# Patient Record
Sex: FEMALE | Race: WHITE | NOT HISPANIC OR LATINO | Employment: FULL TIME | URBAN - METROPOLITAN AREA
[De-identification: names, ages, dates, MRNs, and addresses within clinical notes are randomized per-mention and may not be internally consistent; named-entity substitution may affect disease eponyms.]

---

## 2017-01-03 DIAGNOSIS — F51.01 PRIMARY INSOMNIA: ICD-10-CM

## 2017-01-03 RX ORDER — ZOLPIDEM TARTRATE 10 MG/1
TABLET ORAL
Qty: 60 TABLET | Refills: 3 | Status: ON HOLD | OUTPATIENT
Start: 2017-01-03 | End: 2018-08-01 | Stop reason: CLARIF

## 2017-01-04 ENCOUNTER — TELEPHONE (OUTPATIENT)
Dept: FAMILY MEDICINE | Facility: CLINIC | Age: 72
End: 2017-01-04

## 2017-01-04 NOTE — TELEPHONE ENCOUNTER
----- Message from Mera Yuen sent at 1/4/2017 12:00 PM CST -----  Contact: self  Patient [phone] (cell) is asking if Dr Gomez is trying to reschedule her appt on 02 02 17?/please advise

## 2017-01-19 ENCOUNTER — PATIENT OUTREACH (OUTPATIENT)
Dept: ADMINISTRATIVE | Facility: HOSPITAL | Age: 72
End: 2017-01-19

## 2017-01-19 NOTE — LETTER
January 27, 2017    Madison Long  [address] [address] [place] [address]             [facility]  [address]  [facility] [address]  Phone: [phone] Dear Mrs. Long:    We have tried to reach you by mychart unsuccessfully.        [facility] is committed to your overall health.  To help you get the most out of each of your visits, we will review your information to make sure you are up to date on all of your recommended tests and/or procedures.       Dr. Gomez has found that you may be due for     One-time Hepatitis C Screening lab test(a viral condition that can harm the liver)   Tetanus vaccine   Shingles vaccine   Pneumonia vaccine   Flu vaccine     If you have had any of the above done at another facility, please bring the records or information with you so that your record at [facility] will be complete.     If you are currently taking medication, please bring it with you to your appointment for review.     Also, if you have any type of Advanced Directives, please bring them with you to your office visit so we may scan them into your chart.     Jose Maria Doe LPN Clinical Care Coordinator   [facility]   [address] [facility] [address]   [place] [address]   [phone] (p)   [phone] (f)

## 2017-02-01 ENCOUNTER — TELEPHONE (OUTPATIENT)
Dept: FAMILY MEDICINE | Facility: CLINIC | Age: 72
End: 2017-02-01

## 2017-02-02 ENCOUNTER — OFFICE VISIT (OUTPATIENT)
Dept: FAMILY MEDICINE | Facility: CLINIC | Age: 72
End: 2017-02-02
Payer: MEDICARE

## 2017-02-02 VITALS
DIASTOLIC BLOOD PRESSURE: 80 MMHG | BODY MASS INDEX: 26.74 KG/M2 | OXYGEN SATURATION: 98 % | HEART RATE: 70 BPM | HEIGHT: 65 IN | SYSTOLIC BLOOD PRESSURE: 138 MMHG | WEIGHT: 160.5 LBS

## 2017-02-02 DIAGNOSIS — E78.00 HYPERCHOLESTEROLEMIA: ICD-10-CM

## 2017-02-02 DIAGNOSIS — J44.9 CHRONIC OBSTRUCTIVE PULMONARY DISEASE, UNSPECIFIED COPD TYPE: Primary | ICD-10-CM

## 2017-02-02 DIAGNOSIS — I10 ESSENTIAL HYPERTENSION: Chronic | ICD-10-CM

## 2017-02-02 DIAGNOSIS — K21.9 GASTROESOPHAGEAL REFLUX DISEASE WITHOUT ESOPHAGITIS: ICD-10-CM

## 2017-02-02 PROCEDURE — 3075F SYST BP GE 130 - 139MM HG: CPT | Mod: S$GLB,,, | Performed by: FAMILY MEDICINE

## 2017-02-02 PROCEDURE — 99214 OFFICE O/P EST MOD 30 MIN: CPT | Mod: S$GLB,,, | Performed by: FAMILY MEDICINE

## 2017-02-02 PROCEDURE — 90670 PCV13 VACCINE IM: CPT | Mod: S$GLB,,, | Performed by: FAMILY MEDICINE

## 2017-02-02 PROCEDURE — 99999 PR PBB SHADOW E&M-EST. PATIENT-LVL IV: CPT | Mod: PBBFAC,,, | Performed by: FAMILY MEDICINE

## 2017-02-02 PROCEDURE — 1160F RVW MEDS BY RX/DR IN RCRD: CPT | Mod: S$GLB,,, | Performed by: FAMILY MEDICINE

## 2017-02-02 PROCEDURE — 99499 UNLISTED E&M SERVICE: CPT | Mod: S$GLB,,, | Performed by: FAMILY MEDICINE

## 2017-02-02 PROCEDURE — 1157F ADVNC CARE PLAN IN RCRD: CPT | Mod: S$GLB,,, | Performed by: FAMILY MEDICINE

## 2017-02-02 PROCEDURE — 3079F DIAST BP 80-89 MM HG: CPT | Mod: S$GLB,,, | Performed by: FAMILY MEDICINE

## 2017-02-02 PROCEDURE — 1159F MED LIST DOCD IN RCRD: CPT | Mod: S$GLB,,, | Performed by: FAMILY MEDICINE

## 2017-02-02 PROCEDURE — G0009 ADMIN PNEUMOCOCCAL VACCINE: HCPCS | Mod: S$GLB,,, | Performed by: FAMILY MEDICINE

## 2017-02-02 PROCEDURE — 1126F AMNT PAIN NOTED NONE PRSNT: CPT | Mod: S$GLB,,, | Performed by: FAMILY MEDICINE

## 2017-02-02 RX ORDER — ATORVASTATIN CALCIUM 10 MG/1
10 TABLET, FILM COATED ORAL DAILY
Qty: 90 TABLET | Refills: 3 | Status: SHIPPED | OUTPATIENT
Start: 2017-02-02 | End: 2017-06-02 | Stop reason: SDUPTHER

## 2017-02-02 RX ORDER — ATORVASTATIN CALCIUM 10 MG/1
10 TABLET, FILM COATED ORAL DAILY
Qty: 90 TABLET | Refills: 3 | Status: SHIPPED | OUTPATIENT
Start: 2017-02-02 | End: 2018-02-28 | Stop reason: SDUPTHER

## 2017-02-02 NOTE — MR AVS SNAPSHOT
[facility]  [address] [facility] [address] [place] [address]  Phone: [phone]  Fax: [phone]                  Madison Long   2017 9:20 AM   Office Visit    Description:  Female : 1945   Provider:  Jose J Gomez MD   Department:  [facility]           Reason for Visit     Annual Exam           Diagnoses this Visit        Comments    Chronic obstructive pulmonary disease, unspecified COPD type    -  Primary     Hypercholesterolemia         Essential hypertension         Gastroesophageal reflux disease without esophagitis                To Do List           Future Appointments        Provider Department Dept Phone    2017 10:30 AM MD [facility]n - Gastroenterology [phone]    2017 12:30 PM VASCULAR, LAB [facility] - Vascular Laboratory [phone]    2017 1:00 PM MD [facility] - Vascular Surgery [phone]    2017 1:00 PM Kamran Hughes MD [place] - Urology [phone]    2017 9:20 AM Jose J Gomez MD [facility] [phone]      Goals (5 Years of Data)     None      Follow-Up and Disposition     Return in about 4 months (around 2017).       These Medications        Disp Refills Start End    atorvastatin (LIPITOR) 10 MG tablet 90 tablet 3 2017    Take 1 tablet (10 mg total) by mouth once daily. - Oral    Pharmacy: [id number] [place] - [address] [facility] Ph #: [phone]       atorvastatin (LIPITOR) 10 MG tablet 90 tablet 3 2017    Take 1 tablet (10 mg total) by mouth once daily. - Oral    Pharmacy: [id number] - [place] - [address] Ph #: [phone]         [facility] On Call     [facility] On Call Nurse Care Line -  Assistance  Registered nurses in the [facility] On Call Center provide clinical advisement, health education, appointment booking, and other advisory services.  Call for this free  service at [phone].             Medications           Message regarding Medications     Verify the changes and/or additions to your medication regime listed below are the same as discussed with your clinician today.  If any of these changes or additions are incorrect, please notify your healthcare provider.        STOP taking these medications     azelastine (ASTELIN) 137 mcg (0.1 %) nasal spray 1 spray (137 mcg total) by Nasal route 2 (two) times daily.    oxybutynin (DITROPAN) 5 MG Tab Take 1 tablet (5 mg total) by mouth 3 (three) times daily.           Verify that the below list of medications is an accurate representation of the medications you are currently taking.  If none reported, the list may be blank. If incorrect, please contact your healthcare provider. Carry this list with you in case of emergency.           Current Medications     albuterol (ACCUNEB) 1.25 mg/3 mL Nebu Take 3 mLs (1.25 mg total) by nebulization every 6 (six) hours as needed.    aspirin 81 MG Chew Take 81 mg by mouth every other day.     budesonide-formoterol 160-4.5 mcg (SYMBICORT) 160-4.5 mcg/actuation HFAA Inhale 2 puffs into the lungs 2 (two) times daily.    desoximetasone (TOPICORT LP) 0.05 % cream Twice a day  PRN    DUREZOL 0.05 % Drop ophthalmic solution     econazole nitrate 1 % cream Twice a day  PRN    fish oil-omega-3 fatty acids 300-1,000 mg capsule Take 2 g by mouth once daily.    fluorouracil (EFUDEX) 5 % cream AAA bid x 2 weeks    fluticasone (FLONASE) 50 mcg/actuation nasal spray APPLY TWO SPRAYS NASALLY ONCE DAILY    guaifenesin (MUCINEX) 600 mg 12 hr tablet Take 1,200 mg by mouth 2 (two) times daily.    ketorolac 0.4% (ACULAR) 0.4 % Drop     levocetirizine (XYZAL) 5 MG tablet Take 1 tablet (5 mg total) by mouth every evening.    losartan (COZAAR) 50 MG tablet Take 1 tablet (50 mg total) by mouth once daily.    multivit,stress formula-zinc (STRESS FORMULA WITH ZINC) Tab Take by mouth.    omeprazole (PRILOSEC) 40  "MG capsule Take 1 capsule (40 mg total) by mouth every morning.    tretinoin (RETIN-A) 0.1 % cream Use hs on face    VENTOLIN HFA 90 mcg/actuation inhaler INHALE ONE OR TWO PUFFS BY MOUTH EVERY SIX HOURS AS NEEDED FORWHEEZING.    zolpidem (AMBIEN) 10 mg Tab 1 or 2 tablets every HS prn insomnia    atorvastatin (LIPITOR) 10 MG tablet Take 1 tablet (10 mg total) by mouth once daily.    atorvastatin (LIPITOR) 10 MG tablet Take 1 tablet (10 mg total) by mouth once daily.           Clinical Reference Information           Vital Signs - Last Recorded  Most recent update: 2/2/2017  9:24 AM by Bettye Elliott LPN    BP Pulse Ht Wt SpO2 BMI    138/80 70 5' 5" (1.651 m) 72.8 kg (160 lb 7.9 oz) 98% 26.71 kg/m2      Blood Pressure          Most Recent Value    BP  138/80      Allergies as of 2/2/2017     Penicillins      Immunizations Administered on Date of Encounter - 2/2/2017     Name Date Dose VIS Date Route    Pneumococcal Conjugate - 13 Valent  Incomplete 0.5 mL 11/5/2015 Intramuscular      Orders Placed During Today's Visit      Normal Orders This Visit    Pneumococcal Conjugate Vaccine (13 Valent) (IM)       "

## 2017-02-08 ENCOUNTER — TELEPHONE (OUTPATIENT)
Dept: GASTROENTEROLOGY | Facility: CLINIC | Age: 72
End: 2017-02-08

## 2017-02-08 NOTE — TELEPHONE ENCOUNTER
Provider Canceled (dr sierra will not be in the office , appt cxl, pt can speak with anyone to rescheduled appt

## 2017-02-12 NOTE — PROGRESS NOTES
HISTORY OF PRESENT ILLNESS:  A pleasant female, 71 years of age.  She is a   patient of Dr. Gómez Doherty at the main clinic for COPD.  She also has   hypertension, GERD and hyperlipidemia.  She is a nonsmoker.  She has had several   surgeries.  Past medical, surgical and social history was reviewed.  No recent   chest pain or shortness of breath.  No nausea, vomiting, dysphagia or melena.    No recent hospitalizations.    PHYSICAL EXAMINATION:  Dusty female who looks younger than her age.  Pulse ox   normal.  Chest clear.  No wheezes, rhonchi, clubbing, cyanosis or peripheral   edema.  Regular rhythm.  No murmur or gallop.  No scleral icterus, jaundice or   hepatosplenomegaly.  BMI 26.    ASSESSMENT:  COPD, hypertension, GERD and hypercholesterolemia.    PLAN:  We discussed medications, reviewed labs.  We discussed COPD.  We will   update Prevnar 13 and see her back in followup.      NIKKI  dd: 02/12/2017 13:51:21 (CST)  td: 02/12/2017 14:02:14 (CST)  Doc ID   #[id number]  Job ID #[id number]    CC:

## 2017-02-14 ENCOUNTER — OFFICE VISIT (OUTPATIENT)
Dept: VASCULAR SURGERY | Facility: CLINIC | Age: 72
End: 2017-02-14
Payer: MEDICARE

## 2017-02-14 ENCOUNTER — HOSPITAL ENCOUNTER (OUTPATIENT)
Dept: VASCULAR SURGERY | Facility: CLINIC | Age: 72
Discharge: HOME | End: 2017-02-14
Attending: SURGERY
Payer: MEDICARE

## 2017-02-14 VITALS
SYSTOLIC BLOOD PRESSURE: 139 MMHG | HEART RATE: 78 BPM | HEIGHT: 66 IN | DIASTOLIC BLOOD PRESSURE: 76 MMHG | BODY MASS INDEX: 25.39 KG/M2 | TEMPERATURE: 97 F | WEIGHT: 158 LBS

## 2017-02-14 DIAGNOSIS — I65.29 STENOSIS OF CAROTID ARTERY, UNSPECIFIED LATERALITY: Primary | ICD-10-CM

## 2017-02-14 DIAGNOSIS — I65.23 BILATERAL CAROTID ARTERY OCCLUSION: ICD-10-CM

## 2017-02-14 DIAGNOSIS — I65.23 BILATERAL CAROTID ARTERY STENOSIS: ICD-10-CM

## 2017-02-14 PROCEDURE — 3078F DIAST BP <80 MM HG: CPT | Mod: S$GLB,,, | Performed by: SURGERY

## 2017-02-14 PROCEDURE — 99999 PR PBB SHADOW E&M-EST. PATIENT-LVL III: CPT | Mod: PBBFAC,,, | Performed by: SURGERY

## 2017-02-14 PROCEDURE — 1159F MED LIST DOCD IN RCRD: CPT | Mod: S$GLB,,, | Performed by: SURGERY

## 2017-02-14 PROCEDURE — 1157F ADVNC CARE PLAN IN RCRD: CPT | Mod: S$GLB,,, | Performed by: SURGERY

## 2017-02-14 PROCEDURE — 1126F AMNT PAIN NOTED NONE PRSNT: CPT | Mod: S$GLB,,, | Performed by: SURGERY

## 2017-02-14 PROCEDURE — 3075F SYST BP GE 130 - 139MM HG: CPT | Mod: S$GLB,,, | Performed by: SURGERY

## 2017-02-14 PROCEDURE — 93880 EXTRACRANIAL BILAT STUDY: CPT | Mod: S$GLB,,, | Performed by: SURGERY

## 2017-02-14 PROCEDURE — 99213 OFFICE O/P EST LOW 20 MIN: CPT | Mod: S$GLB,,, | Performed by: SURGERY

## 2017-02-14 PROCEDURE — 1160F RVW MEDS BY RX/DR IN RCRD: CPT | Mod: S$GLB,,, | Performed by: SURGERY

## 2017-02-14 NOTE — PROGRESS NOTES
See my prior note; review of systems, family history and social history are   unchanged.    HISTORY OF PRESENT ILLNESS:  A 71-year-old female initially seen four years ago   for moderate carotid stenosis by an outside, duplex suggesting a 50% stenosis.    Her exam showed no stenosis whatsoever.    She now returns wishing to have this rechecked.  She denies stroke, TIA or   amaurosis.  She has no history of coronary disease or peripheral arterial   occlusive disease.    PHYSICAL EXAMINATION:  VITAL SIGNS:  See nursing note.  NEUROLOGIC:  Cranial nerves VII through XII are intact, 5/5 motor strength in   all extremities.    IMAGING:  Carotid duplex today reveals no carotid stenosis bilaterally.    ASSESSMENT:  No carotid stenosis.  I reassured her that I do not believe she   needs any longitudinal surveillance.          /ole [id number] brooke(s)        LINA/LISETTE  dd: 02/14/2017 13:36:40 (CST)  td: 02/15/2017 01:51:47 (CST)  Doc ID   #[id number]  Job ID #[id number]    CC:

## 2017-02-14 NOTE — MR AVS SNAPSHOT
[facility] - Vascular Surgery  [address]  [facility] [address]  Phone: [phone]  Fax: [phone]                  Madison Long   2017 1:00 PM   Office Visit    Description:  Female : 1945   Provider:  VERONICA Coon III, MD   Department:  [facility] - Vascular Surgery           Reason for Visit     Follow-up           Diagnoses this Visit        Comments    Stenosis of carotid artery, unspecified laterality    -  Primary            To Do List           Future Appointments        Provider Department Dept Phone    2017 1:00 PM MD [facility] [place] - Urology [phone]    2017 9:20 AM Jose J Gomez MD [facility] [phone]      Goals (5 Years of Data)     None      Follow-Up and Disposition     Return if symptoms worsen or fail to improve.      [facility] On Call     [facility] On Call Nurse Care Line -  Assistance  Registered nurses in the [facility] On Call Center provide clinical advisement, health education, appointment booking, and other advisory services.  Call for this free service at [phone].             Medications           Message regarding Medications     Verify the changes and/or additions to your medication regime listed below are the same as discussed with your clinician today.  If any of these changes or additions are incorrect, please notify your healthcare provider.             Verify that the below list of medications is an accurate representation of the medications you are currently taking.  If none reported, the list may be blank. If incorrect, please contact your healthcare provider. Carry this list with you in case of emergency.           Current Medications     albuterol (ACCUNEB) 1.25 mg/3 mL Nebu Take 3 mLs (1.25 mg total) by nebulization every 6 (six) hours as needed.    aspirin 81 MG Chew Take 81 mg by mouth every other day.     atorvastatin (LIPITOR) 10 MG tablet Take 1 tablet (10 mg total) by mouth once  "daily.    atorvastatin (LIPITOR) 10 MG tablet Take 1 tablet (10 mg total) by mouth once daily.    budesonide-formoterol 160-4.5 mcg (SYMBICORT) 160-4.5 mcg/actuation HFAA Inhale 2 puffs into the lungs 2 (two) times daily.    desoximetasone (TOPICORT LP) 0.05 % cream Twice a day  PRN    DUREZOL 0.05 % Drop ophthalmic solution     econazole nitrate 1 % cream Twice a day  PRN    fish oil-omega-3 fatty acids 300-1,000 mg capsule Take 2 g by mouth once daily.    fluorouracil (EFUDEX) 5 % cream AAA bid x 2 weeks    fluticasone (FLONASE) 50 mcg/actuation nasal spray APPLY TWO SPRAYS NASALLY ONCE DAILY    guaifenesin (MUCINEX) 600 mg 12 hr tablet Take 1,200 mg by mouth 2 (two) times daily.    ketorolac 0.4% (ACULAR) 0.4 % Drop     levocetirizine (XYZAL) 5 MG tablet Take 1 tablet (5 mg total) by mouth every evening.    losartan (COZAAR) 50 MG tablet Take 1 tablet (50 mg total) by mouth once daily.    multivit,stress formula-zinc (STRESS FORMULA WITH ZINC) Tab Take by mouth.    omeprazole (PRILOSEC) 40 MG capsule Take 1 capsule (40 mg total) by mouth every morning.    tretinoin (RETIN-A) 0.1 % cream Use hs on face    VENTOLIN HFA 90 mcg/actuation inhaler INHALE ONE OR TWO PUFFS BY MOUTH EVERY SIX HOURS AS NEEDED FORWHEEZING.    zolpidem (AMBIEN) 10 mg Tab 1 or 2 tablets every HS prn insomnia           Clinical Reference Information           Your Vitals Were     BP Pulse Temp Height Weight BMI    139/76 (BP Location: Right arm, Patient Position: Sitting, BP Method: Automatic) 78 96.8 °F (36 °C) (Oral) 5' 6" (1.676 m) 71.7 kg (158 lb) 25.5 kg/m2      Blood Pressure          Most Recent Value    Right Arm BP - Sitting  139/76    Left Arm BP - Sitting  138/72    BP  139/76      Allergies as of 2/14/2017     Penicillins      Immunizations Administered on Date of Encounter - 2/14/2017     None      Language Assistance Services     ATTENTION: Language assistance services are available, free of charge. Please call [phone].  "     ATENCIÓN: Si habla español, tiene a sales disposición servicios gratuitos de asistencia lingüística. [place] al [phone].     CHÚ Ý: N?u b?n nói Ti?ng Vi?t, có các d?ch v? h? tr? ngôn ng? mi?n phí dành cho b?n. G?i s? [phone].         [facility] complies with applicable Federal civil rights laws and does not discriminate on the basis of race, color, national origin, age, disability, or sex.

## 2017-03-21 DIAGNOSIS — J44.9 CHRONIC OBSTRUCTIVE PULMONARY DISEASE, UNSPECIFIED COPD TYPE: ICD-10-CM

## 2017-03-21 RX ORDER — BUDESONIDE AND FORMOTEROL FUMARATE DIHYDRATE 160; 4.5 UG/1; UG/1
AEROSOL RESPIRATORY (INHALATION)
Qty: 11 G | Refills: 7 | Status: SHIPPED | OUTPATIENT
Start: 2017-03-21 | End: 2018-05-21 | Stop reason: SDUPTHER

## 2017-05-23 DIAGNOSIS — J30.1 SEASONAL ALLERGIC RHINITIS DUE TO POLLEN: Primary | ICD-10-CM

## 2017-05-23 RX ORDER — FLUTICASONE PROPIONATE 50 MCG
SPRAY, SUSPENSION (ML) NASAL
Qty: 16 G | Refills: 11 | Status: SHIPPED | OUTPATIENT
Start: 2017-05-23 | End: 2018-05-08 | Stop reason: SDUPTHER

## 2017-06-02 ENCOUNTER — OFFICE VISIT (OUTPATIENT)
Dept: FAMILY MEDICINE | Facility: CLINIC | Age: 72
End: 2017-06-02
Payer: MEDICARE

## 2017-06-02 VITALS
WEIGHT: 158 LBS | BODY MASS INDEX: 25.39 KG/M2 | SYSTOLIC BLOOD PRESSURE: 120 MMHG | HEIGHT: 66 IN | DIASTOLIC BLOOD PRESSURE: 80 MMHG | HEART RATE: 86 BPM

## 2017-06-02 DIAGNOSIS — I10 ESSENTIAL HYPERTENSION: Primary | ICD-10-CM

## 2017-06-02 DIAGNOSIS — J44.1 COPD EXACERBATION: ICD-10-CM

## 2017-06-02 DIAGNOSIS — E78.5 HYPERLIPIDEMIA, UNSPECIFIED HYPERLIPIDEMIA TYPE: ICD-10-CM

## 2017-06-02 DIAGNOSIS — K21.9 GASTROESOPHAGEAL REFLUX DISEASE WITHOUT ESOPHAGITIS: ICD-10-CM

## 2017-06-02 PROCEDURE — 99499 UNLISTED E&M SERVICE: CPT | Mod: S$GLB,,, | Performed by: FAMILY MEDICINE

## 2017-06-02 PROCEDURE — 99214 OFFICE O/P EST MOD 30 MIN: CPT | Mod: S$GLB,,, | Performed by: FAMILY MEDICINE

## 2017-06-02 PROCEDURE — 1159F MED LIST DOCD IN RCRD: CPT | Mod: S$GLB,,, | Performed by: FAMILY MEDICINE

## 2017-06-02 PROCEDURE — 1157F ADVNC CARE PLAN IN RCRD: CPT | Mod: S$GLB,,, | Performed by: FAMILY MEDICINE

## 2017-06-02 PROCEDURE — 1126F AMNT PAIN NOTED NONE PRSNT: CPT | Mod: S$GLB,,, | Performed by: FAMILY MEDICINE

## 2017-06-02 PROCEDURE — 99999 PR PBB SHADOW E&M-EST. PATIENT-LVL III: CPT | Mod: PBBFAC,,, | Performed by: FAMILY MEDICINE

## 2017-06-05 ENCOUNTER — OFFICE VISIT (OUTPATIENT)
Dept: DERMATOLOGY | Facility: CLINIC | Age: 72
End: 2017-06-05
Payer: MEDICARE

## 2017-06-05 ENCOUNTER — OFFICE VISIT (OUTPATIENT)
Dept: GASTROENTEROLOGY | Facility: CLINIC | Age: 72
End: 2017-06-05
Payer: MEDICARE

## 2017-06-05 ENCOUNTER — LAB VISIT (OUTPATIENT)
Dept: LAB | Facility: HOSPITAL | Age: 72
End: 2017-06-05
Attending: FAMILY MEDICINE
Payer: MEDICARE

## 2017-06-05 VITALS — WEIGHT: 158 LBS | BODY MASS INDEX: 25.5 KG/M2

## 2017-06-05 VITALS
HEIGHT: 66 IN | SYSTOLIC BLOOD PRESSURE: 123 MMHG | HEART RATE: 103 BPM | BODY MASS INDEX: 26.08 KG/M2 | DIASTOLIC BLOOD PRESSURE: 78 MMHG | WEIGHT: 162.25 LBS

## 2017-06-05 DIAGNOSIS — L82.1 SEBORRHEIC KERATOSES: ICD-10-CM

## 2017-06-05 DIAGNOSIS — Z79.899 ENCOUNTER FOR MONITORING LONG-TERM PROTON PUMP INHIBITOR THERAPY: ICD-10-CM

## 2017-06-05 DIAGNOSIS — Z51.81 ENCOUNTER FOR MONITORING LONG-TERM PROTON PUMP INHIBITOR THERAPY: ICD-10-CM

## 2017-06-05 DIAGNOSIS — L57.0 MULTIPLE ACTINIC KERATOSES: ICD-10-CM

## 2017-06-05 DIAGNOSIS — I10 ESSENTIAL HYPERTENSION: ICD-10-CM

## 2017-06-05 DIAGNOSIS — Z85.828 HISTORY OF SKIN CANCER: Primary | ICD-10-CM

## 2017-06-05 DIAGNOSIS — K21.00 GASTROESOPHAGEAL REFLUX DISEASE WITH ESOPHAGITIS: Primary | ICD-10-CM

## 2017-06-05 LAB
ALBUMIN SERPL BCP-MCNC: 4.1 G/DL
ALP SERPL-CCNC: 65 U/L
ALT SERPL W/O P-5'-P-CCNC: 19 U/L
ANION GAP SERPL CALC-SCNC: 11 MMOL/L
AST SERPL-CCNC: 24 U/L
BILIRUB SERPL-MCNC: 0.7 MG/DL
BUN SERPL-MCNC: 9 MG/DL
CALCIUM SERPL-MCNC: 9.5 MG/DL
CHLORIDE SERPL-SCNC: 100 MMOL/L
CHOLEST/HDLC SERPL: 3 {RATIO}
CO2 SERPL-SCNC: 25 MMOL/L
CREAT SERPL-MCNC: 0.8 MG/DL
EST. GFR  (AFRICAN AMERICAN): >60 ML/MIN/1.73 M^2
EST. GFR  (NON AFRICAN AMERICAN): >60 ML/MIN/1.73 M^2
GLUCOSE SERPL-MCNC: 103 MG/DL
HDL/CHOLESTEROL RATIO: 33.6 %
HDLC SERPL-MCNC: 223 MG/DL
HDLC SERPL-MCNC: 75 MG/DL
LDLC SERPL CALC-MCNC: 137 MG/DL
NONHDLC SERPL-MCNC: 148 MG/DL
POTASSIUM SERPL-SCNC: 3.8 MMOL/L
PROT SERPL-MCNC: 7.2 G/DL
SODIUM SERPL-SCNC: 136 MMOL/L
TRIGL SERPL-MCNC: 55 MG/DL

## 2017-06-05 PROCEDURE — 17003 DESTRUCT PREMALG LES 2-14: CPT | Mod: S$GLB,,, | Performed by: DERMATOLOGY

## 2017-06-05 PROCEDURE — 99214 OFFICE O/P EST MOD 30 MIN: CPT | Mod: 25,S$GLB,, | Performed by: DERMATOLOGY

## 2017-06-05 PROCEDURE — 99999 PR PBB SHADOW E&M-EST. PATIENT-LVL III: CPT | Mod: PBBFAC,,, | Performed by: NURSE PRACTITIONER

## 2017-06-05 PROCEDURE — 1157F ADVNC CARE PLAN IN RCRD: CPT | Mod: S$GLB,,, | Performed by: NURSE PRACTITIONER

## 2017-06-05 PROCEDURE — 17000 DESTRUCT PREMALG LESION: CPT | Mod: S$GLB,,, | Performed by: DERMATOLOGY

## 2017-06-05 PROCEDURE — 1157F ADVNC CARE PLAN IN RCRD: CPT | Mod: S$GLB,,, | Performed by: DERMATOLOGY

## 2017-06-05 PROCEDURE — 99999 PR PBB SHADOW E&M-EST. PATIENT-LVL II: CPT | Mod: PBBFAC,,, | Performed by: DERMATOLOGY

## 2017-06-05 PROCEDURE — 99499 UNLISTED E&M SERVICE: CPT | Mod: S$GLB,,, | Performed by: NURSE PRACTITIONER

## 2017-06-05 PROCEDURE — 99214 OFFICE O/P EST MOD 30 MIN: CPT | Mod: S$GLB,,, | Performed by: NURSE PRACTITIONER

## 2017-06-05 PROCEDURE — 1126F AMNT PAIN NOTED NONE PRSNT: CPT | Mod: S$GLB,,, | Performed by: NURSE PRACTITIONER

## 2017-06-05 PROCEDURE — 1159F MED LIST DOCD IN RCRD: CPT | Mod: S$GLB,,, | Performed by: DERMATOLOGY

## 2017-06-05 PROCEDURE — 1159F MED LIST DOCD IN RCRD: CPT | Mod: S$GLB,,, | Performed by: NURSE PRACTITIONER

## 2017-06-05 RX ORDER — FLUOROURACIL 50 MG/G
CREAM TOPICAL
Qty: 40 G | Refills: 1 | Status: SHIPPED | OUTPATIENT
Start: 2017-06-05 | End: 2018-05-07

## 2017-06-05 RX ORDER — OMEPRAZOLE 20 MG/1
20 TABLET, DELAYED RELEASE ORAL EVERY MORNING
Qty: 30 TABLET | Refills: 11 | COMMUNITY
Start: 2017-06-05 | End: 2022-06-30

## 2017-06-05 NOTE — PROGRESS NOTES
[facility] Note    Reason for Visit:  The primary encounter diagnosis was Gastroesophageal reflux disease with esophagitis. A diagnosis of Encounter for monitoring long-term proton pump inhibitor therapy was also pertinent to this visit.    PCP:   Primary Doctor No       Referring MD:  No referring provider defined for this encounter.    HPI:  This is a 72 y.o. female here for evaluation of Gerd. Ms. Long was last seen in clinic with Dr. Anderson in 2008. I reviewed patient's medical record dating back 10 years. EGD done last 2011 and chronic inflammation present to esophagus and gastritis neg for H. Pylori.     Currently, has been taking Prilosec for a couple weeks then Tums for a couple weeks. Pt reports concern of side effects with PPIs. Having daily reflux symptoms of pyrosis and burning in throat when does not take Prilosec, with Prilosec daily no breakthrough symptoms. Food triggers are spicy food, greasy food, wine, and liquor. Denies dysphagia/odynophagia. NSAID usage- taking Aspirin 81 mg.     Having a normal formed stool daily. Drinks a glass of prune juice daily. Denies hematochezia, hematemesis, melena, BRBPR, black/tarry stools, and coffee ground emesis.     ROS:  Constitutional: No fevers, no chills, No unintentional weight loss, no fatigue   ENT: No allergies  CV: No chest pain, no palpitations, no perif. edema  Pulm: No cough, No shortness of breath, no wheezes, no sputum  Ophtho: No vision changes  GI: see HPI; also no nausea, no vomiting, no change in appetite  Derm: No rash  Heme: No lymphadenopathy, No bruising  MSK: No arthritis, no muscle pain, no muscle weakness  : No dysuria, No hematuria  Endo: No hot or cold intolerance  Neuro: No syncope, No seizure     Medical History:  has a past medical history of Allergy; Asthma; Basal cell carcinoma; GERD (gastroesophageal reflux disease); Hyperlipidemia; and Hypertension.    Surgical History:  has a past surgical  history that includes Hysterectomy; Excision basal cell carcinoma; Colonoscopy (2013); and Upper gastrointestinal endoscopy.    Family History: family history includes Asthma in her mother; Stroke (age of onset: 77) in her father..     Social History:  reports that she has never smoked. She has never used smokeless tobacco. She reports that she drinks about 1.2 oz of alcohol per week . She reports that she does not use drugs.    Review of patient's allergies indicates:   Allergen Reactions    Penicillins Hives     Current Outpatient Prescriptions   Medication Sig    aspirin 81 MG Chew Take 81 mg by mouth daily as needed.     atorvastatin (LIPITOR) 10 MG tablet Take 1 tablet (10 mg total) by mouth once daily.    desoximetasone (TOPICORT LP) 0.05 % cream Twice a day  PRN    DUREZOL 0.05 % Drop ophthalmic solution     fish oil-omega-3 fatty acids 300-1,000 mg capsule Take 2 g by mouth once daily.    fluorouracil (EFUDEX) 5 % cream AAA bid x 2 weeks    fluticasone (FLONASE) 50 mcg/actuation nasal spray APPLY TWO SPRAYS NASALLY ONCE DAILY    guaifenesin (MUCINEX) 600 mg 12 hr tablet Take 1,200 mg by mouth 2 (two) times daily.    ketorolac 0.4% (ACULAR) 0.4 % Drop     losartan (COZAAR) 50 MG tablet Take 1 tablet (50 mg total) by mouth once daily.    multivit,stress formula-zinc (STRESS FORMULA WITH ZINC) Tab Take by mouth.    SYMBICORT 160-4.5 mcg/actuation HFAA INHALE 2 PUFFS BY MOUTH TWICE DAILY    VENTOLIN HFA 90 mcg/actuation inhaler INHALE ONE OR TWO PUFFS BY MOUTH EVERY SIX HOURS AS NEEDED FORWHEEZING.    zolpidem (AMBIEN) 10 mg Tab 1 or 2 tablets every HS prn insomnia    econazole nitrate 1 % cream Twice a day  PRN    omeprazole (PRILOSEC OTC) 20 MG tablet Take 1 tablet (20 mg total) by mouth every morning. Take 30 minutes before your first protein containing meal.    tretinoin (RETIN-A) 0.1 % cream Use hs on face     No current facility-administered medications for this visit.      Objective  "Findings:    Vital Signs:  /78   Pulse 103   Ht 5' 5.5" (1.664 m)   Wt 73.6 kg (162 lb 4.1 oz)   BMI 26.59 kg/m²   Body mass index is 26.59 kg/m².    Physical Exam:  General Appearance: Well appearing in no acute distress  Head: Normocephalic, without obvious abnormality  Eyes: No scleral icterus, EOMI  ENT: Neck supple, Lips, mucosa, and tongue normal; teeth and gums normal  Lungs: CTA bilaterally in anterior and posterior fields, no wheezes, no crackles.  Heart: Regular rate and rhythm, no murmurs heard  Abdomen: Soft, non tender, non distended with positive bowel sounds in all four quadrants.  Extremities: No clubbing, cyanosis or edema  Skin: No rash to exposed areas  Neurologic: AAOx4    Labs:  Lab Results   Component Value Date    WBC 8.50 12/06/2016    HGB 14.8 12/06/2016    HCT 43.7 12/06/2016     12/06/2016    CHOL 223 (H) 06/05/2017    TRIG 55 06/05/2017    HDL 75 06/05/2017    ALT 19 06/05/2017    AST 24 06/05/2017     06/05/2017    K 3.8 06/05/2017     06/05/2017    CREATININE 0.8 06/05/2017    BUN 9 06/05/2017    CO2 25 06/05/2017    TSH 1.650 10/26/2016    INR 1.0 12/06/2016     Endoscopy:    EGD- 6/15/11 Esophageal mucosal changes suspicious for Rosen's bx and negative for rosen's- chronic inflammation. Hiatal hernia and gastritis bx negative for H. Pylori.     Colonoscopy- 3/1/13 Tortuous colon.There is restrictive mobility of the colon in the sigmoid and transverse colon region probable secondary to adhesions. Repeat in 5 years, 2018.     Assessment:  1. Gastroesophageal reflux disease with esophagitis    2. Encounter for monitoring long-term proton pump inhibitor therapy      Recommendations:  1. Begin taking Prilosec 20 mg 30-45 minutes before first protein containing meal, rx sen to pharmacy. If reflux symptoms are not controlled begin taking Prilosec 40 mg daily. Reviewed lifestyle modifications and handout given. Can take OTC Zantac as needed at night.   2. Labs " tomorrow- Vitamin D, Vitamin B12, and Magnesium. Patient made aware of the increased risk of deficiencies in Vitamin D, B12, and Magnesium being on a PPI for long term. Explained to patient increased risk of C. Diff associated diarrhea and routine bone density scans are recommended per PCP while on long term PPI.     Follow up in 1 year unless symptoms worsen.     Order summary:  Orders Placed This Encounter    Vitamin D    Vitamin B12    Magnesium    omeprazole (PRILOSEC OTC) 20 MG tablet     Thank you so much for allowing me to participate in the care of Madison Fan, DELVIN, FNP-C

## 2017-06-05 NOTE — PATIENT INSTRUCTIONS
For GERD/Reflux:    Take your Prilosec 20 mg 30-45 minutes before your first protein containing meal (breakfast) every day. If symptoms are not controlled increase back to Prilosec 40 mg.     Remain upright for at least 3 hours after eating.    Elevate the head of the bead about 6 inches.  Some patients place cinder blocks under the head of the bed for elevation.    Avoid foods that you have noticed make your symptoms worse (possible triggers include:peppermint, chocolate, caffeine, spicy foods, greasy/fried foods, acidic foods).    You may take over the counter Zantac/ranitadine as directed, as needed for breakthrough symptoms.     Make a clinic appointment If symptoms occur more than twice per week, despite taking medications appropriately.

## 2017-06-06 NOTE — PROGRESS NOTES
Subjective:       Patient ID:  Madison Long is a 72 y.o. female who presents for   Chief Complaint   Patient presents with    Skin Check    Spot     New lesions on arms not painful no tx      Spot         Review of Systems   Constitutional: Negative for fever.   Skin: Negative for itching and rash.   Hematologic/Lymphatic: Does not bruise/bleed easily.        Objective:    Physical Exam   Constitutional: She appears well-developed and well-nourished. No distress.   Neurological: She is alert and oriented to person, place, and time. She is not disoriented.   Psychiatric: She has a normal mood and affect.   Skin:   Areas Examined (abnormalities noted in diagram):   Head / Face Inspection Performed  Neck Inspection Performed  Chest / Axilla Inspection Performed  Back Inspection Performed  RUE Inspected  LUE Inspection Performed  RLE Inspected  LLE Inspection Performed                   Diagram Legend     Erythematous scaling macule/papule c/w actinic keratosis        Surgical scar with no sign of skin cancer recurrence       Assessment / Plan:        History of skin cancer  Comments:  bcc right cheek removed mohs surgery    Multiple actinic keratoses   Cryosurgery Procedure Note    Verbal consent from the patient is obtained and the patient is aware of the precancerous quality and need for treatment of these lesions. Liquid nitrogen cryosurgery is applied to the 11 actinic keratoses, as detailed in the physical exam, to produce a freeze injury.    -     fluorouracil (EFUDEX) 5 % cream; AAA bid x 2 weeks  Dispense: 40 g; Refill: 1    Seborrheic keratoses  reassurance      No recurrence of lesion treated on right nose last visit             Return in about 6 months (around 12/5/2017).

## 2017-06-11 NOTE — PROGRESS NOTES
HISTORY OF PRESENT ILLNESS:  A pleasant female here today, 72 years of age.  She   has hypertension, hyperlipidemia and GERD.  She also has COPD.  Past medical,   surgical and social history is reviewed.  She is a nonsmoker at the present   time.  She has not really been on inhaler that she should be on recently.    PHYSICAL EXAMINATION:  Vital signs normal.  Chest is clear.  No wheezes or   rhonchi.  No clubbing, cyanosis or peripheral edema today.  Respiratory rate was   normal.  No acute skin rash.  No abdominal tenderness.    ASSESSMENT:  Hypertension, COPD, hyperlipidemia and GERD.    PLAN:  We discussed the use of each of her medications and inhalers.  Reviewed   GERD.  Ordered CMP and lipids.  She is going to followup with specialists, I   believe.  I will get back to her, pending her lab test.      NIKKI  dd: 06/11/2017 15:38:13 (CDT)  td: 06/12/2017 03:51:30 (CDT)  Doc ID   #[id number]  Job ID #[id number]    CC:

## 2017-06-14 ENCOUNTER — LAB VISIT (OUTPATIENT)
Dept: LAB | Facility: HOSPITAL | Age: 72
End: 2017-06-14
Attending: INTERNAL MEDICINE
Payer: MEDICARE

## 2017-06-14 DIAGNOSIS — Z79.899 ENCOUNTER FOR MONITORING LONG-TERM PROTON PUMP INHIBITOR THERAPY: ICD-10-CM

## 2017-06-14 DIAGNOSIS — Z51.81 ENCOUNTER FOR MONITORING LONG-TERM PROTON PUMP INHIBITOR THERAPY: ICD-10-CM

## 2017-06-14 LAB
25(OH)D3+25(OH)D2 SERPL-MCNC: 40 NG/ML
MAGNESIUM SERPL-MCNC: 2.2 MG/DL
VIT B12 SERPL-MCNC: 1127 PG/ML

## 2017-06-14 PROCEDURE — 36415 COLL VENOUS BLD VENIPUNCTURE: CPT | Mod: PO

## 2017-06-14 PROCEDURE — 82306 VITAMIN D 25 HYDROXY: CPT

## 2017-06-14 PROCEDURE — 83735 ASSAY OF MAGNESIUM: CPT

## 2017-06-14 PROCEDURE — 82607 VITAMIN B-12: CPT

## 2017-06-15 ENCOUNTER — TELEPHONE (OUTPATIENT)
Dept: GASTROENTEROLOGY | Facility: CLINIC | Age: 72
End: 2017-06-15

## 2017-06-15 DIAGNOSIS — I10 ESSENTIAL HYPERTENSION: ICD-10-CM

## 2017-06-15 NOTE — TELEPHONE ENCOUNTER
----- Message from Rosy Alvarado sent at 6/15/2017 12:23 PM CDT -----  Contact: Patient  States that she needs a refill for the losartan (COZAAR) 50 MG tablets which is her BP medication and that was the only medication that wasn't sent in.  Can you please look in to this matter and please refill.  Any questions, please call [phone].  Thank you      [id number] [place] - [address] [facility]  [address]  [place] [address]  Phone: [phone] Fax: [phone]

## 2017-06-15 NOTE — TELEPHONE ENCOUNTER
----- Message from Araceli Fan NP sent at 6/15/2017 10:45 AM CDT -----  Please notify Vitamin B12, D, and Mag normal. Thanks.     SALLIE Charles

## 2017-06-18 RX ORDER — LOSARTAN POTASSIUM 50 MG/1
50 TABLET ORAL DAILY
Qty: 30 TABLET | Refills: 11 | Status: SHIPPED | OUTPATIENT
Start: 2017-06-18 | End: 2018-07-18 | Stop reason: SDUPTHER

## 2017-08-14 ENCOUNTER — OFFICE VISIT (OUTPATIENT)
Dept: UROLOGY | Facility: CLINIC | Age: 72
End: 2017-08-14
Payer: MEDICARE

## 2017-08-14 VITALS
SYSTOLIC BLOOD PRESSURE: 136 MMHG | HEIGHT: 65 IN | HEART RATE: 82 BPM | WEIGHT: 161.81 LBS | BODY MASS INDEX: 26.96 KG/M2 | DIASTOLIC BLOOD PRESSURE: 80 MMHG

## 2017-08-14 DIAGNOSIS — N95.2 VAGINITIS, ATROPHIC: ICD-10-CM

## 2017-08-14 PROCEDURE — 3079F DIAST BP 80-89 MM HG: CPT | Mod: S$GLB,,, | Performed by: UROLOGY

## 2017-08-14 PROCEDURE — 3075F SYST BP GE 130 - 139MM HG: CPT | Mod: S$GLB,,, | Performed by: UROLOGY

## 2017-08-14 PROCEDURE — 1126F AMNT PAIN NOTED NONE PRSNT: CPT | Mod: S$GLB,,, | Performed by: UROLOGY

## 2017-08-14 PROCEDURE — 1159F MED LIST DOCD IN RCRD: CPT | Mod: S$GLB,,, | Performed by: UROLOGY

## 2017-08-14 PROCEDURE — 3008F BODY MASS INDEX DOCD: CPT | Mod: S$GLB,,, | Performed by: UROLOGY

## 2017-08-14 PROCEDURE — 99999 PR PBB SHADOW E&M-EST. PATIENT-LVL III: CPT | Mod: PBBFAC,,, | Performed by: UROLOGY

## 2017-08-14 PROCEDURE — 1157F ADVNC CARE PLAN IN RCRD: CPT | Mod: S$GLB,,, | Performed by: UROLOGY

## 2017-08-14 PROCEDURE — 99204 OFFICE O/P NEW MOD 45 MIN: CPT | Mod: S$GLB,,, | Performed by: UROLOGY

## 2017-08-14 RX ORDER — ESTRADIOL 0.1 MG/G
1 CREAM VAGINAL EVERY OTHER DAY
Qty: 1 TUBE | Refills: 3 | Status: SHIPPED | OUTPATIENT
Start: 2017-08-14 | End: 2018-08-20 | Stop reason: SDUPTHER

## 2017-08-14 NOTE — PROGRESS NOTES
CC: vaginal dryness, terminal dribbling and dysuria    Madison Long is a 72 y.o. woman who is here for the evaluation of Urinary Frequency and Other (dryness to the vagina area)    S/p total hysterectomy at age 47 for ovarian cyst.  Her  passed 13 years ago and has not been sexually active.  C/o terminal dribbling and slight burning at the end of voiding.  Thus she has used Azo for these symptoms and felt better.  Hx of recurrent UTI.  She is not sexually active at present time.    Denies flank pain, dysuira, hematuria     Past Medical History:   Diagnosis Date    Allergy     Asthma     Basal cell carcinoma     GERD (gastroesophageal reflux disease)     Hyperlipidemia     Hypertension      Past Surgical History:   Procedure Laterality Date    BASAL CELL CARCINOMA EXCISION      COLONOSCOPY  2013    HYSTERECTOMY      UPPER GASTROINTESTINAL ENDOSCOPY       Social History   Substance Use Topics    Smoking status: Never Smoker    Smokeless tobacco: Never Used    Alcohol use 1.2 oz/week     2 Glasses of wine per week      Comment: socially     Family History   Problem Relation Age of Onset    Asthma Mother     Stroke Father 77     cva    Colon cancer Neg Hx     Colon polyps Neg Hx     Celiac disease Neg Hx     Esophageal cancer Neg Hx     Stomach cancer Neg Hx     Irritable bowel syndrome Neg Hx     Inflammatory bowel disease Neg Hx      Allergy:  Review of patient's allergies indicates:   Allergen Reactions    Penicillins Hives     Outpatient Encounter Prescriptions as of 8/14/2017   Medication Sig Dispense Refill    aspirin 81 MG Chew Take 81 mg by mouth daily as needed.       atorvastatin (LIPITOR) 10 MG tablet Take 1 tablet (10 mg total) by mouth once daily. 90 tablet 3    DUREZOL 0.05 % Drop ophthalmic solution       fish oil-omega-3 fatty acids 300-1,000 mg capsule Take 2 g by mouth once daily.      fluticasone (FLONASE) 50 mcg/actuation nasal spray APPLY TWO SPRAYS NASALLY  ONCE DAILY 16 g 11    guaifenesin (MUCINEX) 600 mg 12 hr tablet Take 1,200 mg by mouth 2 (two) times daily.      ketorolac 0.4% (ACULAR) 0.4 % Drop       losartan (COZAAR) 50 MG tablet Take 1 tablet (50 mg total) by mouth once daily. 30 tablet 11    multivit,stress formula-zinc (STRESS FORMULA WITH ZINC) Tab Take by mouth.      omeprazole (PRILOSEC OTC) 20 MG tablet Take 1 tablet (20 mg total) by mouth every morning. Take 30 minutes before your first protein containing meal. 30 tablet 11    SYMBICORT 160-4.5 mcg/actuation HFAA INHALE 2 PUFFS BY MOUTH TWICE DAILY 11 g 7    tretinoin (RETIN-A) 0.1 % cream Use hs on face 45 g 5    VENTOLIN HFA 90 mcg/actuation inhaler INHALE ONE OR TWO PUFFS BY MOUTH EVERY SIX HOURS AS NEEDED FORWHEEZING. 18 each 12    zolpidem (AMBIEN) 10 mg Tab 1 or 2 tablets every HS prn insomnia 60 tablet 3    desoximetasone (TOPICORT LP) 0.05 % cream Twice a day  PRN      econazole nitrate 1 % cream Twice a day  PRN      estradiol (ESTRACE) 0.01 % (0.1 mg/gram) vaginal cream Place 1 g vaginally every other day. 1 Tube 3    fluorouracil (EFUDEX) 5 % cream AAA bid x 2 weeks 40 g 1     No facility-administered encounter medications on file as of 8/14/2017.      Review of Systems   General ROS: GENERAL:  No weight gain or loss  SKIN:  No rashes or lacerations  HEAD:  No headaches  EYES:  No exophthalmos, jaundice or blindness  EARS:  No dizziness, tinnitus or hearing loss  NOSE:  No changes in smell  MOUTH & THROAT:  No dyskinetic movements or obvious goiter  CHEST:  No shortness of breath, hyperventilation or cough  CARDIOVASCULAR:  No tachycardia or chest pain  ABDOMEN:  No nausea, vomiting, pain, constipation or diarrhea  URINARY:  No frequency, dysuria or sexual dysfunction  ENDOCRINE:  No polydipsia, polyuria  MUSCULOSKELETAL:  No pain or stiffness of the joints  NEUROLOGIC:  No weakness, sensory changes, seizures, confusion, memory loss, tremor or other abnormal movements  Physical  Exam     Vitals:    08/14/17 1305   BP: 136/80   Pulse: 82     Physical Exam   Constitutional: She is oriented to person, place, and time. She appears well-developed and well-nourished. No distress.   HENT:   Head: Normocephalic and atraumatic.   Right Ear: External ear normal.   Left Ear: External ear normal.   Nose: Nose normal.   Eyes: Conjunctivae and EOM are normal. Pupils are equal, round, and reactive to light. No scleral icterus.   Neck: Normal range of motion. Neck supple. No JVD present. No tracheal deviation present. No thyromegaly present.   Cardiovascular: Normal rate, regular rhythm, normal heart sounds and intact distal pulses.  Exam reveals no gallop and no friction rub.    No murmur heard.  Pulmonary/Chest: Effort normal and breath sounds normal. No respiratory distress. She has no wheezes.   Abdominal: Soft. Bowel sounds are normal. She exhibits no distension and no mass. There is no tenderness. There is no rebound and no guarding.   Genitourinary: Vagina normal and uterus normal. No vaginal discharge found.   Genitourinary Comments: Atrophic vagina   Musculoskeletal: Normal range of motion. She exhibits no edema, tenderness or deformity.   Lymphadenopathy:     She has no cervical adenopathy.   Neurological: She is alert and oriented to person, place, and time.   Skin: Skin is warm and dry. She is not diaphoretic.     Psychiatric: She has a normal mood and affect. Her behavior is normal. Thought content normal.         LABS:  Lab Results   Component Value Date    CREATININE 0.8 06/05/2017    CREATININE 0.8 12/06/2016    CREATININE 0.9 10/26/2016     Urine Culture, Routine   Date Value Ref Range Status   11/28/2016 No growth  Final     Assessment and Plan:  Madison was seen today for urinary frequency and other.    Diagnoses and all orders for this visit:    Vaginitis, atrophic  -     estradiol (ESTRACE) 0.01 % (0.1 mg/gram) vaginal cream; Place 1 g vaginally every other day.    No positive urine  culture so far.  If she has a symptoms of UTI, make sure to get urine culture.  Otherwise, for her symptoms, recommend vaginal cream applied to urethra.    Follow-up:  Return in about 6 months (around 2/14/2018).

## 2017-08-29 ENCOUNTER — PATIENT OUTREACH (OUTPATIENT)
Dept: ADMINISTRATIVE | Facility: HOSPITAL | Age: 72
End: 2017-08-29

## 2017-09-04 ENCOUNTER — HOSPITAL ENCOUNTER (EMERGENCY)
Facility: HOSPITAL | Age: 72
Discharge: HOME | End: 2017-09-04
Attending: EMERGENCY MEDICINE | Admitting: EMERGENCY MEDICINE
Payer: MEDICARE

## 2017-09-04 ENCOUNTER — NURSE TRIAGE (OUTPATIENT)
Dept: ADMINISTRATIVE | Facility: CLINIC | Age: 72
End: 2017-09-04

## 2017-09-04 ENCOUNTER — HOSPITAL ENCOUNTER (EMERGENCY)
Facility: HOSPITAL | Age: 72
Discharge: ANOTHER HOSPITAL | End: 2017-09-04
Attending: EMERGENCY MEDICINE
Payer: MEDICARE

## 2017-09-04 VITALS
HEART RATE: 89 BPM | DIASTOLIC BLOOD PRESSURE: 87 MMHG | OXYGEN SATURATION: 96 % | SYSTOLIC BLOOD PRESSURE: 163 MMHG | RESPIRATION RATE: 14 BRPM | WEIGHT: 161.81 LBS | HEIGHT: 65 IN | TEMPERATURE: 99 F | BODY MASS INDEX: 26.96 KG/M2

## 2017-09-04 VITALS
WEIGHT: 162 LBS | HEIGHT: 66 IN | DIASTOLIC BLOOD PRESSURE: 88 MMHG | RESPIRATION RATE: 18 BRPM | TEMPERATURE: 99 F | BODY MASS INDEX: 26.03 KG/M2 | HEART RATE: 72 BPM | SYSTOLIC BLOOD PRESSURE: 143 MMHG | OXYGEN SATURATION: 98 %

## 2017-09-04 DIAGNOSIS — H33.21 RETINAL DETACHMENT, RIGHT: Primary | ICD-10-CM

## 2017-09-04 DIAGNOSIS — H53.9 VISUAL CHANGES: Primary | ICD-10-CM

## 2017-09-04 PROCEDURE — 99284 EMERGENCY DEPT VISIT MOD MDM: CPT | Mod: 27

## 2017-09-04 PROCEDURE — 99283 EMERGENCY DEPT VISIT LOW MDM: CPT | Mod: ,,, | Performed by: EMERGENCY MEDICINE

## 2017-09-04 PROCEDURE — 25000003 PHARM REV CODE 250: Performed by: STUDENT IN AN ORGANIZED HEALTH CARE EDUCATION/TRAINING PROGRAM

## 2017-09-04 PROCEDURE — 25000003 PHARM REV CODE 250: Performed by: EMERGENCY MEDICINE

## 2017-09-04 PROCEDURE — 99284 EMERGENCY DEPT VISIT MOD MDM: CPT

## 2017-09-04 RX ORDER — PROPARACAINE HYDROCHLORIDE 5 MG/ML
1 SOLUTION/ DROPS OPHTHALMIC
Status: COMPLETED | OUTPATIENT
Start: 2017-09-04 | End: 2017-09-04

## 2017-09-04 RX ADMIN — PROPARACAINE HYDROCHLORIDE 1 DROP: 5 SOLUTION/ DROPS OPHTHALMIC at 04:09

## 2017-09-04 RX ADMIN — FLUORESCEIN SODIUM 1 STRIP: 1 STRIP OPHTHALMIC at 08:09

## 2017-09-04 RX ADMIN — FLUORESCEIN SODIUM 1 STRIP: 1 STRIP OPHTHALMIC at 04:09

## 2017-09-04 NOTE — ED PROVIDER NOTES
"Encounter Date: 9/4/2017    SCRIBE #1 NOTE: IStephanie, am scribing for, and in the presence of, Dr. Reyes.       History     Chief Complaint   Patient presents with    Eye Problem     vision problems since Thursday, worsening.       09/04/2017 3:53 PM     Chief Complaint: Eye Problem      Madison Long is a 72 y.o. female with a history of Asthma, HTN, HLD, GERD who presents to the ED with complaints of worsened intermittent blurry vision in the right eye since 4 days ago. She states her vision in the right eye was initially blurry but resolved and now today she has central blurry vision and inferior loss of vision. She reports peripheral vision is intact in the right eye. She states she sees "reddish purplish" blur color in the right eye when looking straight forward. If she look down she reports she sees a blurry black color. She reports cataract surgery in both eyes. Pt states cataract surgery in the right eyes 11/2/2016. She wears lenses daily. Pt denies facial numbness or eye pain.        The history is provided by the patient.     Review of patient's allergies indicates:   Allergen Reactions    Penicillins Hives     Past Medical History:   Diagnosis Date    Allergy     Asthma     Basal cell carcinoma     GERD (gastroesophageal reflux disease)     Hyperlipidemia     Hypertension      Past Surgical History:   Procedure Laterality Date    BASAL CELL CARCINOMA EXCISION      COLONOSCOPY  2013    HYSTERECTOMY      UPPER GASTROINTESTINAL ENDOSCOPY       Family History   Problem Relation Age of Onset    Asthma Mother     Stroke Father 77     cva    Colon cancer Neg Hx     Colon polyps Neg Hx     Celiac disease Neg Hx     Esophageal cancer Neg Hx     Stomach cancer Neg Hx     Irritable bowel syndrome Neg Hx     Inflammatory bowel disease Neg Hx      Social History   Substance Use Topics    Smoking status: Never Smoker    Smokeless tobacco: Never Used    Alcohol use 1.2 oz/week     2 " Glasses of wine per week      Comment: socially     Review of Systems   Constitutional: Negative for fever.   HENT: Negative for sore throat.    Eyes: Positive for visual disturbance. Negative for pain.   Respiratory: Negative for shortness of breath.    Cardiovascular: Negative for chest pain.   Gastrointestinal: Negative for nausea.   Skin: Negative for rash.   Neurological: Negative for weakness, numbness and headaches.       Physical Exam     Initial Vitals [09/04/17 1517]   BP Pulse Resp Temp SpO2   (!) 163/87 89 14 98.9 °F (37.2 °C) 96 %      MAP       112.33         Physical Exam    Nursing note and vitals reviewed.  Constitutional: She appears well-developed and well-nourished.   HENT:   Head: Normocephalic and atraumatic.   Eyes: EOM and lids are normal. Pupils are equal, round, and reactive to light. Right eye exhibits no chemosis, no discharge, no exudate and no hordeolum. No foreign body present in the right eye. Left eye exhibits no chemosis, no discharge, no exudate and no hordeolum. No foreign body present in the left eye. Right conjunctiva is not injected. Right conjunctiva has no hemorrhage. Left conjunctiva is not injected. Left conjunctiva has no hemorrhage. No scleral icterus. Right eye exhibits normal extraocular motion and no nystagmus. Left eye exhibits normal extraocular motion and no nystagmus. Right pupil is round and reactive. Left pupil is round and reactive. Pupils are equal.   Slit lamp exam:       The right eye shows no corneal abrasion, no corneal flare, no corneal ulcer, no foreign body, no hyphema, no hypopyon, no fluorescein uptake and no anterior chamber bulge.   Visual fields intact.  Unable to visualize eyegrounds   Neck: Normal range of motion. Neck supple.   Cardiovascular: Normal rate and regular rhythm. Exam reveals no gallop and no friction rub.    No murmur heard.  Pulmonary/Chest: No respiratory distress.   Musculoskeletal: Normal range of motion.   Neurological: She is  alert and oriented to person, place, and time.   Skin: Skin is warm and dry.   Psychiatric: She has a normal mood and affect. Her behavior is normal. Judgment and thought content normal.         ED Course   Procedures  Labs Reviewed - No data to display                     Scribe Attestation:   Scribe #1: I performed the above scribed service and the documentation accurately describes the services I performed. I attest to the accuracy of the note.    Attending Attestation:           Physician Attestation for Scribe:  Physician Attestation Statement for Scribe #1: I, Dr. Reyes, reviewed documentation, as scribed by Stephanie Rodriguez in my presence, and it is both accurate and complete.                 ED Course as of Sep 04 1731   Mon Sep 04, 2017   1617 Case d/w dr hurst [facility] who accepts patient, ER to ER  [EF]   1635 R iop 4,6  [EF]      ED Course User Index  [EF] Elijah Reyes MD     Clinical Impression:   No diagnosis found.         72-year-old presents with central vision loss and visual changes.  These began today.  She has no ocular pain.  She does have some inferior loss of vision on the nasal aspect when looking straight ahead.  Directly above this region she has blurry vision.  Peripheral vision is intact according to the patient.  This does seem concerning for possible retinal detachment.  Patient needs to be transferred for immediate ophthalmology consultation. sister will drive to [facility] ER.  This does not seem to be a stroke.  She has not had complete loss of vision in the right eye.  I see no reason for head CT or labs at our facility.             Elijah Reyes MD  09/04/17 0792

## 2017-09-04 NOTE — ED NOTES
"Presents to the ER with c/o blurry vision to right eye that started 3 days ago. Patient reports a "Circular rainbow vision to my right eye." Patient reports that blurry vision has resolved but now has a black field when she looks down. Mucous membranes are pink and moist. Skin is warm, dry and intact. Lungs are clear bilaterally, respirations are regular and unlabored. Denies cough, congestion, rhinorrhea or SOB. BS active x4, no tenderness with palpation, abd is soft and not distended. Denies any appetite or activity change. S1S2, capillary refill is < 2 seconds. Denies dysuria, difficulty urinating, frequency, numbness, tingling or weakness. ELIJAH MAZAS    "

## 2017-09-04 NOTE — TELEPHONE ENCOUNTER
Answer Assessment - Initial Assessment Questions  Hx of bilateral cataract surgery. Right one done Nov 2016. Onset last Thursday of blurred vision in right eye which has been continuous. Last night noted what appeared to be a halo and this am part of the visual field is black and has some rainbow colors. On call back [facility] and is on her way to ER.    Protocols used: ST VISION LOSS OR CHANGE-A-AH

## 2017-09-04 NOTE — ED TRIAGE NOTES
Reports blurry vision to right eye that's gotten progressively worse since Thursday. By last night around 2000, she's been seeing colors and black shade in center of vision.

## 2017-09-05 ENCOUNTER — TELEPHONE (OUTPATIENT)
Dept: OPHTHALMOLOGY | Facility: CLINIC | Age: 72
End: 2017-09-05

## 2017-09-05 ENCOUNTER — OFFICE VISIT (OUTPATIENT)
Dept: OPHTHALMOLOGY | Facility: CLINIC | Age: 72
End: 2017-09-05
Payer: MEDICARE

## 2017-09-05 DIAGNOSIS — H33.011 RETINAL DETACHMENT OF RIGHT EYE WITH SINGLE BREAK: ICD-10-CM

## 2017-09-05 DIAGNOSIS — H33.001 RHEGMATOGENOUS RETINAL DETACHMENT OF RIGHT EYE: Primary | ICD-10-CM

## 2017-09-05 DIAGNOSIS — H33.21 RETINAL DETACHMENT, RIGHT: Primary | ICD-10-CM

## 2017-09-05 DIAGNOSIS — H43.811 POSTERIOR VITREOUS DETACHMENT, RIGHT EYE: ICD-10-CM

## 2017-09-05 PROCEDURE — 92226 PR SPECIAL EYE EXAM, SUBSEQUENT: CPT | Mod: RT,S$GLB,, | Performed by: OPHTHALMOLOGY

## 2017-09-05 PROCEDURE — 99999 PR PBB SHADOW E&M-EST. PATIENT-LVL III: CPT | Mod: PBBFAC,,, | Performed by: OPHTHALMOLOGY

## 2017-09-05 PROCEDURE — 99499 UNLISTED E&M SERVICE: CPT | Mod: S$GLB,,, | Performed by: OPHTHALMOLOGY

## 2017-09-05 PROCEDURE — 92004 COMPRE OPH EXAM NEW PT 1/>: CPT | Mod: S$GLB,,, | Performed by: OPHTHALMOLOGY

## 2017-09-05 PROCEDURE — 92134 CPTRZ OPH DX IMG PST SGM RTA: CPT | Mod: S$GLB,,, | Performed by: OPHTHALMOLOGY

## 2017-09-05 RX ORDER — CARBOXYMETHYLCELLULOSE SODIUM 5 MG/ML
2 SOLUTION/ DROPS OPHTHALMIC 2 TIMES DAILY PRN
COMMUNITY
End: 2018-05-07

## 2017-09-05 NOTE — ED PROVIDER NOTES
"Encounter Date: 9/4/2017    SCRIBE #1 NOTE: I, Clementina Fernández, am scribing for, and in the presence of,  Dr. Mast. I have scribed the following portions of the note - Other sections scribed: HPI ROS.       History     Chief Complaint   Patient presents with    Eye Problem     sent here from [place] to see ophthal, blurry on and off since thurs, seeing rainbow color since last night     Time seen by provider: 7:23 PM    This is a 72 y.o. female with PM Hx of HTN, HLD, and cataract surgery (both eyes) who presents with complaint of intermittent blurry vision in the right eye that started four days ago. She describes seeing spectrum of colors, "like the rainbow" since yesterday. Pt notes a black [place] in the middle of her field of vision in the right eye, and is only able to see peripherally.  Pt was sent to this facility from [place] for ophthalmology consult. No hx of similar symptoms. Pt feels fine otherwise and denies HA, numbness, weakness, or any other symptoms at this time. She reports taking Bg ASA 1-2 times a week.           The history is provided by the patient and medical records.     Review of patient's allergies indicates:   Allergen Reactions    Penicillins Hives     Past Medical History:   Diagnosis Date    Allergy     Asthma     Basal cell carcinoma     GERD (gastroesophageal reflux disease)     Hyperlipidemia     Hypertension      Past Surgical History:   Procedure Laterality Date    BASAL CELL CARCINOMA EXCISION      COLONOSCOPY  2013    EYE SURGERY      cataracts    HYSTERECTOMY      UPPER GASTROINTESTINAL ENDOSCOPY       Family History   Problem Relation Age of Onset    Asthma Mother     Stroke Father 77     cva    Colon cancer Neg Hx     Colon polyps Neg Hx     Celiac disease Neg Hx     Esophageal cancer Neg Hx     Stomach cancer Neg Hx     Irritable bowel syndrome Neg Hx     Inflammatory bowel disease Neg Hx      Social History   Substance Use Topics    Smoking status: " Never Smoker    Smokeless tobacco: Never Used    Alcohol use 1.2 oz/week     2 Glasses of wine per week      Comment: socially     Review of Systems   Constitutional: Negative for chills and fever.   HENT: Negative for congestion and sore throat.    Eyes: Positive for visual disturbance (right eye x 4 days).   Respiratory: Negative for shortness of breath.    Cardiovascular: Negative for chest pain.   Gastrointestinal: Negative for abdominal pain, nausea and vomiting.   Musculoskeletal: Negative for back pain and neck pain.   Skin: Negative for rash.   Neurological: Negative for weakness, numbness and headaches.   Psychiatric/Behavioral: Negative for confusion.       Physical Exam     Initial Vitals [09/04/17 1752]   BP Pulse Resp Temp SpO2   (!) 196/96 100 18 98.8 °F (37.1 °C) 98 %      MAP       129.33         Physical Exam    Constitutional: She appears well-developed and well-nourished. She is not diaphoretic. No distress.   HENT:   Head: Normocephalic and atraumatic.   Right Ear: External ear normal.   Left Ear: External ear normal.   Mouth/Throat: Oropharynx is clear and moist. No oropharyngeal exudate.   Eyes: Conjunctivae and EOM are normal. Pupils are equal, round, and reactive to light. Right eye exhibits no discharge. Left eye exhibits no discharge. No scleral icterus.   Neck: Normal range of motion. Neck supple. No JVD present.   Cardiovascular: Normal rate, regular rhythm and intact distal pulses.   No murmur heard.  Pulmonary/Chest: Breath sounds normal. No stridor. No respiratory distress. She has no wheezes. She has no rhonchi. She has no rales.   Musculoskeletal: Normal range of motion. She exhibits no edema.   Neurological: She is alert and oriented to person, place, and time.   Skin: Skin is warm and dry. No rash noted. No erythema.   Psychiatric: She has a normal mood and affect.         ED Course   Procedures  Labs Reviewed - No data to display          Medical Decision Making:   History:    Old Medical Records: I decided to obtain old medical records.  Initial Assessment:   Transfer for eval of retinal detachment.  Low suspicion for stroke based on stated sxs.  Case d/w ophthalmology - will eval.      10:00 PM seen by ophtho - does have RD.  Will be seen in clinic in am for further mgt.  Pt indicates understanding of f/u plan.    Other:   I have discussed this case with another health care provider.            Scribe Attestation:   Scribe #1: I performed the above scribed service and the documentation accurately describes the services I performed. I attest to the accuracy of the note.    Attending Attestation:           Physician Attestation for Scribe:  Physician Attestation Statement for Scribe #1: I, Dr. Mast, reviewed documentation, as scribed by Clementina Fernández in my presence, and it is both accurate and complete.                 ED Course      Clinical Impression:   The encounter diagnosis was Retinal detachment, right.                           Harish Mast MD  09/04/17 2644

## 2017-09-05 NOTE — PRE-PROCEDURE INSTRUCTIONS
Spoke with Patient.  NPO, medication, and pre-op instructions reviewed.  Arrival time 1230.  Instructed that she can eat and drink until 0430, have clear liquids between 0430 - 0900, and then to remain NPO after 0900.  Denies previous problems with Anesthesia.   Verbalized understanding of instructions.

## 2017-09-05 NOTE — CONSULTS
"History and Physical Exam  Ophthalmology Consult Service    CC: Left eye blurriness and loss of vision    HPI: Madison Long is a 72 y.o. female with PMH of HTN, HLD, allergic rhinitis, and COPD who presents to the ED with a 5 day history of eye "blurriness" and worsening loss of vision OS. She says that her symptoms first started on 5 days prior when she woke up her L eye was slightly more blurry than her R but soon went away over and hr or two. The two days following, she experienced the same thing upon awakening however the blurriness lasted longer before going away. Last night, when she was leaving a party and got in her car to drive home, she saw that the tail light of the car in front of her looked "reddish and had a red halo around it"; at this time she could see but her vision was distorted. Upon awakening this morning however, she noticed that part of her vision was black and it appeared as if there was a "curtain going down the center of [her] vision". She denies any eye pain, foreign body sensation, or recent trauma. Of note, she had cataract surgery OU last year without complications (sept and again November).       POH: None.  POSHx: CE / IOL placement OU 10 months prior.  FOHx: None.    Gtts: AT in the morning and at night OU      Past Medical History:   Diagnosis Date    Allergy     Asthma     Basal cell carcinoma     GERD (gastroesophageal reflux disease)     Hyperlipidemia     Hypertension          Family History   Problem Relation Age of Onset    Asthma Mother     Stroke Father 77     cva    Colon cancer Neg Hx     Colon polyps Neg Hx     Celiac disease Neg Hx     Esophageal cancer Neg Hx     Stomach cancer Neg Hx     Irritable bowel syndrome Neg Hx     Inflammatory bowel disease Neg Hx          No current facility-administered medications for this encounter.     Current Outpatient Prescriptions:     aspirin 81 MG Chew, Take 81 mg by mouth daily as needed. , Disp: , Rfl:     " atorvastatin (LIPITOR) 10 MG tablet, Take 1 tablet (10 mg total) by mouth once daily., Disp: 90 tablet, Rfl: 3    desoximetasone (TOPICORT LP) 0.05 % cream, Twice a day  PRN, Disp: , Rfl:     estradiol (ESTRACE) 0.01 % (0.1 mg/gram) vaginal cream, Place 1 g vaginally every other day., Disp: 1 Tube, Rfl: 3    fish oil-omega-3 fatty acids 300-1,000 mg capsule, Take 2 g by mouth once daily., Disp: , Rfl:     fluorouracil (EFUDEX) 5 % cream, AAA bid x 2 weeks, Disp: 40 g, Rfl: 1    fluticasone (FLONASE) 50 mcg/actuation nasal spray, APPLY TWO SPRAYS NASALLY ONCE DAILY, Disp: 16 g, Rfl: 11    guaifenesin (MUCINEX) 600 mg 12 hr tablet, Take 1,200 mg by mouth 2 (two) times daily., Disp: , Rfl:     losartan (COZAAR) 50 MG tablet, Take 1 tablet (50 mg total) by mouth once daily., Disp: 30 tablet, Rfl: 11    omeprazole (PRILOSEC OTC) 20 MG tablet, Take 1 tablet (20 mg total) by mouth every morning. Take 30 minutes before your first protein containing meal., Disp: 30 tablet, Rfl: 11    SYMBICORT 160-4.5 mcg/actuation HFAA, INHALE 2 PUFFS BY MOUTH TWICE DAILY, Disp: 11 g, Rfl: 7    tretinoin (RETIN-A) 0.1 % cream, Use hs on face, Disp: 45 g, Rfl: 5    VENTOLIN HFA 90 mcg/actuation inhaler, INHALE ONE OR TWO PUFFS BY MOUTH EVERY SIX HOURS AS NEEDED FORWHEEZING., Disp: 18 each, Rfl: 12    zolpidem (AMBIEN) 10 mg Tab, 1 or 2 tablets every HS prn insomnia, Disp: 60 tablet, Rfl: 3    DUREZOL 0.05 % Drop ophthalmic solution, , Disp: , Rfl:     econazole nitrate 1 % cream, Twice a day  PRN, Disp: , Rfl:     ketorolac 0.4% (ACULAR) 0.4 % Drop, , Disp: , Rfl:     multivit,stress formula-zinc (STRESS FORMULA WITH ZINC) Tab, Take by mouth., Disp: , Rfl:       Review of patient's allergies indicates:   Allergen Reactions    Penicillins Hives         Social History   Substance Use Topics    Smoking status: Never Smoker    Smokeless tobacco: Never Used    Alcohol use 1.2 oz/week     2 Glasses of wine per week       Comment: socially         Base Eye Exam     Visual Acuity (Snellen - Linear)       Right Left    Dist sc NLP center with CF peripherally @ 3 ft      Dist cc  20/40    Near cc  20/30          Tonometry (Tonopen, 8:00 PM)       Right Left    Pressure 10 12          Pupils       Dark Light Shape React APD    Right 3 2 Round Brisk yes    Left 3 2 Round Brisk None          Visual Fields       Right Left      Full    Restrictions Total inferior nasal deficiency; Partial inner superior temporal, inferior temporal, superior nasal deficiencies           Neuro/Psych     Oriented x3:  Yes    Mood/Affect:  Normal          Dilation     Both eyes:  1.0% Mydriacyl, 0.5% Mydriacyl @ 9:30 PM            Slit Lamp and Fundus Exam     Slit Lamp Exam       Right Left    Lids/Lashes Normal Normal    Conjunctiva/Sclera 1+ injection  1+ injection, mild papillary lower tarsal conjunctivitis     Cornea Arcus. Some central and inferior PEEs Arcus. Occasional PEEs with tear film debris    Anterior Chamber Deep and quiet Deep and quiet    Iris Round and reactive Round and reactive    Lens PCIOL PCIOL    Vitreous Mancia sign with syneresis Syneresis          Fundus Exam       Right Left    Disc Normal Normal    C/D Ratio 0.3 0.3    Macula RD extending to the periphery of the macula Normal    Vessels Normal Normal    Periphery Inferotemporal Mac-off RD Normal                  Plan   A/P: Madison Long is a 72 y.o. female with HTN and CE/IOL extraction 10 months prior who presents with diminished vision OD who presents with a mac off RD OD.    - Contacted retina fellow. Will refer pt to retina clinic tomorrow morning.  - Since the macula is detached, she does not need emergent surgery.   - Instructed not to eat or drink at 12 am tonight.   - No rigorous activity. Sleep with the head of the bed elevated.   - Educated pt about causes and pathophysiology of her RD.          ----------------------------------------------------------------------------------------------------------  Thank you for the consult. Feel free to contact me if you have any additional questions.    HARJEET Arango M.D.  PGY-2 - U Ophthalmology  Pager: [phone]

## 2017-09-05 NOTE — H&P
Pre-Operative History & Physical  Ophthalmology      SUBJECTIVE:     History of Present Illness:  Patient is a 72 y.o. 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    MEDICATIONS:   No prescriptions prior to admission.       ALLERGIES:   Review of patient's allergies indicates:   Allergen Reactions    Penicillins Hives       PAST MEDICAL HISTORY:   Past Medical History:   Diagnosis Date    Allergy     Asthma     Basal cell carcinoma     GERD (gastroesophageal reflux disease)     Hyperlipidemia     Hypertension     Retinal detachment      PAST SURGICAL HISTORY:   Past Surgical History:   Procedure Laterality Date    BASAL CELL CARCINOMA EXCISION      CATARACT EXTRACTION      COLONOSCOPY  2013    EYE SURGERY      cataracts    HYSTERECTOMY      UPPER GASTROINTESTINAL ENDOSCOPY       PAST FAMILY HISTORY:   Family History   Problem Relation Age of Onset    Asthma Mother     Stroke Father 77     cva    Colon cancer Neg Hx     Colon polyps Neg Hx     Celiac disease Neg Hx     Esophageal cancer Neg Hx     Stomach cancer Neg Hx     Irritable bowel syndrome Neg Hx     Inflammatory bowel disease Neg Hx      SOCIAL HISTORY:   Social History   Substance Use Topics    Smoking status: Never Smoker    Smokeless tobacco: Never Used    Alcohol use 1.2 oz/week     2 Glasses of wine per week      Comment: socially        MENTAL STATUS: Alert    REVIEW OF SYSTEMS: Negative    OBJECTIVE:     Vital Signs (Most Recent)       Physical Exam:  General: NAD  HEENT: Atraumatic  Lungs: Adequate respirations, LCTAB  Heart: RRR, No murmur  Abdomen: Soft NT    ASSESSMENT/PLAN:     Patient is a 72 y.o. 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     - Plan for surgical correction:  25g PPV/EL/SF6 OD for RRD  Local MAC  45 min   - Risks/benefits/alternatives of the procedure including, but not limited to scarring, bleeding, infection, loss or decreased vision,  and/or need for possible repeat surgery discussed with the patient and family.   - Informed consent obtained prior to surgery and the patient/family voiced good understanding.    Warner Medrano  9/5/2017  2:41 PM

## 2017-09-05 NOTE — PROGRESS NOTES
"HPI     DLS 09/04/17 in the E.R.        71 Y/O F presents today per consult from the E.R.  Pt was seen by Dr. Andres Leung MD and Dr. Harihs Vetnura MD for Retinal detachment of the right eye. Pt states" the onset occurred on Thursday, starting with blurred vision and by Sunday vision was worse and seeing dark spots, flashes, and floaters.[facility]       Eye Meds: Refresh Tears OU prn    OCT - RD OD  OS - No ME      A/P    1. RRD OD   Macula involving x 4  Days    PLan 25g PPV/EL/SF6 OD for RRD  Local MAC  45 min    Risks, benefits, and alternatives to treatment discussed in detail with the patient.  The patient voiced understanding and wished to proceed with the procedure    2. PCIOL OU    3. HTN   Good BP control    TO OR      "

## 2017-09-06 ENCOUNTER — PATIENT OUTREACH (OUTPATIENT)
Dept: ADMINISTRATIVE | Facility: HOSPITAL | Age: 72
End: 2017-09-06

## 2017-09-06 ENCOUNTER — ANESTHESIA EVENT (OUTPATIENT)
Dept: SURGERY | Facility: HOSPITAL | Age: 72
End: 2017-09-06
Payer: MEDICARE

## 2017-09-06 ENCOUNTER — HOSPITAL ENCOUNTER (OUTPATIENT)
Facility: HOSPITAL | Age: 72
Discharge: HOME | End: 2017-09-06
Attending: OPHTHALMOLOGY | Admitting: OPHTHALMOLOGY
Payer: MEDICARE

## 2017-09-06 ENCOUNTER — ANESTHESIA (OUTPATIENT)
Dept: SURGERY | Facility: HOSPITAL | Age: 72
End: 2017-09-06
Payer: MEDICARE

## 2017-09-06 VITALS
OXYGEN SATURATION: 98 % | WEIGHT: 161 LBS | RESPIRATION RATE: 11 BRPM | HEIGHT: 66 IN | HEART RATE: 78 BPM | DIASTOLIC BLOOD PRESSURE: 80 MMHG | SYSTOLIC BLOOD PRESSURE: 154 MMHG | BODY MASS INDEX: 25.88 KG/M2 | TEMPERATURE: 99 F

## 2017-09-06 DIAGNOSIS — H33.001 RETINAL DETACHMENT, RHEGMATOGENOUS, RIGHT EYE: ICD-10-CM

## 2017-09-06 DIAGNOSIS — H33.011 RETINAL DETACHMENT OF RIGHT EYE WITH SINGLE BREAK: Primary | ICD-10-CM

## 2017-09-06 PROCEDURE — 37000008 HC ANESTHESIA 1ST 15 MINUTES: Performed by: OPHTHALMOLOGY

## 2017-09-06 PROCEDURE — 36000707: Performed by: OPHTHALMOLOGY

## 2017-09-06 PROCEDURE — 37000009 HC ANESTHESIA EA ADD 15 MINS: Performed by: OPHTHALMOLOGY

## 2017-09-06 PROCEDURE — 25000003 PHARM REV CODE 250: Performed by: OPHTHALMOLOGY

## 2017-09-06 PROCEDURE — 71000044 HC DOSC ROUTINE RECOVERY FIRST HOUR: Performed by: OPHTHALMOLOGY

## 2017-09-06 PROCEDURE — 67108 REPAIR DETACHED RETINA: CPT | Mod: RT,,, | Performed by: OPHTHALMOLOGY

## 2017-09-06 PROCEDURE — 71000015 HC POSTOP RECOV 1ST HR: Performed by: OPHTHALMOLOGY

## 2017-09-06 PROCEDURE — 25000003 PHARM REV CODE 250: Performed by: ANESTHESIOLOGY

## 2017-09-06 PROCEDURE — 36000706: Performed by: OPHTHALMOLOGY

## 2017-09-06 PROCEDURE — S0020 INJECTION, BUPIVICAINE HYDRO: HCPCS | Performed by: OPHTHALMOLOGY

## 2017-09-06 PROCEDURE — 63600175 PHARM REV CODE 636 W HCPCS: Performed by: OPHTHALMOLOGY

## 2017-09-06 PROCEDURE — 63600175 PHARM REV CODE 636 W HCPCS: Performed by: NURSE ANESTHETIST, CERTIFIED REGISTERED

## 2017-09-06 PROCEDURE — 25000003 PHARM REV CODE 250

## 2017-09-06 RX ORDER — MOXIFLOXACIN 5 MG/ML
SOLUTION/ DROPS OPHTHALMIC
Status: COMPLETED
Start: 2017-09-06 | End: 2017-09-06

## 2017-09-06 RX ORDER — SODIUM CHLORIDE 0.9 % (FLUSH) 0.9 %
3 SYRINGE (ML) INJECTION
Status: DISCONTINUED | OUTPATIENT
Start: 2017-09-06 | End: 2017-09-06 | Stop reason: HOSPADM

## 2017-09-06 RX ORDER — FENTANYL CITRATE 50 UG/ML
INJECTION, SOLUTION INTRAMUSCULAR; INTRAVENOUS
Status: DISCONTINUED | OUTPATIENT
Start: 2017-09-06 | End: 2017-09-06

## 2017-09-06 RX ORDER — PREDNISOLONE ACETATE 10 MG/ML
1 SUSPENSION/ DROPS OPHTHALMIC
Status: DISCONTINUED | OUTPATIENT
Start: 2017-09-06 | End: 2017-09-06 | Stop reason: HOSPADM

## 2017-09-06 RX ORDER — HYDROCODONE BITARTRATE AND ACETAMINOPHEN 5; 325 MG/1; MG/1
1 TABLET ORAL EVERY 4 HOURS PRN
Status: DISCONTINUED | OUTPATIENT
Start: 2017-09-06 | End: 2017-09-06 | Stop reason: HOSPADM

## 2017-09-06 RX ORDER — TROPICAMIDE 10 MG/ML
1 SOLUTION/ DROPS OPHTHALMIC
Status: DISCONTINUED | OUTPATIENT
Start: 2017-09-06 | End: 2017-09-06 | Stop reason: HOSPADM

## 2017-09-06 RX ORDER — NEOMYCIN SULFATE, POLYMYXIN B SULFATE, AND DEXAMETHASONE 3.5; 10000; 1 MG/G; [USP'U]/G; MG/G
OINTMENT OPHTHALMIC
Status: DISCONTINUED
Start: 2017-09-06 | End: 2017-09-06 | Stop reason: HOSPADM

## 2017-09-06 RX ORDER — ACETAMINOPHEN 325 MG/1
650 TABLET ORAL EVERY 4 HOURS PRN
Status: DISCONTINUED | OUTPATIENT
Start: 2017-09-06 | End: 2017-09-06 | Stop reason: HOSPADM

## 2017-09-06 RX ORDER — TROPICAMIDE 10 MG/ML
SOLUTION/ DROPS OPHTHALMIC
Status: COMPLETED
Start: 2017-09-06 | End: 2017-09-06

## 2017-09-06 RX ORDER — PREDNISOLONE ACETATE 10 MG/ML
SUSPENSION/ DROPS OPHTHALMIC
Status: COMPLETED
Start: 2017-09-06 | End: 2017-09-06

## 2017-09-06 RX ORDER — LIDOCAINE HYDROCHLORIDE 20 MG/ML
INJECTION, SOLUTION EPIDURAL; INFILTRATION; INTRACAUDAL; PERINEURAL
Status: DISCONTINUED | OUTPATIENT
Start: 2017-09-06 | End: 2017-09-06 | Stop reason: HOSPADM

## 2017-09-06 RX ORDER — MIDAZOLAM HYDROCHLORIDE 1 MG/ML
INJECTION, SOLUTION INTRAMUSCULAR; INTRAVENOUS
Status: DISCONTINUED | OUTPATIENT
Start: 2017-09-06 | End: 2017-09-06

## 2017-09-06 RX ORDER — VANCOMYCIN HYDROCHLORIDE 500 MG/10ML
INJECTION, POWDER, LYOPHILIZED, FOR SOLUTION INTRAVENOUS
Status: DISCONTINUED | OUTPATIENT
Start: 2017-09-06 | End: 2017-09-06 | Stop reason: HOSPADM

## 2017-09-06 RX ORDER — DEXAMETHASONE SODIUM PHOSPHATE 4 MG/ML
INJECTION, SOLUTION INTRA-ARTICULAR; INTRALESIONAL; INTRAMUSCULAR; INTRAVENOUS; SOFT TISSUE
Status: DISCONTINUED
Start: 2017-09-06 | End: 2017-09-06 | Stop reason: HOSPADM

## 2017-09-06 RX ORDER — BUPIVACAINE HYDROCHLORIDE 7.5 MG/ML
INJECTION, SOLUTION EPIDURAL; RETROBULBAR
Status: DISCONTINUED
Start: 2017-09-06 | End: 2017-09-06 | Stop reason: HOSPADM

## 2017-09-06 RX ORDER — LIDOCAINE HCL/PF 100 MG/5ML
SYRINGE (ML) INTRAVENOUS
Status: DISCONTINUED | OUTPATIENT
Start: 2017-09-06 | End: 2017-09-06

## 2017-09-06 RX ORDER — LIDOCAINE HYDROCHLORIDE 10 MG/ML
1 INJECTION, SOLUTION EPIDURAL; INFILTRATION; INTRACAUDAL; PERINEURAL ONCE
Status: COMPLETED | OUTPATIENT
Start: 2017-09-06 | End: 2017-09-06

## 2017-09-06 RX ORDER — VANCOMYCIN HYDROCHLORIDE 500 MG/10ML
INJECTION, POWDER, LYOPHILIZED, FOR SOLUTION INTRAVENOUS
Status: DISCONTINUED
Start: 2017-09-06 | End: 2017-09-06 | Stop reason: HOSPADM

## 2017-09-06 RX ORDER — LIDOCAINE HYDROCHLORIDE 20 MG/ML
INJECTION, SOLUTION EPIDURAL; INFILTRATION; INTRACAUDAL; PERINEURAL
Status: DISCONTINUED
Start: 2017-09-06 | End: 2017-09-06 | Stop reason: HOSPADM

## 2017-09-06 RX ORDER — TETRACAINE HYDROCHLORIDE 5 MG/ML
SOLUTION OPHTHALMIC
Status: COMPLETED
Start: 2017-09-06 | End: 2017-09-06

## 2017-09-06 RX ORDER — TETRACAINE HYDROCHLORIDE 5 MG/ML
1 SOLUTION OPHTHALMIC
Status: DISCONTINUED | OUTPATIENT
Start: 2017-09-06 | End: 2017-09-06 | Stop reason: HOSPADM

## 2017-09-06 RX ORDER — PHENYLEPHRINE HYDROCHLORIDE 25 MG/ML
1 SOLUTION/ DROPS OPHTHALMIC
Status: DISCONTINUED | OUTPATIENT
Start: 2017-09-06 | End: 2017-09-06 | Stop reason: HOSPADM

## 2017-09-06 RX ORDER — EPINEPHRINE 1 MG/ML
INJECTION, SOLUTION INTRACARDIAC; INTRAMUSCULAR; INTRAVENOUS; SUBCUTANEOUS
Status: DISCONTINUED
Start: 2017-09-06 | End: 2017-09-06 | Stop reason: HOSPADM

## 2017-09-06 RX ORDER — SODIUM CHLORIDE 9 MG/ML
INJECTION, SOLUTION INTRAVENOUS CONTINUOUS
Status: DISCONTINUED | OUTPATIENT
Start: 2017-09-06 | End: 2017-09-06 | Stop reason: HOSPADM

## 2017-09-06 RX ORDER — BUPIVACAINE HYDROCHLORIDE 7.5 MG/ML
INJECTION, SOLUTION EPIDURAL; RETROBULBAR
Status: DISCONTINUED | OUTPATIENT
Start: 2017-09-06 | End: 2017-09-06 | Stop reason: HOSPADM

## 2017-09-06 RX ORDER — EPINEPHRINE CONVENIENCE KIT 1 MG/ML(1)
KIT INTRAMUSCULAR; SUBCUTANEOUS
Status: DISCONTINUED | OUTPATIENT
Start: 2017-09-06 | End: 2017-09-06 | Stop reason: HOSPADM

## 2017-09-06 RX ORDER — ONDANSETRON 4 MG/1
4 TABLET, FILM COATED ORAL EVERY 8 HOURS PRN
Qty: 12 TABLET | Refills: 0 | Status: ON HOLD | OUTPATIENT
Start: 2017-09-06 | End: 2018-01-10 | Stop reason: CLARIF

## 2017-09-06 RX ORDER — PROPOFOL 10 MG/ML
VIAL (ML) INTRAVENOUS
Status: DISCONTINUED | OUTPATIENT
Start: 2017-09-06 | End: 2017-09-06

## 2017-09-06 RX ORDER — ONDANSETRON 8 MG/1
8 TABLET, ORALLY DISINTEGRATING ORAL EVERY 8 HOURS PRN
Status: DISCONTINUED | OUTPATIENT
Start: 2017-09-06 | End: 2017-09-06 | Stop reason: HOSPADM

## 2017-09-06 RX ORDER — DEXAMETHASONE SODIUM PHOSPHATE 4 MG/ML
INJECTION, SOLUTION INTRA-ARTICULAR; INTRALESIONAL; INTRAMUSCULAR; INTRAVENOUS; SOFT TISSUE
Status: DISCONTINUED | OUTPATIENT
Start: 2017-09-06 | End: 2017-09-06 | Stop reason: HOSPADM

## 2017-09-06 RX ORDER — OXYCODONE AND ACETAMINOPHEN 5; 325 MG/1; MG/1
1 TABLET ORAL EVERY 6 HOURS PRN
Qty: 12 TABLET | Refills: 0 | Status: ON HOLD | OUTPATIENT
Start: 2017-09-06 | End: 2018-01-10 | Stop reason: CLARIF

## 2017-09-06 RX ORDER — CYCLOPENTOLATE HYDROCHLORIDE 10 MG/ML
1 SOLUTION/ DROPS OPHTHALMIC
Status: DISCONTINUED | OUTPATIENT
Start: 2017-09-06 | End: 2017-09-06 | Stop reason: HOSPADM

## 2017-09-06 RX ORDER — PHENYLEPHRINE HYDROCHLORIDE 25 MG/ML
SOLUTION/ DROPS OPHTHALMIC
Status: COMPLETED
Start: 2017-09-06 | End: 2017-09-06

## 2017-09-06 RX ORDER — MOXIFLOXACIN 5 MG/ML
1 SOLUTION/ DROPS OPHTHALMIC
Status: DISCONTINUED | OUTPATIENT
Start: 2017-09-06 | End: 2017-09-06 | Stop reason: HOSPADM

## 2017-09-06 RX ORDER — CYCLOPENTOLATE HYDROCHLORIDE 10 MG/ML
SOLUTION/ DROPS OPHTHALMIC
Status: COMPLETED
Start: 2017-09-06 | End: 2017-09-06

## 2017-09-06 RX ADMIN — CYCLOPENTOLATE HYDROCHLORIDE 1 DROP: 10 SOLUTION/ DROPS OPHTHALMIC at 01:09

## 2017-09-06 RX ADMIN — TROPICAMIDE 1 DROP: 10 SOLUTION/ DROPS OPHTHALMIC at 01:09

## 2017-09-06 RX ADMIN — MOXIFLOXACIN 1 DROP: 5 SOLUTION/ DROPS OPHTHALMIC at 01:09

## 2017-09-06 RX ADMIN — HYDROCODONE BITARTRATE AND ACETAMINOPHEN 1 TABLET: 5; 325 TABLET ORAL at 08:09

## 2017-09-06 RX ADMIN — PHENYLEPHRINE HYDROCHLORIDE 1 DROP: 25 SOLUTION/ DROPS OPHTHALMIC at 01:09

## 2017-09-06 RX ADMIN — SODIUM CHLORIDE: 0.9 INJECTION, SOLUTION INTRAVENOUS at 01:09

## 2017-09-06 RX ADMIN — LIDOCAINE HYDROCHLORIDE 70 MG: 20 INJECTION, SOLUTION INTRAVENOUS at 06:09

## 2017-09-06 RX ADMIN — TETRACAINE HYDROCHLORIDE 1 DROP: 5 SOLUTION OPHTHALMIC at 01:09

## 2017-09-06 RX ADMIN — MOXIFLOXACIN HYDROCHLORIDE 1 DROP: 5 SOLUTION/ DROPS OPHTHALMIC at 01:09

## 2017-09-06 RX ADMIN — MIDAZOLAM HYDROCHLORIDE 2 MG: 1 INJECTION, SOLUTION INTRAMUSCULAR; INTRAVENOUS at 06:09

## 2017-09-06 RX ADMIN — PREDNISOLONE ACETATE 1 DROP: 10 SUSPENSION/ DROPS OPHTHALMIC at 01:09

## 2017-09-06 RX ADMIN — FENTANYL CITRATE 25 MCG: 50 INJECTION, SOLUTION INTRAMUSCULAR; INTRAVENOUS at 06:09

## 2017-09-06 RX ADMIN — LIDOCAINE HYDROCHLORIDE 2 MG: 10 INJECTION, SOLUTION EPIDURAL; INFILTRATION; INTRACAUDAL; PERINEURAL at 01:09

## 2017-09-06 RX ADMIN — PROPOFOL 80 MG: 10 INJECTION, EMULSION INTRAVENOUS at 06:09

## 2017-09-06 RX ADMIN — HOMATROPINE HYDROBROMIDE 1 DROP: 50 SOLUTION OPHTHALMIC at 01:09

## 2017-09-06 NOTE — PROGRESS NOTES
Portal outreach un-read by patient.  Outreach mailed today    [facility] is committed to your overall health.  To help you get the most out of each of your visits, we will review your information to make sure you are up to date on all of your recommended tests and/or procedures.       Dr. Arango       has found that you may be due for:     One-time Hepatitis C Screening lab test(a viral condition that can harm the liver)   Tetanus immunization   Shingles immunization       If you have had any of the above done at another facility, please bring the records or information with you so that your record at [facility] will be complete.      If you are currently taking medication , please bring it with you to your appointment for review.     We appreciate the opportunity to provide you with excellent medical care.     Please call the number listed below if you have any questions.

## 2017-09-06 NOTE — LETTER
September 6, 2017    Madison Long  [id number] [place] [id number]             [facility]  [address]  [facility] [address]  Phone: [phone] Dear Ms. Long:    We have tried to reach you by mychart unsuccessfully.     [facility] is committed to your overall health.  To help you get the most out of each of your visits, we will review your information to make sure you are up to date on all of your recommended tests and/or procedures.       Dr. Arango       has found that you may be due for:     One-time Hepatitis C Screening lab test(a viral condition that can harm the liver)   Tetanus immunization   Shingles immunization       If you have had any of the above done at another facility, please bring the records or information with you so that your record at [facility] will be complete.      If you are currently taking medication , please bring it with you to your appointment for review.     We appreciate the opportunity to provide you with excellent medical care.     Sincerely,    Tejal Myers  Clinical Care Coordinator  [facility] [address] [facility] [address]  [place] [address]  Phone: [phone]   Fax: [phone]

## 2017-09-06 NOTE — ANESTHESIA PREPROCEDURE EVALUATION
09/06/2017  Madison Long is a 72 y.o., female.    Anesthesia Evaluation    I have reviewed the Patient Summary Reports.    I have reviewed the Nursing Notes.   I have reviewed the Medications.     Review of Systems  Anesthesia Hx:  No problems with previous Anesthesia  History of prior surgery of interest to airway management or planning: Denies Family Hx of Anesthesia complications.   Denies Personal Hx of Anesthesia complications.   EENT/Dental:   Retinal detachment   Cardiovascular:   Hypertension ECG has been reviewed.    Pulmonary:   COPD Asthma    Renal/:  Renal/ Normal     Hepatic/GI:   GERD    Musculoskeletal:  Musculoskeletal Normal    Neurological:  Neurology Normal        Physical Exam  General:  Well nourished    Airway/Jaw/Neck:  Airway Findings: Mouth Opening: Normal Tongue: Normal  General Airway Assessment: Adult  Mallampati: II  Improves to I with phonation.  TM Distance: Normal, at least 6 cm  Jaw/Neck Findings:  Micrognathia: Negative Neck ROM: Normal ROM      Dental:  Dental Findings: In tact   Chest/Lungs:  Chest/Lungs Findings: Clear to auscultation, Normal Respiratory Rate     Heart/Vascular:  Heart Findings: Rate: Normal  Rhythm: Regular Rhythm  Sounds: Normal  Heart murmur: negative    Abdomen:  Abdomen Findings:  Normal, Nontender, Soft       Mental Status:  Mental Status Findings:  Cooperative, Alert and Oriented         Anesthesia Plan  Type of Anesthesia, risks & benefits discussed:  Anesthesia Type:  general, MAC  Patient's Preference:   Intra-op Monitoring Plan:   Intra-op Monitoring Plan Comments:   Post Op Pain Control Plan:   Post Op Pain Control Plan Comments:   Induction:   IV  Beta Blocker:  Patient is not currently on a Beta-Blocker (No further documentation required).       Informed Consent: Patient understands risks and agrees with Anesthesia plan.  Questions  answered. Anesthesia consent signed with patient.  ASA Score: 2     Day of Surgery Review of History & Physical:    H&P update referred to the surgeon.         Ready For Surgery From Anesthesia Perspective.

## 2017-09-06 NOTE — PROGRESS NOTES
Pt updated: looks like she will be going to sx around 1700; update also given to pt nurse: Leticia Gregory RN. No needs voiced; pt sister at .

## 2017-09-07 ENCOUNTER — OFFICE VISIT (OUTPATIENT)
Dept: OPHTHALMOLOGY | Facility: CLINIC | Age: 72
End: 2017-09-07
Payer: MEDICARE

## 2017-09-07 VITALS — DIASTOLIC BLOOD PRESSURE: 93 MMHG | SYSTOLIC BLOOD PRESSURE: 154 MMHG | HEART RATE: 69 BPM

## 2017-09-07 DIAGNOSIS — H33.021 RETINAL DETACHMENT OF RIGHT EYE WITH MULTIPLE BREAKS: Primary | ICD-10-CM

## 2017-09-07 PROCEDURE — 99499 UNLISTED E&M SERVICE: CPT | Mod: S$GLB,,, | Performed by: OPHTHALMOLOGY

## 2017-09-07 PROCEDURE — 99999 PR PBB SHADOW E&M-EST. PATIENT-LVL II: CPT | Mod: PBBFAC,,, | Performed by: OPHTHALMOLOGY

## 2017-09-07 PROCEDURE — 99024 POSTOP FOLLOW-UP VISIT: CPT | Mod: S$GLB,,, | Performed by: OPHTHALMOLOGY

## 2017-09-07 RX ORDER — DORZOLAMIDE HYDROCHLORIDE AND TIMOLOL MALEATE 20; 5 MG/ML; MG/ML
1 SOLUTION/ DROPS OPHTHALMIC 2 TIMES DAILY
Qty: 10 ML | Refills: 3 | Status: SHIPPED | OUTPATIENT
Start: 2017-09-07 | End: 2017-11-20 | Stop reason: SDUPTHER

## 2017-09-07 NOTE — BRIEF OP NOTE
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

## 2017-09-07 NOTE — DISCHARGE INSTRUCTIONS
Post Op Instructions:  Patient should Maintain Eye shield & Dressing until seen tomorrow in eye clinic  Tylenol as needed for general discomfort  Use Prescription for pain medication if pain is severe  Use Prescription for Nausea (Zofran) if nausea or vomiting  No excessive exercise   No Bending, Lifting or Straining  Call MD if significant pain or nausea / vomiting uncontrolled by medications  Call MD if temperature in excess of 101' F  Sleep on left side  Return to eye clinic for Post Op Examination tomorrow Morning.  Bring Medicine bag to tomorrow's appointment.      Having a Vitrectomy    A vitrectomy is a type of eye surgery to treat problems with the retina and vitreous. The vitreous is a gel-like substance that fills the middle portion of your eye. During the surgery, your surgeon removes the vitreous and replaces it with another solution.    What to tell your health care provider  Before your surgery, tell your health care provider:  · What medicines you take. This includes over-the-counter medicines such as ibuprofen. It also includes vitamins, herbs, and other supplements. You may need to stop taking some medicines before the procedure, such as blood thinners and aspirin.  · If you smoke. You may need to stop before your surgery. Smoking can delay healing. Talk with your healthcare provider if you need help to stop smoking.  · If youve had recent changes in your health. This includes an infection or fever.  · If you are sensitive or allergic to anything. This includes medicines, latex, tape, and anesthetic medicines.  · If you are pregnant. Also tell your healthcare provider if you think you may be pregnant.    Getting ready for your surgery  Make sure to:  · Ask a family member or friend to take you home from the hospital  · Make plans for some help at home while you recover  · Follow all other instructions from your health care provider  · Read the consent form and ask questions if something is not  clear  · Not eat or drink after midnight before your surgery    Tests before your surgery  Before your surgery, your doctor may look at your retina. Special tools are used to shine a light in your eye and look at your retina. You may need to have your eyes dilated for this eye exam. You also may need to have an ultrasound of your eye. This helps your doctor view the retina. An ultrasound uses sound waves to create images on a computer screen.    On the day of your surgery  Talk with your doctor about what to expect during your surgery. The details of the surgery may differ somewhat. A doctor specially trained in eye surgery (ophthalmologist) will do your operation. In general, you can expect the following:  · You may have general anesthesia. This will cause you to sleep through the surgery. Or you may be awake during the surgery. You will receive a medicine to help you relax. You also may be given anesthetic eye drops and injections. This is to make sure you dont feel anything.  · Your surgeon will make an incision in the outer layer of your eye.  · He or she will make a small cut in the white part of the eye (sclera).  · Your doctor will remove the vitreous and any scar tissue or other material.  · Your doctor will do other repairs to your eye as needed. For example, he or she might use a laser to fix a tear in your retina. In some cases, your doctor may inject a gas bubble into your eye. This is to help keep the retina in place.  · Your doctor will replace the vitreous with another type of fluid. It may be replaced with silicone oil or saline.  · Your doctor will close your incisions with stitches.  · Your doctor will put an antibiotic ointment on your eye to help prevent infection.  · Your eye will be covered with a patch.    After your surgery  Youll likely be able to go home the same day. Have someone drive you home.    Recovering at home  Follow all of your doctors instructions about eye care. Your eye may  be a little sore after the procedure. You can take over-the-counter pain medicine as approved by your healthcare provider. You may need to use eye drops with antibiotics. This is to help prevent infection. You may need to wear an eye patch for a day or so.  If you had a gas bubble placed in your eye during your vitrectomy, you will need to follow specific instructions about positioning. You will also need to not travel on an airplane for a period of time after the surgery. Ask your doctor when it will be safe for you to fly again.    Follow-up care  You will need close follow-up with your doctor to see how well the surgery worked. You may have an appointment the day after the surgery. You may need follow-up surgery in the future to remove the replacement fluid from your eye.  Your vision may not be completely normal after your vitrectomy, especially if you had permanent damage to your retina. Ask your doctor how much improvement you can expect.     When to call your health care provider  Call your health care provider right away if you have any of the below:  · Vision that gets worse  · Pain or swelling around your eye that gets worse

## 2017-09-07 NOTE — OP NOTE
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    PROCEDURE PERFORMED:  A 25-gauge pars plana vitrectomy with endolaser, air-fluid   exchange, SF6 gas, 20% to the right eye.    ENDOLASER PARAMETERS:  Number of spots 580, power 150 to 200 milliwatts,   duration 0.1 seconds.    ATTENDING SURGEON:  JANEL Chaves M.D.    ASSISTANT SURGEON:  Fellow, Delio Rivero.    ANESTHESIA:  Local monitored anesthesia care with a retrobulbar injection of 4.0   mL mixture of 0.75% Marcaine, 2% Xylocaine.    ESTIMATED BLOOD LOSS:  Minimal.    COMPLICATIONS:  None.    DISPOSITION:  Stable to recovery.    INDICATIONS FOR SURGERY:  This is a 72-year-old female with a history of blurred   vision out of her right eye, worsening over the last four days.  The patient   was found to have a macula involving retinal detachment the day before surgery   as she was set up for prompt repair to prevent further vision loss and hopefully   improve some vision.  Risks, benefits, and alternatives of surgery were   discussed in detail.  Risks including loss of vision, loss of eye, recurrent   retinal detachment, infection, hemorrhage, lens dislocation, glaucoma, hypotony,   ptosis, and diplopia.  The patient voiced understanding and wished to proceed   with the procedure.    DESCRIPTION OF PROCEDURE:  After proper informed consent was obtained, the   patient was brought back to the Operating Suite at [facility] where   MAC anesthesia was induced.  Retrobulbar injection was provided as above without   complication.  The patient was prepped and draped in the normal sterile fashion   for ophthalmic surgery and a lid speculum was placed in the right eye.    Standard three-port 25-gauge pars plana  vitrectomy was set up with the infusion   cannula inserted 4 mm posterior to the limbus.  The infusion cannula was turned   on only after it was observed to be free and clear of all underlying retinal   tissue.  Supranasal and supratemporal trocars were also placed 4 mm posterior to   the limbus.  The vitrector and light pipe were introduced in the vitreous   cavity and a core vitrectomy was performed.  Posterior hyaloid was already   detached, but it was propagated out to the level of the vitreous base.  Scleral   depression was used to help trim the vitreous down to the vitreous base and trim   around the retinal breaks located at the 11 o'clock position.  There were three   retinal breaks located in lattice degeneration.  The retinal breaks were marked   with diathermy and a posterior retinotomy was created.  An air-fluid exchange   was performed.  The retina significantly flattened following this maneuver.    Endolaser was applied in a barrier fashion around the retinal breaks and the   posterior retinotomy.  The supranasal trocar was removed and not leaking after   gentle massage.  A 20% SF6 gas mixture was created, approximately 40 mL were   cycled through the eye, then the supratemporal and infusion trocars were removed   and not leaking after gentle massage and the eye was normal pressure via   palpation.  Subconjunctival injections of vancomycin and Decadron were given to   the patient.  The drapes were removed from the patient.  She was washed free of   Betadine prep solution.  Maxitrol ointment was placed in the right eye.  The eye   was patch shielded.  MAC anesthesia was reversed and she was brought to   Recovery Room in stable condition, tolerating the procedure well.  Dr. Chaves   was present for the entire case.      JACK  dd: 09/06/2017 20:02:00 (CDT)  td: 09/06/2017 20:41:40 (CDT)  Doc ID   #[id number]  Job ID #[id number]    CC:

## 2017-09-07 NOTE — PROGRESS NOTES
"HPI     Post-op Evaluation    Additional comments: 1 day check           Comments   1 day post op- She had some pain last night which was relieved with pain   meds. She is having no pain this morning but eye is itching    HPI     DLS 09/04/17 in the E.R.        71 Y/O F presents today per consult from the E.R.  Pt was seen by Dr. Andres Leung MD and Dr. Harish Ventura MD for Retinal detachment of the right eye. Pt states" the onset occurred on Thursday, starting with blurred vision and by Sunday vision was worse and seeing dark spots, flashes, and floaters.[facility]       Eye Meds: Refresh Tears OU prn    OCT - RD OD  OS - No ME      A/P    1. RRD OD   Macula involving x 4  Days    S/p  25g PPV/EL/SF6 OD for RRD 9/6/17  Doing well, IOP elevated    Start gtts QID, ointment/shield QHS  Add cosopt BID    Ok for face forward, sleep on left side    2. PCIOL OU    3. HTN   Good BP control    NS 10 days      "

## 2017-09-07 NOTE — TRANSFER OF CARE
"Anesthesia Transfer of Care Note    Patient: Madison Long    Procedure(s) Performed: Procedure(s) (LRB):  VITRECTOMY-PARS PLANA (Right)    Patient location: PACU    Anesthesia Type: MAC    Transport from OR: Transported from OR on room air with adequate spontaneous ventilation    Post pain: adequate analgesia    Post assessment: no apparent anesthetic complications    Post vital signs: stable    Nausea/Vomiting: no nausea/vomiting    Complications: none    Transfer of care protocol was followed      Last vitals:   Visit Vitals  BP (!) 147/102 (BP Location: Right arm, Patient Position: Sitting)   Pulse 84   Temp 36.9 °C (98.4 °F) (Temporal)   Resp 14   Ht 5' 6" (1.676 m)   Wt 73 kg (161 lb)   SpO2 100%   Breastfeeding? No   BMI 25.99 kg/m²     "

## 2017-09-07 NOTE — ANESTHESIA POSTPROCEDURE EVALUATION
"Anesthesia Post Evaluation    Patient: Madison Long    Procedure(s) Performed: Procedure(s) (LRB):  VITRECTOMY-PARS PLANA (Right)    Final Anesthesia Type: MAC  Patient location during evaluation: PACU  Patient participation: Yes- Able to Participate  Level of consciousness: awake and alert  Post-procedure vital signs: reviewed and stable  Pain management: adequate  Airway patency: patent  PONV status at discharge: No PONV  Anesthetic complications: no      Cardiovascular status: blood pressure returned to baseline  Respiratory status: unassisted  Hydration status: euvolemic  Follow-up not needed.        Visit Vitals  BP (!) 154/80 (BP Location: Right arm, Patient Position: Lying)   Pulse 78   Temp 37.2 °C (99 °F) (Temporal)   Resp 11   Ht 5' 6" (1.676 m)   Wt 73 kg (161 lb)   SpO2 98%   Breastfeeding? No   BMI 25.99 kg/m²       Pain/Margie Score: Pain Assessment Performed: Yes (9/6/2017  8:23 PM)  Presence of Pain: denies (9/6/2017  8:23 PM)  Pain Rating Prior to Med Admin: 6 (9/6/2017  7:59 PM)  Pain Rating Post Med Admin: 3 (9/6/2017  8:07 PM)  Margie Score: 10 (9/6/2017  8:23 PM)      "

## 2017-09-07 NOTE — PLAN OF CARE
Discharge instructions explained to pt and family. Pt verbalized understanding. All concerns addressed. Pt given paper prescriptions.

## 2017-09-18 ENCOUNTER — OFFICE VISIT (OUTPATIENT)
Dept: OPHTHALMOLOGY | Facility: CLINIC | Age: 72
End: 2017-09-18
Payer: MEDICARE

## 2017-09-18 DIAGNOSIS — H33.021 RETINAL DETACHMENT OF RIGHT EYE WITH MULTIPLE BREAKS: Primary | ICD-10-CM

## 2017-09-18 PROCEDURE — 99999 PR PBB SHADOW E&M-EST. PATIENT-LVL II: CPT | Mod: PBBFAC,,, | Performed by: OPHTHALMOLOGY

## 2017-09-18 PROCEDURE — 99499 UNLISTED E&M SERVICE: CPT | Mod: S$GLB,,, | Performed by: OPHTHALMOLOGY

## 2017-09-18 PROCEDURE — 99024 POSTOP FOLLOW-UP VISIT: CPT | Mod: S$GLB,,, | Performed by: OPHTHALMOLOGY

## 2017-09-18 NOTE — PROGRESS NOTES
"HPI     DLS 9/7/16      2 wks post op-  States no pain at present. Sometimes will have a shooting pain that comes   and goes.  VA is better.  Still has the bubble. Cannot read the TV screen.        Eye meds:  PF  QID                       A 1% BID                     Dorzolamide BID        HPI     Post-op Evaluation    Additional comments: 1 day check           Comments   1 day post op- She had some pain last night which was relieved with pain   meds. She is having no pain this morning but eye is itching    HPI     DLS 09/04/17 in the E.R.        73 Y/O F presents today per consult from the E.R.  Pt was seen by Dr. Andres Leung MD and Dr. Harish Ventura MD for Retinal detachment of the right eye. Pt states" the onset occurred on Thursday, starting with blurred vision and by Sunday vision was worse and seeing dark spots, flashes, and floaters.[facility]       Eye Meds: Refresh Tears OU prn    OCT - RD OD  OS - No ME      A/P    1. RRD OD   Macula involving x 4  Days    S/p  25g PPV/EL/SF6 OD for RRD 9/6/17  Doing well, IOP elevated    Taper PF 2/1/0  continue cosopt BID until out    Ok for face forward, sleep on left side until bubble gone    2. PCIOL OU    3. HTN   Good BP control        6 weeks      "

## 2017-09-25 ENCOUNTER — OFFICE VISIT (OUTPATIENT)
Dept: OPTOMETRY | Facility: CLINIC | Age: 72
End: 2017-09-25
Payer: MEDICARE

## 2017-09-25 DIAGNOSIS — H43.812 POSTERIOR VITREOUS DETACHMENT, LEFT: Primary | ICD-10-CM

## 2017-09-25 PROCEDURE — 99999 PR PBB SHADOW E&M-EST. PATIENT-LVL III: CPT | Mod: PBBFAC,,, | Performed by: OPTOMETRIST

## 2017-09-25 PROCEDURE — 92014 COMPRE OPH EXAM EST PT 1/>: CPT | Mod: S$GLB,,, | Performed by: OPTOMETRIST

## 2017-09-25 NOTE — PROGRESS NOTES
HPI     Spots and/or Floaters    Additional comments: pt seeing floaters & veil over OS since this morning   -- no light flashes or eye pain // pt had RD surgery OD x 3 weeks           Blurred Vision    Additional comments: noticed sudden transient blurred VA OS only over   this weekend            Dry Eye    Additional comments: +Refresh gtts OU bid           Comments   Agree above  New / transient blur vision OS, somewhat relieved with ATs yesterday  Seems better today than yesterday  No pain/ no redness       Last edited by ASHANTI Noriega, OD on 9/25/2017  1:21 PM. (History)        ROS     Positive for: Eyes    Negative for: Constitutional, Gastrointestinal, Neurological, Skin,   Genitourinary, Musculoskeletal, HENT, Endocrine, Cardiovascular,   Respiratory, Psychiatric, Allergic/Imm, Heme/Lymph    Last edited by AHSANTI Noriega, OD on 9/25/2017  1:21 PM. (History)        Assessment /Plan     For exam results, see Encounter Report.    Posterior vitreous detachment, left      1. New onset PVD OS  Gave copy RD precautions and reviewed  Pt knows to call / message if ANY new symptoms or changes    2.   RRD OD   Macula involving x 4  Days     S/p  25g PPV/EL/SF6 OD for RRD 9/6/17  Doing well, IOP elevated     Taper PF 2/1/0  continue cosopt BID until out     Ok for face forward, sleep on left side until bubble gone    Doing well in PO period  Continue meds / advice as per Dr Chaves  RTC his visit 10/30/17

## 2017-10-05 DIAGNOSIS — E78.5 HYPERLIPIDEMIA, UNSPECIFIED HYPERLIPIDEMIA TYPE: ICD-10-CM

## 2017-10-05 DIAGNOSIS — R06.00 DYSPNEA, UNSPECIFIED TYPE: Primary | ICD-10-CM

## 2017-10-05 DIAGNOSIS — E03.9 HYPOTHYROIDISM, UNSPECIFIED TYPE: ICD-10-CM

## 2017-10-05 DIAGNOSIS — Z12.39 BREAST CANCER SCREENING: ICD-10-CM

## 2017-10-30 ENCOUNTER — TELEPHONE (OUTPATIENT)
Dept: OPHTHALMOLOGY | Facility: CLINIC | Age: 72
End: 2017-10-30

## 2017-10-30 ENCOUNTER — OFFICE VISIT (OUTPATIENT)
Dept: OPHTHALMOLOGY | Facility: CLINIC | Age: 72
End: 2017-10-30
Payer: MEDICARE

## 2017-10-30 DIAGNOSIS — H33.021 RETINAL DETACHMENT OF RIGHT EYE WITH MULTIPLE BREAKS: Primary | ICD-10-CM

## 2017-10-30 DIAGNOSIS — H35.21 PROLIFERATIVE VITREORETINOPATHY, RIGHT: ICD-10-CM

## 2017-10-30 DIAGNOSIS — H35.21 NONDIABETIC PROLIFERATIVE RETINOPATHY, RIGHT: ICD-10-CM

## 2017-10-30 PROCEDURE — 99999 PR PBB SHADOW E&M-EST. PATIENT-LVL II: CPT | Mod: PBBFAC,,, | Performed by: OPHTHALMOLOGY

## 2017-10-30 PROCEDURE — 99499 UNLISTED E&M SERVICE: CPT | Mod: S$GLB,,, | Performed by: OPHTHALMOLOGY

## 2017-10-30 PROCEDURE — 99024 POSTOP FOLLOW-UP VISIT: CPT | Mod: S$GLB,,, | Performed by: OPHTHALMOLOGY

## 2017-10-30 NOTE — PROGRESS NOTES
"HPI     Post-op Evaluation    Additional comments: 6 week check           Comments   DLS 9/18/17- Still seeing floater, wavy lines and seeing [place]. She is   also seeing things on a smaller scale with OD. It is unchanged from last   visit    Cosopt BID      HPI     DLS 9/7/16      2 wks post op-  States no pain at present. Sometimes will have a shooting pain that comes   and goes.  VA is better.  Still has the bubble. Cannot read the TV screen.        Eye meds:  PF  QID                       A 1% BID                     Dorzolamide BID        HPI     Post-op Evaluation    Additional comments: 1 day check           Comments   1 day post op- She had some pain last night which was relieved with pain   meds. She is having no pain this morning but eye is itching    HPI     DLS 09/04/17 in the E.R.        73 Y/O F presents today per consult from the E.R.  Pt was seen by Dr. Andres Leung MD and Dr. Harish Ventura MD for Retinal detachment of the right eye. Pt states" the onset occurred on Thursday, starting with blurred vision and by Sunday vision was worse and seeing dark spots, flashes, and floaters.[facility]       Eye Meds: Refresh Tears OU prn    OCT - RD OD  OS - No ME      A/P    1. RRD OD   Macula involving x 4  Days    S/p  25g PPV/EL/SF6 OD for RRD 9/6/17  IOP improved    10/30/17 - Recurrent IT RD with early PVR and small break    Plan 25g PPV/membrane stripping/inferior retinopexy/C3F8 OD for RRD/PVR OD    Local MAC  LOC 60 min  Mazz case    Risks, benefits, and alternatives to treatment discussed in detail with the patient.  The patient voiced understanding and wished to proceed with the procedure      2. PCIOL OU    3. HTN   Good BP control      "

## 2017-10-31 ENCOUNTER — TELEPHONE (OUTPATIENT)
Dept: OPHTHALMOLOGY | Facility: CLINIC | Age: 72
End: 2017-10-31

## 2017-10-31 ENCOUNTER — ANESTHESIA EVENT (OUTPATIENT)
Dept: SURGERY | Facility: HOSPITAL | Age: 72
End: 2017-10-31
Payer: MEDICARE

## 2017-11-01 ENCOUNTER — ANESTHESIA (OUTPATIENT)
Dept: SURGERY | Facility: HOSPITAL | Age: 72
End: 2017-11-01
Payer: MEDICARE

## 2017-11-01 ENCOUNTER — HOSPITAL ENCOUNTER (OUTPATIENT)
Facility: HOSPITAL | Age: 72
Discharge: HOME | End: 2017-11-01
Attending: OPHTHALMOLOGY | Admitting: OPHTHALMOLOGY
Payer: MEDICARE

## 2017-11-01 ENCOUNTER — SURGERY (OUTPATIENT)
Age: 72
End: 2017-11-01

## 2017-11-01 VITALS
HEART RATE: 83 BPM | DIASTOLIC BLOOD PRESSURE: 82 MMHG | WEIGHT: 152 LBS | HEIGHT: 65 IN | OXYGEN SATURATION: 99 % | SYSTOLIC BLOOD PRESSURE: 132 MMHG | RESPIRATION RATE: 16 BRPM | TEMPERATURE: 98 F | BODY MASS INDEX: 25.33 KG/M2

## 2017-11-01 DIAGNOSIS — H33.021 RETINAL DETACHMENT OF RIGHT EYE WITH MULTIPLE BREAKS: Primary | ICD-10-CM

## 2017-11-01 DIAGNOSIS — H33.001 MACULA-OFF RHEGMATOGENOUS RETINAL DETACHMENT, RIGHT: ICD-10-CM

## 2017-11-01 DIAGNOSIS — H35.21 PROLIFERATIVE VITREORETINOPATHY, RIGHT: ICD-10-CM

## 2017-11-01 PROCEDURE — 37000009 HC ANESTHESIA EA ADD 15 MINS: Performed by: OPHTHALMOLOGY

## 2017-11-01 PROCEDURE — C1784 OCULAR DEV, INTRAOP, DET RET: HCPCS | Performed by: OPHTHALMOLOGY

## 2017-11-01 PROCEDURE — 71000015 HC POSTOP RECOV 1ST HR: Performed by: OPHTHALMOLOGY

## 2017-11-01 PROCEDURE — 25000003 PHARM REV CODE 250: Performed by: OPHTHALMOLOGY

## 2017-11-01 PROCEDURE — 37000008 HC ANESTHESIA 1ST 15 MINUTES: Performed by: OPHTHALMOLOGY

## 2017-11-01 PROCEDURE — 67113 REPAIR RETINAL DETACH CPLX: CPT | Mod: 78,RT,, | Performed by: OPHTHALMOLOGY

## 2017-11-01 PROCEDURE — S0020 INJECTION, BUPIVICAINE HYDRO: HCPCS | Performed by: OPHTHALMOLOGY

## 2017-11-01 PROCEDURE — D9220A PRA ANESTHESIA: Mod: ,,, | Performed by: ANESTHESIOLOGY

## 2017-11-01 PROCEDURE — 63600175 PHARM REV CODE 636 W HCPCS: Performed by: OPHTHALMOLOGY

## 2017-11-01 PROCEDURE — 25000003 PHARM REV CODE 250

## 2017-11-01 PROCEDURE — 27600004 OPTIME MED/SURG SUP & DEVICES INTRAOCULAR LENS: Performed by: OPHTHALMOLOGY

## 2017-11-01 PROCEDURE — 36000707: Performed by: OPHTHALMOLOGY

## 2017-11-01 PROCEDURE — 63600175 PHARM REV CODE 636 W HCPCS: Performed by: STUDENT IN AN ORGANIZED HEALTH CARE EDUCATION/TRAINING PROGRAM

## 2017-11-01 PROCEDURE — 36000706: Performed by: OPHTHALMOLOGY

## 2017-11-01 PROCEDURE — 27201423 OPTIME MED/SURG SUP & DEVICES STERILE SUPPLY: Performed by: OPHTHALMOLOGY

## 2017-11-01 RX ORDER — PROPOFOL 10 MG/ML
VIAL (ML) INTRAVENOUS
Status: DISCONTINUED | OUTPATIENT
Start: 2017-11-01 | End: 2017-11-01

## 2017-11-01 RX ORDER — PHENYLEPHRINE HYDROCHLORIDE 25 MG/ML
SOLUTION/ DROPS OPHTHALMIC
Status: COMPLETED
Start: 2017-11-01 | End: 2017-11-01

## 2017-11-01 RX ORDER — LIDOCAINE HYDROCHLORIDE 20 MG/ML
INJECTION, SOLUTION EPIDURAL; INFILTRATION; INTRACAUDAL; PERINEURAL
Status: DISCONTINUED | OUTPATIENT
Start: 2017-11-01 | End: 2017-11-01 | Stop reason: HOSPADM

## 2017-11-01 RX ORDER — CYCLOPENTOLATE HYDROCHLORIDE 10 MG/ML
SOLUTION/ DROPS OPHTHALMIC
Status: COMPLETED
Start: 2017-11-01 | End: 2017-11-01

## 2017-11-01 RX ORDER — MIDAZOLAM HYDROCHLORIDE 1 MG/ML
INJECTION, SOLUTION INTRAMUSCULAR; INTRAVENOUS
Status: DISCONTINUED | OUTPATIENT
Start: 2017-11-01 | End: 2017-11-01

## 2017-11-01 RX ORDER — EPINEPHRINE 1 MG/ML
INJECTION, SOLUTION INTRACARDIAC; INTRAMUSCULAR; INTRAVENOUS; SUBCUTANEOUS
Status: DISCONTINUED | OUTPATIENT
Start: 2017-11-01 | End: 2017-11-01 | Stop reason: HOSPADM

## 2017-11-01 RX ORDER — NEOMYCIN SULFATE, POLYMYXIN B SULFATE, AND DEXAMETHASONE 3.5; 10000; 1 MG/G; [USP'U]/G; MG/G
OINTMENT OPHTHALMIC
Status: DISCONTINUED | OUTPATIENT
Start: 2017-11-01 | End: 2017-11-01 | Stop reason: HOSPADM

## 2017-11-01 RX ORDER — HYDROCODONE BITARTRATE AND ACETAMINOPHEN 5; 325 MG/1; MG/1
1 TABLET ORAL EVERY 4 HOURS PRN
Status: DISCONTINUED | OUTPATIENT
Start: 2017-11-01 | End: 2017-11-01 | Stop reason: HOSPADM

## 2017-11-01 RX ORDER — OXYCODONE AND ACETAMINOPHEN 5; 325 MG/1; MG/1
1 TABLET ORAL EVERY 6 HOURS PRN
Qty: 12 TABLET | Refills: 0 | Status: ON HOLD | OUTPATIENT
Start: 2017-11-01 | End: 2018-01-10 | Stop reason: CLARIF

## 2017-11-01 RX ORDER — ONDANSETRON 4 MG/1
4 TABLET, FILM COATED ORAL EVERY 8 HOURS PRN
Qty: 12 TABLET | Refills: 0 | Status: ON HOLD | OUTPATIENT
Start: 2017-11-01 | End: 2018-01-10 | Stop reason: CLARIF

## 2017-11-01 RX ORDER — PREDNISOLONE ACETATE 10 MG/ML
SUSPENSION/ DROPS OPHTHALMIC
Status: COMPLETED
Start: 2017-11-01 | End: 2017-11-01

## 2017-11-01 RX ORDER — SODIUM CHLORIDE 0.9 % (FLUSH) 0.9 %
3 SYRINGE (ML) INJECTION
Status: DISCONTINUED | OUTPATIENT
Start: 2017-11-01 | End: 2017-11-01 | Stop reason: HOSPADM

## 2017-11-01 RX ORDER — FENTANYL CITRATE 50 UG/ML
INJECTION, SOLUTION INTRAMUSCULAR; INTRAVENOUS
Status: DISCONTINUED | OUTPATIENT
Start: 2017-11-01 | End: 2017-11-01

## 2017-11-01 RX ORDER — NEOMYCIN SULFATE, POLYMYXIN B SULFATE, AND DEXAMETHASONE 3.5; 10000; 1 MG/G; [USP'U]/G; MG/G
OINTMENT OPHTHALMIC
Status: DISCONTINUED
Start: 2017-11-01 | End: 2017-11-01 | Stop reason: HOSPADM

## 2017-11-01 RX ORDER — VANCOMYCIN HYDROCHLORIDE 500 MG/10ML
INJECTION, POWDER, LYOPHILIZED, FOR SOLUTION INTRAVENOUS
Status: DISCONTINUED | OUTPATIENT
Start: 2017-11-01 | End: 2017-11-01 | Stop reason: HOSPADM

## 2017-11-01 RX ORDER — DEXAMETHASONE SODIUM PHOSPHATE 4 MG/ML
INJECTION, SOLUTION INTRA-ARTICULAR; INTRALESIONAL; INTRAMUSCULAR; INTRAVENOUS; SOFT TISSUE
Status: DISCONTINUED | OUTPATIENT
Start: 2017-11-01 | End: 2017-11-01 | Stop reason: HOSPADM

## 2017-11-01 RX ORDER — BUPIVACAINE HYDROCHLORIDE 7.5 MG/ML
INJECTION, SOLUTION EPIDURAL; RETROBULBAR
Status: DISCONTINUED | OUTPATIENT
Start: 2017-11-01 | End: 2017-11-01 | Stop reason: HOSPADM

## 2017-11-01 RX ORDER — CYCLOPENTOLATE HYDROCHLORIDE 10 MG/ML
1 SOLUTION/ DROPS OPHTHALMIC
Status: DISCONTINUED | OUTPATIENT
Start: 2017-11-01 | End: 2017-11-01 | Stop reason: HOSPADM

## 2017-11-01 RX ORDER — PHENYLEPHRINE HYDROCHLORIDE 25 MG/ML
1 SOLUTION/ DROPS OPHTHALMIC
Status: DISCONTINUED | OUTPATIENT
Start: 2017-11-01 | End: 2017-11-01 | Stop reason: HOSPADM

## 2017-11-01 RX ORDER — INDOCYANINE GREEN AND WATER 25 MG
KIT INJECTION
Status: DISCONTINUED
Start: 2017-11-01 | End: 2017-11-01 | Stop reason: HOSPADM

## 2017-11-01 RX ORDER — DEXAMETHASONE SODIUM PHOSPHATE 4 MG/ML
INJECTION, SOLUTION INTRA-ARTICULAR; INTRALESIONAL; INTRAMUSCULAR; INTRAVENOUS; SOFT TISSUE
Status: DISCONTINUED
Start: 2017-11-01 | End: 2017-11-01 | Stop reason: HOSPADM

## 2017-11-01 RX ORDER — TETRACAINE HYDROCHLORIDE 5 MG/ML
SOLUTION OPHTHALMIC
Status: COMPLETED
Start: 2017-11-01 | End: 2017-11-01

## 2017-11-01 RX ORDER — ACETAMINOPHEN 325 MG/1
650 TABLET ORAL EVERY 4 HOURS PRN
Status: DISCONTINUED | OUTPATIENT
Start: 2017-11-01 | End: 2017-11-01 | Stop reason: HOSPADM

## 2017-11-01 RX ORDER — LIDOCAINE HYDROCHLORIDE 20 MG/ML
INJECTION, SOLUTION EPIDURAL; INFILTRATION; INTRACAUDAL; PERINEURAL
Status: DISCONTINUED
Start: 2017-11-01 | End: 2017-11-01 | Stop reason: HOSPADM

## 2017-11-01 RX ORDER — BUPIVACAINE HYDROCHLORIDE 7.5 MG/ML
INJECTION, SOLUTION EPIDURAL; RETROBULBAR
Status: DISCONTINUED
Start: 2017-11-01 | End: 2017-11-01 | Stop reason: HOSPADM

## 2017-11-01 RX ORDER — TROPICAMIDE 10 MG/ML
SOLUTION/ DROPS OPHTHALMIC
Status: COMPLETED
Start: 2017-11-01 | End: 2017-11-01

## 2017-11-01 RX ORDER — MOXIFLOXACIN 5 MG/ML
1 SOLUTION/ DROPS OPHTHALMIC
Status: DISCONTINUED | OUTPATIENT
Start: 2017-11-01 | End: 2017-11-01 | Stop reason: HOSPADM

## 2017-11-01 RX ORDER — ONDANSETRON 2 MG/ML
4 INJECTION INTRAMUSCULAR; INTRAVENOUS EVERY 8 HOURS PRN
Status: DISCONTINUED | OUTPATIENT
Start: 2017-11-01 | End: 2017-11-01 | Stop reason: HOSPADM

## 2017-11-01 RX ORDER — LIDOCAINE HYDROCHLORIDE 20 MG/ML
INJECTION, SOLUTION INFILTRATION; PERINEURAL
Status: DISCONTINUED
Start: 2017-11-01 | End: 2017-11-01 | Stop reason: WASHOUT

## 2017-11-01 RX ORDER — TETRACAINE HYDROCHLORIDE 5 MG/ML
1 SOLUTION OPHTHALMIC
Status: DISCONTINUED | OUTPATIENT
Start: 2017-11-01 | End: 2017-11-01 | Stop reason: HOSPADM

## 2017-11-01 RX ORDER — ONDANSETRON 8 MG/1
8 TABLET, ORALLY DISINTEGRATING ORAL EVERY 8 HOURS PRN
Status: DISCONTINUED | OUTPATIENT
Start: 2017-11-01 | End: 2017-11-01 | Stop reason: HOSPADM

## 2017-11-01 RX ORDER — VANCOMYCIN HYDROCHLORIDE 500 MG/10ML
INJECTION, POWDER, LYOPHILIZED, FOR SOLUTION INTRAVENOUS
Status: DISCONTINUED
Start: 2017-11-01 | End: 2017-11-01 | Stop reason: HOSPADM

## 2017-11-01 RX ORDER — TROPICAMIDE 10 MG/ML
1 SOLUTION/ DROPS OPHTHALMIC
Status: DISCONTINUED | OUTPATIENT
Start: 2017-11-01 | End: 2017-11-01 | Stop reason: HOSPADM

## 2017-11-01 RX ORDER — SODIUM CHLORIDE 9 MG/ML
INJECTION, SOLUTION INTRAVENOUS CONTINUOUS
Status: DISCONTINUED | OUTPATIENT
Start: 2017-11-01 | End: 2017-11-01 | Stop reason: HOSPADM

## 2017-11-01 RX ORDER — PREDNISOLONE ACETATE 10 MG/ML
1 SUSPENSION/ DROPS OPHTHALMIC
Status: DISCONTINUED | OUTPATIENT
Start: 2017-11-01 | End: 2017-11-01 | Stop reason: HOSPADM

## 2017-11-01 RX ORDER — EPINEPHRINE 1 MG/ML
INJECTION, SOLUTION INTRACARDIAC; INTRAMUSCULAR; INTRAVENOUS; SUBCUTANEOUS
Status: DISCONTINUED
Start: 2017-11-01 | End: 2017-11-01 | Stop reason: HOSPADM

## 2017-11-01 RX ORDER — LIDOCAINE HYDROCHLORIDE 10 MG/ML
1 INJECTION, SOLUTION EPIDURAL; INFILTRATION; INTRACAUDAL; PERINEURAL ONCE
Status: DISCONTINUED | OUTPATIENT
Start: 2017-11-01 | End: 2017-11-01 | Stop reason: HOSPADM

## 2017-11-01 RX ADMIN — SODIUM CHLORIDE: 0.9 INJECTION, SOLUTION INTRAVENOUS at 07:11

## 2017-11-01 RX ADMIN — FENTANYL CITRATE 25 MCG: 50 INJECTION, SOLUTION INTRAMUSCULAR; INTRAVENOUS at 09:11

## 2017-11-01 RX ADMIN — PREDNISOLONE ACETATE 1 DROP: 10 SUSPENSION/ DROPS OPHTHALMIC at 07:11

## 2017-11-01 RX ADMIN — PROPOFOL 20 MG: 10 INJECTION, EMULSION INTRAVENOUS at 08:11

## 2017-11-01 RX ADMIN — LIDOCAINE HYDROCHLORIDE 2 ML: 20 INJECTION, SOLUTION EPIDURAL; INFILTRATION; INTRACAUDAL; PERINEURAL at 08:11

## 2017-11-01 RX ADMIN — TETRACAINE HYDROCHLORIDE 2 DROP: 5 SOLUTION OPHTHALMIC at 08:11

## 2017-11-01 RX ADMIN — VANCOMYCIN HYDROCHLORIDE 500 MG: 500 INJECTION, POWDER, LYOPHILIZED, FOR SOLUTION INTRAVENOUS at 08:11

## 2017-11-01 RX ADMIN — PHENYLEPHRINE HYDROCHLORIDE 1 DROP: 25 SOLUTION/ DROPS OPHTHALMIC at 07:11

## 2017-11-01 RX ADMIN — CYCLOPENTOLATE HYDROCHLORIDE 1 DROP: 10 SOLUTION/ DROPS OPHTHALMIC at 07:11

## 2017-11-01 RX ADMIN — FENTANYL CITRATE 25 MCG: 50 INJECTION, SOLUTION INTRAMUSCULAR; INTRAVENOUS at 08:11

## 2017-11-01 RX ADMIN — TROPICAMIDE 1 DROP: 10 SOLUTION/ DROPS OPHTHALMIC at 07:11

## 2017-11-01 RX ADMIN — MOXIFLOXACIN HYDROCHLORIDE 1 DROP: 5 SOLUTION/ DROPS OPHTHALMIC at 07:11

## 2017-11-01 RX ADMIN — TETRACAINE HYDROCHLORIDE 1 DROP: 5 SOLUTION OPHTHALMIC at 06:11

## 2017-11-01 RX ADMIN — MIDAZOLAM HYDROCHLORIDE 2 MG: 1 INJECTION, SOLUTION INTRAMUSCULAR; INTRAVENOUS at 08:11

## 2017-11-01 RX ADMIN — NEOMYCIN SULFATE, POLYMYXIN B SULFATE, AND DEXAMETHASONE 1 APPLICATION: 3.5; 10000; 1 OINTMENT OPHTHALMIC at 08:11

## 2017-11-01 RX ADMIN — DEXAMETHASONE SODIUM PHOSPHATE 2 MG: 4 INJECTION, SOLUTION INTRAMUSCULAR; INTRAVENOUS at 08:11

## 2017-11-01 RX ADMIN — EPINEPHRINE 0.3 ML: 1 INJECTION, SOLUTION INTRAMUSCULAR; SUBCUTANEOUS at 08:11

## 2017-11-01 RX ADMIN — HOMATROPINE HYDROBROMIDE 1 DROP: 50 SOLUTION OPHTHALMIC at 07:11

## 2017-11-01 RX ADMIN — BUPIVACAINE HYDROCHLORIDE 2 ML: 7.5 INJECTION, SOLUTION EPIDURAL; RETROBULBAR at 08:11

## 2017-11-01 NOTE — BRIEF OP NOTE
Pre-Op Dx: RRD with multiple breaks and PVR OD    Post Op Dx: same    Procedure Performed: 25g PPV/membrane stripping/AFx/EL/C3F8 14% OD    Attending Surgeon: Anastacio    Assistant Surgeon: Mitchell    Anesthesia: Local/Mac, retrobulbar injection of 4.0cc mixture 0.75%Marcaine, 2% Xylocaine    Estimated blood loss: Minimal    Complication: None    Specimen: None    Disposition: Stable to recovery    Findings/Outcome: inferotemporal retinal detachment with small breaks from 5-7 with early PVR.  IT posterior retinotomy. Flatted nicely under air, good retinopexy applied    Date of Discharge: 11/1/17    Discharge Disposition: stable to recovery then home    F/U: tomorrow

## 2017-11-01 NOTE — DISCHARGE INSTRUCTIONS
Post Op Instructions:  Patient should Maintain Eye shield & Dressing until seen tomorrow in eye clinic  Tylenol as needed for general discomfort  Use Prescription for pain medication if pain is severe  Use Prescription for Nausea (Zofran) if nausea or vomiting  No excessive exercise   No Bending, Lifting or Straining  Call MD if significant pain or nausea / vomiting uncontrolled by medications  Call MD if temperature in excess of 101' F  Maintain left side down position  Return to eye clinic for Post Op Examination tomorrow Morning.  Bring Medicine bag to tomorrow's appointment.

## 2017-11-01 NOTE — PLAN OF CARE
Problem: Patient Care Overview  Goal: Plan of Care Review  Outcome: Outcome(s) achieved Date Met: 11/01/17  Discharge instructions and prescriptions reviewed. Vital signs stable, taking fluids. Meets criteria for discharge at this time.

## 2017-11-01 NOTE — TRANSFER OF CARE
"Anesthesia Transfer of Care Note    Patient: Madison Long    Procedure(s) Performed: Procedure(s) (LRB):  REPAIR-RETINA (VITRECTOMY) (Right)    Patient location: PACU    Anesthesia Type: MAC    Transport from OR: Transported from OR on 2-3 L/min O2 by NC with adequate spontaneous ventilation    Post pain: adequate analgesia    Post assessment: no apparent anesthetic complications and tolerated procedure well    Post vital signs: stable    Level of consciousness: awake, alert and oriented    Nausea/Vomiting: no nausea/vomiting    Complications: none    Transfer of care protocol was followed      Last vitals:   Visit Vitals  BP (!) 144/74 (BP Location: Left arm, Patient Position: Sitting)   Pulse 71   Temp 36.7 °C (98.1 °F) (Oral)   Resp 16   Ht 5' 5" (1.651 m)   Wt 68.9 kg (152 lb)   SpO2 100%   Breastfeeding? No   BMI 25.29 kg/m²     "

## 2017-11-01 NOTE — ANESTHESIA PREPROCEDURE EVALUATION
10/31/2017  Madison Long is a 72 y.o., female     Pre-operative evaluation for REPAIR-RETINA (VITRECTOMY) (Right)        Past Surgical History:   Procedure Laterality Date    BASAL CELL CARCINOMA EXCISION      CATARACT EXTRACTION      COLONOSCOPY  2013    EYE SURGERY      cataracts    HYSTERECTOMY      UPPER GASTROINTESTINAL ENDOSCOPY           Vital Signs Range (Last 24H):         CBC:   No results for input(s): WBC, RBC, HGB, HCT, PLT, MCV, MCH, MCHC in the last 720 hours.    CMP: No results for input(s): NA, K, CL, CO2, BUN, CREATININE, GLU, MG, PHOS, CALCIUM, ALBUMIN, PROT, ALKPHOS, ALT, AST, BILITOT in the last 720 hours.    INR:  No results for input(s): INR, PROTIME, APTT in the last 720 hours.    Invalid input(s): PT        Anesthesia Evaluation    I have reviewed the Patient Summary Reports.     I have reviewed the Medications.     Review of Systems  Anesthesia Hx:  No problems with previous Anesthesia    Cardiovascular:   Hypertension Denies MI.  Denies CAD.    Denies CABG/stent.     Pulmonary:   COPD Asthma    Renal/:  Renal/ Normal     Hepatic/GI:   Denies GERD. Denies Liver Disease.  Denies Hepatitis.    Neurological:  Neurology Normal    Endocrine:  Endocrine Normal        Physical Exam  General:  Well nourished    Airway/Jaw/Neck:  Airway Findings: Mouth Opening: Normal Tongue: Normal  Mallampati: II  TM Distance: Normal, at least 6 cm     Eyes/Ears/Nose:  EYES/EARS/NOSE FINDINGS: Normal   Dental:  Dental Findings: In tact   Chest/Lungs:  Chest/Lungs Findings: Normal Respiratory Rate     Heart/Vascular:  Heart Findings: Rate: Normal  Rhythm: Regular Rhythm        Mental Status:  Mental Status Findings: Normal        Anesthesia Plan  Type of Anesthesia, risks & benefits discussed:  Anesthesia Type:  general, MAC  Patient's Preference:   Intra-op Monitoring Plan: standard ASA  monitors  Intra-op Monitoring Plan Comments:   Post Op Pain Control Plan:   Post Op Pain Control Plan Comments:   Induction:   IV  Beta Blocker:  Patient is not currently on a Beta-Blocker (No further documentation required).       Informed Consent: Patient understands risks and agrees with Anesthesia plan.  Questions answered. Anesthesia consent signed with patient.  ASA Score: 3     Day of Surgery Review of History & Physical:    H&P update referred to the surgeon.         Ready For Surgery From Anesthesia Perspective.

## 2017-11-01 NOTE — OP NOTE
DATE OF PROCEDURE:  11/01/2017.    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    PROCEDURES PERFORMED:  A 25-gauge pars plana vitrectomy with membrane stripping,   air-fluid exchange, endolaser injection of C3F8 gas 14% to the right eye.    ATTENDING SURGEON:  JANEL Chaves M.D.    ASSISTANT SURGEON:  Lurdes Medrano.    ANESTHESIA:  Local, monitored anesthesia care with a retrobulbar injection of   4.0 mL mixture of 0.75% Marcaine and 2% Xylocaine.    ESTIMATED BLOOD LOSS:  Minimal.    COMPLICATIONS:  None.    DISPOSITION:  Stable to recovery.    INDICATIONS FOR SURGERY:  This is a 72-year-old female who is status post recent   pars plana vitrectomy in the right eye for a primary retinal detachment.  The   patient was doing fine until the day before surgery when she came in for a   followup visit.  She had complained of some peripheral distortion over the last   couple of days.  The patient was found to have a recurrent inferotemporal   retinal detachment, inferior retinal breaks and some early proliferative vitreal   retinopathy.  Decision was made to take the patient to surgery to repair the   retinal detachment, prevent further vision loss and improve some of the   peripheral vision.  Risks, benefits, and alternatives of surgery were discussed   in detail with risks including loss of vision, loss of eye, recurrent retinal   detachment, infection, hemorrhage, lens dislocation, glaucoma, hypotony, ptosis   and diplopia.  The patient voiced understanding and wished to proceed with the   procedure.    DESCRIPTION OF PROCEDURE:  After proper informed consent was obtained, the   patient was brought back to the Operative Suite at [facility] where   MAC anesthesia was induced.  Retrobulbar injection was provided as above without   complication.  The patient was prepped and draped in normal sterile fashion for   ophthalmic surgery and lid speculum was placed in the right eye.  A standard   3-port 25-gauge pars plana vitrectomy was set up with the infusion cannula   inserted 3.5 mm posterior to the limbus.  The infusion cannula was turned on   only after observed to be free and clear of all underlying retinal tissue.    Supranasal and supratemporal trocars were also placed 3.5 mm posterior to the   limbus.  The vitrector and light pipe were introduced in the vitreous cavity.    Vitreous organization was noted along the inferior retinal breaks.  Scleral   depression was used to help remove some of the cortical vitreous strands and   free up vitreous traction around the breaks.  There was a small nidus of PVR in   a mini star-fold pattern inferotemporally.  The vitrector was used to strip this   vitreous organization away.  Scleral depression was performed 360 degrees, and   outside of the inferior pathology, no other breaks were noted superiorly.  An   inferotemporal posterior retinotomy was created with the diathermy tip and the   edges of the breaks were marked.  An air-fluid exchange was performed with the   retina significantly flattened following this maneuver.  Endolaser was applied   in a barrier fashion around the inferior retinal breaks as well as an inferior   barricade retinopexy from approximately 4 o'clock to 8 o'clock.  Additional   retinopexy was applied around the primary retinal break sites supratemporally as   well as around the posterior retinotomy.  Supranasal trocar was removed and not   leaking after gentle massage.  A 14% C3F8 gas mixture was created and   approximately 40 mL were cycled through the eye and then the supratemporal and   infusion trocars were removed and not leaking after gentle massage.  The eye was    normal pressure via palpation.  Subconjunctival injections of vancomycin and   Decadron were given to the patient.  The drapes were removed from the patient.    She was washed free of Betadine prep solution.  Maxitrol ointment was placed in   the right eye.  The eye was patch shielded.  MAC anesthesia was reversed and she   was brought to Recovery Room in stable condition, tolerating the procedure   well.  Dr. Chaves was present for the entire case.      JACK  dd: 11/01/2017 09:14:40 (CDT)  td: 11/01/2017 10:34:29 (CDT)  Doc ID   #[id number]  Job ID #[id number]    CC:

## 2017-11-01 NOTE — H&P
Pre-Operative History & Physical  Ophthalmology      SUBJECTIVE:     History of Present Illness:  Patient is a 72 y.o. 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    MEDICATIONS:   No prescriptions prior to admission.       ALLERGIES:   Review of patient's allergies indicates:   Allergen Reactions    Penicillins Hives       PAST MEDICAL HISTORY:   Past Medical History:   Diagnosis Date    Allergy     Asthma     Basal cell carcinoma     COPD (chronic obstructive pulmonary disease)     GERD (gastroesophageal reflux disease)     Hyperlipidemia     Hypertension     Retinal detachment      PAST SURGICAL HISTORY:   Past Surgical History:   Procedure Laterality Date    BASAL CELL CARCINOMA EXCISION      CATARACT EXTRACTION      COLONOSCOPY  2013    EYE SURGERY      cataracts    HYSTERECTOMY      UPPER GASTROINTESTINAL ENDOSCOPY       PAST FAMILY HISTORY:   Family History   Problem Relation Age of Onset    Asthma Mother     Stroke Father 77     cva    Colon cancer Neg Hx     Colon polyps Neg Hx     Celiac disease Neg Hx     Esophageal cancer Neg Hx     Stomach cancer Neg Hx     Irritable bowel syndrome Neg Hx     Inflammatory bowel disease Neg Hx      SOCIAL HISTORY:   Social History   Substance Use Topics    Smoking status: Never Smoker    Smokeless tobacco: Never Used    Alcohol use 1.2 oz/week     2 Glasses of wine per week      Comment: socially        MENTAL STATUS: Alert    REVIEW OF SYSTEMS: Negative    OBJECTIVE:     Vital Signs (Most Recent)       Physical Exam:  General: NAD  HEENT: Atraumatic  Lungs: Adequate respirations, LCTAB  Heart: RRR, No murmur  Abdomen: Soft NT    ASSESSMENT/PLAN:     Patient is a 72 y.o. 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     - Plan for surgical correction Plan 25g  PPV/membrane stripping/inferior retinopexy/C3F8 OD for RRD/PVR OD     Local MAC  LOC 60 min  [place] case     - Risks/benefits/alternatives of the procedure including, but not limited to scarring, bleeding, infection, loss or decreased vision, and/or need for possible repeat surgery discussed with the patient and family.   - Informed consent obtained prior to surgery and the patient/family voiced good understanding.    Warner Medrano  11/1/2017  12:02 AM

## 2017-11-02 ENCOUNTER — OFFICE VISIT (OUTPATIENT)
Dept: OPHTHALMOLOGY | Facility: CLINIC | Age: 72
End: 2017-11-02
Payer: MEDICARE

## 2017-11-02 VITALS — SYSTOLIC BLOOD PRESSURE: 158 MMHG | DIASTOLIC BLOOD PRESSURE: 98 MMHG | HEART RATE: 107 BPM

## 2017-11-02 DIAGNOSIS — H35.21 PROLIFERATIVE VITREORETINOPATHY, RIGHT: ICD-10-CM

## 2017-11-02 DIAGNOSIS — H33.021 RETINAL DETACHMENT OF RIGHT EYE WITH MULTIPLE BREAKS: Primary | ICD-10-CM

## 2017-11-02 PROCEDURE — 99999 PR PBB SHADOW E&M-EST. PATIENT-LVL IV: CPT | Mod: PBBFAC,,, | Performed by: OPHTHALMOLOGY

## 2017-11-02 PROCEDURE — 99024 POSTOP FOLLOW-UP VISIT: CPT | Mod: S$GLB,,, | Performed by: OPHTHALMOLOGY

## 2017-11-02 PROCEDURE — 99499 UNLISTED E&M SERVICE: CPT | Mod: S$GLB,,, | Performed by: OPHTHALMOLOGY

## 2017-11-02 NOTE — ANESTHESIA POSTPROCEDURE EVALUATION
"Anesthesia Post Evaluation    Patient: Madison Long    Procedure(s) Performed: Procedure(s) (LRB):  REPAIR-RETINA (VITRECTOMY) (Right)    Final Anesthesia Type: general  Patient location during evaluation: PACU  Patient participation: Yes- Able to Participate  Level of consciousness: awake and alert  Post-procedure vital signs: reviewed and stable  Pain management: adequate  Airway patency: patent  PONV status at discharge: No PONV  Anesthetic complications: no      Cardiovascular status: stable  Respiratory status: unassisted, spontaneous ventilation and room air  Hydration status: euvolemic  Follow-up not needed.        Visit Vitals  /82   Pulse 83   Temp 36.7 °C (98.1 °F) (Oral)   Resp 16   Ht 5' 5" (1.651 m)   Wt 68.9 kg (152 lb)   SpO2 99%   Breastfeeding? No   BMI 25.29 kg/m²       Pain/Margie Score: Pain Assessment Performed: Yes (11/1/2017  9:27 AM)  Presence of Pain: denies (11/1/2017  9:27 AM)  Margie Score: 10 (11/1/2017  9:27 AM)      "

## 2017-11-02 NOTE — PROGRESS NOTES
"HPI     DLS 10/30/17  1day post op check PPV OD. Pt states she see spots in OD   like she did in OS.     Cosopt BID      HPI     Post-op Evaluation    Additional comments: 6 week check           Comments   DLS 9/18/17- Still seeing floater, wavy lines and seeing [place]. She is   also seeing things on a smaller scale with OD. It is unchanged from last   visit    Cosopt BID      HPI     DLS 9/7/16      2 wks post op-  States no pain at present. Sometimes will have a shooting pain that comes   and goes.  VA is better.  Still has the bubble. Cannot read the TV screen.        Eye meds:  PF  QID                       A 1% BID                     Dorzolamide BID        HPI     Post-op Evaluation    Additional comments: 1 day check           Comments   1 day post op- She had some pain last night which was relieved with pain   meds. She is having no pain this morning but eye is itching    HPI     DLS 09/04/17 in the E.R.        73 Y/O F presents today per consult from the E.R.  Pt was seen by Dr. Andres Leung MD and Dr. Harish Ventura MD for Retinal detachment of the right eye. Pt states" the onset occurred on Thursday, starting with blurred vision and by Sunday vision was worse and seeing dark spots, flashes, and floaters.[facility]       Eye Meds: Refresh Tears OU prn    OCT - RD OD  OS - No ME      A/P    1. RRD OD   Macula involving x 4  Days    S/p  25g PPV/EL/SF6 OD for RRD 9/6/17  IOP improved    10/30/17 - Recurrent IT RD with early PVR and small break    s/p 25g PPV/membrane stripping/inferior retinopexy/C3F8 OD for RRD/PVR OD 11/1/17    Doing well, good IOP  Start gtts QID, ointment/shield QHS    Side position    F/U NS next week    2. PCIOL OU    3. HTN   Good BP control    4. PVD OS  No breaks OS  Dilate OS at 1 month post op OD            "

## 2017-11-06 ENCOUNTER — OFFICE VISIT (OUTPATIENT)
Dept: OPHTHALMOLOGY | Facility: CLINIC | Age: 72
End: 2017-11-06
Payer: MEDICARE

## 2017-11-06 DIAGNOSIS — H33.001 RETINAL DETACHMENT, RHEGMATOGENOUS, RIGHT EYE: Primary | ICD-10-CM

## 2017-11-06 DIAGNOSIS — H33.021 RETINAL DETACHMENT OF RIGHT EYE WITH MULTIPLE BREAKS: ICD-10-CM

## 2017-11-06 DIAGNOSIS — H35.21 PROLIFERATIVE VITREORETINOPATHY, RIGHT: ICD-10-CM

## 2017-11-06 PROCEDURE — 99024 POSTOP FOLLOW-UP VISIT: CPT | Mod: S$GLB,,, | Performed by: OPHTHALMOLOGY

## 2017-11-06 PROCEDURE — 99999 PR PBB SHADOW E&M-EST. PATIENT-LVL II: CPT | Mod: PBBFAC,,, | Performed by: OPHTHALMOLOGY

## 2017-11-06 NOTE — PROGRESS NOTES
"HPI     Post-op Evaluation    Additional comments: 4 day check           Comments   DLS 11/2/17- OS still blurry. Still seeing floaters OS- they come and go.   She also sees a thin veil that flashes across vision OS sometimes    HPI     DLS 10/30/17  1day post op check PPV OD. Pt states she see spots in OD   like she did in OS.     Cosopt BID      HPI     Post-op Evaluation    Additional comments: 6 week check           Comments   [facility] 9/18/17- Still seeing floater, wavy lines and seeing [place]. She is   also seeing things on a smaller scale with OD. It is unchanged from last   visit    Cosopt BID      HPI     DLS 9/7/16      2 wks post op-  States no pain at present. Sometimes will have a shooting pain that comes   and goes.  VA is better.  Still has the bubble. Cannot read the TV screen.        Eye meds:  PF  QID                       A 1% BID                     Dorzolamide BID        HPI     Post-op Evaluation    Additional comments: 1 day check           Comments   1 day post op- She had some pain last night which was relieved with pain   meds. She is having no pain this morning but eye is itching    HPI     [facility] 09/04/17 in the E.R.        71 Y/O F presents today per consult from the E.R.  Pt was seen by Dr. Andres Leung MD and Dr. Harish Ventura MD for Retinal detachment of the right eye. Pt states" the onset occurred on Thursday, starting with blurred vision and by Sunday vision was worse and seeing dark spots, flashes, and floaters.[facility]       Eye Meds: Refresh Tears OU prn    OCT - RD OD  OS - No ME      A/P    1. RRD OD   Macula involving x 4  Days    S/p  25g PPV/EL/SF6 OD for RRD 9/6/17  IOP improved    10/30/17 - Recurrent IT RD with early PVR and small break    s/p 25g PPV/membrane stripping/inferior retinopexy/C3F8 OD for RRD/PVR OD 11/1/17    Doing well, good IOP  continue gtts QID, ointment/shield QHS until Thursday  Then PF/HA BID    Side position at night    2. PCIOL OU    3. HTN   Good BP " control    4. PVD OS  No breaks OS      3 weeks Dilate OU

## 2017-11-08 ENCOUNTER — TELEPHONE (OUTPATIENT)
Dept: PULMONOLOGY | Facility: CLINIC | Age: 72
End: 2017-11-08

## 2017-11-09 ENCOUNTER — TELEPHONE (OUTPATIENT)
Dept: PULMONOLOGY | Facility: CLINIC | Age: 72
End: 2017-11-09

## 2017-11-09 NOTE — TELEPHONE ENCOUNTER
----- Message from Ely Lucas sent at 11/9/2017  8:52 AM CST -----  Contact: Self   [phone]  [place]  -   Pt  Calling to speak with the nurse in regards to her appt , call the pt back to discuss

## 2017-11-13 ENCOUNTER — OFFICE VISIT (OUTPATIENT)
Dept: PRIMARY CARE CLINIC | Facility: CLINIC | Age: 72
End: 2017-11-13
Payer: MEDICARE

## 2017-11-13 ENCOUNTER — TELEPHONE (OUTPATIENT)
Dept: FAMILY MEDICINE | Facility: CLINIC | Age: 72
End: 2017-11-13

## 2017-11-13 VITALS
TEMPERATURE: 98 F | HEIGHT: 65 IN | HEART RATE: 80 BPM | BODY MASS INDEX: 26.41 KG/M2 | SYSTOLIC BLOOD PRESSURE: 140 MMHG | WEIGHT: 158.5 LBS | OXYGEN SATURATION: 97 % | DIASTOLIC BLOOD PRESSURE: 90 MMHG

## 2017-11-13 DIAGNOSIS — N39.0 URINARY TRACT INFECTION WITH HEMATURIA, SITE UNSPECIFIED: Primary | ICD-10-CM

## 2017-11-13 DIAGNOSIS — R30.0 DYSURIA: ICD-10-CM

## 2017-11-13 DIAGNOSIS — R31.9 URINARY TRACT INFECTION WITH HEMATURIA, SITE UNSPECIFIED: Primary | ICD-10-CM

## 2017-11-13 LAB
BILIRUB SERPL-MCNC: NORMAL MG/DL
BLOOD URINE, POC: NORMAL
COLOR, POC UA: NORMAL
GLUCOSE UR QL STRIP: NORMAL
KETONES UR QL STRIP: NORMAL
LEUKOCYTE ESTERASE URINE, POC: NORMAL
NITRITE, POC UA: NORMAL
PH, POC UA: 7
PROTEIN, POC: NORMAL
SPECIFIC GRAVITY, POC UA: 1
UROBILINOGEN, POC UA: NORMAL

## 2017-11-13 PROCEDURE — 99213 OFFICE O/P EST LOW 20 MIN: CPT | Mod: 25,S$GLB,, | Performed by: NURSE PRACTITIONER

## 2017-11-13 PROCEDURE — 99999 PR PBB SHADOW E&M-EST. PATIENT-LVL V: CPT | Mod: PBBFAC,,, | Performed by: NURSE PRACTITIONER

## 2017-11-13 PROCEDURE — 87086 URINE CULTURE/COLONY COUNT: CPT

## 2017-11-13 PROCEDURE — 81002 URINALYSIS NONAUTO W/O SCOPE: CPT | Mod: S$GLB,,, | Performed by: NURSE PRACTITIONER

## 2017-11-13 RX ORDER — NITROFURANTOIN 25; 75 MG/1; MG/1
100 CAPSULE ORAL 2 TIMES DAILY
Qty: 14 CAPSULE | Refills: 0 | Status: SHIPPED | OUTPATIENT
Start: 2017-11-13 | End: 2017-11-20

## 2017-11-13 RX ORDER — PHENAZOPYRIDINE HYDROCHLORIDE 100 MG/1
100 TABLET, FILM COATED ORAL 3 TIMES DAILY PRN
Qty: 7 TABLET | Refills: 0 | Status: SHIPPED | OUTPATIENT
Start: 2017-11-13 | End: 2017-11-16

## 2017-11-13 NOTE — TELEPHONE ENCOUNTER
Patient unknown to me and clinic policy needs to be seen for antibiotic treatment.  Needs appointment with a provider.

## 2017-11-13 NOTE — TELEPHONE ENCOUNTER
----- Message from Nicolle Coles sent at 11/13/2017  1:14 PM CST -----  Contact: self  Patient is needing to know if she can have some antibiotics called in for a possible UTI   Please call back [phone]      Gracie Garcias    [phone]

## 2017-11-13 NOTE — TELEPHONE ENCOUNTER
Jason pt  Pt has appt to establish 1/23/18  Pt states she has a retinal detachment and cannot drive at this time, but she has a UTI.  She is having urinary frequency and dyuria.  She is requesting abx be sent to pharmacy for her since she is unable to get here to leave a sample.  Please advise.

## 2017-11-13 NOTE — TELEPHONE ENCOUNTER
Pt informed.  Verbalized understanding.  Appt scheduled, but pt is unsure if she'll be able to make it.  She will call, if not.

## 2017-11-14 NOTE — PATIENT INSTRUCTIONS

## 2017-11-14 NOTE — PROGRESS NOTES
Subjective:       Patient ID: Madison Long is a 72 y.o. female.    Chief Complaint: Urinary Tract Infection (started saturday)    Urinary Tract Infection    This is a new problem. Episode onset: started saturday. The problem occurs every urination. The problem has been unchanged. The quality of the pain is described as burning. The pain is mild. There has been no fever. Associated symptoms include frequency and urgency. Pertinent negatives include no behavior changes, chills, discharge, flank pain, hematuria, hesitancy, nausea, possible pregnancy, sweats, vomiting, weight loss, bubble bath use, constipation, rash or withholding. She has tried increased fluids for the symptoms. The treatment provided mild relief.     Review of Systems   Constitutional: Negative for chills, fatigue, fever and weight loss.   HENT: Negative for congestion, sinus pain, sinus pressure, sneezing and sore throat.    Respiratory: Negative for cough, chest tightness, shortness of breath and wheezing.    Cardiovascular: Negative for chest pain, palpitations and leg swelling.   Gastrointestinal: Negative for abdominal distention, abdominal pain, constipation, diarrhea, nausea and vomiting.   Genitourinary: Positive for frequency and urgency. Negative for decreased urine volume, difficulty urinating, dysuria, flank pain, hematuria and hesitancy.   Musculoskeletal: Negative for arthralgias, gait problem, joint swelling and myalgias.   Skin: Negative for rash and wound.   Neurological: Negative for dizziness, light-headedness, numbness and headaches.       Objective:      Physical Exam   Constitutional: She is oriented to person, place, and time. She appears well-developed and well-nourished.   HENT:   Head: Normocephalic and atraumatic.   Right Ear: External ear normal.   Left Ear: External ear normal.   Nose: Nose normal.   Mouth/Throat: Oropharynx is clear and moist.   Eyes: Pupils are equal, round, and reactive to light.   Neck: Normal  range of motion.   Cardiovascular: Normal rate, regular rhythm, normal heart sounds and intact distal pulses.    Pulmonary/Chest: Effort normal and breath sounds normal.   Abdominal: Soft. Bowel sounds are normal.   Musculoskeletal: Normal range of motion.   Neurological: She is alert and oriented to person, place, and time.   Skin: Skin is warm and dry.   Nursing note and vitals reviewed.      Assessment:       1. Urinary tract infection with hematuria, site unspecified    2. Dysuria        Plan:       Urinary tract infection with hematuria, site unspecified  -     Urine culture  -     POCT urine dipstick without microscope  -     nitrofurantoin, macrocrystal-monohydrate, (MACROBID) 100 MG capsule; Take 1 capsule (100 mg total) by mouth 2 (two) times daily.  Dispense: 14 capsule; Refill: 0  -     phenazopyridine (PYRIDIUM) 100 MG tablet; Take 1 tablet (100 mg total) by mouth 3 (three) times daily as needed for Pain.  Dispense: 7 tablet; Refill: 0    Dysuria  -     Urine culture  -     POCT urine dipstick without microscope  -     nitrofurantoin, macrocrystal-monohydrate, (MACROBID) 100 MG capsule; Take 1 capsule (100 mg total) by mouth 2 (two) times daily.  Dispense: 14 capsule; Refill: 0  -     phenazopyridine (PYRIDIUM) 100 MG tablet; Take 1 tablet (100 mg total) by mouth 3 (three) times daily as needed for Pain.  Dispense: 7 tablet; Refill: 0    Will follow up with patient regarding urine culture. Patient instructed to follow up if no improvement or worsening symptoms.

## 2017-11-15 LAB — BACTERIA UR CULT: NO GROWTH

## 2017-11-16 DIAGNOSIS — R31.29 HEMATURIA, MICROSCOPIC: Primary | ICD-10-CM

## 2017-11-16 NOTE — PROGRESS NOTES
Please call the patient and let her know that the urine culture is showing no growth so she can stop the antibiotic. If she continues to have symptoms I can refer her to urology. Thanks.

## 2017-11-20 ENCOUNTER — OFFICE VISIT (OUTPATIENT)
Dept: OPHTHALMOLOGY | Facility: CLINIC | Age: 72
End: 2017-11-20
Payer: MEDICARE

## 2017-11-20 DIAGNOSIS — H33.021 RETINAL DETACHMENT OF RIGHT EYE WITH MULTIPLE BREAKS: Primary | ICD-10-CM

## 2017-11-20 DIAGNOSIS — H35.21 PROLIFERATIVE VITREORETINOPATHY, RIGHT: ICD-10-CM

## 2017-11-20 PROCEDURE — 99999 PR PBB SHADOW E&M-EST. PATIENT-LVL III: CPT | Mod: PBBFAC,,, | Performed by: OPHTHALMOLOGY

## 2017-11-20 PROCEDURE — 99499 UNLISTED E&M SERVICE: CPT | Mod: S$GLB,,, | Performed by: OPHTHALMOLOGY

## 2017-11-20 PROCEDURE — 99024 POSTOP FOLLOW-UP VISIT: CPT | Mod: S$GLB,,, | Performed by: OPHTHALMOLOGY

## 2017-11-20 RX ORDER — DORZOLAMIDE HYDROCHLORIDE AND TIMOLOL MALEATE 20; 5 MG/ML; MG/ML
1 SOLUTION/ DROPS OPHTHALMIC 2 TIMES DAILY
Qty: 10 ML | Refills: 3 | Status: SHIPPED | OUTPATIENT
Start: 2017-11-20 | End: 2018-01-09

## 2017-11-20 RX ORDER — PREDNISOLONE ACETATE 10 MG/ML
1 SUSPENSION/ DROPS OPHTHALMIC 2 TIMES DAILY
Qty: 5 ML | Refills: 2 | Status: SHIPPED | OUTPATIENT
Start: 2017-11-20 | End: 2018-01-09

## 2017-11-20 NOTE — PROGRESS NOTES
"HPI     DLS 11/6/17-   Pt states she can see color and a little more shapes. But still has a line   from the bubble in the center.  Still has floaters OS and sees "a veil"   that floats across the [facility].       PF x BID  HA x BID  Dylan qhs      Eye Meds: Refresh Tears OU prn    OCT - RD OD  OS - No ME      A/P    1. RRD OD   Macula involving x 4  Days    S/p  25g PPV/EL/SF6 OD for RRD 9/6/17  IOP improved    10/30/17 - Recurrent IT RD with early PVR and small break    s/p 25g PPV/membrane stripping/inferior retinopexy/C3F8 OD for RRD/PVR OD 11/1/17    Doing well, good IOP  continue gtts QID, ointment/shield QHS until Thursday  Then PF/HA BID    Side position at night    2. PCIOL OU    3. HTN   Good BP control    4. PVD OS  No breaks OS      6 weeks Dilate OU  "

## 2017-11-29 DIAGNOSIS — J44.9 CHRONIC OBSTRUCTIVE PULMONARY DISEASE, UNSPECIFIED COPD TYPE: Primary | ICD-10-CM

## 2017-11-29 RX ORDER — ALBUTEROL SULFATE 90 UG/1
2 AEROSOL, METERED RESPIRATORY (INHALATION) EVERY 6 HOURS PRN
Qty: 1 INHALER | Refills: 11 | Status: SHIPPED | OUTPATIENT
Start: 2017-11-29 | End: 2018-09-04 | Stop reason: SDUPTHER

## 2017-11-29 NOTE — TELEPHONE ENCOUNTER
----- Message from Ely Lucas sent at 11/29/2017 12:52 PM CST -----  Contact: Self   [phone]  Pt calling  To get an refill  On  Medication VENTOLIN HFA 90 mcg/actuation inhaler

## 2017-12-04 ENCOUNTER — TELEPHONE (OUTPATIENT)
Dept: OPHTHALMOLOGY | Facility: CLINIC | Age: 72
End: 2017-12-04

## 2017-12-04 ENCOUNTER — OFFICE VISIT (OUTPATIENT)
Dept: OPHTHALMOLOGY | Facility: CLINIC | Age: 72
End: 2017-12-04
Payer: MEDICARE

## 2017-12-04 DIAGNOSIS — H35.21 PROLIFERATIVE VITREORETINOPATHY, RIGHT: ICD-10-CM

## 2017-12-04 DIAGNOSIS — H35.21 NONDIABETIC PROLIFERATIVE RETINOPATHY, RIGHT: ICD-10-CM

## 2017-12-04 DIAGNOSIS — H33.001 RETINAL DETACHMENT, RHEGMATOGENOUS, RIGHT EYE: Primary | ICD-10-CM

## 2017-12-04 DIAGNOSIS — H33.001 RHEGMATOGENOUS RETINAL DETACHMENT OF RIGHT EYE: Primary | ICD-10-CM

## 2017-12-04 PROCEDURE — 99024 POSTOP FOLLOW-UP VISIT: CPT | Mod: S$GLB,,, | Performed by: OPHTHALMOLOGY

## 2017-12-04 PROCEDURE — 99999 PR PBB SHADOW E&M-EST. PATIENT-LVL II: CPT | Mod: PBBFAC,,, | Performed by: OPHTHALMOLOGY

## 2017-12-04 PROCEDURE — 99499 UNLISTED E&M SERVICE: CPT | Mod: S$GLB,,, | Performed by: OPHTHALMOLOGY

## 2017-12-04 NOTE — PROGRESS NOTES
"HPI     DLS 11/20/17-   Pt states   Sudden distortion in [facility] OD last night and lines are more wavy.    Can't make out the faces on the TV, it is distorted.      PF x BID  HA x BID  Cosopt BID       HPI     DLS 11/6/17-   Pt states she can see color and a little more shapes. But still has a line   from the bubble in the center.  Still has floaters OS and sees "a veil"   that floats across the [facility].       PF x BID  HA x BID  Dylan qhs      Eye Meds: Refresh Tears OU prn    OCT - RD OD  OS - No ME      A/P    1. RRD OD   Macula involving x 4  Days    S/p  25g PPV/EL/SF6 OD for RRD 9/6/17  IOP improved    10/30/17 - Recurrent IT RD with early PVR and small break    s/p 25g PPV/membrane stripping/inferior retinopexy/C3F8 OD for RRD/PVR OD 11/1/17 12/4/17 - recurrent inferior RD - small holes posterior to laser    Plan 25g PPV/EL/AFx/1000cs Silicone Oil OD for Recurrent RRD with PVR    Local MAC  LOC 45 min    Risks, benefits, and alternatives to treatment discussed in detail with the patient.  The patient voiced understanding and wished to proceed with the procedure    Will leave oil in 6-12 months    Side position at night    2. PCIOL OU    3. HTN   Good BP control    4. PVD OS  No breaks OS      "

## 2017-12-05 ENCOUNTER — TELEPHONE (OUTPATIENT)
Dept: OPHTHALMOLOGY | Facility: CLINIC | Age: 72
End: 2017-12-05

## 2017-12-05 NOTE — H&P
Pre-Operative History & Physical  Ophthalmology      SUBJECTIVE:     History of Present Illness:  Patient is a 72 y.o. 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    MEDICATIONS:   No prescriptions prior to admission.       ALLERGIES:   Review of patient's allergies indicates:   Allergen Reactions    Penicillins Hives    Ciprofloxacin      Felt like her legs were weak and muscle pain.     Doxycycline        PAST MEDICAL HISTORY:   Past Medical History:   Diagnosis Date    Allergy     Asthma     Basal cell carcinoma     COPD (chronic obstructive pulmonary disease)     GERD (gastroesophageal reflux disease)     Hyperlipidemia     Hypertension     Retinal detachment      PAST SURGICAL HISTORY:   Past Surgical History:   Procedure Laterality Date    BASAL CELL CARCINOMA EXCISION      CATARACT EXTRACTION      COLONOSCOPY  2013    EYE SURGERY      cataracts    HYSTERECTOMY      UPPER GASTROINTESTINAL ENDOSCOPY       PAST FAMILY HISTORY:   Family History   Problem Relation Age of Onset    Asthma Mother     Stroke Father 77     cva    Colon cancer Neg Hx     Colon polyps Neg Hx     Celiac disease Neg Hx     Esophageal cancer Neg Hx     Stomach cancer Neg Hx     Irritable bowel syndrome Neg Hx     Inflammatory bowel disease Neg Hx      SOCIAL HISTORY:   Social History   Substance Use Topics    Smoking status: Never Smoker    Smokeless tobacco: Never Used    Alcohol use 1.2 oz/week     2 Glasses of wine per week      Comment: socially        MENTAL STATUS: Alert    REVIEW OF SYSTEMS: Negative    OBJECTIVE:     Vital Signs (Most Recent)       Physical Exam:  General: NAD  HEENT: Atraumatic  Lungs: Adequate respirations, LCTAB  Heart: RRR, No murmur  Abdomen: Soft NT    ASSESSMENT/PLAN:     Patient is a 72 y.o. female with Nondiabetic proliferative retinopathy, right [H35.21]  Rhegmatogenous retinal detachment  of right eye [H33.001].     - Plan for surgical correction Plan 25g PPV/EL/AFx/1000cs Silicone Oil OD for Recurrent RRD with PVR     Local MAC  LOC 45 min   - Risks/benefits/alternatives of the procedure including, but not limited to scarring, bleeding, infection, loss or decreased vision, and/or need for possible repeat surgery discussed with the patient and family.   - Informed consent obtained prior to surgery and the patient/family voiced good understanding.    Warner Medrano  12/5/2017  2:56 PM

## 2017-12-06 ENCOUNTER — ANESTHESIA (OUTPATIENT)
Dept: SURGERY | Facility: HOSPITAL | Age: 72
End: 2017-12-06
Payer: MEDICARE

## 2017-12-06 ENCOUNTER — HOSPITAL ENCOUNTER (OUTPATIENT)
Facility: HOSPITAL | Age: 72
Discharge: HOME | End: 2017-12-06
Attending: OPHTHALMOLOGY | Admitting: OPHTHALMOLOGY
Payer: MEDICARE

## 2017-12-06 ENCOUNTER — ANESTHESIA EVENT (OUTPATIENT)
Dept: SURGERY | Facility: HOSPITAL | Age: 72
End: 2017-12-06
Payer: MEDICARE

## 2017-12-06 ENCOUNTER — SURGERY (OUTPATIENT)
Age: 72
End: 2017-12-06

## 2017-12-06 VITALS
TEMPERATURE: 98 F | HEIGHT: 66 IN | HEART RATE: 76 BPM | RESPIRATION RATE: 18 BRPM | SYSTOLIC BLOOD PRESSURE: 116 MMHG | DIASTOLIC BLOOD PRESSURE: 68 MMHG | BODY MASS INDEX: 26.03 KG/M2 | OXYGEN SATURATION: 100 % | WEIGHT: 162 LBS

## 2017-12-06 DIAGNOSIS — H33.021 RETINAL DETACHMENT OF RIGHT EYE WITH MULTIPLE BREAKS: Primary | ICD-10-CM

## 2017-12-06 DIAGNOSIS — H35.21 PROLIFERATIVE VITREORETINOPATHY, RIGHT: ICD-10-CM

## 2017-12-06 PROCEDURE — 36000707: Performed by: OPHTHALMOLOGY

## 2017-12-06 PROCEDURE — 37000008 HC ANESTHESIA 1ST 15 MINUTES: Performed by: OPHTHALMOLOGY

## 2017-12-06 PROCEDURE — 25000003 PHARM REV CODE 250: Performed by: OPHTHALMOLOGY

## 2017-12-06 PROCEDURE — 36000706: Performed by: OPHTHALMOLOGY

## 2017-12-06 PROCEDURE — 27600004 OPTIME MED/SURG SUP & DEVICES INTRAOCULAR LENS: Performed by: OPHTHALMOLOGY

## 2017-12-06 PROCEDURE — 63600175 PHARM REV CODE 636 W HCPCS: Performed by: NURSE ANESTHETIST, CERTIFIED REGISTERED

## 2017-12-06 PROCEDURE — D9220A PRA ANESTHESIA: Mod: CRNA,,, | Performed by: NURSE ANESTHETIST, CERTIFIED REGISTERED

## 2017-12-06 PROCEDURE — 99499 UNLISTED E&M SERVICE: CPT | Mod: ,,, | Performed by: OPHTHALMOLOGY

## 2017-12-06 PROCEDURE — C1784 OCULAR DEV, INTRAOP, DET RET: HCPCS | Performed by: OPHTHALMOLOGY

## 2017-12-06 PROCEDURE — S0020 INJECTION, BUPIVICAINE HYDRO: HCPCS | Performed by: OPHTHALMOLOGY

## 2017-12-06 PROCEDURE — C1814 RETINAL TAMP, SILICONE OIL: HCPCS | Performed by: OPHTHALMOLOGY

## 2017-12-06 PROCEDURE — 37000009 HC ANESTHESIA EA ADD 15 MINS: Performed by: OPHTHALMOLOGY

## 2017-12-06 PROCEDURE — 25000003 PHARM REV CODE 250: Performed by: ANESTHESIOLOGY

## 2017-12-06 PROCEDURE — 67113 REPAIR RETINAL DETACH CPLX: CPT | Mod: 79,RT,, | Performed by: OPHTHALMOLOGY

## 2017-12-06 PROCEDURE — 27201423 OPTIME MED/SURG SUP & DEVICES STERILE SUPPLY: Performed by: OPHTHALMOLOGY

## 2017-12-06 PROCEDURE — 71000015 HC POSTOP RECOV 1ST HR: Performed by: OPHTHALMOLOGY

## 2017-12-06 PROCEDURE — D9220A PRA ANESTHESIA: Mod: ANES,,, | Performed by: ANESTHESIOLOGY

## 2017-12-06 PROCEDURE — 63600175 PHARM REV CODE 636 W HCPCS: Performed by: OPHTHALMOLOGY

## 2017-12-06 PROCEDURE — 71000033 HC RECOVERY, INTIAL HOUR: Performed by: OPHTHALMOLOGY

## 2017-12-06 RX ORDER — NEOMYCIN SULFATE, POLYMYXIN B SULFATE, AND DEXAMETHASONE 3.5; 10000; 1 MG/G; [USP'U]/G; MG/G
OINTMENT OPHTHALMIC
Status: DISCONTINUED
Start: 2017-12-06 | End: 2017-12-06 | Stop reason: HOSPADM

## 2017-12-06 RX ORDER — TROPICAMIDE 10 MG/ML
1 SOLUTION/ DROPS OPHTHALMIC
Status: DISCONTINUED | OUTPATIENT
Start: 2017-12-06 | End: 2017-12-06 | Stop reason: HOSPADM

## 2017-12-06 RX ORDER — PREDNISOLONE ACETATE 10 MG/ML
1 SUSPENSION/ DROPS OPHTHALMIC
Status: DISCONTINUED | OUTPATIENT
Start: 2017-12-06 | End: 2017-12-06 | Stop reason: HOSPADM

## 2017-12-06 RX ORDER — CYCLOPENTOLATE HYDROCHLORIDE 10 MG/ML
1 SOLUTION/ DROPS OPHTHALMIC
Status: DISCONTINUED | OUTPATIENT
Start: 2017-12-06 | End: 2017-12-06 | Stop reason: HOSPADM

## 2017-12-06 RX ORDER — HYDROCODONE BITARTRATE AND ACETAMINOPHEN 5; 325 MG/1; MG/1
1 TABLET ORAL EVERY 6 HOURS PRN
Qty: 12 TABLET | Refills: 0 | Status: ON HOLD | OUTPATIENT
Start: 2017-12-06 | End: 2018-01-10 | Stop reason: CLARIF

## 2017-12-06 RX ORDER — TETRACAINE HYDROCHLORIDE 5 MG/ML
1 SOLUTION OPHTHALMIC
Status: DISCONTINUED | OUTPATIENT
Start: 2017-12-06 | End: 2017-12-06 | Stop reason: HOSPADM

## 2017-12-06 RX ORDER — LIDOCAINE HYDROCHLORIDE 20 MG/ML
INJECTION, SOLUTION EPIDURAL; INFILTRATION; INTRACAUDAL; PERINEURAL
Status: DISCONTINUED
Start: 2017-12-06 | End: 2017-12-06 | Stop reason: HOSPADM

## 2017-12-06 RX ORDER — OXYCODONE AND ACETAMINOPHEN 5; 325 MG/1; MG/1
1 TABLET ORAL EVERY 6 HOURS PRN
Qty: 12 TABLET | Refills: 0 | Status: ON HOLD | OUTPATIENT
Start: 2017-12-06 | End: 2018-01-10 | Stop reason: CLARIF

## 2017-12-06 RX ORDER — ACETAMINOPHEN 325 MG/1
650 TABLET ORAL EVERY 4 HOURS PRN
Status: DISCONTINUED | OUTPATIENT
Start: 2017-12-06 | End: 2017-12-06 | Stop reason: HOSPADM

## 2017-12-06 RX ORDER — LIDOCAINE HCL/PF 100 MG/5ML
SYRINGE (ML) INTRAVENOUS
Status: DISCONTINUED | OUTPATIENT
Start: 2017-12-06 | End: 2017-12-06

## 2017-12-06 RX ORDER — NEOMYCIN SULFATE, POLYMYXIN B SULFATE, AND DEXAMETHASONE 3.5; 10000; 1 MG/G; [USP'U]/G; MG/G
OINTMENT OPHTHALMIC
Status: DISCONTINUED | OUTPATIENT
Start: 2017-12-06 | End: 2017-12-06 | Stop reason: HOSPADM

## 2017-12-06 RX ORDER — SODIUM CHLORIDE 0.9 % (FLUSH) 0.9 %
3 SYRINGE (ML) INJECTION
Status: DISCONTINUED | OUTPATIENT
Start: 2017-12-06 | End: 2017-12-06 | Stop reason: HOSPADM

## 2017-12-06 RX ORDER — ONDANSETRON 8 MG/1
8 TABLET, ORALLY DISINTEGRATING ORAL EVERY 8 HOURS PRN
Status: DISCONTINUED | OUTPATIENT
Start: 2017-12-06 | End: 2017-12-06 | Stop reason: HOSPADM

## 2017-12-06 RX ORDER — BUPIVACAINE HYDROCHLORIDE 7.5 MG/ML
INJECTION, SOLUTION EPIDURAL; RETROBULBAR
Status: DISCONTINUED | OUTPATIENT
Start: 2017-12-06 | End: 2017-12-06 | Stop reason: HOSPADM

## 2017-12-06 RX ORDER — LIDOCAINE HYDROCHLORIDE 20 MG/ML
INJECTION, SOLUTION EPIDURAL; INFILTRATION; INTRACAUDAL; PERINEURAL
Status: DISCONTINUED | OUTPATIENT
Start: 2017-12-06 | End: 2017-12-06 | Stop reason: HOSPADM

## 2017-12-06 RX ORDER — HYDROCODONE BITARTRATE AND ACETAMINOPHEN 5; 325 MG/1; MG/1
1 TABLET ORAL EVERY 6 HOURS PRN
Status: DISCONTINUED | OUTPATIENT
Start: 2017-12-06 | End: 2017-12-06 | Stop reason: HOSPADM

## 2017-12-06 RX ORDER — VANCOMYCIN HYDROCHLORIDE 500 MG/10ML
INJECTION, POWDER, LYOPHILIZED, FOR SOLUTION INTRAVENOUS
Status: DISCONTINUED | OUTPATIENT
Start: 2017-12-06 | End: 2017-12-06 | Stop reason: HOSPADM

## 2017-12-06 RX ORDER — HYDROCODONE BITARTRATE AND ACETAMINOPHEN 5; 325 MG/1; MG/1
1 TABLET ORAL EVERY 4 HOURS PRN
Status: DISCONTINUED | OUTPATIENT
Start: 2017-12-06 | End: 2017-12-06 | Stop reason: HOSPADM

## 2017-12-06 RX ORDER — MIDAZOLAM HYDROCHLORIDE 1 MG/ML
INJECTION, SOLUTION INTRAMUSCULAR; INTRAVENOUS
Status: DISCONTINUED | OUTPATIENT
Start: 2017-12-06 | End: 2017-12-06

## 2017-12-06 RX ORDER — SODIUM CHLORIDE 9 MG/ML
INJECTION, SOLUTION INTRAVENOUS CONTINUOUS
Status: DISCONTINUED | OUTPATIENT
Start: 2017-12-06 | End: 2017-12-06 | Stop reason: HOSPADM

## 2017-12-06 RX ORDER — DEXAMETHASONE SODIUM PHOSPHATE 4 MG/ML
INJECTION, SOLUTION INTRA-ARTICULAR; INTRALESIONAL; INTRAMUSCULAR; INTRAVENOUS; SOFT TISSUE
Status: DISCONTINUED
Start: 2017-12-06 | End: 2017-12-06 | Stop reason: HOSPADM

## 2017-12-06 RX ORDER — MOXIFLOXACIN 5 MG/ML
1 SOLUTION/ DROPS OPHTHALMIC
Status: DISCONTINUED | OUTPATIENT
Start: 2017-12-06 | End: 2017-12-06 | Stop reason: HOSPADM

## 2017-12-06 RX ORDER — ONDANSETRON 4 MG/1
4 TABLET, FILM COATED ORAL EVERY 8 HOURS PRN
Qty: 12 TABLET | Refills: 0 | Status: ON HOLD | OUTPATIENT
Start: 2017-12-06 | End: 2018-01-10 | Stop reason: CLARIF

## 2017-12-06 RX ORDER — DEXAMETHASONE SODIUM PHOSPHATE 4 MG/ML
INJECTION, SOLUTION INTRA-ARTICULAR; INTRALESIONAL; INTRAMUSCULAR; INTRAVENOUS; SOFT TISSUE
Status: DISCONTINUED | OUTPATIENT
Start: 2017-12-06 | End: 2017-12-06 | Stop reason: HOSPADM

## 2017-12-06 RX ORDER — EPINEPHRINE 1 MG/ML
INJECTION, SOLUTION INTRACARDIAC; INTRAMUSCULAR; INTRAVENOUS; SUBCUTANEOUS
Status: DISCONTINUED
Start: 2017-12-06 | End: 2017-12-06 | Stop reason: HOSPADM

## 2017-12-06 RX ORDER — VANCOMYCIN HYDROCHLORIDE 500 MG/10ML
INJECTION, POWDER, LYOPHILIZED, FOR SOLUTION INTRAVENOUS
Status: DISCONTINUED
Start: 2017-12-06 | End: 2017-12-06 | Stop reason: HOSPADM

## 2017-12-06 RX ORDER — BUPIVACAINE HYDROCHLORIDE 7.5 MG/ML
INJECTION, SOLUTION EPIDURAL; RETROBULBAR
Status: DISCONTINUED
Start: 2017-12-06 | End: 2017-12-06 | Stop reason: HOSPADM

## 2017-12-06 RX ORDER — PROPOFOL 10 MG/ML
VIAL (ML) INTRAVENOUS
Status: DISCONTINUED | OUTPATIENT
Start: 2017-12-06 | End: 2017-12-06

## 2017-12-06 RX ORDER — LIDOCAINE HYDROCHLORIDE 10 MG/ML
1 INJECTION, SOLUTION EPIDURAL; INFILTRATION; INTRACAUDAL; PERINEURAL ONCE
Status: COMPLETED | OUTPATIENT
Start: 2017-12-06 | End: 2017-12-06

## 2017-12-06 RX ORDER — FENTANYL CITRATE 50 UG/ML
25 INJECTION, SOLUTION INTRAMUSCULAR; INTRAVENOUS EVERY 5 MIN PRN
Status: DISCONTINUED | OUTPATIENT
Start: 2017-12-06 | End: 2017-12-06 | Stop reason: HOSPADM

## 2017-12-06 RX ORDER — EPINEPHRINE 1 MG/ML
INJECTION, SOLUTION INTRACARDIAC; INTRAMUSCULAR; INTRAVENOUS; SUBCUTANEOUS
Status: DISCONTINUED | OUTPATIENT
Start: 2017-12-06 | End: 2017-12-06 | Stop reason: HOSPADM

## 2017-12-06 RX ORDER — PHENYLEPHRINE HYDROCHLORIDE 25 MG/ML
1 SOLUTION/ DROPS OPHTHALMIC
Status: DISCONTINUED | OUTPATIENT
Start: 2017-12-06 | End: 2017-12-06 | Stop reason: HOSPADM

## 2017-12-06 RX ADMIN — PHENYLEPHRINE HYDROCHLORIDE 1 DROP: 25 SOLUTION/ DROPS OPHTHALMIC at 10:12

## 2017-12-06 RX ADMIN — PREDNISOLONE ACETATE 1 DROP: 10 SUSPENSION/ DROPS OPHTHALMIC at 10:12

## 2017-12-06 RX ADMIN — TETRACAINE HYDROCHLORIDE 1 DROP: 5 SOLUTION OPHTHALMIC at 10:12

## 2017-12-06 RX ADMIN — LIDOCAINE HYDROCHLORIDE 50 MG: 20 INJECTION, SOLUTION INTRAVENOUS at 11:12

## 2017-12-06 RX ADMIN — HOMATROPINE HYDROBROMIDE 1 DROP: 50 SOLUTION OPHTHALMIC at 10:12

## 2017-12-06 RX ADMIN — MOXIFLOXACIN HYDROCHLORIDE 1 DROP: 5 SOLUTION/ DROPS OPHTHALMIC at 10:12

## 2017-12-06 RX ADMIN — MIDAZOLAM HYDROCHLORIDE 2 MG: 1 INJECTION, SOLUTION INTRAMUSCULAR; INTRAVENOUS at 11:12

## 2017-12-06 RX ADMIN — CYCLOPENTOLATE HYDROCHLORIDE 1 DROP: 10 SOLUTION/ DROPS OPHTHALMIC at 10:12

## 2017-12-06 RX ADMIN — SODIUM CHLORIDE: 0.9 INJECTION, SOLUTION INTRAVENOUS at 10:12

## 2017-12-06 RX ADMIN — TROPICAMIDE 1 DROP: 10 SOLUTION/ DROPS OPHTHALMIC at 10:12

## 2017-12-06 RX ADMIN — LIDOCAINE HYDROCHLORIDE 5 ML: 20 INJECTION, SOLUTION EPIDURAL; INFILTRATION; INTRACAUDAL; PERINEURAL at 11:12

## 2017-12-06 RX ADMIN — LIDOCAINE HYDROCHLORIDE 2 MG: 10 INJECTION, SOLUTION EPIDURAL; INFILTRATION; INTRACAUDAL; PERINEURAL at 10:12

## 2017-12-06 RX ADMIN — DEXAMETHASONE SODIUM PHOSPHATE 2 MG: 4 INJECTION, SOLUTION INTRAMUSCULAR; INTRAVENOUS at 11:12

## 2017-12-06 RX ADMIN — EPINEPHRINE 0.3 MG: 1 INJECTION, SOLUTION INTRAMUSCULAR; SUBCUTANEOUS at 11:12

## 2017-12-06 RX ADMIN — NEOMYCIN SULFATE, POLYMYXIN B SULFATE, AND DEXAMETHASONE 1 APPLICATION: 3.5; 10000; 1 OINTMENT OPHTHALMIC at 11:12

## 2017-12-06 RX ADMIN — HYDROCODONE BITARTRATE AND ACETAMINOPHEN 1 TABLET: 5; 325 TABLET ORAL at 12:12

## 2017-12-06 RX ADMIN — PROPOFOL 45 MG: 10 INJECTION, EMULSION INTRAVENOUS at 11:12

## 2017-12-06 RX ADMIN — BUPIVACAINE HYDROCHLORIDE 5 ML: 7.5 INJECTION, SOLUTION EPIDURAL; RETROBULBAR at 11:12

## 2017-12-06 RX ADMIN — VANCOMYCIN HYDROCHLORIDE 25 MG: 500 INJECTION, POWDER, LYOPHILIZED, FOR SOLUTION INTRAVENOUS at 11:12

## 2017-12-06 NOTE — OP NOTE
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    PROCEDURE PERFORMED:  A 25-gauge pars plana vitrectomy with membrane stripping,   endolaser, air-fluid exchange, injection of 1000 centistoke silicone oil to the   right eye approximately 6 mL of oil.    ENDOLASER PARAMETERS:  Number of spots 457, power 150 to 200 milliwatts,   duration 0.1 seconds.    ATTENDING SURGEON:  JANEL Chaves M.D.    ASSISTANT SURGEON:  Fellow, Dung Medrano.    ANESTHESIA:  Local monitored anesthesia care with a retrobulbar injection of 4.0   mL mixture of 0.75% Marcaine, 2% Xylocaine.    ESTIMATED BLOOD LOSS:  Minimal.    COMPLICATIONS:  None.    DISPOSITION:  Stable to recovery.    INDICATIONS FOR SURGERY:  This is a 72-year-old female with a history of   rhegmatogenous retinal detachment repair with some low-grade PVR membranes   inferiorly who suddenly redetached after a second repair with C3F8 gas.    Decision made to take the patient back to surgery to repair the retinal   detachment, prevent further vision loss and hopefully stabilize the detachment.    Risks, benefits, alternatives to surgery were discussed in detail with risks   including loss of vision, loss of eye, recurrent retinal detachment, infection,   hemorrhage, lens dislocation, glaucoma, hypotony, ptosis and diplopia.  The   patient voiced understanding and wished to proceed with the procedure.    DESCRIPTION OF PROCEDURE:  After proper informed consent was obtained, the   patient was brought back to the Operating Suite at [facility] where   MAC anesthesia was induced.  Retrobulbar injection was provided as above without   complication.  The patient was prepped and draped in normal sterile fashion for    ophthalmic surgery and lid speculum was placed in the right eye.  A standard   3-port 25-gauge pars plana vitrectomy was set up with the infusion cannula   inserted inferotemporally 3.5 mm posterior to the limbus.  The infusion cannula   was turned on only after observed to be free and clear of all underlying retinal   tissue.  Supranasal and supratemporal trocars were also placed 3.5 mm posterior   to the limbus.  The vitrector and light pipe were introduced in the vitreous   cavity and the vitrector was used to help strip away some of the anterior   membranes.  There was a small membrane growth inferior to the macula.  This was   stripped off with the ILM forceps.  An air-fluid exchange was then performed.    The prior retinotomy sites were well lasered, so a new one was created   inferotemporally with the diathermy tip.  Soft tip was then used to drain the   subretinal fluid out.  Endolaser was applied in a barrier fashion around the   posterior retinotomy and inferior barricade posterior to the prior laser.    Supranasal trocars were removed and closed with a 7-0 Vicryl suture.  1000   centistoke silicone oil was infused to a normal pressure via palpation,   approximately 6 mL.  Then the supratemporal and infusion trocars were removed   and closed with 7-0 Vicryl sutures and the eye was normal pressure via   palpation.  Subconjunctival injections of vancomycin and Decadron were given to   the patient.  The drapes were removed from the patient.  She was washed free of   Betadine prep solution.  Maxitrol ointment was placed in the right eye.  The eye   was patch shielded.  MAC anesthesia was reversed.  She was brought to Recovery   Room in stable condition, tolerating the procedure well.  Dr. Chaves was   present for the entire case.      JACK  dd: 12/06/2017 12:29:30 (CST)  td: 12/06/2017 16:31:39 (CST)  Doc ID   #[id number]  Job ID #[id number]    CC:

## 2017-12-06 NOTE — INTERVAL H&P NOTE
H&P completed on pt has been reviewed, the patient has been examined and:  I concur with the findings and no changes have occurred since H&P was written.       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

## 2017-12-06 NOTE — ANESTHESIA PREPROCEDURE EVALUATION
12/06/2017  Madison Long is a 72 y.o., female.  Pre-operative evaluation for Procedure(s) (LRB):  REPAIR-RETINA (VITRECTOMY) (Right)    Madison Long is a 72 y.o. female     Patient Active Problem List   Diagnosis    GERD (gastroesophageal reflux disease)    Hypertension    COPD (chronic obstructive pulmonary disease)    Hyperlipidemia    Perennial allergic rhinitis with seasonal variation    Vaginitis, atrophic    Retinal detachment of right eye with multiple breaks    Posterior vitreous detachment, right eye    Retinal detachment, rhegmatogenous, right eye    Proliferative vitreoretinopathy, right    Macula-off rhegmatogenous retinal detachment, right       Review of patient's allergies indicates:   Allergen Reactions    Penicillins Hives    Ciprofloxacin      Felt like her legs were weak and muscle pain.     Doxycycline        No current facility-administered medications on file prior to encounter.      Current Outpatient Prescriptions on File Prior to Encounter   Medication Sig Dispense Refill    atorvastatin (LIPITOR) 10 MG tablet Take 1 tablet (10 mg total) by mouth once daily. (Patient taking differently: Take 10 mg by mouth nightly. ) 90 tablet 3    carboxymethylcellulose (REFRESH PLUS) 0.5 % Dpet Place 2 drops into both eyes 2 (two) times daily as needed.      dorzolamide-timolol 2-0.5% (COSOPT) 22.3-6.8 mg/mL ophthalmic solution Place 1 drop into the right eye 2 (two) times daily. 10 mL 3    estradiol (ESTRACE) 0.01 % (0.1 mg/gram) vaginal cream Place 1 g vaginally every other day. 1 Tube 3    fluticasone (FLONASE) 50 mcg/actuation nasal spray APPLY TWO SPRAYS NASALLY ONCE DAILY (Patient taking differently: 2 sprays by Each Nare route daily as needed. APPLY TWO SPRAYS NASALLY ONCE DAILY, prn) 16 g 11    guaifenesin (MUCINEX) 600 mg 12 hr tablet Take 1,200 mg by mouth 2  (two) times daily as needed.       homatropine (ISOPTO HOMATROPINE) 5 % ophthalmic solution Place 1 drop into the right eye 2 (two) times daily. 5 mL 2    losartan (COZAAR) 50 MG tablet Take 1 tablet (50 mg total) by mouth once daily. (Patient taking differently: Take 50 mg by mouth every morning. ) 30 tablet 11    omeprazole (PRILOSEC OTC) 20 MG tablet Take 1 tablet (20 mg total) by mouth every morning. Take 30 minutes before your first protein containing meal. 30 tablet 11    prednisoLONE acetate (PRED FORTE) 1 % DrpS Place 1 drop into the right eye 2 (two) times daily. 5 mL 2    SYMBICORT 160-4.5 mcg/actuation HFAA INHALE 2 PUFFS BY MOUTH TWICE DAILY (Patient taking differently: INHALE 2 PUFFS BY MOUTH TWICE DAILY, prn) 11 g 7    zolpidem (AMBIEN) 10 mg Tab 1 or 2 tablets every HS prn insomnia (Patient taking differently: Take 10 mg by mouth nightly as needed. 1 or 2 tablets every HS prn insomnia) 60 tablet 3    aspirin 81 MG Chew Take 81 mg by mouth daily as needed. Usually takes about every 2 weeks.      desoximetasone (TOPICORT LP) 0.05 % cream Apply topically 2 (two) times daily as needed. Twice a day  PRN      econazole nitrate 1 % cream Apply topically 2 (two) times daily as needed. Twice a day  PRN      fish oil-omega-3 fatty acids 300-1,000 mg capsule Take 1 g by mouth every morning.       fluorouracil (EFUDEX) 5 % cream AAA bid x 2 weeks (Patient taking differently: Apply topically 2 (two) times daily as needed. AAA bid prn) 40 g 1    multivit,stress formula-zinc (STRESS FORMULA WITH ZINC) Tab Take 1 tablet by mouth every morning. Uses Alive      ondansetron (ZOFRAN) 4 MG tablet Take 1 tablet (4 mg total) by mouth every 8 (eight) hours as needed for Nausea. 12 tablet 0    ondansetron (ZOFRAN) 4 MG tablet Take 1 tablet (4 mg total) by mouth every 8 (eight) hours as needed for Nausea. 12 tablet 0    oxycodone-acetaminophen (PERCOCET) 5-325 mg per tablet Take 1 tablet by mouth every 6 (six)  hours as needed for Pain. 12 tablet 0    oxyCODONE-acetaminophen (PERCOCET) 5-325 mg per tablet Take 1 tablet by mouth every 6 (six) hours as needed for Pain. 12 tablet 0    tretinoin (RETIN-A) 0.1 % cream Use hs on face (Patient taking differently: Apply topically nightly as needed. Use hs on face, prn) 45 g 5    VENTOLIN HFA 90 mcg/actuation inhaler Inhale 2 puffs into the lungs every 6 (six) hours as needed. INHALE ONE OR TWO PUFFS BY MOUTH EVERY SIX HOURS AS NEEDED FOR WHEEZING. 1 Inhaler 11       Past Surgical History:   Procedure Laterality Date    BASAL CELL CARCINOMA EXCISION      CATARACT EXTRACTION      COLONOSCOPY  2013    EYE SURGERY      cataracts    HYSTERECTOMY      UPPER GASTROINTESTINAL ENDOSCOPY         Social History     Social History    Marital status:      Spouse name: N/A    Number of children: 0    Years of education: N/A     Occupational History     [employer]     Social History Main Topics    Smoking status: Never Smoker    Smokeless tobacco: Never Used    Alcohol use 1.2 oz/week     2 Glasses of wine per week      Comment: socially    Drug use: No    Sexual activity: Not on file     Other Topics Concern    Not on file     Social History Narrative    No narrative on file         Vital Signs Range (Last 24H):  Temp:  [36.6 °C (97.9 °F)]   Pulse:  [65]   Resp:  [18]   BP: (120)/(74)   SpO2:  [99 %]       CBC: No results for input(s): WBC, RBC, HGB, HCT, PLT, MCV, MCH, MCHC in the last 72 hours.    CMP: No results for input(s): NA, K, CL, CO2, BUN, CREATININE, GLU, MG, PHOS, CALCIUM, ALBUMIN, PROT, ALKPHOS, ALT, AST, BILITOT in the last 72 hours.    INR  No results for input(s): INR, PROTIME, APTT in the last 72 hours.    Invalid input(s): PT        Diagnostic Studies:      EKD Echo:      Anesthesia Evaluation    I have reviewed the Patient Summary Reports.    I have reviewed the Nursing Notes.   I have reviewed the Medications.     Review of  Systems  Anesthesia Hx:  No problems with previous Anesthesia  History of prior surgery of interest to airway management or planning: Denies Family Hx of Anesthesia complications.   Denies Personal Hx of Anesthesia complications.   Cardiovascular:   Hypertension    Pulmonary:   COPD Asthma        Physical Exam  General:  Well nourished    Airway/Jaw/Neck:  Airway Findings: Mouth Opening: Normal Tongue: Normal  Mallampati: I  TM Distance: Normal, at least 6 cm      Dental:  Dental Findings: In tact   Chest/Lungs:  Chest/Lungs Findings: Clear to auscultation, Normal Respiratory Rate     Heart/Vascular:  Heart Findings: Rate: Normal  Rhythm: Regular Rhythm  Sounds: Normal             Anesthesia Plan  Type of Anesthesia, risks & benefits discussed:  Anesthesia Type:  MAC  Patient's Preference:   Intra-op Monitoring Plan: standard ASA monitors  Intra-op Monitoring Plan Comments:   Post Op Pain Control Plan:   Post Op Pain Control Plan Comments:   Induction:   IV  Beta Blocker:  Patient is not currently on a Beta-Blocker (No further documentation required).       Informed Consent: Patient understands risks and agrees with Anesthesia plan.  Questions answered. Anesthesia consent signed with patient.  ASA Score: 2     Day of Surgery Review of History & Physical:    H&P update referred to the surgeon.     Anesthesia Plan Notes: Patient experienced painful intervals during prior eye surgery        Ready For Surgery From Anesthesia Perspective.

## 2017-12-06 NOTE — TRANSFER OF CARE
"Anesthesia Transfer of Care Note    Patient: Madison Long    Procedure(s) Performed: Procedure(s) (LRB):  REPAIR-RETINA (VITRECTOMY) (Right)    Patient location: PACU    Anesthesia Type: MAC    Transport from OR: Transported from OR on room air with adequate spontaneous ventilation    Post pain: adequate analgesia    Post assessment: no apparent anesthetic complications and tolerated procedure well    Post vital signs: stable    Level of consciousness: awake, alert and oriented    Nausea/Vomiting: no nausea/vomiting    Complications: none    Transfer of care protocol was followed      Last vitals:   Visit Vitals  /74 (BP Location: Left arm, Patient Position: Lying)   Pulse 65   Temp 36.6 °C (97.9 °F) (Temporal)   Resp 18   Ht 5' 5.5" (1.664 m)   Wt 73.5 kg (162 lb)   SpO2 99%   Breastfeeding? No   BMI 26.55 kg/m²     "

## 2017-12-06 NOTE — BRIEF OP NOTE
Pre-Op Dx: Recurrent RRD with PVR OD    Post Op Dx: same    Procedure Performed: 25g PPV/membrane stripping/EL/AFx/1000cs Silicone oil (~6cc)  EL #457, P 150-200mW, D 0.1s    Attending Surgeon: Anastacio    Assistant Surgeon: Mitchell    Anesthesia: Local/Mac, retrobulbar injection of 4.0cc mixture 0.75%Marcaine, 2% Xylocaine    Estimated blood loss: Minimal    Complication: None    Specimen: None    Disposition: Stable to recovery    Findings/Outcome: inferior RRD with some low grade PVR membranes inferiorly.  Flattened nicely after peeling under air.  Additional retinopexy applied inferiorly and around retinotomy    Date of Discharge: 12/6/17    Discharge Disposition: stable to recovery then home    F/U: tomorrow

## 2017-12-06 NOTE — DISCHARGE INSTRUCTIONS
Post Op Instructions:  Patient should Maintain Eye shield & Dressing until seen tomorrow in eye clinic  Tylenol as needed for general discomfort  Use Prescription for pain medication if pain is severe  Use Prescription for Nausea (Zofran) if nausea or vomiting  No excessive exercise   No Bending, Lifting or Straining  Call MD if significant pain or nausea / vomiting uncontrolled by medications  Call MD if temperature in excess of 101' F  Maintain right or left side down  Return to eye clinic for Post Op Examination tomorrow Morning.  Bring Medicine bag to tomorrow's appointment.

## 2017-12-07 ENCOUNTER — OFFICE VISIT (OUTPATIENT)
Dept: OPHTHALMOLOGY | Facility: CLINIC | Age: 72
End: 2017-12-07
Payer: MEDICARE

## 2017-12-07 VITALS — DIASTOLIC BLOOD PRESSURE: 91 MMHG | HEART RATE: 100 BPM | SYSTOLIC BLOOD PRESSURE: 143 MMHG

## 2017-12-07 DIAGNOSIS — H33.001 MACULA-OFF RHEGMATOGENOUS RETINAL DETACHMENT, RIGHT: Primary | ICD-10-CM

## 2017-12-07 DIAGNOSIS — H35.21 PROLIFERATIVE VITREORETINOPATHY, RIGHT: ICD-10-CM

## 2017-12-07 PROCEDURE — 99999 PR PBB SHADOW E&M-EST. PATIENT-LVL III: CPT | Mod: PBBFAC,,, | Performed by: OPHTHALMOLOGY

## 2017-12-07 PROCEDURE — 99499 UNLISTED E&M SERVICE: CPT | Mod: S$GLB,,, | Performed by: OPHTHALMOLOGY

## 2017-12-07 PROCEDURE — 99024 POSTOP FOLLOW-UP VISIT: CPT | Mod: S$GLB,,, | Performed by: OPHTHALMOLOGY

## 2017-12-07 NOTE — PROGRESS NOTES
HPI     DLS 11/20/17-   Pt states   Sudden distortion in [facility] OD last night and lines are more wavy.    Can't make out the faces on the TV, it is distorted.      PF x BID  HA x BID  Cosopt BID       Eye Meds: Refresh Tears OU prn    OCT - RD OD  OS - No ME      A/P    1. RRD OD   Macula involving x 4  Days    S/p  25g PPV/EL/SF6 OD for RRD 9/6/17  IOP improved    10/30/17 - Recurrent IT RD with early PVR and small break    s/p 25g PPV/membrane stripping/inferior retinopexy/C3F8 OD for RRD/PVR OD 11/1/17 12/4/17 - recurrent inferior RD - small holes posterior to laser    s/p 25g PPV/EL/AFx/1000cs Silicone Oil OD for Recurrent RRD with PVR 12/6/17    Doing well, good IOP  Start gtts QID, ointment/shield QHS    Will leave oil in 6-12 months    Side position throughout day and night    2. PCIOL OU    3. HTN   Good BP control    4. PVD OS  No breaks OS      1-2 weeks NS

## 2017-12-08 NOTE — ANESTHESIA POSTPROCEDURE EVALUATION
"Anesthesia Post Evaluation    Patient: Madison Long    Procedure(s) Performed: Procedure(s) (LRB):  REPAIR-RETINA (VITRECTOMY) (Right)    Final Anesthesia Type: MAC  Patient location during evaluation: PACU  Patient participation: Yes- Able to Participate  Level of consciousness: awake and alert  Post-procedure vital signs: reviewed and stable  Pain management: adequate  Airway patency: patent  PONV status at discharge: No PONV  Anesthetic complications: no      Cardiovascular status: stable  Respiratory status: unassisted, spontaneous ventilation and room air  Hydration status: euvolemic  Follow-up not needed.        Visit Vitals  /68   Pulse 76   Temp 36.8 °C (98.2 °F) (Temporal)   Resp 18   Ht 5' 5.5" (1.664 m)   Wt 73.5 kg (162 lb)   SpO2 100%   Breastfeeding? No   BMI 26.55 kg/m²       Pain/Margie Score: No Data Recorded      "

## 2017-12-10 ENCOUNTER — TELEPHONE (OUTPATIENT)
Dept: OPHTHALMOLOGY | Facility: CLINIC | Age: 72
End: 2017-12-10

## 2017-12-10 NOTE — TELEPHONE ENCOUNTER
Pt called describing symptoms x two days of visual disturbance like a floater which appears when laying down in inferior vision.  Resolves after a while.  When pt stands and opens eyes, sees similar floating object which disappears after a few seconds.  Also endorses metamorphopsias when viewing patio doors. Denies pain.  Recommended the pt be seen in Dr. Chaves's clinic tomorrow AM.  Pt will be able to get a ride and be there @ 11 AM. Discussed with retina fellow Dr. Medrano.

## 2017-12-11 ENCOUNTER — OFFICE VISIT (OUTPATIENT)
Dept: OPHTHALMOLOGY | Facility: CLINIC | Age: 72
End: 2017-12-11
Payer: MEDICARE

## 2017-12-11 DIAGNOSIS — H35.21 PROLIFERATIVE VITREORETINOPATHY, RIGHT: ICD-10-CM

## 2017-12-11 DIAGNOSIS — H33.021 RETINAL DETACHMENT OF RIGHT EYE WITH MULTIPLE BREAKS: Primary | ICD-10-CM

## 2017-12-11 PROCEDURE — 99499 UNLISTED E&M SERVICE: CPT | Mod: S$GLB,,, | Performed by: OPHTHALMOLOGY

## 2017-12-11 PROCEDURE — 99999 PR PBB SHADOW E&M-EST. PATIENT-LVL III: CPT | Mod: PBBFAC,,, | Performed by: OPHTHALMOLOGY

## 2017-12-11 PROCEDURE — 99024 POSTOP FOLLOW-UP VISIT: CPT | Mod: S$GLB,,, | Performed by: OPHTHALMOLOGY

## 2017-12-11 NOTE — PROGRESS NOTES
HPI     Post-op Evaluation    Additional comments: 5 day check           Comments   DLS 12/7/17- Eye pain off and on. She did take a couple of pain pills. When the pain happens it is not severe. Over the weekend she started seeing circular object OD when she closes her eye. She also at times sees   black [place] at times (it has white in the middle and black outside of [place]) she is also seeing some waviness in vision OD    PF x 4  HA x 4  vigamox x 4  Dylan qhs      HPI     DLS 11/20/17-   Pt states   Sudden distortion in [place] OD last night and lines are more wavy.    Can't make out the faces on the TV, it is distorted.      PF x BID  HA x BID  Cosopt BID       Eye Meds: Refresh Tears OU prn    OCT - RD OD  OS - No ME      A/P    1. RRD OD   Macula involving x 4  Days    S/p  25g PPV/EL/SF6 OD for RRD 9/6/17  IOP improved    10/30/17 - Recurrent IT RD with early PVR and small break    s/p 25g PPV/membrane stripping/inferior retinopexy/C3F8 OD for RRD/PVR OD 11/1/17 12/4/17 - recurrent inferior RD - small holes posterior to laser    s/p 25g PPV/EL/AFx/1000cs Silicone Oil OD for Recurrent RRD with PVR 12/6/17    Doing well, good IOP  Continue gtts QID, ointment/shield QHS    Will leave oil in 5-6 months - may need buckle at time of oil removal    Side/stomach position throughout day and night    2. PCIOL OU    3. HTN   Good BP control    4. PVD OS  No breaks OS      1 weeks on the NS

## 2017-12-18 ENCOUNTER — OFFICE VISIT (OUTPATIENT)
Dept: OPHTHALMOLOGY | Facility: CLINIC | Age: 72
End: 2017-12-18
Payer: MEDICARE

## 2017-12-18 DIAGNOSIS — H35.21 PROLIFERATIVE VITREORETINOPATHY, RIGHT: ICD-10-CM

## 2017-12-18 DIAGNOSIS — H33.001 RETINAL DETACHMENT, RHEGMATOGENOUS, RIGHT EYE: Primary | ICD-10-CM

## 2017-12-18 PROCEDURE — 99499 UNLISTED E&M SERVICE: CPT | Mod: S$GLB,,, | Performed by: OPHTHALMOLOGY

## 2017-12-18 PROCEDURE — 99999 PR PBB SHADOW E&M-EST. PATIENT-LVL III: CPT | Mod: PBBFAC,,, | Performed by: OPHTHALMOLOGY

## 2017-12-18 PROCEDURE — 99024 POSTOP FOLLOW-UP VISIT: CPT | Mod: S$GLB,,, | Performed by: OPHTHALMOLOGY

## 2017-12-18 NOTE — PROGRESS NOTES
"HPI     DLS 12/11/17-  PO OD  States the [place] moves around but is still there. "the same as last week"    No pain.      PF x 3  Dorzolamide/Timolol  BID OD  vigamox x 4  Dylan qhs    Prior OCT - RD OD  OS - No ME      A/P    1. RRD OD   Macula involving x 4  Days    S/p  25g PPV/EL/SF6 OD for RRD 9/6/17  IOP improved    10/30/17 - Recurrent IT RD with early PVR and small break    s/p 25g PPV/membrane stripping/inferior retinopexy/C3F8 OD for RRD/PVR OD 11/1/17 12/4/17 - recurrent inferior RD - small holes posterior to laser    s/p 25g PPV/EL/AFx/1000cs Silicone Oil OD for Recurrent RRD with PVR 12/6/17    Doing well, good IOP    Small inferior fluid collection beneath oil    Will need SB at time of oil removal or if progresses    Side sleep at night. Ok for face forward    2. PCIOL OU    3. HTN   Good BP control    4. PVD OS  No breaks OS    2 weeks    "

## 2018-01-09 ENCOUNTER — TELEPHONE (OUTPATIENT)
Dept: OPHTHALMOLOGY | Facility: CLINIC | Age: 73
End: 2018-01-09

## 2018-01-09 ENCOUNTER — OFFICE VISIT (OUTPATIENT)
Dept: OPHTHALMOLOGY | Facility: CLINIC | Age: 73
End: 2018-01-09
Payer: MEDICARE

## 2018-01-09 DIAGNOSIS — H33.001 MACULA-OFF RHEGMATOGENOUS RETINAL DETACHMENT, RIGHT: ICD-10-CM

## 2018-01-09 DIAGNOSIS — H35.21 PROLIFERATIVE VITREORETINOPATHY, RIGHT: ICD-10-CM

## 2018-01-09 DIAGNOSIS — H33.001 RETINAL DETACHMENT, RHEGMATOGENOUS, RIGHT EYE: Primary | ICD-10-CM

## 2018-01-09 DIAGNOSIS — H33.001 RHEGMATOGENOUS RETINAL DETACHMENT OF RIGHT EYE: Primary | ICD-10-CM

## 2018-01-09 PROCEDURE — 99499 UNLISTED E&M SERVICE: CPT | Mod: S$GLB,,, | Performed by: OPHTHALMOLOGY

## 2018-01-09 PROCEDURE — 99999 PR PBB SHADOW E&M-EST. PATIENT-LVL IV: CPT | Mod: PBBFAC,,, | Performed by: OPHTHALMOLOGY

## 2018-01-09 PROCEDURE — 99024 POSTOP FOLLOW-UP VISIT: CPT | Mod: S$GLB,,, | Performed by: OPHTHALMOLOGY

## 2018-01-09 NOTE — PROGRESS NOTES
HPI     DLS 12/18/17    Pt here for 2 week post op check OD.  Pt states she seems to be starting to see through the bubble OD. Pt states pain OD (4 on scale) and seems to be relieved with Tylenol. Pt states she has a black [place] in OD. Pt   states floaters and a veil in her vision OS.     Refresh Tears BID OU      Prior OCT - RD OD  OS - No ME      A/P    1. RRD OD   Macula involving x 4  Days    S/p  25g PPV/EL/SF6 OD for RRD 9/6/17  IOP improved    10/30/17 - Recurrent IT RD with early PVR and small break    s/p 25g PPV/membrane stripping/inferior retinopexy/C3F8 OD for RRD/PVR OD 11/1/17 12/4/17 - recurrent inferior RD - small holes posterior to laser    s/p 25g PPV/EL/AFx/1000cs Silicone Oil OD for Recurrent RRD with PVR 12/6/17    Doing well, good IOP    1/9/18 increasing inferior fluid collection beneath oil into macula - needs SB - multiple small break    Plan /270, 25g PPV/SO removal/mebrane stripping/AFx/EL/1000cs Silicone Oil OD for RRD/PVR    GETA  LOC 2.5 hours    Risks, benefits, and alternatives to treatment discussed in detail with the patient.  The patient voiced understanding and wished to proceed with the procedure        2. PCIOL OU    3. HTN   Good BP control    4. PVD OS  No breaks OS

## 2018-01-10 ENCOUNTER — SURGERY (OUTPATIENT)
Age: 73
End: 2018-01-10

## 2018-01-10 ENCOUNTER — HOSPITAL ENCOUNTER (OUTPATIENT)
Facility: HOSPITAL | Age: 73
Discharge: HOME | End: 2018-01-10
Attending: OPHTHALMOLOGY | Admitting: OPHTHALMOLOGY
Payer: MEDICARE

## 2018-01-10 ENCOUNTER — ANESTHESIA EVENT (OUTPATIENT)
Dept: SURGERY | Facility: HOSPITAL | Age: 73
End: 2018-01-10
Payer: MEDICARE

## 2018-01-10 ENCOUNTER — ANESTHESIA (OUTPATIENT)
Dept: SURGERY | Facility: HOSPITAL | Age: 73
End: 2018-01-10
Payer: MEDICARE

## 2018-01-10 ENCOUNTER — HOSPITAL ENCOUNTER (OUTPATIENT)
Dept: RADIOLOGY | Facility: HOSPITAL | Age: 73
Discharge: HOME | End: 2018-01-10
Attending: INTERNAL MEDICINE | Admitting: OPHTHALMOLOGY
Payer: MEDICARE

## 2018-01-10 VITALS
HEART RATE: 88 BPM | OXYGEN SATURATION: 100 % | SYSTOLIC BLOOD PRESSURE: 117 MMHG | RESPIRATION RATE: 16 BRPM | TEMPERATURE: 98 F | WEIGHT: 158 LBS | DIASTOLIC BLOOD PRESSURE: 63 MMHG | BODY MASS INDEX: 29.08 KG/M2 | HEIGHT: 62 IN

## 2018-01-10 DIAGNOSIS — H33.21 RETINAL DETACHMENT, RIGHT: ICD-10-CM

## 2018-01-10 DIAGNOSIS — H33.001 MACULA-OFF RHEGMATOGENOUS RETINAL DETACHMENT, RIGHT: Primary | ICD-10-CM

## 2018-01-10 DIAGNOSIS — H35.21 PROLIFERATIVE VITREORETINOPATHY, RIGHT: ICD-10-CM

## 2018-01-10 DIAGNOSIS — R06.00 DYSPNEA, UNSPECIFIED TYPE: ICD-10-CM

## 2018-01-10 PROCEDURE — 37000008 HC ANESTHESIA 1ST 15 MINUTES: Performed by: OPHTHALMOLOGY

## 2018-01-10 PROCEDURE — 27600004 OPTIME MED/SURG SUP & DEVICES INTRAOCULAR LENS: Performed by: OPHTHALMOLOGY

## 2018-01-10 PROCEDURE — S0020 INJECTION, BUPIVICAINE HYDRO: HCPCS | Performed by: OPHTHALMOLOGY

## 2018-01-10 PROCEDURE — 36000707: Performed by: OPHTHALMOLOGY

## 2018-01-10 PROCEDURE — 25000003 PHARM REV CODE 250: Performed by: NURSE ANESTHETIST, CERTIFIED REGISTERED

## 2018-01-10 PROCEDURE — D9220A PRA ANESTHESIA: Mod: CRNA,,, | Performed by: NURSE ANESTHETIST, CERTIFIED REGISTERED

## 2018-01-10 PROCEDURE — 37000009 HC ANESTHESIA EA ADD 15 MINS: Performed by: OPHTHALMOLOGY

## 2018-01-10 PROCEDURE — 63600175 PHARM REV CODE 636 W HCPCS: Performed by: NURSE ANESTHETIST, CERTIFIED REGISTERED

## 2018-01-10 PROCEDURE — C1776 JOINT DEVICE (IMPLANTABLE): HCPCS | Performed by: OPHTHALMOLOGY

## 2018-01-10 PROCEDURE — 71000044 HC DOSC ROUTINE RECOVERY FIRST HOUR: Performed by: OPHTHALMOLOGY

## 2018-01-10 PROCEDURE — 63600175 PHARM REV CODE 636 W HCPCS: Performed by: OPHTHALMOLOGY

## 2018-01-10 PROCEDURE — C1784 OCULAR DEV, INTRAOP, DET RET: HCPCS | Performed by: OPHTHALMOLOGY

## 2018-01-10 PROCEDURE — 25000003 PHARM REV CODE 250

## 2018-01-10 PROCEDURE — 27201423 OPTIME MED/SURG SUP & DEVICES STERILE SUPPLY: Performed by: OPHTHALMOLOGY

## 2018-01-10 PROCEDURE — 25000003 PHARM REV CODE 250: Performed by: OPHTHALMOLOGY

## 2018-01-10 PROCEDURE — 36000706: Performed by: OPHTHALMOLOGY

## 2018-01-10 PROCEDURE — 71046 X-RAY EXAM CHEST 2 VIEWS: CPT | Mod: 26,,, | Performed by: RADIOLOGY

## 2018-01-10 PROCEDURE — 71046 X-RAY EXAM CHEST 2 VIEWS: CPT | Mod: TC,FY

## 2018-01-10 PROCEDURE — D9220A PRA ANESTHESIA: Mod: ANES,,, | Performed by: ANESTHESIOLOGY

## 2018-01-10 PROCEDURE — 67113 REPAIR RETINAL DETACH CPLX: CPT | Mod: 78,RT,, | Performed by: OPHTHALMOLOGY

## 2018-01-10 PROCEDURE — 27800903 OPTIME MED/SURG SUP & DEVICES OTHER IMPLANTS: Performed by: OPHTHALMOLOGY

## 2018-01-10 PROCEDURE — C1814 RETINAL TAMP, SILICONE OIL: HCPCS | Performed by: OPHTHALMOLOGY

## 2018-01-10 PROCEDURE — 71000015 HC POSTOP RECOV 1ST HR: Performed by: OPHTHALMOLOGY

## 2018-01-10 DEVICE — IMPLANTABLE DEVICE: Type: IMPLANTABLE DEVICE | Site: EYE | Status: FUNCTIONAL

## 2018-01-10 RX ORDER — DEXAMETHASONE SODIUM PHOSPHATE 4 MG/ML
INJECTION, SOLUTION INTRA-ARTICULAR; INTRALESIONAL; INTRAMUSCULAR; INTRAVENOUS; SOFT TISSUE
Status: DISCONTINUED
Start: 2018-01-10 | End: 2018-01-10 | Stop reason: HOSPADM

## 2018-01-10 RX ORDER — FENTANYL CITRATE 50 UG/ML
INJECTION, SOLUTION INTRAMUSCULAR; INTRAVENOUS
Status: DISCONTINUED | OUTPATIENT
Start: 2018-01-10 | End: 2018-01-10

## 2018-01-10 RX ORDER — TETRACAINE HYDROCHLORIDE 5 MG/ML
1 SOLUTION OPHTHALMIC
Status: DISCONTINUED | OUTPATIENT
Start: 2018-01-10 | End: 2018-01-10 | Stop reason: HOSPADM

## 2018-01-10 RX ORDER — OXYCODONE AND ACETAMINOPHEN 5; 325 MG/1; MG/1
1 TABLET ORAL EVERY 6 HOURS PRN
Qty: 30 TABLET | Refills: 0 | Status: SHIPPED | OUTPATIENT
Start: 2018-01-10 | End: 2018-01-16

## 2018-01-10 RX ORDER — CYCLOPENTOLATE HYDROCHLORIDE 10 MG/ML
1 SOLUTION/ DROPS OPHTHALMIC
Status: DISCONTINUED | OUTPATIENT
Start: 2018-01-10 | End: 2018-01-10 | Stop reason: HOSPADM

## 2018-01-10 RX ORDER — SODIUM CHLORIDE 9 MG/ML
INJECTION, SOLUTION INTRAVENOUS CONTINUOUS
Status: DISCONTINUED | OUTPATIENT
Start: 2018-01-10 | End: 2018-01-10 | Stop reason: HOSPADM

## 2018-01-10 RX ORDER — VANCOMYCIN HYDROCHLORIDE 500 MG/10ML
INJECTION, POWDER, LYOPHILIZED, FOR SOLUTION INTRAVENOUS
Status: DISCONTINUED | OUTPATIENT
Start: 2018-01-10 | End: 2018-01-10 | Stop reason: HOSPADM

## 2018-01-10 RX ORDER — NEOMYCIN SULFATE, POLYMYXIN B SULFATE, AND DEXAMETHASONE 3.5; 10000; 1 MG/G; [USP'U]/G; MG/G
OINTMENT OPHTHALMIC
Status: DISCONTINUED | OUTPATIENT
Start: 2018-01-10 | End: 2018-01-10 | Stop reason: HOSPADM

## 2018-01-10 RX ORDER — LIDOCAINE HCL/PF 100 MG/5ML
SYRINGE (ML) INTRAVENOUS
Status: DISCONTINUED | OUTPATIENT
Start: 2018-01-10 | End: 2018-01-10

## 2018-01-10 RX ORDER — ACETAZOLAMIDE 500 MG/5ML
INJECTION, POWDER, LYOPHILIZED, FOR SOLUTION INTRAVENOUS
Status: DISCONTINUED | OUTPATIENT
Start: 2018-01-10 | End: 2018-01-10

## 2018-01-10 RX ORDER — TROPICAMIDE 10 MG/ML
SOLUTION/ DROPS OPHTHALMIC
Status: COMPLETED
Start: 2018-01-10 | End: 2018-01-10

## 2018-01-10 RX ORDER — BUPIVACAINE HYDROCHLORIDE 7.5 MG/ML
INJECTION, SOLUTION EPIDURAL; RETROBULBAR
Status: DISCONTINUED | OUTPATIENT
Start: 2018-01-10 | End: 2018-01-10 | Stop reason: HOSPADM

## 2018-01-10 RX ORDER — HYDROCODONE BITARTRATE AND ACETAMINOPHEN 5; 325 MG/1; MG/1
1 TABLET ORAL EVERY 4 HOURS PRN
Status: DISCONTINUED | OUTPATIENT
Start: 2018-01-10 | End: 2018-01-10 | Stop reason: HOSPADM

## 2018-01-10 RX ORDER — LIDOCAINE HYDROCHLORIDE 20 MG/ML
INJECTION, SOLUTION EPIDURAL; INFILTRATION; INTRACAUDAL; PERINEURAL
Status: DISCONTINUED | OUTPATIENT
Start: 2018-01-10 | End: 2018-01-10 | Stop reason: HOSPADM

## 2018-01-10 RX ORDER — MOXIFLOXACIN 5 MG/ML
SOLUTION/ DROPS OPHTHALMIC
Status: COMPLETED
Start: 2018-01-10 | End: 2018-01-10

## 2018-01-10 RX ORDER — PREDNISOLONE ACETATE 10 MG/ML
SUSPENSION/ DROPS OPHTHALMIC
Status: COMPLETED
Start: 2018-01-10 | End: 2018-01-10

## 2018-01-10 RX ORDER — DEXAMETHASONE SODIUM PHOSPHATE 4 MG/ML
INJECTION, SOLUTION INTRA-ARTICULAR; INTRALESIONAL; INTRAMUSCULAR; INTRAVENOUS; SOFT TISSUE
Status: DISCONTINUED | OUTPATIENT
Start: 2018-01-10 | End: 2018-01-10 | Stop reason: HOSPADM

## 2018-01-10 RX ORDER — ONDANSETRON 4 MG/1
4 TABLET, FILM COATED ORAL EVERY 8 HOURS PRN
Qty: 12 TABLET | Refills: 0 | Status: SHIPPED | OUTPATIENT
Start: 2018-01-10 | End: 2018-01-16

## 2018-01-10 RX ORDER — VANCOMYCIN HYDROCHLORIDE 500 MG/10ML
INJECTION, POWDER, LYOPHILIZED, FOR SOLUTION INTRAVENOUS
Status: DISCONTINUED
Start: 2018-01-10 | End: 2018-01-10 | Stop reason: HOSPADM

## 2018-01-10 RX ORDER — LIDOCAINE HYDROCHLORIDE 20 MG/ML
INJECTION, SOLUTION EPIDURAL; INFILTRATION; INTRACAUDAL; PERINEURAL
Status: DISCONTINUED
Start: 2018-01-10 | End: 2018-01-10 | Stop reason: HOSPADM

## 2018-01-10 RX ORDER — CEFADROXIL 500 MG/1
500 CAPSULE ORAL EVERY 12 HOURS
Qty: 20 CAPSULE | Refills: 0 | Status: SHIPPED | OUTPATIENT
Start: 2018-01-10 | End: 2018-01-23

## 2018-01-10 RX ORDER — LIDOCAINE HYDROCHLORIDE 10 MG/ML
1 INJECTION, SOLUTION EPIDURAL; INFILTRATION; INTRACAUDAL; PERINEURAL ONCE
Status: COMPLETED | OUTPATIENT
Start: 2018-01-10 | End: 2018-01-10

## 2018-01-10 RX ORDER — ONDANSETRON 4 MG/1
4 TABLET, FILM COATED ORAL EVERY 8 HOURS PRN
Qty: 30 TABLET | Refills: 0 | Status: SHIPPED | OUTPATIENT
Start: 2018-01-10 | End: 2018-01-16

## 2018-01-10 RX ORDER — OXYCODONE AND ACETAMINOPHEN 5; 325 MG/1; MG/1
1 TABLET ORAL EVERY 6 HOURS PRN
Qty: 12 TABLET | Refills: 0 | Status: SHIPPED | OUTPATIENT
Start: 2018-01-10 | End: 2018-01-16

## 2018-01-10 RX ORDER — GLYCOPYRROLATE 0.2 MG/ML
INJECTION INTRAMUSCULAR; INTRAVENOUS
Status: DISCONTINUED | OUTPATIENT
Start: 2018-01-10 | End: 2018-01-10

## 2018-01-10 RX ORDER — NEOMYCIN SULFATE, POLYMYXIN B SULFATE, AND DEXAMETHASONE 3.5; 10000; 1 MG/G; [USP'U]/G; MG/G
OINTMENT OPHTHALMIC
Status: DISCONTINUED
Start: 2018-01-10 | End: 2018-01-10 | Stop reason: HOSPADM

## 2018-01-10 RX ORDER — CYCLOPENTOLATE HYDROCHLORIDE 10 MG/ML
SOLUTION/ DROPS OPHTHALMIC
Status: COMPLETED
Start: 2018-01-10 | End: 2018-01-10

## 2018-01-10 RX ORDER — ACETAMINOPHEN 325 MG/1
650 TABLET ORAL EVERY 4 HOURS PRN
Status: DISCONTINUED | OUTPATIENT
Start: 2018-01-10 | End: 2018-01-10 | Stop reason: HOSPADM

## 2018-01-10 RX ORDER — PHENYLEPHRINE HYDROCHLORIDE 25 MG/ML
1 SOLUTION/ DROPS OPHTHALMIC
Status: DISCONTINUED | OUTPATIENT
Start: 2018-01-10 | End: 2018-01-10 | Stop reason: HOSPADM

## 2018-01-10 RX ORDER — MOXIFLOXACIN 5 MG/ML
1 SOLUTION/ DROPS OPHTHALMIC
Status: DISCONTINUED | OUTPATIENT
Start: 2018-01-10 | End: 2018-01-10 | Stop reason: HOSPADM

## 2018-01-10 RX ORDER — ROCURONIUM BROMIDE 10 MG/ML
INJECTION, SOLUTION INTRAVENOUS
Status: DISCONTINUED | OUTPATIENT
Start: 2018-01-10 | End: 2018-01-10

## 2018-01-10 RX ORDER — PROPOFOL 10 MG/ML
VIAL (ML) INTRAVENOUS
Status: DISCONTINUED | OUTPATIENT
Start: 2018-01-10 | End: 2018-01-10

## 2018-01-10 RX ORDER — PREDNISOLONE ACETATE 10 MG/ML
1 SUSPENSION/ DROPS OPHTHALMIC
Status: DISCONTINUED | OUTPATIENT
Start: 2018-01-10 | End: 2018-01-10 | Stop reason: HOSPADM

## 2018-01-10 RX ORDER — METHYLPREDNISOLONE SOD SUCC 125 MG
VIAL (EA) INJECTION
Status: DISCONTINUED | OUTPATIENT
Start: 2018-01-10 | End: 2018-01-10

## 2018-01-10 RX ORDER — TETRACAINE HYDROCHLORIDE 5 MG/ML
SOLUTION OPHTHALMIC
Status: COMPLETED
Start: 2018-01-10 | End: 2018-01-10

## 2018-01-10 RX ORDER — ACETAZOLAMIDE 500 MG/5ML
INJECTION, POWDER, LYOPHILIZED, FOR SOLUTION INTRAVENOUS
Status: DISCONTINUED
Start: 2018-01-10 | End: 2018-01-10 | Stop reason: HOSPADM

## 2018-01-10 RX ORDER — ONDANSETRON 2 MG/ML
INJECTION INTRAMUSCULAR; INTRAVENOUS
Status: DISCONTINUED | OUTPATIENT
Start: 2018-01-10 | End: 2018-01-10

## 2018-01-10 RX ORDER — MIDAZOLAM HYDROCHLORIDE 1 MG/ML
INJECTION, SOLUTION INTRAMUSCULAR; INTRAVENOUS
Status: DISCONTINUED | OUTPATIENT
Start: 2018-01-10 | End: 2018-01-10

## 2018-01-10 RX ORDER — BUPIVACAINE HYDROCHLORIDE 7.5 MG/ML
INJECTION, SOLUTION EPIDURAL; RETROBULBAR
Status: DISCONTINUED
Start: 2018-01-10 | End: 2018-01-10 | Stop reason: HOSPADM

## 2018-01-10 RX ORDER — CEFAZOLIN SODIUM 1 G/3ML
INJECTION, POWDER, FOR SOLUTION INTRAMUSCULAR; INTRAVENOUS
Status: DISCONTINUED | OUTPATIENT
Start: 2018-01-10 | End: 2018-01-10

## 2018-01-10 RX ORDER — TROPICAMIDE 10 MG/ML
1 SOLUTION/ DROPS OPHTHALMIC
Status: DISCONTINUED | OUTPATIENT
Start: 2018-01-10 | End: 2018-01-10 | Stop reason: HOSPADM

## 2018-01-10 RX ORDER — NEOSTIGMINE METHYLSULFATE 1 MG/ML
INJECTION, SOLUTION INTRAVENOUS
Status: DISCONTINUED | OUTPATIENT
Start: 2018-01-10 | End: 2018-01-10

## 2018-01-10 RX ORDER — ONDANSETRON 8 MG/1
8 TABLET, ORALLY DISINTEGRATING ORAL EVERY 8 HOURS PRN
Status: DISCONTINUED | OUTPATIENT
Start: 2018-01-10 | End: 2018-01-10 | Stop reason: HOSPADM

## 2018-01-10 RX ORDER — PHENYLEPHRINE HYDROCHLORIDE 25 MG/ML
SOLUTION/ DROPS OPHTHALMIC
Status: COMPLETED
Start: 2018-01-10 | End: 2018-01-10

## 2018-01-10 RX ADMIN — CEFAZOLIN 1 G: 330 INJECTION, POWDER, FOR SOLUTION INTRAMUSCULAR; INTRAVENOUS at 02:01

## 2018-01-10 RX ADMIN — HOMATROPINE HYDROBROMIDE 1 DROP: 50 SOLUTION OPHTHALMIC at 11:01

## 2018-01-10 RX ADMIN — GLYCOPYRROLATE 0.3 MG: 0.2 INJECTION INTRAMUSCULAR; INTRAVENOUS at 05:01

## 2018-01-10 RX ADMIN — LIDOCAINE HYDROCHLORIDE 0.2 MG: 10 INJECTION, SOLUTION EPIDURAL; INFILTRATION; INTRACAUDAL; PERINEURAL at 12:01

## 2018-01-10 RX ADMIN — MOXIFLOXACIN 1 DROP: 5 SOLUTION/ DROPS OPHTHALMIC at 12:01

## 2018-01-10 RX ADMIN — PREDNISOLONE ACETATE 1 DROP: 10 SUSPENSION/ DROPS OPHTHALMIC at 12:01

## 2018-01-10 RX ADMIN — PHENYLEPHRINE HYDROCHLORIDE 1 DROP: 25 SOLUTION/ DROPS OPHTHALMIC at 11:01

## 2018-01-10 RX ADMIN — EPHEDRINE SULFATE 10 MG: 50 INJECTION, SOLUTION INTRAMUSCULAR; INTRAVENOUS; SUBCUTANEOUS at 04:01

## 2018-01-10 RX ADMIN — MIDAZOLAM HYDROCHLORIDE 2 MG: 1 INJECTION, SOLUTION INTRAMUSCULAR; INTRAVENOUS at 02:01

## 2018-01-10 RX ADMIN — CYCLOPENTOLATE HYDROCHLORIDE 1 DROP: 10 SOLUTION/ DROPS OPHTHALMIC at 12:01

## 2018-01-10 RX ADMIN — ONDANSETRON 4 MG: 2 INJECTION INTRAMUSCULAR; INTRAVENOUS at 05:01

## 2018-01-10 RX ADMIN — ACETAZOLAMIDE 250 MG: 500 INJECTION, POWDER, LYOPHILIZED, FOR SOLUTION INTRAVENOUS at 04:01

## 2018-01-10 RX ADMIN — FENTANYL CITRATE 100 MCG: 50 INJECTION, SOLUTION INTRAMUSCULAR; INTRAVENOUS at 02:01

## 2018-01-10 RX ADMIN — NEOMYCIN SULFATE, POLYMYXIN B SULFATE, AND DEXAMETHASONE 1 APPLICATION: 3.5; 10000; 1 OINTMENT OPHTHALMIC at 05:01

## 2018-01-10 RX ADMIN — CYCLOPENTOLATE HYDROCHLORIDE 1 DROP: 10 SOLUTION/ DROPS OPHTHALMIC at 11:01

## 2018-01-10 RX ADMIN — VANCOMYCIN HYDROCHLORIDE 500 MG: 500 INJECTION, POWDER, LYOPHILIZED, FOR SOLUTION INTRAVENOUS at 03:01

## 2018-01-10 RX ADMIN — HYDROCODONE BITARTRATE AND ACETAMINOPHEN 1 TABLET: 5; 325 TABLET ORAL at 07:01

## 2018-01-10 RX ADMIN — TETRACAINE HYDROCHLORIDE 1 DROP: 5 SOLUTION OPHTHALMIC at 11:01

## 2018-01-10 RX ADMIN — LIDOCAINE HYDROCHLORIDE 2.5 ML: 20 INJECTION, SOLUTION EPIDURAL; INFILTRATION; INTRACAUDAL; PERINEURAL at 05:01

## 2018-01-10 RX ADMIN — EPHEDRINE SULFATE 10 MG: 50 INJECTION, SOLUTION INTRAMUSCULAR; INTRAVENOUS; SUBCUTANEOUS at 03:01

## 2018-01-10 RX ADMIN — PREDNISOLONE ACETATE 1 DROP: 10 SUSPENSION/ DROPS OPHTHALMIC at 11:01

## 2018-01-10 RX ADMIN — PHENYLEPHRINE HYDROCHLORIDE 1 DROP: 25 SOLUTION/ DROPS OPHTHALMIC at 12:01

## 2018-01-10 RX ADMIN — MOXIFLOXACIN HYDROCHLORIDE 1 DROP: 5 SOLUTION/ DROPS OPHTHALMIC at 11:01

## 2018-01-10 RX ADMIN — TROPICAMIDE 1 DROP: 10 SOLUTION/ DROPS OPHTHALMIC at 12:01

## 2018-01-10 RX ADMIN — METHYLPREDNISOLONE SODIUM SUCCINATE 125 MG: 125 INJECTION, POWDER, FOR SOLUTION INTRAMUSCULAR; INTRAVENOUS at 04:01

## 2018-01-10 RX ADMIN — SODIUM CHLORIDE, SODIUM GLUCONATE, SODIUM ACETATE, POTASSIUM CHLORIDE, MAGNESIUM CHLORIDE, SODIUM PHOSPHATE, DIBASIC, AND POTASSIUM PHOSPHATE: .53; .5; .37; .037; .03; .012; .00082 INJECTION, SOLUTION INTRAVENOUS at 04:01

## 2018-01-10 RX ADMIN — ROCURONIUM BROMIDE 35 MG: 10 INJECTION, SOLUTION INTRAVENOUS at 02:01

## 2018-01-10 RX ADMIN — TROPICAMIDE 1 DROP: 10 SOLUTION/ DROPS OPHTHALMIC at 11:01

## 2018-01-10 RX ADMIN — BALANCED SALT SOLUTION 50 ML: 6.4; .75; .48; .3; 3.9; 1.7 SOLUTION OPHTHALMIC at 03:01

## 2018-01-10 RX ADMIN — PROPOFOL 120 MG: 10 INJECTION, EMULSION INTRAVENOUS at 02:01

## 2018-01-10 RX ADMIN — MOXIFLOXACIN 1 DROP: 5 SOLUTION/ DROPS OPHTHALMIC at 11:01

## 2018-01-10 RX ADMIN — DEXAMETHASONE SODIUM PHOSPHATE 2 MG: 4 INJECTION, SOLUTION INTRAMUSCULAR; INTRAVENOUS at 03:01

## 2018-01-10 RX ADMIN — HYPROMELLOSE 2910 1 DROP: 5 SOLUTION OPHTHALMIC at 03:01

## 2018-01-10 RX ADMIN — BUPIVACAINE HYDROCHLORIDE 2.5 ML: 7.5 INJECTION, SOLUTION EPIDURAL; RETROBULBAR at 05:01

## 2018-01-10 RX ADMIN — NEOSTIGMINE METHYLSULFATE 2.5 MG: 1 INJECTION INTRAVENOUS at 05:01

## 2018-01-10 RX ADMIN — LIDOCAINE HYDROCHLORIDE 75 MG: 20 INJECTION, SOLUTION INTRAVENOUS at 02:01

## 2018-01-10 RX ADMIN — SODIUM CHLORIDE: 0.9 INJECTION, SOLUTION INTRAVENOUS at 12:01

## 2018-01-10 NOTE — BRIEF OP NOTE
Pre-Op Dx: Recurrent RRD with PVR OD    Post Op Dx: same    Procedure Performed: scleral buckle 240/270/25g PPV/SO removal/membrane stripping/EL/AFx/1000cs Silicone oil (5.0cc) OD  El #[id number], P 150-200mW, D 0.1s    Attending Surgeon: Anastacio    Assistant Surgeon: Mitchell    Anesthesia: General, retrobulbar injection of 4.0cc mixture 0.75%Marcaine, 2% Xylocaine    Estimated blood loss: Minimal    Complication: None    Specimen: None    Disposition: Stable to recovery    Findings/Outcome: inferior PVR with anterior plaque formation - peeled and anterior retinectomy at 7:00.  Scleral dehiscence ST beneath band - 5-0 mersilene used to close defect and secure band with horizontal beltloop    Date of Discharge: 1/10/18    Discharge Disposition: stable to recovery then home    F/U: tomorrow

## 2018-01-10 NOTE — ANESTHESIA PREPROCEDURE EVALUATION
01/10/2018  Madison Long is a 72 y.o., female.  Pre-operative evaluation for Procedure(s) (LRB):  REPAIR-RETINA (VITRECTOMY) (Right)    Madison Long is a 72 y.o. female     Patient Active Problem List   Diagnosis    GERD (gastroesophageal reflux disease)    Hypertension    COPD (chronic obstructive pulmonary disease)    Hyperlipidemia    Perennial allergic rhinitis with seasonal variation    Vaginitis, atrophic    Retinal detachment of right eye with multiple breaks    Posterior vitreous detachment, right eye    Retinal detachment, rhegmatogenous, right eye    Proliferative vitreoretinopathy, right    Macula-off rhegmatogenous retinal detachment, right    Retinal detachment, right       Review of patient's allergies indicates:   Allergen Reactions    Penicillins Hives    Ciprofloxacin      Felt like her legs were weak and muscle pain.     Doxycycline        Current Facility-Administered Medications on File Prior to Visit   Medication Dose Route Frequency Provider Last Rate Last Dose    0.9%  NaCl infusion   Intravenous Continuous D. Yuri Chaves MD 10 mL/hr at 01/10/18 1232      bupivacaine (PF) 0.75% (7.5 mg/ml) (MARCAINE) 0.75 % (7.5 mg/mL) injection             cyclopentolate 1% ophthalmic solution 1 drop  1 drop Right Eye On Call Procedure Warner Medrano MD   1 drop at 01/10/18 1216    dexamethasone (DECADRON) 4 mg/mL injection             homatropine 5 % ophthalmic solution 1 drop  1 drop Right Eye On Call Procedure Warner Medrano MD   1 drop at 01/10/18 1214    lidocaine  20 mg/mL (2%) 20 mg/mL (2 %) injection             moxifloxacin 0.5 % ophthalmic solution 1 drop  1 drop Right Eye On Call Procedure Warner Medrano MD   1 drop at 01/10/18 1215    neomycin-polymyxin-dexamethasone (DEXACINE) 3.5 mg/g-10,000 unit/g-0.1 % ophthalmic ointment              phenylephrine HCL 2.5% ophthalmic solution 1 drop  1 drop Right Eye On Call Procedure Warner Medrano MD   1 drop at 01/10/18 1215    prednisoLONE acetate 1 % ophthalmic suspension 1 drop  1 drop Right Eye On Call Procedure Warner Medrano MD   1 drop at 01/10/18 1215    tetracaine HCl (PF) 0.5 % Drop 1 drop  1 drop Right Eye On Call Procedure Warner Medrano MD   1 drop at 01/10/18 1151    tropicamide 1% ophthalmic solution 1 drop  1 drop Right Eye On Call Procedure Warner Medrano MD   1 drop at 01/10/18 1216    vancomycin (VANCOCIN) 500 mg injection              Current Outpatient Prescriptions on File Prior to Visit   Medication Sig Dispense Refill    aspirin 81 MG Chew Take 81 mg by mouth daily as needed. Usually takes about every 2 weeks.      atorvastatin (LIPITOR) 10 MG tablet Take 1 tablet (10 mg total) by mouth once daily. (Patient taking differently: Take 10 mg by mouth nightly. ) 90 tablet 3    carboxymethylcellulose (REFRESH PLUS) 0.5 % Dpet Place 2 drops into both eyes 2 (two) times daily as needed.      desoximetasone (TOPICORT LP) 0.05 % cream Apply topically 2 (two) times daily as needed. Twice a day  PRN      econazole nitrate 1 % cream Apply topically 2 (two) times daily as needed. Twice a day  PRN      estradiol (ESTRACE) 0.01 % (0.1 mg/gram) vaginal cream Place 1 g vaginally every other day. 1 Tube 3    fish oil-omega-3 fatty acids 300-1,000 mg capsule Take 1 g by mouth every morning.       fluorouracil (EFUDEX) 5 % cream AAA bid x 2 weeks (Patient taking differently: Apply topically 2 (two) times daily as needed. AAA bid prn) 40 g 1    fluticasone (FLONASE) 50 mcg/actuation nasal spray APPLY TWO SPRAYS NASALLY ONCE DAILY (Patient taking differently: 2 sprays by Each Nare route daily as needed. APPLY TWO SPRAYS NASALLY ONCE DAILY, prn) 16 g 11    guaifenesin (MUCINEX) 600 mg 12 hr tablet Take 1,200 mg by mouth 2 (two) times daily as needed.       losartan (COZAAR) 50 MG  tablet Take 1 tablet (50 mg total) by mouth once daily. (Patient taking differently: Take 50 mg by mouth every morning. ) 30 tablet 11    multivit,stress formula-zinc (STRESS FORMULA WITH ZINC) Tab Take 1 tablet by mouth every morning. Uses Alive      omeprazole (PRILOSEC OTC) 20 MG tablet Take 1 tablet (20 mg total) by mouth every morning. Take 30 minutes before your first protein containing meal. 30 tablet 11    SYMBICORT 160-4.5 mcg/actuation HFAA INHALE 2 PUFFS BY MOUTH TWICE DAILY (Patient taking differently: INHALE 2 PUFFS BY MOUTH TWICE DAILY, prn) 11 g 7    tretinoin (RETIN-A) 0.1 % cream Use hs on face (Patient taking differently: Apply topically nightly as needed. Use hs on face, prn) 45 g 5    VENTOLIN HFA 90 mcg/actuation inhaler Inhale 2 puffs into the lungs every 6 (six) hours as needed. INHALE ONE OR TWO PUFFS BY MOUTH EVERY SIX HOURS AS NEEDED FOR WHEEZING. 1 Inhaler 11    zolpidem (AMBIEN) 10 mg Tab 1 or 2 tablets every HS prn insomnia (Patient taking differently: Take 10 mg by mouth nightly as needed. 1 or 2 tablets every HS prn insomnia) 60 tablet 3       Past Surgical History:   Procedure Laterality Date    BASAL CELL CARCINOMA EXCISION      CATARACT EXTRACTION      COLONOSCOPY  2013    EYE SURGERY      cataracts    HYSTERECTOMY      UPPER GASTROINTESTINAL ENDOSCOPY         Social History     Social History    Marital status:      Spouse name: N/A    Number of children: 0    Years of education: N/A     Occupational History     [employer]     Social History Main Topics    Smoking status: Never Smoker    Smokeless tobacco: Never Used    Alcohol use 1.2 oz/week     2 Glasses of wine per week      Comment: socially    Drug use: No    Sexual activity: Not on file     Other Topics Concern    Not on file     Social History Narrative    No narrative on file         Vital Signs Range (Last 24H):  Temp:  [37 °C (98.6 °F)]   Pulse:  [91]   Resp:  [17]   BP: (104)/(84)    SpO2:  [99 %]       CBC:   Recent Labs      01/10/18   0815   WBC  6.80   RBC  4.40   HGB  13.7   HCT  40.2   PLT  287   MCV  91   MCH  31.1*   MCHC  34.1       CMP:   Recent Labs      01/10/18   0815   NA  140   K  3.9   CL  103   CO2  26   BUN  13   CREATININE  0.7   GLU  101   CALCIUM  9.5   ALBUMIN  3.9   PROT  6.8   ALKPHOS  53*   ALT  21   AST  22   BILITOT  1.0       INR  No results for input(s): PT, INR, PROTIME, APTT in the last 72 hours.        Diagnostic Studies:      EKD Echo:      Pre-op Assessment    I have reviewed the Patient Summary Reports.     I have reviewed the Nursing Notes.   I have reviewed the Medications.     Review of Systems  Anesthesia Hx:  No problems with previous Anesthesia  History of prior surgery of interest to airway management or planning: Denies Family Hx of Anesthesia complications.   Denies Personal Hx of Anesthesia complications.   Cardiovascular:   Exercise tolerance: good Hypertension    Pulmonary:   COPD Asthma    Renal/:  Renal/ Normal     Hepatic/GI:   GERD, well controlled    Neurological:  Neurology Normal    Endocrine:  Endocrine Normal        Physical Exam  General:  Well nourished    Airway/Jaw/Neck:  Airway Findings: Mouth Opening: Normal Tongue: Normal  Mallampati: I  TM Distance: Normal, at least 6 cm      Dental:  Dental Findings: In tact   Chest/Lungs:  Chest/Lungs Findings: Clear to auscultation, Normal Respiratory Rate     Heart/Vascular:  Heart Findings: Rate: Normal  Rhythm: Regular Rhythm  Sounds: Normal             Anesthesia Plan  Type of Anesthesia, risks & benefits discussed:  Anesthesia Type:  MAC, general  Patient's Preference:   Intra-op Monitoring Plan: standard ASA monitors  Intra-op Monitoring Plan Comments:   Post Op Pain Control Plan: IV/PO Opioids PRN  Post Op Pain Control Plan Comments:   Induction:   IV  Beta Blocker:  Patient is not currently on a Beta-Blocker (No further documentation required).       Informed Consent:  Patient understands risks and agrees with Anesthesia plan.  Questions answered. Anesthesia consent signed with patient.  ASA Score: 2     Day of Surgery Review of History & Physical:    H&P update referred to the surgeon.         Ready For Surgery From Anesthesia Perspective.

## 2018-01-10 NOTE — INTERVAL H&P NOTE
H&P completed on Ms. Long has been reviewed, the patient has been examined and:  I concur with the findings and no changes have occurred since H&P was written.     Active Hospital Problems    Diagnosis  POA    *Retinal detachment of right eye with multiple breaks [H33.021]  Yes    Retinal detachment, right [H33.21]  Yes    Proliferative vitreoretinopathy, right [H35.21]  Yes      Resolved Hospital Problems    Diagnosis Date Resolved POA   No resolved problems to display.

## 2018-01-10 NOTE — H&P
Pre-Operative History & Physical  Ophthalmology      SUBJECTIVE:     History of Present Illness:  Patient is a 72 y.o. 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    MEDICATIONS:   No prescriptions prior to admission.       ALLERGIES:   Review of patient's allergies indicates:   Allergen Reactions    Penicillins Hives    Ciprofloxacin      Felt like her legs were weak and muscle pain.     Doxycycline        PAST MEDICAL HISTORY:   Past Medical History:   Diagnosis Date    Allergy     Asthma     Basal cell carcinoma     COPD (chronic obstructive pulmonary disease)     GERD (gastroesophageal reflux disease)     Hyperlipidemia     Hypertension     Retinal detachment      PAST SURGICAL HISTORY:   Past Surgical History:   Procedure Laterality Date    BASAL CELL CARCINOMA EXCISION      CATARACT EXTRACTION      COLONOSCOPY  2013    EYE SURGERY      cataracts    HYSTERECTOMY      UPPER GASTROINTESTINAL ENDOSCOPY       PAST FAMILY HISTORY:   Family History   Problem Relation Age of Onset    Asthma Mother     Stroke Father 77     cva    Colon cancer Neg Hx     Colon polyps Neg Hx     Celiac disease Neg Hx     Esophageal cancer Neg Hx     Stomach cancer Neg Hx     Irritable bowel syndrome Neg Hx     Inflammatory bowel disease Neg Hx      SOCIAL HISTORY:   Social History   Substance Use Topics    Smoking status: Never Smoker    Smokeless tobacco: Never Used    Alcohol use 1.2 oz/week     2 Glasses of wine per week      Comment: socially        MENTAL STATUS: Alert    REVIEW OF SYSTEMS: Negative    OBJECTIVE:     Vital Signs (Most Recent)       Physical Exam:  General: NAD  HEENT: Atraumatic  Lungs: Adequate respirations, LCTAB  Heart: RRR, No murmur  Abdomen: Soft NT    ASSESSMENT/PLAN:     Patient is a 72 y.o. 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     - Plan for surgical correction      Plan /270, 25g PPV/SO removal/mebrane  stripping/AFx/EL/1000cs Silicone Oil OD for RRD/PVR     GETA  LOC 2.5 hours   - Risks/benefits/alternatives of the procedure including, but not limited to scarring, bleeding, infection, loss or decreased vision, and/or need for possible repeat surgery discussed with the patient and family.   - Informed consent obtained prior to surgery and the patient/family voiced good understanding.    Warner Medrano  1/10/2018  9:33 AM

## 2018-01-11 ENCOUNTER — OFFICE VISIT (OUTPATIENT)
Dept: OPHTHALMOLOGY | Facility: CLINIC | Age: 73
End: 2018-01-11
Payer: MEDICARE

## 2018-01-11 VITALS — DIASTOLIC BLOOD PRESSURE: 87 MMHG | HEART RATE: 96 BPM | SYSTOLIC BLOOD PRESSURE: 138 MMHG

## 2018-01-11 DIAGNOSIS — H33.001 MACULA-OFF RHEGMATOGENOUS RETINAL DETACHMENT, RIGHT: Primary | ICD-10-CM

## 2018-01-11 DIAGNOSIS — H35.21 PROLIFERATIVE VITREORETINOPATHY, RIGHT: ICD-10-CM

## 2018-01-11 PROCEDURE — 99024 POSTOP FOLLOW-UP VISIT: CPT | Mod: S$GLB,,, | Performed by: OPHTHALMOLOGY

## 2018-01-11 PROCEDURE — 99499 UNLISTED E&M SERVICE: CPT | Mod: S$GLB,,, | Performed by: OPHTHALMOLOGY

## 2018-01-11 PROCEDURE — 99999 PR PBB SHADOW E&M-EST. PATIENT-LVL III: CPT | Mod: PBBFAC,,, | Performed by: OPHTHALMOLOGY

## 2018-01-11 RX ORDER — HYDROCODONE BITARTRATE AND ACETAMINOPHEN 10; 325 MG/1; MG/1
1 TABLET ORAL EVERY 6 HOURS PRN
Qty: 30 TABLET | Refills: 0 | Status: SHIPPED | OUTPATIENT
Start: 2018-01-11 | End: 2018-02-19 | Stop reason: ALTCHOICE

## 2018-01-11 NOTE — DISCHARGE INSTRUCTIONS
Post Op Instructions:  Patient should Maintain Eye shield & Dressing until seen tomorrow in eye clinic  Tylenol as needed for general discomfort  Use Prescription for pain medication if pain is severe  Use Prescription for Nausea (Zofran) if nausea or vomiting  No excessive exercise   No Bending, Lifting or Straining  Call MD if significant pain or nausea / vomiting uncontrolled by medications  Call MD if temperature in excess of 101' F  Maintain Head in a Face-Down Position until bed then sleep on either side  Return to eye clinic for Post Op Examination tomorrow Morning.  Bring Medicine bag to tomorrow's appointment.      Recovery After Procedural Sedation (Adult)  You have been given medicine by vein to make you sleep during your surgery. This may have included both a pain medicine and sleeping medicine. Most of the effects have worn off. But you may still have some drowsiness for the next 6 to 8 hours.  Home care  Follow these guidelines when you get home:  · For the next 8 hours, you should be watched by a responsible adult. This person should make sure your condition is not getting worse.  · Don't drink any alcohol for the next 24 hours.  · Don't drive, operate dangerous machinery, or make important business or personal decisions during the next 24 hours.  Note: Your healthcare provider may tell you not to take any medicine by mouth for pain or sleep in the next 4 hours. These medicines may react with the medicines you were given in the hospital. This could cause a much stronger response than usual.  Follow-up care  Follow up with your healthcare provider if you are not alert and back to your usual level of activity within 12 hours.  When to seek medical advice  Call your healthcare provider right away if any of these occur:  · Drowsiness gets worse  · Weakness or dizziness gets worse  · Repeated vomiting  · You can't be awakened   Date Last Reviewed: 10/18/2016  © 7542-1353 [employer] [address]  [place] [address]. All rights reserved. This information is not intended as a substitute for professional medical care. Always follow your healthcare professional's instructions.

## 2018-01-11 NOTE — OP NOTE
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    POSTOPERATIVE DIAGNOSIS:  Rhegmatogenous retinal detachment with PVR to the   right eye.    PROCEDURE PERFORMED:  Scleral buckle #240 band with 270 sleeve, 25-gauge pars   plana vitrectomy with silicone oil removal, membrane stripping infusion of   Perfluoron, endolaser, air-fluid exchange, injection of 1000 centistoke silicone   oil approximately 5 mL to the right eye.    ENDOLASER PARAMETERS:  Number of spots 1374, power 150 to 250 milliwatts,   duration 0.1 seconds.    ATTENDING SURGEON:  JANEL Chaves M.D.    ASSISTANT SURGEON:  Delio Medrano.    ANESTHESIA:  General with a retrobulbar injection of 4.0 mL mixture of 0.75%   Marcaine, 2% Xylocaine.    ESTIMATED BLOOD LOSS:  Minimal.    COMPLICATIONS:  None.    DISPOSITION:  Stable to recovery.    INDICATIONS FOR SURGERY:  This is a 72-year-old female who has had two prior   retinal detachment repairs.  The first done on the 11/01/2017, which she   underwent a pars plana vitrectomy with a gas bubble.  She developed some   proliferative vitreal retinopathy and redetached and repaired her on 12/06/2017   with a silicone oil bubble.  Over the last week, she had noticed some clotting   of the superior vision, it was found to have a progressive inferior detachment   with fluid extending towards the macula with beneath the oil.  Decision was made   to take the patient to surgery to place a scleral buckle and additional   silicone oil to stabilize the retinal detachment and prevent further vision   loss.  Risks, benefits and alternatives to surgery were discussed in detail.    Risks including loss of vision, loss of eye, recurrent retinal detachment,   infection, hemorrhage, lens dislocation, glaucoma, hypotony, ptosis and   diplopia.  The patient voiced understanding and wished to proceed with the   procedure.    DESCRIPTION OF PROCEDURE:   After proper informed consent was obtained, the   patient was brought back to the Operating Suite at [facility], where   general anesthesia was induced.  The patient was prepped and draped in normal   sterile fashion for ophthalmic surgery and lid speculum was placed in the right   eye.  A 360-degree conjunctival peritomy was performed with blunt Guido   scissors.  The intramuscular septate were  with the curved Dixon   scissors in all 4 quadrants.  Four horizontal recti muscles were isolated and   secured with 4-0 silk ties.  Four horizontal belt loops were fashioned in each   quadrant 13 mm posterior to the limbus with a #57 blade and a cleft palate   blade.  Superotemporally, there was a scleral dehiscence and some oil started to   egress through the scleral wound.  5-0 mersilene was placed through the scleral   dehiscence and a horizontal belt loop was used to secure the 240 band in this   quadrant.  Otherwise, it was placed through the horizontal belt loops and   beneath the recti and the other three quadrants.  The band was secured   superotemporally with a 270 sleeve and tightened to an appropriate height and   pressure.  Attention was then directed towards the intraocular portion of the   procedure.  A standard 3-port 25-gauge pars plana vitrectomy was set up with the   infusion cannula inserted inferotemporally 3.5 mm posterior to the limbus.  The   infusion cannula was turned on only after observed to be free and clear of all   underlying retinal tissue.  Supranasal and supratemporal trocars were also   placed, 3.5 mm posterior to the limbus VFC device was used to extract the   remaining silicone oil from the eye.  Post-vitrector and light pipe were   introduced in the vitreous cavity and a posterior segment examination revealed   inferior fibrosis and scar tissue with a macular involving detachment.  There   was some bleeding coming from the skull dehiscence site  superotemporally.  The   scar tissue was peeled off with the ILM forceps as well as the vitrector.  There   was anterior plaque that was trimmed and partially excised to allow for the   retina to lay flat.  Perfluoron was used to assist in flattening of the retina   up to the level of the buckle edge; 360 laser was applied on the posterior   buckle edge, then an air-fluid exchange was performed anteriorly.  Additional   laser was applied anterior to the buckle.  The retina was lying flat at this   point, and air-fluid exchange was performed and the retina continued to flatten   under air.  There was some small fibrosis inferiorly; however, ____ was applied   around it.  Supranasal trocars were removed and closed with 7-0 Vicryl suture   and 1000 centistoke oil was infused to the eye to a normal pressure via   palpation, approximately 5 mL.  Then, the supratemporal and infusion trocars   were removed, closed with 7-0 Vicryl sutures as well.  The eye was normal   pressure and none of the wounds were leaking.  The edges of the buckle were   trimmed and placed beneath the adjacent recti muscles.  The 4-0 silk ties were   removed.  The conjunctiva was brought forth and closed with several interrupted   6-0 plain gut sutures.  Subconjunctival injections of vancomycin and Decadron   were given to the patient.  The drapes were removed from the patient.  A   retrobulbar injection of 4.0 mL mixture of 0.75% Marcaine, 2% Xylocaine was   given to the patient.  The eye was patched, shielded.  General anesthesia was   reversed and she was brought to Recovery Room in stable condition, tolerating   the procedure well.  Dr. Chaves was present for the entire case.      DAM/IN  dd: 01/10/2018 17:51:05 (CST)  td: 01/10/2018 22:41:55 (CST)  Doc ID   #[id number]  Job ID #[id number]    CC:

## 2018-01-11 NOTE — ANESTHESIA POSTPROCEDURE EVALUATION
"Anesthesia Post Evaluation    Patient: Madison Long    Procedure(s) Performed: Procedure(s) (LRB):  REPAIR-RETINA (VITRECTOMY) (Right)    Final Anesthesia Type: general  Patient location during evaluation: PACU  Patient participation: Yes- Able to Participate  Level of consciousness: awake and alert  Post-procedure vital signs: reviewed and stable  Pain management: adequate  Airway patency: patent  PONV status at discharge: No PONV  Anesthetic complications: no      Cardiovascular status: stable  Respiratory status: unassisted and spontaneous ventilation  Hydration status: euvolemic  Follow-up not needed.        Visit Vitals  /71   Pulse 90   Temp 37 °C (98.6 °F) (Oral)   Resp 16   Ht 5' 2" (1.575 m)   Wt 71.7 kg (158 lb)   SpO2 100%   BMI 28.90 kg/m²       Pain/Margie Score: Pain Assessment Performed: Yes (1/10/2018  6:53 PM)  Presence of Pain: denies (1/10/2018  6:53 PM)  Margie Score: 10 (1/10/2018  6:53 PM)      "

## 2018-01-11 NOTE — TRANSFER OF CARE
"Anesthesia Transfer of Care Note    Patient: Madison Long    Procedure(s) Performed: Procedure(s) (LRB):  REPAIR-RETINA (VITRECTOMY) (Right)    Patient location: [facility]    Anesthesia Type: general    Transport from OR: Transported from OR on 6-10 L/min O2 by face mask with adequate spontaneous ventilation    Post pain: adequate analgesia    Post assessment: no apparent anesthetic complications    Post vital signs: stable    Level of consciousness: awake    Nausea/Vomiting: no nausea/vomiting    Complications: none    Transfer of care protocol was followed      Last vitals:   Visit Vitals  BP (!) 104/50   Pulse 103   Temp 37 °C (98.6 °F) (Oral)   Resp 17   Ht 5' 2" (1.575 m)   Wt 71.7 kg (158 lb)   SpO2 100%   BMI 28.90 kg/m²     "

## 2018-01-11 NOTE — PLAN OF CARE
Patient and sister state they are ready to be discharged. Instructions, prescriptions and eye bag given to patient and family. Both verbalize understanding. Patient tolerating po liquids with no difficulty. Patient denies pain. Anesthesia consent and surgical consent in chart upon patient's discharge from [facility].

## 2018-01-16 ENCOUNTER — OFFICE VISIT (OUTPATIENT)
Dept: OPHTHALMOLOGY | Facility: CLINIC | Age: 73
End: 2018-01-16
Payer: MEDICARE

## 2018-01-16 DIAGNOSIS — H43.811 POSTERIOR VITREOUS DETACHMENT, RIGHT EYE: ICD-10-CM

## 2018-01-16 DIAGNOSIS — H33.021 RETINAL DETACHMENT OF RIGHT EYE WITH MULTIPLE BREAKS: Primary | ICD-10-CM

## 2018-01-16 PROCEDURE — 99024 POSTOP FOLLOW-UP VISIT: CPT | Mod: S$GLB,,, | Performed by: OPHTHALMOLOGY

## 2018-01-16 PROCEDURE — 99999 PR PBB SHADOW E&M-EST. PATIENT-LVL III: CPT | Mod: PBBFAC,,, | Performed by: OPHTHALMOLOGY

## 2018-01-16 PROCEDURE — 99499 UNLISTED E&M SERVICE: CPT | Mod: S$GLB,,, | Performed by: OPHTHALMOLOGY

## 2018-01-16 NOTE — PROGRESS NOTES
HPI     5 day PO check   DLS-01/11/2018- Dr. Chaves    Pt sts vision has slightly improved still pain in OD and also had nose bleed and soreness around head also vibration in Right ear. Thinks she is having problems because of the antibiotic Pills cefadroxil she is allergic to PCN.  Has been taking all gtts   Vig , HA , PF QID     (-)Flashes (+)Floaters noticed today comes and goes     HPI     Post-op Evaluation    Additional comments: 1 day check           Comments   DLS 1/9/18- She needs a new RX for the pain medication because she can't   take the one that was prescribed. She took tylenol last night for the pain      Prior OCT - RD OD  OS - No ME      A/P    1. RRD OD   Macula involving x 4  Days    S/p  25g PPV/EL/SF6 OD for RRD 9/6/17  IOP improved    10/30/17 - Recurrent IT RD with early PVR and small break    s/p 25g PPV/membrane stripping/inferior retinopexy/C3F8 OD for RRD/PVR OD 11/1/17 12/4/17 - recurrent inferior RD - small holes posterior to laser    s/p 25g PPV/EL/AFx/1000cs Silicone Oil OD for Recurrent RRD with PVR 12/6/17    Doing well, good IOP    1/9/18 increasing inferior fluid collection beneath oil into macula - needs SB - multiple small break    s/p /270, 25g PPV/SO removal/membrane stripping/AFx/EL/1000cs Silicone Oil OD for RRD/PVR 1/10/18    Doing well, good IOP  Had ST scleral dehiscence - closed with 5-0 mersilene    Retina flat    Continue gtts QID Ointment/shield QHS until Thursday  Then PF 3/2/1    4 week    Side sleep or stomach      2. PCIOL OU    3. HTN   Good BP control    4. PVD OS  No breaks OS

## 2018-01-22 ENCOUNTER — TELEPHONE (OUTPATIENT)
Dept: FAMILY MEDICINE | Facility: CLINIC | Age: 73
End: 2018-01-22

## 2018-01-23 ENCOUNTER — TELEPHONE (OUTPATIENT)
Dept: FAMILY MEDICINE | Facility: CLINIC | Age: 73
End: 2018-01-23

## 2018-01-23 ENCOUNTER — OFFICE VISIT (OUTPATIENT)
Dept: FAMILY MEDICINE | Facility: CLINIC | Age: 73
End: 2018-01-23
Payer: MEDICARE

## 2018-01-23 VITALS
OXYGEN SATURATION: 98 % | BODY MASS INDEX: 28.61 KG/M2 | SYSTOLIC BLOOD PRESSURE: 128 MMHG | HEART RATE: 105 BPM | DIASTOLIC BLOOD PRESSURE: 78 MMHG | WEIGHT: 155.44 LBS | HEIGHT: 62 IN | RESPIRATION RATE: 14 BRPM

## 2018-01-23 DIAGNOSIS — E78.5 DYSLIPIDEMIA: ICD-10-CM

## 2018-01-23 DIAGNOSIS — R00.2 PALPITATIONS: ICD-10-CM

## 2018-01-23 DIAGNOSIS — Z00.00 ROUTINE PHYSICAL EXAMINATION: Primary | ICD-10-CM

## 2018-01-23 DIAGNOSIS — I10 ESSENTIAL HYPERTENSION: ICD-10-CM

## 2018-01-23 DIAGNOSIS — F41.1 GAD (GENERALIZED ANXIETY DISORDER): ICD-10-CM

## 2018-01-23 DIAGNOSIS — Z12.31 ENCOUNTER FOR SCREENING MAMMOGRAM FOR BREAST CANCER: ICD-10-CM

## 2018-01-23 DIAGNOSIS — Z11.59 NEED FOR HEPATITIS C SCREENING TEST: ICD-10-CM

## 2018-01-23 DIAGNOSIS — N76.0 ACUTE VAGINITIS: ICD-10-CM

## 2018-01-23 PROCEDURE — 99999 PR PBB SHADOW E&M-EST. PATIENT-LVL III: CPT | Mod: PBBFAC,,, | Performed by: INTERNAL MEDICINE

## 2018-01-23 PROCEDURE — 99499 UNLISTED E&M SERVICE: CPT | Mod: S$GLB,,, | Performed by: INTERNAL MEDICINE

## 2018-01-23 PROCEDURE — 99397 PER PM REEVAL EST PAT 65+ YR: CPT | Mod: S$GLB,,, | Performed by: INTERNAL MEDICINE

## 2018-01-23 RX ORDER — HYDROXYZINE HYDROCHLORIDE 10 MG/1
TABLET, FILM COATED ORAL
Qty: 90 TABLET | Refills: 11 | Status: SHIPPED | OUTPATIENT
Start: 2018-01-23 | End: 2018-07-23 | Stop reason: SDUPTHER

## 2018-01-23 RX ORDER — FLUOXETINE 10 MG/1
10 TABLET ORAL DAILY
Qty: 30 TABLET | Refills: 11 | Status: SHIPPED | OUTPATIENT
Start: 2018-01-23 | End: 2018-02-19 | Stop reason: SDUPTHER

## 2018-01-23 RX ORDER — HYDROXYZINE HYDROCHLORIDE 10 MG/1
25 TABLET, FILM COATED ORAL 3 TIMES DAILY PRN
Qty: 90 TABLET | Refills: 11 | Status: SHIPPED | OUTPATIENT
Start: 2018-01-23 | End: 2018-01-23 | Stop reason: SDUPTHER

## 2018-01-23 RX ORDER — FLUCONAZOLE 150 MG/1
TABLET ORAL
Qty: 2 TABLET | Refills: 0 | Status: SHIPPED | OUTPATIENT
Start: 2018-01-23 | End: 2018-02-28 | Stop reason: SDUPTHER

## 2018-01-23 NOTE — TELEPHONE ENCOUNTER
----- Message from Kayla Gonzalez sent at 1/23/2018 12:23 PM CST -----  Contact: self  Patient has been waiting for a hour at the pharmacy for her prescriptions. Patient states pharmacy is waiting for clarification on a prescription before he will fill it. Please call patient at [phone]. Thanks!   [id number] - [place] - [address]  [address]  [place] [address]  Phone: [phone] Fax: [phone]

## 2018-01-23 NOTE — TELEPHONE ENCOUNTER
----- Message from Maria Eugenia Елена sent at 1/23/2018 11:18 AM CST -----  Contact: Guero Banerjee  Clarification on Rx Hydroxyzine.  Please call Guero Banerjee at [phone]

## 2018-01-23 NOTE — TELEPHONE ENCOUNTER
Pharmacist called to clarify directions.  Has 2 sets of directions for hydroxyzine.  Please clarify if taking 3 tablets TID anxiety or up to 3 tablets nightly for insomnia.

## 2018-01-23 NOTE — PROGRESS NOTES
Subjective:       Patient ID: Madison Long is a 72 y.o. female.    Chief Complaint: Annual Exam    Here for routine health maintenance.    4 eye surgeries in past month. Dr Sotelo  HTN - controlled  HLD - controlled; goal < 130 age, htn  COPD - no sob; Dr Schmitz   Insomnia - controlled with ambien every night.  Having increased general anxiety  With recent multiple eye surgeries.  Wants help.   Atrophic vaginitis - estrogen cream used for 1-2 weeks and then will take a break.  due for mammogram; rx originally by urology Dr Hughes.  Complains of vaginal itching since finishing eye abx.   Complains of intermittent heart fluttering over past 48 hr.      Review of Systems   Constitutional: Negative for appetite change and fever.   HENT: Negative for nosebleeds and trouble swallowing.    Eyes: Negative for discharge and visual disturbance.   Respiratory: Negative for choking and shortness of breath.    Cardiovascular: Negative for chest pain and palpitations.   Gastrointestinal: Negative for abdominal pain, nausea and vomiting.   Musculoskeletal: Negative for arthralgias and joint swelling.   Skin: Negative for rash and wound.   Neurological: Negative for dizziness and syncope.   Psychiatric/Behavioral: Negative for confusion, dysphoric mood and suicidal ideas. The patient is nervous/anxious.        Objective:      Vitals:    01/23/18 0843   BP: 128/78   Pulse: 105   Resp: 14     Physical Exam   Constitutional: She appears well-nourished.   Eyes: Conjunctivae and EOM are normal.   Neck: Trachea normal and normal range of motion. No thyromegaly present.   Cardiovascular: Normal heart sounds.    Edema negative   Pulmonary/Chest: Effort normal and breath sounds normal.   Abdominal: Soft. There is no hepatomegaly.   Musculoskeletal:   ROM normal bilateral  Strength normal bilateral   Neurological: She has normal reflexes. No cranial nerve deficit.   DTR decreased bilateral   Skin: Skin is warm, dry and intact.    Psychiatric: She has a normal mood and affect.   Alert and Oriented    Vitals reviewed.      ekg wnl read by me      Assessment:       1. Routine physical examination    2. Essential hypertension    3. Dyslipidemia    4. Need for hepatitis C screening test    5. Acute vaginitis    6. Palpitations    7. ALEX (generalized anxiety disorder)    8. Encounter for screening mammogram for breast cancer        Plan:       Routine physical examination    Essential hypertension  -     Comprehensive metabolic panel; Future; Expected date: 07/22/2018    Dyslipidemia  -     Lipid panel; Future; Expected date: 07/22/2018    Need for hepatitis C screening test  -     Hepatitis C antibody; Future; Expected date: 07/22/2018    Acute vaginitis  -     fluconazole (DIFLUCAN) 150 MG Tab; Once now and repeat in 7 days  Dispense: 2 tablet; Refill: 0    Palpitations  -     EKG 12-lead; Future; Expected date: 01/23/2018    ALEX (generalized anxiety disorder)  -     FLUoxetine 10 MG Tab; Take 1 tablet (10 mg total) by mouth once daily.  Dispense: 30 tablet; Refill: 11  -     hydrOXYzine HCl (ATARAX) 10 MG Tab; Take 3 tablets (30 mg total) by mouth 3 (three) times daily as needed (anxiety;). May take up to 3 qhs prn insomnia  Dispense: 90 tablet; Refill: 11    Encounter for screening mammogram for breast cancer  -     Mammo Digital Screening Bilat with CAD; Future; Expected date: 01/23/2018      Medication List with Changes/Refills   New Medications    FLUCONAZOLE (DIFLUCAN) 150 MG TAB    Once now and repeat in 7 days    FLUOXETINE 10 MG TAB    Take 1 tablet (10 mg total) by mouth once daily.    HYDROXYZINE HCL (ATARAX) 10 MG TAB    Take 3 tablets (30 mg total) by mouth 3 (three) times daily as needed (anxiety;). May take up to 3 qhs prn insomnia   Current Medications    ASPIRIN 81 MG CHEW    Take 81 mg by mouth daily as needed. Usually takes about every 2 weeks.    ATORVASTATIN (LIPITOR) 10 MG TABLET    Take 1 tablet (10 mg total) by mouth  once daily.    CARBOXYMETHYLCELLULOSE (REFRESH PLUS) 0.5 % DPET    Place 2 drops into both eyes 2 (two) times daily as needed.    DESOXIMETASONE (TOPICORT LP) 0.05 % CREAM    Apply topically 2 (two) times daily as needed. Twice a day  PRN    ECONAZOLE NITRATE 1 % CREAM    Apply topically 2 (two) times daily as needed. Twice a day  PRN    ESTRADIOL (ESTRACE) 0.01 % (0.1 MG/GRAM) VAGINAL CREAM    Place 1 g vaginally every other day.    FISH OIL-OMEGA-3 FATTY ACIDS 300-1,000 MG CAPSULE    Take 1 g by mouth every morning.     FLUOROURACIL (EFUDEX) 5 % CREAM    AAA bid x 2 weeks    FLUTICASONE (FLONASE) 50 MCG/ACTUATION NASAL SPRAY    APPLY TWO SPRAYS NASALLY ONCE DAILY    GUAIFENESIN (MUCINEX) 600 MG 12 HR TABLET    Take 1,200 mg by mouth 2 (two) times daily as needed.     HYDROCODONE-ACETAMINOPHEN 10-325MG (NORCO)  MG TAB    Take 1 tablet by mouth every 6 (six) hours as needed for Pain.    LOSARTAN (COZAAR) 50 MG TABLET    Take 1 tablet (50 mg total) by mouth once daily.    MULTIVIT,STRESS FORMULA-ZINC (STRESS FORMULA WITH ZINC) TAB    Take 1 tablet by mouth every morning. Uses Alive    OMEPRAZOLE (PRILOSEC OTC) 20 MG TABLET    Take 1 tablet (20 mg total) by mouth every morning. Take 30 minutes before your first protein containing meal.    SYMBICORT 160-4.5 MCG/ACTUATION HFAA    INHALE 2 PUFFS BY MOUTH TWICE DAILY    TRETINOIN (RETIN-A) 0.1 % CREAM    Use hs on face    VENTOLIN HFA 90 MCG/ACTUATION INHALER    Inhale 2 puffs into the lungs every 6 (six) hours as needed. INHALE ONE OR TWO PUFFS BY MOUTH EVERY SIX HOURS AS NEEDED FOR WHEEZING.    ZOLPIDEM (AMBIEN) 10 MG TAB    1 or 2 tablets every HS prn insomnia   Discontinued Medications    CEFADROXIL (DURICEF) 500 MG CAP    Take 1 capsule (500 mg total) by mouth every 12 (twelve) hours.     Wellness reviewed  Palpitations cont, halter          Counseled on regular exercise, maintenance of a healthy weight, balanced diet rich in fruits/vegetables and lean protein,  "and avoidance of unhealthy habits like smoking and excessive alcohol intake.   Also, counseled on importance of being compliant with medication, health appointments, diet and exercise.     Follow-up in about 6 months (around 7/23/2018). mc    "This note will not be shared with the patient."  "

## 2018-01-29 ENCOUNTER — OFFICE VISIT (OUTPATIENT)
Dept: OPHTHALMOLOGY | Facility: CLINIC | Age: 73
End: 2018-01-29
Payer: MEDICARE

## 2018-01-29 DIAGNOSIS — H33.001 RETINAL DETACHMENT, RHEGMATOGENOUS, RIGHT EYE: Primary | ICD-10-CM

## 2018-01-29 DIAGNOSIS — H35.21 PROLIFERATIVE VITREORETINOPATHY, RIGHT: ICD-10-CM

## 2018-01-29 DIAGNOSIS — H33.001 MACULA-OFF RHEGMATOGENOUS RETINAL DETACHMENT, RIGHT: ICD-10-CM

## 2018-01-29 PROCEDURE — 99499 UNLISTED E&M SERVICE: CPT | Mod: S$GLB,,, | Performed by: OPHTHALMOLOGY

## 2018-01-29 PROCEDURE — 99999 PR PBB SHADOW E&M-EST. PATIENT-LVL IV: CPT | Mod: PBBFAC,,, | Performed by: OPHTHALMOLOGY

## 2018-01-29 PROCEDURE — 99024 POSTOP FOLLOW-UP VISIT: CPT | Mod: S$GLB,,, | Performed by: OPHTHALMOLOGY

## 2018-01-29 RX ORDER — TETANUS TOXOID, REDUCED DIPHTHERIA TOXOID AND ACELLULAR PERTUSSIS VACCINE, ADSORBED 5; 2.5; 8; 8; 2.5 [IU]/.5ML; [IU]/.5ML; UG/.5ML; UG/.5ML; UG/.5ML
SUSPENSION INTRAMUSCULAR
COMMUNITY
Start: 2018-01-23 | End: 2018-03-23

## 2018-01-29 RX ORDER — FLUOXETINE 10 MG/1
10 CAPSULE ORAL DAILY
COMMUNITY
Start: 2018-01-23 | End: 2019-01-25 | Stop reason: SDUPTHER

## 2018-01-29 NOTE — PROGRESS NOTES
HPI     DLS-01/16/2018- Dr. Chaves    Pt states  Starting Sat she is seeing a shadow over her VA OD; when it   passes she cannot see fingers in her VA.    PF BID     (-)Flashes (+)Floaters noticed today comes and goes       HPI     5 day PO check   DLS-01/11/2018- Dr. Chaves    Pt sts vision has slightly improved still pain in OD and also had nose bleed and soreness around head also vibration in Right ear. Thinks she is having problems because of the antibiotic Pills cefadroxil she is allergic to PCN.  Has been taking all gtts   Vig , HA , PF QID     (-)Flashes (+)Floaters noticed today comes and goes     HPI     Post-op Evaluation    Additional comments: 1 day check           Comments   DLS 1/9/18- She needs a new RX for the pain medication because she can't   take the one that was prescribed. She took tylenol last night for the pain      Prior OCT - RD OD  OS - No ME      A/P    1. RRD OD   Macula involving x 4  Days    S/p  25g PPV/EL/SF6 OD for RRD 9/6/17  IOP improved    10/30/17 - Recurrent IT RD with early PVR and small break    s/p 25g PPV/membrane stripping/inferior retinopexy/C3F8 OD for RRD/PVR OD 11/1/17 12/4/17 - recurrent inferior RD - small holes posterior to laser    s/p 25g PPV/EL/AFx/1000cs Silicone Oil OD for Recurrent RRD with PVR 12/6/17    Doing well, good IOP    1/9/18 increasing inferior fluid collection beneath oil into macula - needs SB - multiple small break    s/p /270, 25g PPV/SO removal/membrane stripping/AFx/EL/1000cs Silicone Oil OD for RRD/PVR 1/10/18    Doing well, good IOP  Had ST scleral dehiscence - closed with 5-0 mersilene    Continue gtts QID Ointment/shield QHS until Thursday  Then PF 2/1    Side sleep or stomach    1/29/18  Inferior PVR OD with shallow submacular fluid   IOP elevated - resume Cosopt  Will likely need inferior retinectomy    2. PCIOL OU    3. HTN   Good BP control    4. PVD OS  No breaks OS      3 weeks OCT

## 2018-02-19 ENCOUNTER — OFFICE VISIT (OUTPATIENT)
Dept: OPHTHALMOLOGY | Facility: CLINIC | Age: 73
End: 2018-02-19
Payer: MEDICARE

## 2018-02-19 ENCOUNTER — OFFICE VISIT (OUTPATIENT)
Dept: FAMILY MEDICINE | Facility: CLINIC | Age: 73
End: 2018-02-19
Payer: MEDICARE

## 2018-02-19 VITALS
RESPIRATION RATE: 16 BRPM | SYSTOLIC BLOOD PRESSURE: 144 MMHG | HEART RATE: 80 BPM | WEIGHT: 154.31 LBS | DIASTOLIC BLOOD PRESSURE: 96 MMHG | HEIGHT: 62 IN | OXYGEN SATURATION: 98 % | BODY MASS INDEX: 28.39 KG/M2 | TEMPERATURE: 99 F

## 2018-02-19 DIAGNOSIS — R05.9 COUGH: Primary | ICD-10-CM

## 2018-02-19 DIAGNOSIS — H33.001 RHEGMATOGENOUS RETINAL DETACHMENT OF RIGHT EYE: ICD-10-CM

## 2018-02-19 DIAGNOSIS — H35.21 PROLIFERATIVE VITREORETINOPATHY, RIGHT: ICD-10-CM

## 2018-02-19 DIAGNOSIS — J02.9 PHARYNGITIS, UNSPECIFIED ETIOLOGY: ICD-10-CM

## 2018-02-19 DIAGNOSIS — H33.021 RETINAL DETACHMENT OF RIGHT EYE WITH MULTIPLE BREAKS: Primary | ICD-10-CM

## 2018-02-19 DIAGNOSIS — R30.0 DYSURIA: ICD-10-CM

## 2018-02-19 LAB
BILIRUB SERPL-MCNC: ABNORMAL MG/DL
BILIRUB UR QL STRIP: NEGATIVE
BLOOD, POC UA: ABNORMAL
CLARITY UR: CLEAR
COLOR UR: YELLOW
CTP QC/QA: YES
GLUCOSE UR QL STRIP: NEGATIVE
GLUCOSE UR QL STRIP: NORMAL
HGB UR QL STRIP: NEGATIVE
KETONES UR QL STRIP: ABNORMAL
KETONES UR QL STRIP: NEGATIVE
LEUKOCYTE ESTERASE UR QL STRIP: NEGATIVE
LEUKOCYTE ESTERASE URINE, POC: ABNORMAL
NITRITE UR QL STRIP: NEGATIVE
NITRITE, POC UA: ABNORMAL
PH UR STRIP: 6 [PH] (ref 5–8)
PH, POC UA: 7
PROT UR QL STRIP: NEGATIVE
PROTEIN, POC: ABNORMAL
S PYO RRNA THROAT QL PROBE: NEGATIVE
SP GR UR STRIP: <=1.005 (ref 1–1.03)
SPECIFIC GRAVITY, POC UA: 1
URN SPEC COLLECT METH UR: ABNORMAL
UROBILINOGEN, POC UA: NORMAL

## 2018-02-19 PROCEDURE — 87880 STREP A ASSAY W/OPTIC: CPT | Mod: QW,S$GLB,, | Performed by: NURSE PRACTITIONER

## 2018-02-19 PROCEDURE — 87086 URINE CULTURE/COLONY COUNT: CPT

## 2018-02-19 PROCEDURE — 81003 URINALYSIS AUTO W/O SCOPE: CPT | Mod: PO

## 2018-02-19 PROCEDURE — 99213 OFFICE O/P EST LOW 20 MIN: CPT | Mod: 25,S$GLB,, | Performed by: NURSE PRACTITIONER

## 2018-02-19 PROCEDURE — 1159F MED LIST DOCD IN RCRD: CPT | Mod: S$GLB,,, | Performed by: NURSE PRACTITIONER

## 2018-02-19 PROCEDURE — 1126F AMNT PAIN NOTED NONE PRSNT: CPT | Mod: S$GLB,,, | Performed by: NURSE PRACTITIONER

## 2018-02-19 PROCEDURE — 92134 CPTRZ OPH DX IMG PST SGM RTA: CPT | Mod: S$GLB,,, | Performed by: OPHTHALMOLOGY

## 2018-02-19 PROCEDURE — 99499 UNLISTED E&M SERVICE: CPT | Mod: S$GLB,,, | Performed by: OPHTHALMOLOGY

## 2018-02-19 PROCEDURE — 99999 PR PBB SHADOW E&M-EST. PATIENT-LVL V: CPT | Mod: PBBFAC,,, | Performed by: NURSE PRACTITIONER

## 2018-02-19 PROCEDURE — 87088 URINE BACTERIA CULTURE: CPT

## 2018-02-19 PROCEDURE — 87070 CULTURE OTHR SPECIMN AEROBIC: CPT | Mod: 59

## 2018-02-19 PROCEDURE — 87147 CULTURE TYPE IMMUNOLOGIC: CPT

## 2018-02-19 PROCEDURE — 81000 URINALYSIS NONAUTO W/SCOPE: CPT | Mod: S$GLB,,, | Performed by: NURSE PRACTITIONER

## 2018-02-19 PROCEDURE — 3008F BODY MASS INDEX DOCD: CPT | Mod: S$GLB,,, | Performed by: NURSE PRACTITIONER

## 2018-02-19 PROCEDURE — 99999 PR PBB SHADOW E&M-EST. PATIENT-LVL III: CPT | Mod: PBBFAC,,, | Performed by: OPHTHALMOLOGY

## 2018-02-19 PROCEDURE — 99024 POSTOP FOLLOW-UP VISIT: CPT | Mod: S$GLB,,, | Performed by: OPHTHALMOLOGY

## 2018-02-19 RX ORDER — BENZONATATE 200 MG/1
200 CAPSULE ORAL 3 TIMES DAILY PRN
Qty: 30 CAPSULE | Refills: 0 | Status: SHIPPED | OUTPATIENT
Start: 2018-02-19 | End: 2018-03-01

## 2018-02-19 NOTE — PROGRESS NOTES
HPI     DLS-01/29/2018- Dr. Chaves    Pt states can only see her Fingers moving in certain area, can't make out faces or read.  Has that dark spot central.      Has a white dot on LLL she wants to be checked today.  Dorzolamide BID    (-)Flashes (+)Floaters noticed today comes and goes       HPI     DLS-01/16/2018- Dr. Chaves    Pt states  Starting Sat she is seeing a shadow over her VA OD; when it   passes she cannot see fingers in her VA.    PF BID     (-)Flashes (+)Floaters noticed today comes and goes       HPI     5 day PO check   DLS-01/11/2018- Dr. Chaves    Pt sts vision has slightly improved still pain in OD and also had nose bleed and soreness around head also vibration in Right ear. Thinks she is having problems because of the antibiotic Pills cefadroxil she is allergic to PCN.  Has been taking all gtts   Vig , HA , PF QID     (-)Flashes (+)Floaters noticed today comes and goes     HPI     Post-op Evaluation    Additional comments: 1 day check           Comments   DLS 1/9/18- She needs a new RX for the pain medication because she can't   take the one that was prescribed. She took tylenol last night for the pain      Prior OCT - RD OD  OS - No ME      A/P    1. RRD OD   Macula involving x 4  Days    S/p  25g PPV/EL/SF6 OD for RRD 9/6/17  IOP improved    10/30/17 - Recurrent IT RD with early PVR and small break    s/p 25g PPV/membrane stripping/inferior retinopexy/C3F8 OD for RRD/PVR OD 11/1/17 12/4/17 - recurrent inferior RD - small holes posterior to laser    s/p 25g PPV/EL/AFx/1000cs Silicone Oil OD for Recurrent RRD with PVR 12/6/17    Doing well, good IOP    1/9/18 increasing inferior fluid collection beneath oil into macula - needs SB - multiple small break    s/p /270, 25g PPV/SO removal/membrane stripping/AFx/EL/1000cs Silicone Oil OD for RRD/PVR 1/10/18    Doing well, good IOP  Had ST scleral dehiscence - closed with 5-0 mersilene    Continue PF1    Side sleep or stomach    Inferior  PVR OD with shallow submacular fluid   IOP elevated - resume Cosopt  Will likely need inferior retinectomy    2/18 - some progression  May need PPV/SO removal/membrane strippingretinectomy/EL/Gas vs oil next visit    2. PCIOL OU    3. HTN   Good BP control    4. PVD OS  No breaks OS      4 weeks

## 2018-02-19 NOTE — PATIENT INSTRUCTIONS

## 2018-02-19 NOTE — PROGRESS NOTES
Subjective:       Patient ID: Madison Long is a 72 y.o. female.    Chief Complaint: Cough (x2 days ) and Vaginitis (Diflucan 1/23/18)  Pt reports she was on a trip last week with niece and she was coughing.   Now she is coughing. She is using Symbicort, albuterol, Mucinex.  Pt reports she has had a hysterectomy.  She felt like Friday she was starting with a bladder infection. Took Dilflucan 2 weeks ago. Monistat 1 vag on Friday used. She took a Macrobid yesterday, an old rx from 11/2017.      Cough   This is a new problem. The current episode started in the past 7 days. Associated symptoms include postnasal drip, rhinorrhea and wheezing. Pertinent negatives include no chest pain, chills, fever, sore throat or shortness of breath.   Dysuria    This is a new problem. The current episode started in the past 7 days. The quality of the pain is described as burning. There has been no fever. Associated symptoms include flank pain and frequency. Pertinent negatives include no chills or hematuria. She has tried antibiotics (AZO and Macrobid) for the symptoms. The treatment provided mild relief.     Vitals:    02/19/18 1306   BP: (!) 144/96   Pulse: 80   Resp: 16   Temp: 98.8 °F (37.1 °C)     Review of Systems   Constitutional: Negative for chills, diaphoresis and fever.   HENT: Positive for postnasal drip and rhinorrhea. Negative for congestion, sinus pain, sinus pressure, sneezing, sore throat and voice change.    Eyes: Negative for visual disturbance.   Respiratory: Positive for cough and wheezing. Negative for chest tightness and shortness of breath.    Cardiovascular: Negative for chest pain.   Genitourinary: Positive for dysuria, flank pain and frequency. Negative for hematuria and vaginal discharge.       Objective:      Physical Exam   Constitutional: She is oriented to person, place, and time. Vital signs are normal. She appears well-developed and well-nourished. She is cooperative.   HENT:   Head: Normocephalic  and atraumatic.   Right Ear: Hearing, tympanic membrane, external ear and ear canal normal.   Left Ear: Hearing, tympanic membrane, external ear and ear canal normal.   Nose: Rhinorrhea present. No mucosal edema.   Mouth/Throat: Uvula is midline and mucous membranes are normal. Posterior oropharyngeal erythema present.   Eyes: Conjunctivae and EOM are normal.   Neck: Normal range of motion. Neck supple.   Cardiovascular: Normal rate, regular rhythm, S1 normal, S2 normal and normal heart sounds.    Pulmonary/Chest: Effort normal. No respiratory distress. She has wheezes.   Abdominal: Soft. Bowel sounds are normal. She exhibits no distension. There is no tenderness.   Genitourinary:   Genitourinary Comments: See POCT results.   Musculoskeletal: Normal range of motion.   Lymphadenopathy:        Head (right side): No submental, no submandibular, no tonsillar, no preauricular and no posterior auricular adenopathy present.        Head (left side): No submental, no submandibular, no tonsillar, no preauricular and no posterior auricular adenopathy present.   Neurological: She is alert and oriented to person, place, and time.   Skin: Skin is warm and dry. Capillary refill takes less than 2 seconds.   Psychiatric: She has a normal mood and affect. Her speech is normal and behavior is normal. Judgment and thought content normal.   Nursing note and vitals reviewed.    POCT Ua:  WBC, UA neg    Nitrite, UA neg    Urobilinogen, UA normal    Protein neg    pH, UA 7    Blood, UA 5-10    Spec Grav UA 1.000    Ketones, UA neg    Bilirubin neg    Glucose, UA normal        Assessment & Plan:       Pharyngitis, unspecified etiology  -     POCT Rapid Strep A, negative in clinic.  -     Throat culture, pending.     Cough  -     Throat culture, pending results.  -     benzonatate (TESSALON) 200 MG capsule; Take 1 capsule (200 mg total) by mouth 3 (three) times daily as needed.  Dispense: 30 capsule; Refill: 0    Dysuria  -     POCT  URINALYSIS, see results.  -     URINALYSIS  Sent urine culture, pending.     Will call pt with results.       Follow-up if symptoms worsen or fail to improve.

## 2018-02-20 ENCOUNTER — TELEPHONE (OUTPATIENT)
Dept: FAMILY MEDICINE | Facility: CLINIC | Age: 73
End: 2018-02-20

## 2018-02-20 NOTE — TELEPHONE ENCOUNTER
----- Message from Segundo Powers sent at 2/20/2018  3:05 PM CST -----  Contact: [phone]  Patient is calling for lab results. Thanks!

## 2018-02-21 ENCOUNTER — TELEPHONE (OUTPATIENT)
Dept: FAMILY MEDICINE | Facility: CLINIC | Age: 73
End: 2018-02-21

## 2018-02-21 DIAGNOSIS — A49.1 GBS (GROUP B STREPTOCOCCUS) INFECTION: Primary | ICD-10-CM

## 2018-02-21 LAB — BACTERIA UR CULT: NORMAL

## 2018-02-21 RX ORDER — FLUCONAZOLE 150 MG/1
150 TABLET ORAL ONCE
Qty: 1 TABLET | Refills: 0 | Status: SHIPPED | OUTPATIENT
Start: 2018-02-21 | End: 2018-02-21

## 2018-02-21 NOTE — TELEPHONE ENCOUNTER
Reviewed urine cx results with Dr Tyson.  +GBS in urine, pt has documented allergies to PCN, Cipro, and Doxycycline.   Recommended to rx Diflucan for reported  symptoms and refer to Urology.   Please notify pt that Diflucan was sent to pharmacy and Urology referral was made, schedule appointment for further evaluation and treatment.

## 2018-02-21 NOTE — TELEPHONE ENCOUNTER
----- Message from Ralph Gibbons sent at 2/21/2018  2:09 PM CST -----  Contact: pt  Pt is calling to see if she was going to get another prescription called in for yeast  Call Back#[phone]  Thanks

## 2018-02-22 LAB — BACTERIA THROAT CULT: NORMAL

## 2018-02-28 DIAGNOSIS — E78.00 HYPERCHOLESTEROLEMIA: ICD-10-CM

## 2018-02-28 DIAGNOSIS — N76.0 ACUTE VAGINITIS: ICD-10-CM

## 2018-02-28 NOTE — TELEPHONE ENCOUNTER
----- Message from Ralph Gibbons sent at 2/28/2018 11:49 AM CST -----  Contact: pt  Pt is requesting a refill on her atorvastatin (LIPITOR) 10 MG tablet  Call Back#[phone]  Thanks      [facility] - [place] - [address] AT [facility] [address]  [address] [address]  Phone: [phone] Fax: [phone]

## 2018-03-02 ENCOUNTER — TELEPHONE (OUTPATIENT)
Dept: FAMILY MEDICINE | Facility: CLINIC | Age: 73
End: 2018-03-02

## 2018-03-02 RX ORDER — ATORVASTATIN CALCIUM 10 MG/1
10 TABLET, FILM COATED ORAL NIGHTLY
Qty: 90 TABLET | Refills: 2 | Status: SHIPPED | OUTPATIENT
Start: 2018-03-02 | End: 2018-07-23 | Stop reason: SDUPTHER

## 2018-03-02 RX ORDER — FLUCONAZOLE 150 MG/1
TABLET ORAL
Qty: 2 TABLET | Refills: 0 | Status: SHIPPED | OUTPATIENT
Start: 2018-03-02 | End: 2018-07-23 | Stop reason: ALTCHOICE

## 2018-03-02 NOTE — TELEPHONE ENCOUNTER
----- Message from Mera Yuen sent at 3/2/2018 11:31 AM CST -----  Contact: self  Patient [phone] (cell) had called about 3 days ago to have her Lipitor 10mg (Generic) called to pharmacy but she has not heard back/she is calling to check the status of having this called to or sent to a different pharmacy which is [facility] [phone]

## 2018-03-19 ENCOUNTER — OFFICE VISIT (OUTPATIENT)
Dept: OPHTHALMOLOGY | Facility: CLINIC | Age: 73
End: 2018-03-19
Payer: MEDICARE

## 2018-03-19 ENCOUNTER — TELEPHONE (OUTPATIENT)
Dept: OPHTHALMOLOGY | Facility: CLINIC | Age: 73
End: 2018-03-19

## 2018-03-19 DIAGNOSIS — H35.21 PROLIFERATIVE VITREORETINOPATHY, RIGHT: ICD-10-CM

## 2018-03-19 DIAGNOSIS — H35.21 NONDIABETIC PROLIFERATIVE RETINOPATHY, RIGHT: ICD-10-CM

## 2018-03-19 DIAGNOSIS — H33.021 RETINAL DETACHMENT OF RIGHT EYE WITH MULTIPLE BREAKS: Primary | ICD-10-CM

## 2018-03-19 DIAGNOSIS — H33.001 MACULA-OFF RHEGMATOGENOUS RETINAL DETACHMENT, RIGHT: ICD-10-CM

## 2018-03-19 DIAGNOSIS — H33.001 RHEGMATOGENOUS RETINAL DETACHMENT OF RIGHT EYE: ICD-10-CM

## 2018-03-19 PROCEDURE — 99024 POSTOP FOLLOW-UP VISIT: CPT | Mod: S$GLB,,, | Performed by: OPHTHALMOLOGY

## 2018-03-19 PROCEDURE — 99499 UNLISTED E&M SERVICE: CPT | Mod: S$GLB,,, | Performed by: OPHTHALMOLOGY

## 2018-03-19 PROCEDURE — 99999 PR PBB SHADOW E&M-EST. PATIENT-LVL II: CPT | Mod: PBBFAC,,, | Performed by: OPHTHALMOLOGY

## 2018-03-19 RX ORDER — DORZOLAMIDE HYDROCHLORIDE AND TIMOLOL MALEATE 20; 5 MG/ML; MG/ML
1 SOLUTION/ DROPS OPHTHALMIC 2 TIMES DAILY
COMMUNITY
End: 2018-05-07

## 2018-03-19 NOTE — PROGRESS NOTES
HPI     Post-op Evaluation    Additional comments: 1 month check           Comments   DLS 2/19/18- OD seems to be doing well    PF  Daily  cosopt BID    HPI     DLS-01/29/2018- Dr. Chaves    Pt states can only see her Fingers moving in certain area, can't make out faces or read.  Has that dark spot central.      Has a white dot on LLL she wants to be checked today.  Dorzolamide BID    (-)Flashes (+)Floaters noticed today comes and goes       HPI     DLS-01/16/2018- Dr. Chaves    Pt states  Starting Sat she is seeing a shadow over her VA OD; when it   passes she cannot see fingers in her VA.    PF BID     (-)Flashes (+)Floaters noticed today comes and goes       HPI     5 day PO check   DLS-01/11/2018- Dr. Chaves    Pt sts vision has slightly improved still pain in OD and also had nose bleed and soreness around head also vibration in Right ear. Thinks she is having problems because of the antibiotic Pills cefadroxil she is allergic to PCN.  Has been taking all gtts   Vig , HA , PF QID     (-)Flashes (+)Floaters noticed today comes and goes     HPI     Post-op Evaluation    Additional comments: 1 day check           Comments   DLS 1/9/18- She needs a new RX for the pain medication because she can't   take the one that was prescribed. She took tylenol last night for the pain      Prior OCT - RD OD  OS - No ME      A/P    1. RRD OD   Macula involving x 4  Days    S/p  25g PPV/EL/SF6 OD for RRD 9/6/17  IOP improved    10/30/17 - Recurrent IT RD with early PVR and small break    s/p 25g PPV/membrane stripping/inferior retinopexy/C3F8 OD for RRD/PVR OD 11/1/17 12/4/17 - recurrent inferior RD - small holes posterior to laser    s/p 25g PPV/EL/AFx/1000cs Silicone Oil OD for Recurrent RRD with PVR 12/6/17    Doing well, good IOP    1/9/18 increasing inferior fluid collection beneath oil into macula - needs SB - multiple small break    s/p /270, 25g PPV/SO removal/membrane stripping/AFx/EL/1000cs Silicone Oil OD  for RRD/PVR 1/10/18    Doing well, good IOP  Had ST scleral dehiscence - closed with 5-0 mersilene    Continue PF1    Side sleep or stomach    Inferior PVR OD with shallow submacular fluid   IOP low - stop Cosopt  Will likely need inferior retinectomy    2/18 - some progression    Plan 25 PPV/SO removal/membrane stripping/inferior retinectomy/posterior capsulectomy/EL/Gas vs oil OD for RRD with PVR and PCO OD     LMA   LOC 60 min      Risks, benefits, and alternatives to treatment discussed in detail with the patient.  The patient voiced understanding and wished to proceed with the procedure      2. PCIOL OU    3. HTN   Good BP control    4. PVD OS  No breaks OS      To OR

## 2018-03-23 ENCOUNTER — HOSPITAL ENCOUNTER (OUTPATIENT)
Dept: RADIOLOGY | Facility: HOSPITAL | Age: 73
Discharge: HOME | End: 2018-03-23
Attending: INTERNAL MEDICINE
Payer: MEDICARE

## 2018-03-23 ENCOUNTER — OFFICE VISIT (OUTPATIENT)
Dept: UROLOGY | Facility: CLINIC | Age: 73
End: 2018-03-23
Payer: MEDICARE

## 2018-03-23 VITALS
BODY MASS INDEX: 27.85 KG/M2 | SYSTOLIC BLOOD PRESSURE: 140 MMHG | HEART RATE: 75 BPM | DIASTOLIC BLOOD PRESSURE: 69 MMHG | HEIGHT: 64 IN | WEIGHT: 163.13 LBS

## 2018-03-23 DIAGNOSIS — N30.90 CYSTITIS: Primary | ICD-10-CM

## 2018-03-23 DIAGNOSIS — Z12.31 ENCOUNTER FOR SCREENING MAMMOGRAM FOR BREAST CANCER: ICD-10-CM

## 2018-03-23 DIAGNOSIS — N81.11 CYSTOCELE, MIDLINE: ICD-10-CM

## 2018-03-23 LAB
BILIRUB SERPL-MCNC: NORMAL MG/DL
BLOOD URINE, POC: NORMAL
COLOR, POC UA: YELLOW
GLUCOSE UR QL STRIP: NORMAL
KETONES UR QL STRIP: NORMAL
LEUKOCYTE ESTERASE URINE, POC: NORMAL
NITRITE, POC UA: NORMAL
PH, POC UA: 7
PROTEIN, POC: NORMAL
SPECIFIC GRAVITY, POC UA: 1.01
UROBILINOGEN, POC UA: NORMAL

## 2018-03-23 PROCEDURE — 99999 PR PBB SHADOW E&M-EST. PATIENT-LVL III: CPT | Mod: PBBFAC,,, | Performed by: UROLOGY

## 2018-03-23 PROCEDURE — 77063 BREAST TOMOSYNTHESIS BI: CPT | Mod: 26,,, | Performed by: RADIOLOGY

## 2018-03-23 PROCEDURE — 99213 OFFICE O/P EST LOW 20 MIN: CPT | Mod: 25,S$GLB,, | Performed by: UROLOGY

## 2018-03-23 PROCEDURE — 77067 SCR MAMMO BI INCL CAD: CPT | Mod: TC,PO

## 2018-03-23 PROCEDURE — 77067 SCR MAMMO BI INCL CAD: CPT | Mod: 26,,, | Performed by: RADIOLOGY

## 2018-03-23 PROCEDURE — 81002 URINALYSIS NONAUTO W/O SCOPE: CPT | Mod: S$GLB,,, | Performed by: UROLOGY

## 2018-03-23 PROCEDURE — 3077F SYST BP >= 140 MM HG: CPT | Mod: CPTII,S$GLB,, | Performed by: UROLOGY

## 2018-03-23 PROCEDURE — 3078F DIAST BP <80 MM HG: CPT | Mod: CPTII,S$GLB,, | Performed by: UROLOGY

## 2018-03-23 NOTE — PROGRESS NOTES
Subjective:       Patient ID: Madison Long is a 72 y.o. female.    Chief Complaint: Advice Only    HPI     72 year olf with a UTI last year.  She was initially treated with a course of Doxy but her symtpoms persisted.  She then tried Cipro but had a reaction.  She was seen in the ER and was diagnosed with OAB and given a medicine which she no longer takes.  She has seen Dr. Hughes in the past and he is treating her with topical estrace cream.   She is now retired and wants to move all medical care possible to the [facility].  She had previous hysterectomy,  She has some prolapse.    She is mostly asymptomatic today.  She denies hematuria and dysuria.  Urine culture last month positive for light growth Group B strep and likely contaminant.  Cath UA today is completely clear.  She is her to establish care.  Urine dipstick shows negative for all components.    Past Medical History:   Diagnosis Date    Allergy     Asthma     Basal cell carcinoma     COPD (chronic obstructive pulmonary disease)     GERD (gastroesophageal reflux disease)     Hyperlipidemia     Hypertension     Retinal detachment      Past Surgical History:   Procedure Laterality Date    BASAL CELL CARCINOMA EXCISION      BREAST CYST EXCISION Left     long time ago, bening    CATARACT EXTRACTION      COLONOSCOPY  2013    EYE SURGERY      cataracts    HYSTERECTOMY      UPPER GASTROINTESTINAL ENDOSCOPY         Current Outpatient Prescriptions:     aspirin 81 MG Chew, Take 81 mg by mouth daily as needed. Usually takes about every 2 weeks., Disp: , Rfl:     atorvastatin (LIPITOR) 10 MG tablet, Take 1 tablet (10 mg total) by mouth nightly., Disp: 90 tablet, Rfl: 2    carboxymethylcellulose (REFRESH PLUS) 0.5 % Dpet, Place 2 drops into both eyes 2 (two) times daily as needed., Disp: , Rfl:     desoximetasone (TOPICORT LP) 0.05 % cream, Apply topically 2 (two) times daily as needed. Twice a day  PRN, Disp: , Rfl:     estradiol (ESTRACE) 0.01  % (0.1 mg/gram) vaginal cream, Place 1 g vaginally every other day., Disp: 1 Tube, Rfl: 3    fish oil-omega-3 fatty acids 300-1,000 mg capsule, Take 1 g by mouth every morning. , Disp: , Rfl:     fluconazole (DIFLUCAN) 150 MG Tab, TAKE 1 TABLET BY MOUTH NOW THEN REPEAT IN 7 DAYS, Disp: 2 tablet, Rfl: 0    FLUoxetine (PROZAC) 10 MG capsule, , Disp: , Rfl:     fluticasone (FLONASE) 50 mcg/actuation nasal spray, APPLY TWO SPRAYS NASALLY ONCE DAILY (Patient taking differently: 2 sprays by Each Nare route daily as needed. APPLY TWO SPRAYS NASALLY ONCE DAILY, prn), Disp: 16 g, Rfl: 11    guaifenesin (MUCINEX) 600 mg 12 hr tablet, Take 1,200 mg by mouth 2 (two) times daily as needed. , Disp: , Rfl:     hydrOXYzine HCl (ATARAX) 10 MG Tab, Take 1 po qid prn anxiety; May take up to 3 tabs qhs, Disp: 90 tablet, Rfl: 11    losartan (COZAAR) 50 MG tablet, Take 1 tablet (50 mg total) by mouth once daily. (Patient taking differently: Take 50 mg by mouth every morning. ), Disp: 30 tablet, Rfl: 11    multivit,stress formula-zinc (STRESS FORMULA WITH ZINC) Tab, Take 1 tablet by mouth every morning. Uses Alive, Disp: , Rfl:     omeprazole (PRILOSEC OTC) 20 MG tablet, Take 1 tablet (20 mg total) by mouth every morning. Take 30 minutes before your first protein containing meal., Disp: 30 tablet, Rfl: 11    SYMBICORT 160-4.5 mcg/actuation HFAA, INHALE 2 PUFFS BY MOUTH TWICE DAILY (Patient taking differently: INHALE 2 PUFFS BY MOUTH TWICE DAILY, prn), Disp: 11 g, Rfl: 7    tretinoin (RETIN-A) 0.1 % cream, Use hs on face (Patient taking differently: Apply topically nightly as needed. Use hs on face, prn), Disp: 45 g, Rfl: 5    VENTOLIN HFA 90 mcg/actuation inhaler, Inhale 2 puffs into the lungs every 6 (six) hours as needed. INHALE ONE OR TWO PUFFS BY MOUTH EVERY SIX HOURS AS NEEDED FOR WHEEZING., Disp: 1 Inhaler, Rfl: 11    zolpidem (AMBIEN) 10 mg Tab, 1 or 2 tablets every HS prn insomnia (Patient taking differently: Take 10  mg by mouth nightly as needed. 1 or 2 tablets every HS prn insomnia), Disp: 60 tablet, Rfl: 3    dorzolamide-timolol 2-0.5% (COSOPT) 22.3-6.8 mg/mL ophthalmic solution, Place 1 drop into the right eye 2 (two) times daily., Disp: , Rfl:     econazole nitrate 1 % cream, Apply topically 2 (two) times daily as needed. Twice a day  PRN, Disp: , Rfl:     fluorouracil (EFUDEX) 5 % cream, AAA bid x 2 weeks (Patient taking differently: Apply topically 2 (two) times daily as needed. AAA bid prn), Disp: 40 g, Rfl: 1      Review of Systems   Constitutional: Negative for fever.   Eyes: Negative for visual disturbance.   Respiratory: Negative for shortness of breath.    Cardiovascular: Negative for chest pain.   Gastrointestinal: Negative for abdominal pain and nausea.   Genitourinary: Negative for dysuria, flank pain and hematuria.   Musculoskeletal: Negative for gait problem.   Skin: Negative for rash.   Neurological: Negative for seizures.   Psychiatric/Behavioral: Negative for confusion.       Objective:      Physical Exam   Constitutional: She is oriented to person, place, and time. She appears well-developed and well-nourished.   HENT:   Head: Normocephalic.   Eyes: Conjunctivae and EOM are normal.   Neck: Normal range of motion.   Cardiovascular: Normal rate.    Pulmonary/Chest: Effort normal.   Abdominal: Soft. She exhibits no distension and no mass. There is no tenderness.   Genitourinary:   Genitourinary Comments: Bladder non-tender and nondistended  No CVA tenderness   Musculoskeletal: She exhibits no edema.   Neurological: She is alert and oriented to person, place, and time.   Skin: Skin is warm and dry. No rash noted. No erythema.   Psychiatric: She has a normal mood and affect. Her behavior is normal.   Vitals reviewed.      Assessment:       1. Cystitis    2. Cystocele, midline        Plan:       Cystitis  -     POCT URINE DIPSTICK WITHOUT MICROSCOPE    Cystocele, midline      f/u as needed

## 2018-03-23 NOTE — PROGRESS NOTES
Time: 11:04    Order reviewed and verified Yes    Verified correct patient using two patient identifiers:: Yes    Allergies verified: Yes    Hand hygiene performed Yes    Name of 2nd person for insertion if applicable Timothea Rhea    Insertion attempts (insert comment if >2 attempts): 4    Catheter type: in and out    Tube size: 14 fr    Catheter inserted using sterile technique: Yes    Urine returned:Yesclear    Urine specimen collected: Yes    Specimen labeled and verified in front of patient Yes    Total urine volume obtained: 500 ml    Patient reports pain level as 0    Dr. Biswas notified

## 2018-03-23 NOTE — LETTER
March 23, 2018      Bridgette Dukes NP  [address] [facility] [address] [place] [address]           [facility]  [address] [facility] [address] [place] [address]  Phone: [phone]          Patient: Madison Long   MR Number: [id number]   YOB: 1945   Date of Visit: 3/23/2018       Dear Bridgette Dukes:    Thank you for referring Madison Long to me for evaluation. Attached you will find relevant portions of my assessment and plan of care.    If you have questions, please do not hesitate to call me. I look forward to following Madison Long along with you.    Sincerely,    WESTON Biswas MD    Enclosure  CC:  No Recipients    If you would like to receive this communication electronically, please contact [email or web address] or [phone] to request more information on [employer] Link access.    For providers and/or their staff who would like to refer a patient to [facility], please contact us through our one-stop-shop provider referral line, [facility] , at [phone].    If you feel you have received this communication in error or would no longer like to receive these types of communications, please e-mail [email or web address]

## 2018-04-10 ENCOUNTER — TELEPHONE (OUTPATIENT)
Dept: OPHTHALMOLOGY | Facility: CLINIC | Age: 73
End: 2018-04-10

## 2018-04-11 ENCOUNTER — ANESTHESIA EVENT (OUTPATIENT)
Dept: SURGERY | Facility: HOSPITAL | Age: 73
End: 2018-04-11
Payer: MEDICARE

## 2018-04-11 ENCOUNTER — HOSPITAL ENCOUNTER (OUTPATIENT)
Facility: HOSPITAL | Age: 73
Discharge: HOME | End: 2018-04-11
Attending: OPHTHALMOLOGY | Admitting: OPHTHALMOLOGY
Payer: MEDICARE

## 2018-04-11 ENCOUNTER — ANESTHESIA (OUTPATIENT)
Dept: SURGERY | Facility: HOSPITAL | Age: 73
End: 2018-04-11
Payer: MEDICARE

## 2018-04-11 VITALS
TEMPERATURE: 98 F | SYSTOLIC BLOOD PRESSURE: 125 MMHG | HEART RATE: 71 BPM | OXYGEN SATURATION: 99 % | WEIGHT: 152 LBS | HEIGHT: 66 IN | DIASTOLIC BLOOD PRESSURE: 71 MMHG | RESPIRATION RATE: 18 BRPM | BODY MASS INDEX: 24.43 KG/M2

## 2018-04-11 DIAGNOSIS — H35.21 PROLIFERATIVE VITREORETINOPATHY, RIGHT: ICD-10-CM

## 2018-04-11 DIAGNOSIS — H33.001 MACULA-OFF RHEGMATOGENOUS RETINAL DETACHMENT, RIGHT: Primary | ICD-10-CM

## 2018-04-11 DIAGNOSIS — H33.21 RETINAL DETACHMENT, RIGHT: ICD-10-CM

## 2018-04-11 PROCEDURE — 36000706: Performed by: OPHTHALMOLOGY

## 2018-04-11 PROCEDURE — 67113 REPAIR RETINAL DETACH CPLX: CPT | Mod: RT,,, | Performed by: OPHTHALMOLOGY

## 2018-04-11 PROCEDURE — C1814 RETINAL TAMP, SILICONE OIL: HCPCS | Performed by: OPHTHALMOLOGY

## 2018-04-11 PROCEDURE — C1784 OCULAR DEV, INTRAOP, DET RET: HCPCS | Performed by: OPHTHALMOLOGY

## 2018-04-11 PROCEDURE — 27200651 HC AIRWAY, LMA: Performed by: NURSE ANESTHETIST, CERTIFIED REGISTERED

## 2018-04-11 PROCEDURE — 63600175 PHARM REV CODE 636 W HCPCS: Performed by: NURSE ANESTHETIST, CERTIFIED REGISTERED

## 2018-04-11 PROCEDURE — 37000008 HC ANESTHESIA 1ST 15 MINUTES: Performed by: OPHTHALMOLOGY

## 2018-04-11 PROCEDURE — 63600175 PHARM REV CODE 636 W HCPCS: Performed by: OPHTHALMOLOGY

## 2018-04-11 PROCEDURE — 25000003 PHARM REV CODE 250: Performed by: OPHTHALMOLOGY

## 2018-04-11 PROCEDURE — D9220A PRA ANESTHESIA: Mod: CRNA,,, | Performed by: NURSE ANESTHETIST, CERTIFIED REGISTERED

## 2018-04-11 PROCEDURE — 71000033 HC RECOVERY, INTIAL HOUR: Performed by: OPHTHALMOLOGY

## 2018-04-11 PROCEDURE — S0020 INJECTION, BUPIVICAINE HYDRO: HCPCS | Performed by: OPHTHALMOLOGY

## 2018-04-11 PROCEDURE — 25000003 PHARM REV CODE 250

## 2018-04-11 PROCEDURE — 71000015 HC POSTOP RECOV 1ST HR: Performed by: OPHTHALMOLOGY

## 2018-04-11 PROCEDURE — 36000707: Performed by: OPHTHALMOLOGY

## 2018-04-11 PROCEDURE — D9220A PRA ANESTHESIA: Mod: ANES,,, | Performed by: ANESTHESIOLOGY

## 2018-04-11 PROCEDURE — 37000009 HC ANESTHESIA EA ADD 15 MINS: Performed by: OPHTHALMOLOGY

## 2018-04-11 PROCEDURE — 27201423 OPTIME MED/SURG SUP & DEVICES STERILE SUPPLY: Performed by: OPHTHALMOLOGY

## 2018-04-11 PROCEDURE — 27600004 OPTIME MED/SURG SUP & DEVICES INTRAOCULAR LENS: Performed by: OPHTHALMOLOGY

## 2018-04-11 RX ORDER — PREDNISOLONE ACETATE 10 MG/ML
1 SUSPENSION/ DROPS OPHTHALMIC
Status: DISCONTINUED | OUTPATIENT
Start: 2018-04-11 | End: 2018-04-11 | Stop reason: HOSPADM

## 2018-04-11 RX ORDER — VANCOMYCIN HYDROCHLORIDE 500 MG/10ML
INJECTION, POWDER, LYOPHILIZED, FOR SOLUTION INTRAVENOUS
Status: DISCONTINUED | OUTPATIENT
Start: 2018-04-11 | End: 2018-04-11 | Stop reason: HOSPADM

## 2018-04-11 RX ORDER — FENTANYL CITRATE 50 UG/ML
50 INJECTION, SOLUTION INTRAMUSCULAR; INTRAVENOUS EVERY 5 MIN PRN
Status: DISCONTINUED | OUTPATIENT
Start: 2018-04-11 | End: 2018-04-11 | Stop reason: HOSPADM

## 2018-04-11 RX ORDER — DEXAMETHASONE SODIUM PHOSPHATE 4 MG/ML
INJECTION, SOLUTION INTRA-ARTICULAR; INTRALESIONAL; INTRAMUSCULAR; INTRAVENOUS; SOFT TISSUE
Status: DISCONTINUED | OUTPATIENT
Start: 2018-04-11 | End: 2018-04-11 | Stop reason: HOSPADM

## 2018-04-11 RX ORDER — HYDROCODONE BITARTRATE AND ACETAMINOPHEN 5; 325 MG/1; MG/1
1 TABLET ORAL EVERY 6 HOURS PRN
Qty: 12 TABLET | Refills: 0 | Status: SHIPPED | OUTPATIENT
Start: 2018-04-11 | End: 2018-05-07

## 2018-04-11 RX ORDER — PHENYLEPHRINE HYDROCHLORIDE 10 MG/ML
INJECTION INTRAVENOUS
Status: DISCONTINUED | OUTPATIENT
Start: 2018-04-11 | End: 2018-04-11

## 2018-04-11 RX ORDER — NEOMYCIN SULFATE, POLYMYXIN B SULFATE, AND DEXAMETHASONE 3.5; 10000; 1 MG/G; [USP'U]/G; MG/G
OINTMENT OPHTHALMIC
Status: DISCONTINUED | OUTPATIENT
Start: 2018-04-11 | End: 2018-04-11 | Stop reason: HOSPADM

## 2018-04-11 RX ORDER — OXYCODONE AND ACETAMINOPHEN 5; 325 MG/1; MG/1
1 TABLET ORAL EVERY 6 HOURS PRN
Qty: 12 TABLET | Refills: 0 | Status: SHIPPED | OUTPATIENT
Start: 2018-04-11 | End: 2018-05-07

## 2018-04-11 RX ORDER — EPINEPHRINE 1 MG/ML
INJECTION, SOLUTION INTRACARDIAC; INTRAMUSCULAR; INTRAVENOUS; SUBCUTANEOUS
Status: DISCONTINUED
Start: 2018-04-11 | End: 2018-04-11 | Stop reason: HOSPADM

## 2018-04-11 RX ORDER — MIDAZOLAM HYDROCHLORIDE 1 MG/ML
INJECTION, SOLUTION INTRAMUSCULAR; INTRAVENOUS
Status: DISCONTINUED | OUTPATIENT
Start: 2018-04-11 | End: 2018-04-11

## 2018-04-11 RX ORDER — TROPICAMIDE 10 MG/ML
1 SOLUTION/ DROPS OPHTHALMIC
Status: DISCONTINUED | OUTPATIENT
Start: 2018-04-11 | End: 2018-04-11 | Stop reason: HOSPADM

## 2018-04-11 RX ORDER — LIDOCAINE HCL/PF 100 MG/5ML
SYRINGE (ML) INTRAVENOUS
Status: DISCONTINUED | OUTPATIENT
Start: 2018-04-11 | End: 2018-04-11

## 2018-04-11 RX ORDER — MOXIFLOXACIN 5 MG/ML
1 SOLUTION/ DROPS OPHTHALMIC
Status: DISCONTINUED | OUTPATIENT
Start: 2018-04-11 | End: 2018-04-11 | Stop reason: HOSPADM

## 2018-04-11 RX ORDER — DEXAMETHASONE SODIUM PHOSPHATE 4 MG/ML
INJECTION, SOLUTION INTRA-ARTICULAR; INTRALESIONAL; INTRAMUSCULAR; INTRAVENOUS; SOFT TISSUE
Status: DISCONTINUED
Start: 2018-04-11 | End: 2018-04-11 | Stop reason: HOSPADM

## 2018-04-11 RX ORDER — FENTANYL CITRATE 50 UG/ML
INJECTION, SOLUTION INTRAMUSCULAR; INTRAVENOUS
Status: DISCONTINUED | OUTPATIENT
Start: 2018-04-11 | End: 2018-04-11

## 2018-04-11 RX ORDER — SODIUM CHLORIDE 9 MG/ML
INJECTION, SOLUTION INTRAVENOUS CONTINUOUS
Status: DISCONTINUED | OUTPATIENT
Start: 2018-04-11 | End: 2018-04-11 | Stop reason: HOSPADM

## 2018-04-11 RX ORDER — VANCOMYCIN HYDROCHLORIDE 500 MG/10ML
INJECTION, POWDER, LYOPHILIZED, FOR SOLUTION INTRAVENOUS
Status: DISCONTINUED
Start: 2018-04-11 | End: 2018-04-11 | Stop reason: HOSPADM

## 2018-04-11 RX ORDER — ONDANSETRON 2 MG/ML
INJECTION INTRAMUSCULAR; INTRAVENOUS
Status: DISCONTINUED | OUTPATIENT
Start: 2018-04-11 | End: 2018-04-11

## 2018-04-11 RX ORDER — ONDANSETRON 8 MG/1
8 TABLET, ORALLY DISINTEGRATING ORAL EVERY 8 HOURS PRN
Status: DISCONTINUED | OUTPATIENT
Start: 2018-04-11 | End: 2018-04-11 | Stop reason: HOSPADM

## 2018-04-11 RX ORDER — LIDOCAINE HYDROCHLORIDE 10 MG/ML
INJECTION, SOLUTION EPIDURAL; INFILTRATION; INTRACAUDAL; PERINEURAL
Status: COMPLETED
Start: 2018-04-11 | End: 2018-04-11

## 2018-04-11 RX ORDER — LIDOCAINE HYDROCHLORIDE 20 MG/ML
INJECTION, SOLUTION EPIDURAL; INFILTRATION; INTRACAUDAL; PERINEURAL
Status: DISCONTINUED | OUTPATIENT
Start: 2018-04-11 | End: 2018-04-11 | Stop reason: HOSPADM

## 2018-04-11 RX ORDER — PROPOFOL 10 MG/ML
VIAL (ML) INTRAVENOUS
Status: DISCONTINUED | OUTPATIENT
Start: 2018-04-11 | End: 2018-04-11

## 2018-04-11 RX ORDER — ACETAMINOPHEN 325 MG/1
650 TABLET ORAL EVERY 4 HOURS PRN
Status: DISCONTINUED | OUTPATIENT
Start: 2018-04-11 | End: 2018-04-11 | Stop reason: HOSPADM

## 2018-04-11 RX ORDER — BUPIVACAINE HYDROCHLORIDE 7.5 MG/ML
INJECTION, SOLUTION EPIDURAL; RETROBULBAR
Status: DISCONTINUED
Start: 2018-04-11 | End: 2018-04-11 | Stop reason: HOSPADM

## 2018-04-11 RX ORDER — NEOMYCIN SULFATE, POLYMYXIN B SULFATE, AND DEXAMETHASONE 3.5; 10000; 1 MG/G; [USP'U]/G; MG/G
OINTMENT OPHTHALMIC
Status: DISCONTINUED
Start: 2018-04-11 | End: 2018-04-11 | Stop reason: HOSPADM

## 2018-04-11 RX ORDER — CYCLOPENTOLATE HYDROCHLORIDE 10 MG/ML
1 SOLUTION/ DROPS OPHTHALMIC
Status: DISCONTINUED | OUTPATIENT
Start: 2018-04-11 | End: 2018-04-11 | Stop reason: HOSPADM

## 2018-04-11 RX ORDER — MEPERIDINE HYDROCHLORIDE 25 MG/ML
12.5 INJECTION INTRAMUSCULAR; INTRAVENOUS; SUBCUTANEOUS EVERY 10 MIN PRN
Status: DISCONTINUED | OUTPATIENT
Start: 2018-04-11 | End: 2018-04-11 | Stop reason: HOSPADM

## 2018-04-11 RX ORDER — EPINEPHRINE 1 MG/ML
INJECTION, SOLUTION INTRACARDIAC; INTRAMUSCULAR; INTRAVENOUS; SUBCUTANEOUS
Status: DISCONTINUED | OUTPATIENT
Start: 2018-04-11 | End: 2018-04-11 | Stop reason: HOSPADM

## 2018-04-11 RX ORDER — SODIUM CHLORIDE 0.9 % (FLUSH) 0.9 %
3 SYRINGE (ML) INJECTION
Status: DISCONTINUED | OUTPATIENT
Start: 2018-04-11 | End: 2018-04-11 | Stop reason: HOSPADM

## 2018-04-11 RX ORDER — DEXAMETHASONE SODIUM PHOSPHATE 4 MG/ML
INJECTION, SOLUTION INTRA-ARTICULAR; INTRALESIONAL; INTRAMUSCULAR; INTRAVENOUS; SOFT TISSUE
Status: DISCONTINUED | OUTPATIENT
Start: 2018-04-11 | End: 2018-04-11

## 2018-04-11 RX ORDER — ONDANSETRON 4 MG/1
4 TABLET, FILM COATED ORAL EVERY 8 HOURS PRN
Qty: 12 TABLET | Refills: 0 | Status: SHIPPED | OUTPATIENT
Start: 2018-04-11 | End: 2018-05-07

## 2018-04-11 RX ORDER — LIDOCAINE HYDROCHLORIDE 20 MG/ML
INJECTION, SOLUTION EPIDURAL; INFILTRATION; INTRACAUDAL; PERINEURAL
Status: DISCONTINUED
Start: 2018-04-11 | End: 2018-04-11 | Stop reason: HOSPADM

## 2018-04-11 RX ORDER — TETRACAINE HYDROCHLORIDE 5 MG/ML
1 SOLUTION OPHTHALMIC
Status: DISCONTINUED | OUTPATIENT
Start: 2018-04-11 | End: 2018-04-11 | Stop reason: HOSPADM

## 2018-04-11 RX ORDER — HYDROCODONE BITARTRATE AND ACETAMINOPHEN 5; 325 MG/1; MG/1
1 TABLET ORAL EVERY 4 HOURS PRN
Status: DISCONTINUED | OUTPATIENT
Start: 2018-04-11 | End: 2018-04-11 | Stop reason: HOSPADM

## 2018-04-11 RX ORDER — PHENYLEPHRINE HYDROCHLORIDE 25 MG/ML
1 SOLUTION/ DROPS OPHTHALMIC
Status: DISCONTINUED | OUTPATIENT
Start: 2018-04-11 | End: 2018-04-11 | Stop reason: HOSPADM

## 2018-04-11 RX ORDER — LIDOCAINE HYDROCHLORIDE 10 MG/ML
1 INJECTION, SOLUTION EPIDURAL; INFILTRATION; INTRACAUDAL; PERINEURAL ONCE
Status: COMPLETED | OUTPATIENT
Start: 2018-04-11 | End: 2018-04-11

## 2018-04-11 RX ORDER — BUPIVACAINE HYDROCHLORIDE 7.5 MG/ML
INJECTION, SOLUTION EPIDURAL; RETROBULBAR
Status: DISCONTINUED | OUTPATIENT
Start: 2018-04-11 | End: 2018-04-11 | Stop reason: HOSPADM

## 2018-04-11 RX ADMIN — PHENYLEPHRINE HYDROCHLORIDE 200 MCG: 10 INJECTION INTRAVENOUS at 11:04

## 2018-04-11 RX ADMIN — LIDOCAINE HYDROCHLORIDE 100 MG: 20 INJECTION, SOLUTION INTRAVENOUS at 11:04

## 2018-04-11 RX ADMIN — LIDOCAINE HYDROCHLORIDE 0.2 MG: 10 INJECTION, SOLUTION EPIDURAL; INFILTRATION; INTRACAUDAL; PERINEURAL at 10:04

## 2018-04-11 RX ADMIN — MOXIFLOXACIN HYDROCHLORIDE 1 DROP: 5 SOLUTION/ DROPS OPHTHALMIC at 10:04

## 2018-04-11 RX ADMIN — MIDAZOLAM HYDROCHLORIDE 2 MG: 1 INJECTION, SOLUTION INTRAMUSCULAR; INTRAVENOUS at 11:04

## 2018-04-11 RX ADMIN — MOXIFLOXACIN HYDROCHLORIDE 1 DROP: 5 SOLUTION/ DROPS OPHTHALMIC at 09:04

## 2018-04-11 RX ADMIN — HOMATROPINE HYDROBROMIDE 1 DROP: 50 SOLUTION OPHTHALMIC at 10:04

## 2018-04-11 RX ADMIN — DEXAMETHASONE SODIUM PHOSPHATE 8 MG: 4 INJECTION, SOLUTION INTRAMUSCULAR; INTRAVENOUS at 11:04

## 2018-04-11 RX ADMIN — SODIUM CHLORIDE: 0.9 INJECTION, SOLUTION INTRAVENOUS at 10:04

## 2018-04-11 RX ADMIN — PROPOFOL 50 MG: 10 INJECTION, EMULSION INTRAVENOUS at 11:04

## 2018-04-11 RX ADMIN — ONDANSETRON 4 MG: 2 INJECTION INTRAMUSCULAR; INTRAVENOUS at 01:04

## 2018-04-11 RX ADMIN — CYCLOPENTOLATE HYDROCHLORIDE 1 DROP: 10 SOLUTION/ DROPS OPHTHALMIC at 10:04

## 2018-04-11 RX ADMIN — FENTANYL CITRATE 50 MCG: 50 INJECTION, SOLUTION INTRAMUSCULAR; INTRAVENOUS at 11:04

## 2018-04-11 RX ADMIN — HOMATROPINE HYDROBROMIDE 1 DROP: 50 SOLUTION OPHTHALMIC at 09:04

## 2018-04-11 RX ADMIN — PHENYLEPHRINE HYDROCHLORIDE 1 DROP: 25 SOLUTION/ DROPS OPHTHALMIC at 10:04

## 2018-04-11 RX ADMIN — TETRACAINE HYDROCHLORIDE 1 DROP: 5 SOLUTION OPHTHALMIC at 09:04

## 2018-04-11 RX ADMIN — PREDNISOLONE ACETATE 1 DROP: 10 SUSPENSION/ DROPS OPHTHALMIC at 09:04

## 2018-04-11 RX ADMIN — TROPICAMIDE 1 DROP: 10 SOLUTION/ DROPS OPHTHALMIC at 10:04

## 2018-04-11 RX ADMIN — PROPOFOL 150 MG: 10 INJECTION, EMULSION INTRAVENOUS at 11:04

## 2018-04-11 RX ADMIN — PREDNISOLONE ACETATE 1 DROP: 10 SUSPENSION/ DROPS OPHTHALMIC at 10:04

## 2018-04-11 RX ADMIN — PHENYLEPHRINE HYDROCHLORIDE 1 DROP: 25 SOLUTION/ DROPS OPHTHALMIC at 09:04

## 2018-04-11 RX ADMIN — CYCLOPENTOLATE HYDROCHLORIDE 1 DROP: 10 SOLUTION/ DROPS OPHTHALMIC at 09:04

## 2018-04-11 RX ADMIN — TROPICAMIDE 1 DROP: 10 SOLUTION/ DROPS OPHTHALMIC at 09:04

## 2018-04-11 NOTE — PLAN OF CARE
Pt and family given dc instructions and verbalized understanding.   1355- Paged resident on call for Dr. Chaves. Pt allergic to oxycodone. Was given a prescriptiom for percocet to take for pain. Pt requesting hydrocodone for prescription pain med. Pt aaox3 and in no distress.   1402- Connected w/ Dr. Warner Medrano. Stated will come to bedside to drop off new prescription.   1407- Dr. Medrano at bedside exchanging prescription.

## 2018-04-11 NOTE — ANESTHESIA POSTPROCEDURE EVALUATION
"Anesthesia Post Evaluation    Patient: Madison Long    Procedure(s) Performed: Procedure(s) (LRB):  REPAIR-RETINA (VITRECTOMY) (Right)    Final Anesthesia Type: general  Patient location during evaluation: PACU  Patient participation: Yes- Able to Participate  Level of consciousness: awake and alert and oriented  Post-procedure vital signs: reviewed and stable  Pain management: adequate  Airway patency: patent  PONV status at discharge: No PONV  Anesthetic complications: no      Cardiovascular status: stable  Respiratory status: unassisted, spontaneous ventilation and face mask  Hydration status: euvolemic  Follow-up not needed.        Visit Vitals  BP 92/74   Pulse 90   Temp 36.8 °C (98.2 °F) (Temporal)   Resp 16   Ht 5' 5.5" (1.664 m)   Wt 68.9 kg (152 lb)   SpO2 100%   BMI 24.91 kg/m²       Pain/Margie Score: Pain Assessment Performed: Yes (4/11/2018  1:30 PM)  Presence of Pain: non-verbal indicators absent (4/11/2018  1:30 PM)  Margie Score: 7 (4/11/2018  1:30 PM)      "

## 2018-04-11 NOTE — DISCHARGE INSTRUCTIONS
Post Op Instructions:  Patient should Maintain Eye shield & Dressing until seen tomorrow in eye clinic  Tylenol as needed for general discomfort  Use Prescription for pain medication if pain is severe  Use Prescription for Nausea (Zofran) if nausea or vomiting  No excessive exercise   No Bending, Lifting or Straining  Call MD if significant pain or nausea / vomiting uncontrolled by medications  Call MD if temperature in excess of 101' F  Maintain Head in a Face-Down Position or left side down  Return to eye clinic for Post Op Examination tomorrow Morning.  Bring Medicine bag to tomorrow's appointment.

## 2018-04-11 NOTE — OP NOTE
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    PROCEDURE PERFORMED:  A 25-gauge pars plana vitrectomy with silicone oil   removal, membrane stripping, internal limiting membrane peel, infusion of   perfluoron, temporal retinectomy, endolaser, air-fluid exchange, injection of   1000 centistoke silicone oil approximately 5 mL to the right eye.    ENDOLASER PARAMETERS:  Number of spots 1898, power 150 to 200 milliwatts,   duration 0.1 seconds.    NOTE:  This is a modifier 22 case for surgical complexity given complex nature   of detachment and length of time spent peeling off the scar tissue membranes   including a complex temporal retinectomy performed.    ATTENDING SURGEON:  JANEL Chaves M.D.    ASSISTANT SURGEON:  Fellow, Delio Medrano.    ANESTHESIA:  LMA with a retrobulbar injection of 4 mL mixture of 0.75% Marcaine   and 2% Xylocaine.    ESTIMATED BLOOD LOSS:  Minimal.    COMPLICATIONS:  None.    DISPOSITION:  Stable to recovery.    INDICATIONS FOR SURGERY:  This is a 72-year-old female with a history of   recurrent retinal detachment, status post multiple vitrectomies and a scleral   buckle placement of silicone oil, last procedure in January.  The patient had   progressive proliferative vitreal retinopathy, which we allowed to burn out   under silicone oil.  On her last appointment, she developed macular PVR with a   fold.  Decision was made to take the patient back to remove these PVR bands,   possible retinectomy to allow for long-term stabilization of her detachment.    Risks, benefits, and alternatives of surgery were discussed in detail.  Risks   including loss of vision, loss of eye, recurrent retinal detachment, infection,   hemorrhage, lens  dislocation, glaucoma, hypotony, ptosis and diplopia.  The   patient voiced understanding and wished to proceed with the procedure.    DESCRIPTION OF PROCEDURE:  After proper informed consent was obtained, the   patient was brought back to the Operating Suite at [facility] where   LMA was induced.  Retrobulbar injection was provided as above without   complication.  The patient was prepped and draped in normal sterile fashion for   ophthalmic surgery.  Lid speculum was placed in the right eye.  Standard 3-port   25-gauge pars plana vitrectomy was set up with the infusion cannula inserted 3.5   mm posterior to the limbus.  The infusion cannula was turned on only after   observed to be free and clear of all underlying retinal tissue.  Superonasal and   superotemporal trocars were also placed 3.5 mm posterior to the limbus.  The   [facility] oil extraction device was used to remove the silicone oil from the posterior   segment.  Examination of the posterior segment revealed inferior detachment   with macular PVR and folds as well as fibrotic retina especially   inferotemporally.  The posterior bands were peeled off with the ILM forceps and   a contact lens was used and the ICG was used to stain the epiretinal membrane   ILM complex, which was peeled off under contact lens visualization.  There was   improvement in foveal anatomy following peeling.  There was still inferotemporal   PVR strands that were stripped with the cutter and the ILM forceps.  Perfluoron   was used to help flatten the retina out to the level of the scleral buckle.    Endolaser was applied in a barrier fashion on the posterior aspect of the   buckle, but there was still some tenting of the retina under perfluoron.  Some   of the perfluoron was extracted and the vitrectomy was used to create an   inferotemporal retinectomy from the 5 o'clock position to 11 o'clock position   anteriorly, then Perfluoron was infused to allow these areas to  flatten out and   there was significant improvement in anatomy in doing so.  Endolaser was applied   in a barrier fashion along the edges of the retinectomy.  Then, an air-fluid   exchange was performed and anatomy was significantly flattened and improved.    Superotemporal trocar was removed and closed with a 7-0 Vicryl suture.  Then,   5000 centistoke silicone oil was infused to the eye to normal pressure via   palpation, approximately 5 mL.  Then, the superonasal and infusion trocars were   removed and closed with 7-0 Vicryl sutures and the eye was normal pressure via   palpation.  Subconjunctival injections of vancomycin and Decadron were given to   the patient.  The drapes were removed from the patient.  She was washed free of   Betadine prep solution.  Maxitrol ointment was placed in the right eye.  The eye   was patch shielded.  LMA was reversed and she was brought to Recovery Room in   stable condition, tolerating the procedure well.  Dr. Chaves was present for   the entire case.          /ole [id number] brooke(s)        JACK  dd: 04/11/2018 13:29:14 (CDT)  td: 04/11/2018 14:28:44 (CDT)  Doc ID   #[id number]  Job ID #[id number]    CC:

## 2018-04-11 NOTE — INTERVAL H&P NOTE
H&P completed on Mrs. Long has been reviewed, the patient has been examined and:  I concur with the findings and no changes have occurred since H&P was written.    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

## 2018-04-11 NOTE — H&P
Pre-Operative History & Physical  Ophthalmology      SUBJECTIVE:     History of Present Illness:  Patient is a 72 y.o. 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    MEDICATIONS:   No prescriptions prior to admission.       ALLERGIES:   Review of patient's allergies indicates:   Allergen Reactions    Penicillins Hives    Ciprofloxacin      Felt like her legs were weak and muscle pain.     Doxycycline Other (See Comments)     Pt unsure of reaction.    Oxycodone Anxiety       PAST MEDICAL HISTORY:   Past Medical History:   Diagnosis Date    Allergy     Asthma     Basal cell carcinoma     COPD (chronic obstructive pulmonary disease)     GERD (gastroesophageal reflux disease)     Hyperlipidemia     Hypertension     Retinal detachment      PAST SURGICAL HISTORY:   Past Surgical History:   Procedure Laterality Date    BASAL CELL CARCINOMA EXCISION      BREAST CYST EXCISION Left     long time ago, bening    CATARACT EXTRACTION      COLONOSCOPY  2013    EYE SURGERY      cataracts    HYSTERECTOMY      UPPER GASTROINTESTINAL ENDOSCOPY       PAST FAMILY HISTORY:   Family History   Problem Relation Age of Onset    Asthma Mother     Stroke Father 77     cva    Colon cancer Neg Hx     Colon polyps Neg Hx     Celiac disease Neg Hx     Esophageal cancer Neg Hx     Stomach cancer Neg Hx     Irritable bowel syndrome Neg Hx     Inflammatory bowel disease Neg Hx      SOCIAL HISTORY:   Social History   Substance Use Topics    Smoking status: Never Smoker    Smokeless tobacco: Never Used    Alcohol use 1.2 oz/week     2 Glasses of wine per week      Comment: socially        MENTAL STATUS: Alert    REVIEW OF SYSTEMS: Negative    OBJECTIVE:     Vital Signs (Most Recent)       Physical Exam:  General: NAD  HEENT: Atraumatic  Lungs: Adequate respirations,  LCTAB  Heart: RRR, No murmur  Abdomen: Soft NT    ASSESSMENT/PLAN:     Patient is a 72 y.o. 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     - Plan for surgical correction Plan 25 PPV/SO removal/membrane stripping/inferior retinectomy/posterior capsulectomy/EL/Gas vs oil OD for RRD with PVR and PCO OD      LMA   LOC 60 min   - Risks/benefits/alternatives of the procedure including, but not limited to scarring, bleeding, infection, loss or decreased vision, and/or need for possible repeat surgery discussed with the patient and family.   - Informed consent obtained prior to surgery and the patient/family voiced good understanding.    Warner Medrano  4/11/2018  3:55 AM

## 2018-04-11 NOTE — TRANSFER OF CARE
"Anesthesia Transfer of Care Note    Patient: Madison Long    Procedure(s) Performed: Procedure(s) (LRB):  REPAIR-RETINA (VITRECTOMY) (Right)    Patient location: PACU    Anesthesia Type: general    Transport from OR: Transported from OR on 6-10 L/min O2 by face mask with adequate spontaneous ventilation    Post pain: adequate analgesia    Post assessment: tolerated procedure well and no apparent anesthetic complications    Post vital signs: stable    Level of consciousness: awake, alert and oriented    Nausea/Vomiting: no nausea/vomiting    Complications: none    Transfer of care protocol was followed      Last vitals:   Visit Vitals  BP (!) 144/82 (BP Location: Right arm, Patient Position: Lying)   Pulse 67   Temp 36.8 °C (98.2 °F) (Oral)   Resp 16   Ht 5' 5.5" (1.664 m)   Wt 68.9 kg (152 lb)   SpO2 98%   BMI 24.91 kg/m²     "

## 2018-04-11 NOTE — BRIEF OP NOTE
Pre-Op Dx: Recurrent RRD with PVR OD    Post Op Dx: same    Procedure Performed: 25g PPV/Silicone oil removal/membrane stripping/ILM peel/infusion of perfluoron/temporal retinectomy/EL/AFx/1000cs Oil (5.0cc) OD  EL #[id number], P 150-200mW, D 0.1  Modifier #[id number]    Attending Surgeon: Anastacio    Assistant Surgeon: Mitchell    Anesthesia: LMA, retrobulbar injection of 4.0cc mixture 0.75%Marcaine, 2% Xylocaine    Estimated blood loss: Minimal    Complication: None    Specimen: None    Disposition: Stable to recovery    Findings/Outcome: inferotemporal RRD with macular PVR.  ERM and ILM removed.  Retinal flattened nicely under PFO and Air following retinectomy from 5:00 -11:00    Date of Discharge: 4/11/18    Discharge Disposition: stable to recovery then home    F/U: tomorrow

## 2018-04-11 NOTE — ANESTHESIA RELEASE NOTE
"Anesthesia Release from PACU Note    Patient: Madison Long    Procedure(s) Performed: Procedure(s) (LRB):  REPAIR-RETINA (VITRECTOMY) (Right)    Anesthesia type: GEN    Post pain: Adequate analgesia reported    Post assessment: no apparent anesthetic complications, tolerated procedure well and no evidence of recall    Post vital signs: BP 92/74   Pulse 90   Temp 36.8 °C (98.2 °F) (Temporal)   Resp 16   Ht 5' 5.5" (1.664 m)   Wt 68.9 kg (152 lb)   SpO2 100%   BMI 24.91 kg/m²     Level of consciousness: awake, alert and oriented    Nausea/Vomiting: no nausea/no vomiting    Complications: none    Airway Patency: patent    Respiratory: unassisted, spontaneous ventilation, face mask    Cardiovascular: stable and blood pressure at baseline    Hydration: euvolemic    "

## 2018-04-11 NOTE — ANESTHESIA PREPROCEDURE EVALUATION
04/11/2018  Madison Long is a 72 y.o., female.    Pre-operative evaluation for Procedure(s) (LRB):  REPAIR-RETINA (VITRECTOMY) (Right)    Review of patient's allergies indicates:   Allergen Reactions    Penicillins Hives    Ciprofloxacin      Felt like her legs were weak and muscle pain.     Doxycycline Other (See Comments)     Pt unsure of reaction.    Oxycodone Anxiety       No current facility-administered medications on file prior to encounter.      Current Outpatient Prescriptions on File Prior to Encounter   Medication Sig Dispense Refill    estradiol (ESTRACE) 0.01 % (0.1 mg/gram) vaginal cream Place 1 g vaginally every other day. 1 Tube 3    VENTOLIN HFA 90 mcg/actuation inhaler Inhale 2 puffs into the lungs every 6 (six) hours as needed. INHALE ONE OR TWO PUFFS BY MOUTH EVERY SIX HOURS AS NEEDED FOR WHEEZING. 1 Inhaler 11    aspirin 81 MG Chew Take 81 mg by mouth daily as needed. Usually takes about every 2 weeks.      atorvastatin (LIPITOR) 10 MG tablet Take 1 tablet (10 mg total) by mouth nightly. 90 tablet 2    carboxymethylcellulose (REFRESH PLUS) 0.5 % Dpet Place 2 drops into both eyes 2 (two) times daily as needed.      desoximetasone (TOPICORT LP) 0.05 % cream Apply topically 2 (two) times daily as needed. Twice a day  PRN      dorzolamide-timolol 2-0.5% (COSOPT) 22.3-6.8 mg/mL ophthalmic solution Place 1 drop into the right eye 2 (two) times daily.      econazole nitrate 1 % cream Apply topically 2 (two) times daily as needed. Twice a day  PRN      fish oil-omega-3 fatty acids 300-1,000 mg capsule Take 1 g by mouth every morning.       fluconazole (DIFLUCAN) 150 MG Tab TAKE 1 TABLET BY MOUTH NOW THEN REPEAT IN 7 DAYS 2 tablet 0    fluorouracil (EFUDEX) 5 % cream AAA bid x 2 weeks (Patient taking differently: Apply topically 2 (two) times daily as needed. AAA bid prn) 40 g  1    FLUoxetine (PROZAC) 10 MG capsule       fluticasone (FLONASE) 50 mcg/actuation nasal spray APPLY TWO SPRAYS NASALLY ONCE DAILY (Patient taking differently: 2 sprays by Each Nare route daily as needed. APPLY TWO SPRAYS NASALLY ONCE DAILY, prn) 16 g 11    guaifenesin (MUCINEX) 600 mg 12 hr tablet Take 1,200 mg by mouth 2 (two) times daily as needed.       hydrOXYzine HCl (ATARAX) 10 MG Tab Take 1 po qid prn anxiety; May take up to 3 tabs qhs (Patient taking differently: Take 10 mg by mouth 4 (four) times daily as needed. Take 1 po qid prn anxiety; May take up to 3 tabs qhs) 90 tablet 11    losartan (COZAAR) 50 MG tablet Take 1 tablet (50 mg total) by mouth once daily. (Patient taking differently: Take 50 mg by mouth every morning. ) 30 tablet 11    multivit,stress formula-zinc (STRESS FORMULA WITH ZINC) Tab Take 1 tablet by mouth every morning. Uses Alive      omeprazole (PRILOSEC OTC) 20 MG tablet Take 1 tablet (20 mg total) by mouth every morning. Take 30 minutes before your first protein containing meal. 30 tablet 11    SYMBICORT 160-4.5 mcg/actuation HFAA INHALE 2 PUFFS BY MOUTH TWICE DAILY (Patient taking differently: INHALE 2 PUFFS BY MOUTH TWICE DAILY, prn) 11 g 7    tretinoin (RETIN-A) 0.1 % cream Use hs on face (Patient taking differently: Apply topically nightly as needed. Use hs on face, prn) 45 g 5    zolpidem (AMBIEN) 10 mg Tab 1 or 2 tablets every HS prn insomnia (Patient taking differently: Take 10 mg by mouth nightly as needed. 1 or 2 tablets every HS prn insomnia) 60 tablet 3       Patient Active Problem List   Diagnosis    GERD (gastroesophageal reflux disease)    Hypertension    COPD (chronic obstructive pulmonary disease)    Hyperlipidemia    Perennial allergic rhinitis with seasonal variation    Vaginitis, atrophic    Retinal detachment of right eye with multiple breaks    Posterior vitreous detachment, right eye    Retinal detachment, rhegmatogenous, right eye     Proliferative vitreoretinopathy, right    Macula-off rhegmatogenous retinal detachment, right    Retinal detachment, right       Past Surgical History:   Procedure Laterality Date    BASAL CELL CARCINOMA EXCISION      BREAST CYST EXCISION Left     long time ago, bening    CATARACT EXTRACTION      COLONOSCOPY  2013    EYE SURGERY      cataracts    HYSTERECTOMY      UPPER GASTROINTESTINAL ENDOSCOPY             No results for input(s): HCT in the last 72 hours.  No results for input(s): PLT in the last 72 hours.  No results for input(s): K in the last 72 hours.  No results for input(s): CREATININE in the last 72 hours.  No results for input(s): GLU in the last 72 hours.  No results for input(s): PT in the last 72 hours.                    Anesthesia Evaluation         Review of Systems  Anesthesia Hx:  No problems with previous Anesthesia    Social:  Non-Smoker, Alcohol Use    Hematology/Oncology:  Hematology Normal   Oncology Normal     Cardiovascular:   Hypertension, well controlled Denies MI.    Denies Angina.    Pulmonary:   COPD, mild Asthma mild Walks 40 minutes in house at fast pace.   Renal/:   Denies Chronic Renal Disease.     Hepatic/GI:   Liver disease: minor skin cured with excision. Denies Hepatitis.    Neurological:   Denies TIA. Denies CVA. Denies Seizures.    Endocrine:   Denies Diabetes.        Physical Exam  General:  Well nourished    Airway/Jaw/Neck:  Airway Findings: Mouth Opening: Normal Tongue: Normal  General Airway Assessment: Adult, Average  Mallampati: II  TM Distance: Normal, at least 6 cm  Jaw/Neck Findings:  Neck ROM: Normal ROM       Chest/Lungs:  Chest/Lungs Findings: Clear to auscultation     Heart/Vascular:  Heart Findings: Rate: Normal  Rhythm: Regular Rhythm  Sounds: Normal        Mental Status:  Mental Status Findings:  Cooperative, Alert and Oriented         Anesthesia Plan  Type of Anesthesia, risks & benefits discussed:  Anesthesia Type:  general  Patient's Preference:    Intra-op Monitoring Plan:   Intra-op Monitoring Plan Comments:   Post Op Pain Control Plan:   Post Op Pain Control Plan Comments: As per surgeon's plan  Induction:   IV  Beta Blocker:  Patient is not currently on a Beta-Blocker (No further documentation required).       Informed Consent: Patient understands risks and agrees with Anesthesia plan.  Questions answered. Anesthesia consent signed with patient.  ASA Score: 2     Day of Surgery Review of History & Physical:    H&P update referred to the surgeon.         Ready For Surgery From Anesthesia Perspective.     Present Prior to Hospital Arrival?: No; Placement Date: 01/10/18; Placement Time: 1430; Method of Intubation: Direct laryngoscopy; Inserted by: CRNA; Airway Device: Endotracheal Tube; Mask Ventilation: Easy; Intubated: Postinduction; Blade: Queen #2; Airway Device Size: 7.0; Style: Cuffed; Cuff Inflation: Minimal occlusive pressure; Inflation Amount: 5; Placement Verified By: Auscultation, Capnometry, ETT Condensation; Grade: Grade I; Complicating Factors: None; Intubation Findings: Positive EtCO2, Bilateral breath sounds, Atraumatic/Condition of teeth unchanged;  Depth of Insertion: 21; Securment: Lips; Complications: None; Breath Sounds: Equal Bilateral; Insertion Attempts: 1; Removal Date: 01/10/18;  Removal Time: 1755

## 2018-04-12 ENCOUNTER — OFFICE VISIT (OUTPATIENT)
Dept: OPHTHALMOLOGY | Facility: CLINIC | Age: 73
End: 2018-04-12
Payer: MEDICARE

## 2018-04-12 VITALS — DIASTOLIC BLOOD PRESSURE: 77 MMHG | SYSTOLIC BLOOD PRESSURE: 121 MMHG | HEART RATE: 84 BPM

## 2018-04-12 DIAGNOSIS — H35.21 PROLIFERATIVE VITREORETINOPATHY, RIGHT: ICD-10-CM

## 2018-04-12 DIAGNOSIS — H33.021 RETINAL DETACHMENT OF RIGHT EYE WITH MULTIPLE BREAKS: Primary | ICD-10-CM

## 2018-04-12 PROCEDURE — 99999 PR PBB SHADOW E&M-EST. PATIENT-LVL III: CPT | Mod: PBBFAC,,, | Performed by: OPHTHALMOLOGY

## 2018-04-12 PROCEDURE — 99499 UNLISTED E&M SERVICE: CPT | Mod: S$GLB,,, | Performed by: OPHTHALMOLOGY

## 2018-04-12 PROCEDURE — 99024 POSTOP FOLLOW-UP VISIT: CPT | Mod: S$GLB,,, | Performed by: OPHTHALMOLOGY

## 2018-04-12 NOTE — PROGRESS NOTES
HPI     1 day PO     25-gauge pars plana vitrectomy with silicone oil   removal, membrane stripping, internal limiting membrane peel, infusion of   perfluoron, temporal retinectomy, endolaser, air-fluid exchange, injection   of   1000 [place] silicone oil approximately 5 mL to the right eye.    Pt sts pain has not been bad took pain med once last night and tylenol   this am     Last edited by Melyssa Mcgowan on 4/12/2018  7:49 AM. (History)      HPI     Post-op Evaluation    Additional comments: 1 month check           Comments   DLS 2/19/18- OD seems to be doing well    PF  Daily  cosopt BID    HPI     DLS-01/29/2018- Dr. Chaves    Pt states can only see her Fingers moving in certain area, can't make out faces or read.  Has that dark spot central.      Has a white dot on LLL she wants to be checked today.  Dorzolamide BID    (-)Flashes (+)Floaters noticed today comes and goes       HPI     DLS-01/16/2018- Dr. Chaves    Pt states  Starting Sat she is seeing a shadow over her VA OD; when it   passes she cannot see fingers in her VA.    PF BID     (-)Flashes (+)Floaters noticed today comes and goes       HPI     5 day PO check   DLS-01/11/2018- Dr. Chaves    Pt sts vision has slightly improved still pain in OD and also had nose bleed and soreness around head also vibration in Right ear. Thinks she is having problems because of the antibiotic Pills cefadroxil she is allergic to PCN.  Has been taking all gtts   Vig , HA , PF QID     (-)Flashes (+)Floaters noticed today comes and goes     HPI     Post-op Evaluation    Additional comments: 1 day check           Comments   DLS 1/9/18- She needs a new RX for the pain medication because she can't   take the one that was prescribed. She took tylenol last night for the pain      Prior OCT - RD OD  OS - No ME      A/P    1. RRD OD   Macula involving x 4  Days    S/p  25g PPV/EL/SF6 OD for RRD 9/6/17  IOP improved    10/30/17 - Recurrent IT RD with early PVR and small  break    s/p 25g PPV/membrane stripping/inferior retinopexy/C3F8 OD for RRD/PVR OD 11/1/17 12/4/17 - recurrent inferior RD - small holes posterior to laser    s/p 25g PPV/EL/AFx/1000cs Silicone Oil OD for Recurrent RRD with PVR 12/6/17    Doing well, good IOP    1/9/18 increasing inferior fluid collection beneath oil into macula - needs SB - multiple small break    s/p /270, 25g PPV/SO removal/membrane stripping/AFx/EL/1000cs Silicone Oil OD for RRD/PVR 1/10/18    Doing well, good IOP  Had ST scleral dehiscence - closed with 5-0 mersilene    Side sleep or stomach    Inferior PVR OD with shallow submacular fluid   IOP low - stop Cosopt  Will likely need inferior retinectomy    2/18 - some progression    s/p 25 PPV/SO removal/membrane stripping/inferior retinectomy/posterior capsulectomy/EL/Gas vs oil OD for RRD with PVR and PCO OD 4/11/18    Doing well, good IOP    Start gtts QID  Ointment/shield QHS    Sleep on left   And some face down/left side down     4 days NS      2. PCIOL OU    3. HTN   Good BP control    4. PVD OS  No breaks OS

## 2018-04-16 ENCOUNTER — OFFICE VISIT (OUTPATIENT)
Dept: OPHTHALMOLOGY | Facility: CLINIC | Age: 73
End: 2018-04-16
Payer: MEDICARE

## 2018-04-16 DIAGNOSIS — H33.021 RETINAL DETACHMENT OF RIGHT EYE WITH MULTIPLE BREAKS: Primary | ICD-10-CM

## 2018-04-16 DIAGNOSIS — H35.21 PROLIFERATIVE VITREORETINOPATHY, RIGHT: ICD-10-CM

## 2018-04-16 PROCEDURE — 99499 UNLISTED E&M SERVICE: CPT | Mod: S$GLB,,, | Performed by: OPHTHALMOLOGY

## 2018-04-16 PROCEDURE — 99999 PR PBB SHADOW E&M-EST. PATIENT-LVL III: CPT | Mod: PBBFAC,,, | Performed by: OPHTHALMOLOGY

## 2018-04-16 PROCEDURE — 99024 POSTOP FOLLOW-UP VISIT: CPT | Mod: S$GLB,,, | Performed by: OPHTHALMOLOGY

## 2018-04-16 NOTE — LETTER
April 16, 2018      Darian Arango MD  [address] [facility] [address] [place] [address]           [facility]  [address] [facility] [address] [place] [address]  Phone: [phone]  Fax: [phone]          Patient: Madison Long   MR Number: [id number]   YOB: 1945   Date of Visit: 4/16/2018       Dear Dr. Darian Arango:    Thank you for referring Madison Long to me for evaluation. Attached you will find relevant portions of my assessment and plan of care.    If you have questions, please do not hesitate to call me. I look forward to following Madison Long along with you.    Sincerely,    JANEL Chaves MD    Enclosure  CC:  No Recipients    If you would like to receive this communication electronically, please contact [email or web address] or [phone] to request more information on Epic[employer] Link access.    For providers and/or their staff who would like to refer a patient to [facility], please contact us through our one-stop-shop provider referral line, [facility], at [phone].    If you feel you have received this communication in error or would no longer like to receive these types of communications, please e-mail [email or web address]

## 2018-04-16 NOTE — PROGRESS NOTES
HPI     Post-op Evaluation    Additional comments: 4 day check           Comments   DLS 4/12/18- She gets slight pain at times which is relieved with tylenol    PF x 4  HA x 4  vigamox x 4  Dylan qhs        Prior OCT - RD OD  OS - No ME      A/P    1. RRD OD   Macula involving x 4  Days    S/p  25g PPV/EL/SF6 OD for RRD 9/6/17  IOP improved    10/30/17 - Recurrent IT RD with early PVR and small break    s/p 25g PPV/membrane stripping/inferior retinopexy/C3F8 OD for RRD/PVR OD 11/1/17 12/4/17 - recurrent inferior RD - small holes posterior to laser    s/p 25g PPV/EL/AFx/1000cs Silicone Oil OD for Recurrent RRD with PVR 12/6/17    Doing well, good IOP    1/9/18 increasing inferior fluid collection beneath oil into macula - needs SB - multiple small break    s/p /270, 25g PPV/SO removal/membrane stripping/AFx/EL/1000cs Silicone Oil OD for RRD/PVR 1/10/18    Doing well, good IOP  Had ST scleral dehiscence - closed with 5-0 mersilene    Side sleep or stomach    Inferior PVR OD with shallow submacular fluid   IOP low - stop Cosopt  Will likely need inferior retinectomy    2/18 - some progression    s/p 25 PPV/SO removal/membrane stripping/inferior retinectomy/posterior capsulectomy/EL/Gas vs oil OD for RRD with PVR and PCO OD 4/11/18    Doing well, good IOP  Stable inferior RD under oil    Continue gtts QID, Ointment/shield QHS    Side sleep      3-4 weeks      2. PCIOL OU    3. HTN   Good BP control    4. PVD OS  No breaks OS

## 2018-05-07 ENCOUNTER — OFFICE VISIT (OUTPATIENT)
Dept: OPHTHALMOLOGY | Facility: CLINIC | Age: 73
End: 2018-05-07
Payer: MEDICARE

## 2018-05-07 DIAGNOSIS — H35.21 PROLIFERATIVE VITREORETINOPATHY, RIGHT: ICD-10-CM

## 2018-05-07 DIAGNOSIS — H33.021 RETINAL DETACHMENT OF RIGHT EYE WITH MULTIPLE BREAKS: Primary | ICD-10-CM

## 2018-05-07 PROCEDURE — 99499 UNLISTED E&M SERVICE: CPT | Mod: S$GLB,,, | Performed by: OPHTHALMOLOGY

## 2018-05-07 PROCEDURE — 99024 POSTOP FOLLOW-UP VISIT: CPT | Mod: S$GLB,,, | Performed by: OPHTHALMOLOGY

## 2018-05-07 PROCEDURE — 99999 PR PBB SHADOW E&M-EST. PATIENT-LVL III: CPT | Mod: PBBFAC,,, | Performed by: OPHTHALMOLOGY

## 2018-05-07 RX ORDER — ACETAMINOPHEN 500 MG
500 TABLET ORAL EVERY 6 HOURS PRN
COMMUNITY

## 2018-05-07 RX ORDER — DORZOLAMIDE HYDROCHLORIDE AND TIMOLOL MALEATE 20; 5 MG/ML; MG/ML
1 SOLUTION/ DROPS OPHTHALMIC 2 TIMES DAILY
Qty: 10 ML | Refills: 6 | Status: SHIPPED | OUTPATIENT
Start: 2018-05-07 | End: 2018-11-19 | Stop reason: SDUPTHER

## 2018-05-07 NOTE — PROGRESS NOTES
HPI     3 wk PO   DLS- 04/16/218 Dr. Chaves     Pt sts vision seems to be the same since last visit OU. Has had more achy   /throbbing pain just over OD. Has been using tylenol 500 q4h. Uses tylenol   atleast 2x's daily     (-)Flashes (+)Floaters normal in OS   (-)Photophobia  (+)Glare driving     PF x QD OD      Prior OCT - RD OD  OS - No ME      A/P    1. RRD OD   Macula involving x 4  Days    S/p  25g PPV/EL/SF6 OD for RRD 9/6/17  IOP improved    10/30/17 - Recurrent IT RD with early PVR and small break    s/p 25g PPV/membrane stripping/inferior retinopexy/C3F8 OD for RRD/PVR OD 11/1/17 12/4/17 - recurrent inferior RD - small holes posterior to laser    s/p 25g PPV/EL/AFx/1000cs Silicone Oil OD for Recurrent RRD with PVR 12/6/17    Doing well, good IOP    1/9/18 increasing inferior fluid collection beneath oil into macula - needs SB - multiple small break    s/p /270, 25g PPV/SO removal/membrane stripping/AFx/EL/1000cs Silicone Oil OD for RRD/PVR 1/10/18    Doing well, good IOP  Had ST scleral dehiscence - closed with 5-0 mersilene    Side sleep or stomach    Inferior PVR OD with shallow submacular fluid   IOP low - stop Cosopt  Will likely need inferior retinectomy    2/18 - some progression    s/p 25 PPV/SO removal/membrane stripping/inferior retinectomy/posterior capsulectomy/EL/[place] oil OD for RRD with PVR and PCO OD 4/11/18    Stable inferonasal RD under oil  Increased IOP today - start Cosopt BID  Continue PF Q day    Side sleep      3-4 weeks OCT       2. PCIOL OU    3. HTN   Good BP control    4. PVD OS  No breaks OS

## 2018-05-08 DIAGNOSIS — J30.1 SEASONAL ALLERGIC RHINITIS DUE TO POLLEN: ICD-10-CM

## 2018-05-08 RX ORDER — FLUTICASONE PROPIONATE 50 MCG
SPRAY, SUSPENSION (ML) NASAL
Qty: 1 BOTTLE | Refills: 6 | Status: SHIPPED | OUTPATIENT
Start: 2018-05-08

## 2018-05-21 DIAGNOSIS — J44.9 CHRONIC OBSTRUCTIVE PULMONARY DISEASE, UNSPECIFIED COPD TYPE: ICD-10-CM

## 2018-05-21 NOTE — TELEPHONE ENCOUNTER
----- Message from Maya Stauffer sent at 5/21/2018 11:12 AM CDT -----  Contact: nichole [phone]  Patient called for a refill  Synbort to be  called into [facility] on [facility]

## 2018-05-23 RX ORDER — BUDESONIDE AND FORMOTEROL FUMARATE DIHYDRATE 160; 4.5 UG/1; UG/1
2 AEROSOL RESPIRATORY (INHALATION) 2 TIMES DAILY
Qty: 11 G | Refills: 7 | Status: SHIPPED | OUTPATIENT
Start: 2018-05-23 | End: 2018-09-03 | Stop reason: SDUPTHER

## 2018-06-11 ENCOUNTER — OFFICE VISIT (OUTPATIENT)
Dept: OPHTHALMOLOGY | Facility: CLINIC | Age: 73
End: 2018-06-11
Payer: MEDICARE

## 2018-06-11 DIAGNOSIS — H33.001 RETINAL DETACHMENT, RHEGMATOGENOUS, RIGHT EYE: Primary | ICD-10-CM

## 2018-06-11 DIAGNOSIS — H33.021 RETINAL DETACHMENT OF RIGHT EYE WITH MULTIPLE BREAKS: ICD-10-CM

## 2018-06-11 DIAGNOSIS — H33.001 MACULA-OFF RHEGMATOGENOUS RETINAL DETACHMENT, RIGHT: ICD-10-CM

## 2018-06-11 DIAGNOSIS — H35.21 PROLIFERATIVE VITREORETINOPATHY, RIGHT: ICD-10-CM

## 2018-06-11 PROCEDURE — 99499 UNLISTED E&M SERVICE: CPT | Mod: S$GLB,,, | Performed by: OPHTHALMOLOGY

## 2018-06-11 PROCEDURE — 99999 PR PBB SHADOW E&M-EST. PATIENT-LVL II: CPT | Mod: PBBFAC,,, | Performed by: OPHTHALMOLOGY

## 2018-06-11 PROCEDURE — 99024 POSTOP FOLLOW-UP VISIT: CPT | Mod: S$GLB,,, | Performed by: OPHTHALMOLOGY

## 2018-06-11 RX ORDER — FLUOROURACIL 50 MG/G
CREAM TOPICAL
Refills: 0 | COMMUNITY
Start: 2018-06-03 | End: 2020-01-31 | Stop reason: SDUPTHER

## 2018-06-11 NOTE — PROGRESS NOTES
HPI     Post-op Evaluation    Additional comments: 1 month check           Comments   DLS 5/7/18- She feels off balance at times and has trouble judging things   such as trying to pour coffee in cup. She also has [place] in vision at   times. She has recently noticed that it happens when she is active   (yardwork and such). It goes away after she relaxes. She is still getting   HAs    Cosopt BID  PF daily    HPI     3 wk PO   DLS- 04/16/218 Dr. Chaves     Pt sts vision seems to be the same since last visit OU. Has had more achy   /throbbing pain just over OD. Has been using tylenol 500 q4h. Uses tylenol   atleast 2x's daily     (-)Flashes (+)Floaters normal in OS   (-)Photophobia  (+)Glare driving     PF x QD OD      Prior OCT - RD OD  OS - No ME      A/P    1. RRD OD   Macula involving x 4  Days    S/p  25g PPV/EL/SF6 OD for RRD 9/6/17  IOP improved    10/30/17 - Recurrent IT RD with early PVR and small break    s/p 25g PPV/membrane stripping/inferior retinopexy/C3F8 OD for RRD/PVR OD 11/1/17 12/4/17 - recurrent inferior RD - small holes posterior to laser    s/p 25g PPV/EL/AFx/1000cs Silicone Oil OD for Recurrent RRD with PVR 12/6/17    Doing well, good IOP    1/9/18 increasing inferior fluid collection beneath oil into macula - needs SB - multiple small break    s/p /270, 25g PPV/SO removal/membrane stripping/AFx/EL/1000cs Silicone Oil OD for RRD/PVR 1/10/18    Doing well, good IOP  Had ST scleral dehiscence - closed with 5-0 mersilene    Side sleep or stomach    Inferior PVR OD with shallow submacular fluid   IOP low - stop Cosopt  Will likely need inferior retinectomy    2/18 - some progression    s/p 25 PPV/SO removal/membrane stripping/inferior retinectomy/posterior capsulectomy/EL/Leonora oil OD for RRD with PVR and PCO OD 4/11/18    Stable inferonasal RD under oil  IOP improved on Cosopt BID - continue  Continue PF Q day    8 weeks OCT       2. PCIOL OU    3. HTN   Good BP control    4. PVD OS  No  breaks OS

## 2018-07-16 ENCOUNTER — LAB VISIT (OUTPATIENT)
Dept: LAB | Facility: HOSPITAL | Age: 73
End: 2018-07-16
Attending: INTERNAL MEDICINE
Payer: MEDICARE

## 2018-07-16 DIAGNOSIS — E78.5 DYSLIPIDEMIA: ICD-10-CM

## 2018-07-16 DIAGNOSIS — I10 ESSENTIAL HYPERTENSION: ICD-10-CM

## 2018-07-16 DIAGNOSIS — Z11.59 NEED FOR HEPATITIS C SCREENING TEST: ICD-10-CM

## 2018-07-16 LAB
ALBUMIN SERPL BCP-MCNC: 4.2 G/DL
ALP SERPL-CCNC: 62 U/L
ALT SERPL W/O P-5'-P-CCNC: 20 U/L
ANION GAP SERPL CALC-SCNC: 9 MMOL/L
AST SERPL-CCNC: 26 U/L
BILIRUB SERPL-MCNC: 1 MG/DL
BUN SERPL-MCNC: 11 MG/DL
CALCIUM SERPL-MCNC: 9.2 MG/DL
CHLORIDE SERPL-SCNC: 104 MMOL/L
CHOLEST SERPL-MCNC: 247 MG/DL
CHOLEST/HDLC SERPL: 3.3 {RATIO}
CO2 SERPL-SCNC: 26 MMOL/L
CREAT SERPL-MCNC: 0.8 MG/DL
EST. GFR  (AFRICAN AMERICAN): >60 ML/MIN/1.73 M^2
EST. GFR  (NON AFRICAN AMERICAN): >60 ML/MIN/1.73 M^2
GLUCOSE SERPL-MCNC: 111 MG/DL
HDLC SERPL-MCNC: 75 MG/DL
HDLC SERPL: 30.4 %
LDLC SERPL CALC-MCNC: 156.4 MG/DL
NONHDLC SERPL-MCNC: 172 MG/DL
POTASSIUM SERPL-SCNC: 4.9 MMOL/L
PROT SERPL-MCNC: 7 G/DL
SODIUM SERPL-SCNC: 139 MMOL/L
TRIGL SERPL-MCNC: 78 MG/DL

## 2018-07-16 PROCEDURE — 80053 COMPREHEN METABOLIC PANEL: CPT

## 2018-07-16 PROCEDURE — 80061 LIPID PANEL: CPT

## 2018-07-16 PROCEDURE — 36415 COLL VENOUS BLD VENIPUNCTURE: CPT | Mod: PO

## 2018-07-16 PROCEDURE — 86803 HEPATITIS C AB TEST: CPT

## 2018-07-17 LAB — HCV AB SERPL QL IA: NEGATIVE

## 2018-07-18 DIAGNOSIS — I10 ESSENTIAL HYPERTENSION: ICD-10-CM

## 2018-07-19 RX ORDER — LOSARTAN POTASSIUM 50 MG/1
50 TABLET ORAL EVERY MORNING
Qty: 30 TABLET | Refills: 6 | Status: SHIPPED | OUTPATIENT
Start: 2018-07-19 | End: 2019-03-10 | Stop reason: SDUPTHER

## 2018-07-20 ENCOUNTER — TELEPHONE (OUTPATIENT)
Dept: FAMILY MEDICINE | Facility: CLINIC | Age: 73
End: 2018-07-20

## 2018-07-23 ENCOUNTER — OFFICE VISIT (OUTPATIENT)
Dept: FAMILY MEDICINE | Facility: CLINIC | Age: 73
End: 2018-07-23
Payer: MEDICARE

## 2018-07-23 VITALS
BODY MASS INDEX: 26.08 KG/M2 | RESPIRATION RATE: 18 BRPM | HEIGHT: 66 IN | DIASTOLIC BLOOD PRESSURE: 88 MMHG | OXYGEN SATURATION: 98 % | SYSTOLIC BLOOD PRESSURE: 128 MMHG | HEART RATE: 82 BPM | WEIGHT: 162.25 LBS

## 2018-07-23 DIAGNOSIS — E78.5 DYSLIPIDEMIA: ICD-10-CM

## 2018-07-23 DIAGNOSIS — I10 ESSENTIAL HYPERTENSION: Primary | ICD-10-CM

## 2018-07-23 DIAGNOSIS — G47.00 INSOMNIA, UNSPECIFIED TYPE: ICD-10-CM

## 2018-07-23 DIAGNOSIS — E78.00 HYPERCHOLESTEROLEMIA: ICD-10-CM

## 2018-07-23 DIAGNOSIS — F41.1 GAD (GENERALIZED ANXIETY DISORDER): ICD-10-CM

## 2018-07-23 PROCEDURE — 99214 OFFICE O/P EST MOD 30 MIN: CPT | Mod: S$GLB,,, | Performed by: INTERNAL MEDICINE

## 2018-07-23 PROCEDURE — 3079F DIAST BP 80-89 MM HG: CPT | Mod: CPTII,S$GLB,, | Performed by: INTERNAL MEDICINE

## 2018-07-23 PROCEDURE — 3074F SYST BP LT 130 MM HG: CPT | Mod: CPTII,S$GLB,, | Performed by: INTERNAL MEDICINE

## 2018-07-23 PROCEDURE — 99999 PR PBB SHADOW E&M-EST. PATIENT-LVL III: CPT | Mod: PBBFAC,,, | Performed by: INTERNAL MEDICINE

## 2018-07-23 RX ORDER — PREDNISOLONE ACETATE 10 MG/ML
1 SUSPENSION/ DROPS OPHTHALMIC DAILY
COMMUNITY
End: 2019-04-15 | Stop reason: SDUPTHER

## 2018-07-23 RX ORDER — TRAZODONE HYDROCHLORIDE 50 MG/1
50 TABLET ORAL NIGHTLY PRN
Qty: 60 TABLET | Refills: 5 | Status: SHIPPED | OUTPATIENT
Start: 2018-07-23 | End: 2019-07-31

## 2018-07-23 RX ORDER — ATORVASTATIN CALCIUM 20 MG/1
20 TABLET, FILM COATED ORAL NIGHTLY
Qty: 90 TABLET | Refills: 3 | Status: SHIPPED | OUTPATIENT
Start: 2018-07-23 | End: 2019-07-31

## 2018-07-23 RX ORDER — HYDROXYZINE HYDROCHLORIDE 25 MG/1
TABLET, FILM COATED ORAL
Qty: 45 TABLET | Refills: 11 | Status: SHIPPED | OUTPATIENT
Start: 2018-07-23 | End: 2019-01-25

## 2018-07-23 RX ORDER — ZOLPIDEM TARTRATE 12.5 MG/1
12.5 TABLET, FILM COATED, EXTENDED RELEASE ORAL NIGHTLY PRN
Qty: 30 TABLET | Refills: 5 | Status: SHIPPED | OUTPATIENT
Start: 2018-07-23 | End: 2018-08-22

## 2018-07-23 RX ORDER — PSEUDOEPHEDRINE HCL 30 MG
30 TABLET ORAL EVERY 4 HOURS PRN
COMMUNITY

## 2018-07-23 NOTE — PROGRESS NOTES
Subjective:       Patient ID: Madison Long is a 73 y.o. female.    Chief Complaint: Hypertension and Hyperlipidemia    4 eye surgeries in past month. Dr Sotelo  HTN - controlled  HLD - uncontrolled; goal < 130 age, htn  COPD - no sob; Dr Schmitz     Insomnia - hydroxyzine 30 mg only last 3 hr; controlled with ambien every night but last 4-5 hr.  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     ALEX - controlled    Atrophic vaginitis - estrogen cream used for 1-2 weeks and then will take a break.  due for mammogram; rx originally by urology Dr Hughes.  Complains of vaginal itching since finishing eye abx.   Complains of intermittent heart fluttering over past 48 hr.      Review of Systems   Constitutional: Negative for appetite change and fever.   HENT: Negative for nosebleeds and trouble swallowing.    Eyes: Negative for discharge and visual disturbance.   Respiratory: Negative for choking and shortness of breath.    Cardiovascular: Negative for chest pain and palpitations.   Gastrointestinal: Negative for abdominal pain, nausea and vomiting.   Musculoskeletal: Negative for arthralgias and joint swelling.   Skin: Negative for rash and wound.   Neurological: Negative for dizziness and syncope.   Psychiatric/Behavioral: Negative for confusion and dysphoric mood.       Objective:      Vitals:    07/23/18 1047   BP: 128/88   Pulse: 82   Resp: 18     Physical Exam   Constitutional: She appears well-nourished.   Eyes: Conjunctivae and EOM are normal.   Neck: Normal range of motion.   Cardiovascular: Normal rate and regular rhythm.    Pulmonary/Chest: Effort normal and breath sounds normal.   Musculoskeletal:   Normal ROM bilateral    Neurological: No cranial nerve deficit (grossly intact).   Skin: Skin is warm and dry.   Psychiatric: She has a normal mood and affect.   Alert and orientated   Vitals reviewed.        Assessment:       1. Essential hypertension    2. Dyslipidemia     3. Insomnia, unspecified type    4. Hypercholesterolemia    5. ALEX (generalized anxiety disorder)        Plan:       Essential hypertension  -     Comprehensive metabolic panel; Future; Expected date: 01/19/2019    Dyslipidemia  -     atorvastatin (LIPITOR) 20 MG tablet; Take 1 tablet (20 mg total) by mouth nightly.  Dispense: 90 tablet; Refill: 3  -     Lipid panel; Future; Expected date: 01/19/2019    Insomnia, unspecified type  -     traZODone (DESYREL) 50 MG tablet; Take 1 tablet (50 mg total) by mouth nightly as needed for Insomnia. May take up to 2 tabs per night prn  Dispense: 60 tablet; Refill: 5  -     zolpidem (AMBIEN CR) 12.5 MG CR tablet; Take 1 tablet (12.5 mg total) by mouth nightly as needed for Insomnia.  Dispense: 30 tablet; Refill: 5  -     hydrOXYzine HCl (ATARAX) 25 MG tablet; 1 and a  1/2 tab po qhs prn insomnia  Dispense: 45 tablet; Refill: 11    Hypercholesterolemia    ALEX (generalized anxiety disorder)            Medication List with Changes/Refills   Current Medications    ACETAMINOPHEN (TYLENOL) 500 MG TABLET    Take 500 mg by mouth every 6 (six) hours as needed for Pain.    ASPIRIN 81 MG CHEW    Take 81 mg by mouth daily as needed. Usually takes about every 2 weeks.    ATORVASTATIN (LIPITOR) 10 MG TABLET    Take 1 tablet (10 mg total) by mouth nightly.    BUDESONIDE-FORMOTEROL 160-4.5 MCG (SYMBICORT) 160-4.5 MCG/ACTUATION HFAA    Inhale 2 puffs into the lungs 2 (two) times daily. Controller    DORZOLAMIDE-TIMOLOL 2-0.5% (COSOPT) 22.3-6.8 MG/ML OPHTHALMIC SOLUTION    Place 1 drop into the right eye 2 (two) times daily.    ECONAZOLE NITRATE 1 % CREAM    Apply topically 2 (two) times daily as needed. Twice a day  PRN    ESTRADIOL (ESTRACE) 0.01 % (0.1 MG/GRAM) VAGINAL CREAM    Place 1 g vaginally every other day.    FISH OIL-OMEGA-3 FATTY ACIDS 300-1,000 MG CAPSULE    Take 1 g by mouth every morning.     FLUOROURACIL (EFUDEX) 5 % CREAM    CHRISTIAN AA BID FOR 2 WEEKS PRN    FLUOXETINE (PROZAC) 10  "MG CAPSULE    Take 10 mg by mouth once daily.     FLUTICASONE (FLONASE) 50 MCG/ACTUATION NASAL SPRAY    SPRAY 2 SPRAYS IN EACH NOSTRIL EVERY DAY    GUAIFENESIN (MUCINEX) 600 MG 12 HR TABLET    Take 1,200 mg by mouth 2 (two) times daily as needed.     HYDROXYZINE HCL (ATARAX) 10 MG TAB    Take 1 po qid prn anxiety; May take up to 3 tabs qhs    LOSARTAN (COZAAR) 50 MG TABLET    Take 1 tablet (50 mg total) by mouth every morning.    MULTIVIT,STRESS FORMULA-ZINC (STRESS FORMULA WITH ZINC) TAB    Take 1 tablet by mouth every morning. Uses Alive    OMEPRAZOLE (PRILOSEC OTC) 20 MG TABLET    Take 1 tablet (20 mg total) by mouth every morning. Take 30 minutes before your first protein containing meal.    PREDNISOLONE ACETATE (PRED FORTE) 1 % DRPS    Place 1 drop into the right eye once daily.    PSEUDOEPHEDRINE (SUDAFED) 30 MG TABLET    Take 30 mg by mouth every 4 (four) hours as needed for Congestion.    SODIUM CHLORIDE (SALINE NASAL) 0.65 % NASAL SPRAY    1 spray by Nasal route as needed for Congestion.    VENTOLIN HFA 90 MCG/ACTUATION INHALER    Inhale 2 puffs into the lungs every 6 (six) hours as needed. INHALE ONE OR TWO PUFFS BY MOUTH EVERY SIX HOURS AS NEEDED FOR WHEEZING.    ZINC GLUCONATE (COLD-EEZE ORAL)    Take 1 lozenge by mouth daily as needed.    ZOLPIDEM (AMBIEN) 10 MG TAB    1 or 2 tablets every HS prn insomnia   Discontinued Medications    FLUCONAZOLE (DIFLUCAN) 150 MG TAB    TAKE 1 TABLET BY MOUTH NOW THEN REPEAT IN 7 DAYS       Continue current management    Counseled on regular exercise, maintenance of a healthy weight, balanced diet rich in fruits/vegetables and lean protein, and avoidance of unhealthy habits like smoking and excessive alcohol intake.   Also, counseled on importance of being compliant with medication, health appointments, diet and exercise.     Follow-up in about 6 months (around 1/25/2019). ok call in reg ambien if xr nw    "This note will not be shared with the patient."  "

## 2018-07-24 ENCOUNTER — OFFICE VISIT (OUTPATIENT)
Dept: GASTROENTEROLOGY | Facility: CLINIC | Age: 73
End: 2018-07-24
Payer: MEDICARE

## 2018-07-24 VITALS — BODY MASS INDEX: 26.09 KG/M2 | WEIGHT: 162.38 LBS | HEIGHT: 66 IN

## 2018-07-24 DIAGNOSIS — Z86.010 HX OF COLONIC POLYPS: Primary | ICD-10-CM

## 2018-07-24 DIAGNOSIS — K21.9 GASTROESOPHAGEAL REFLUX DISEASE, ESOPHAGITIS PRESENCE NOT SPECIFIED: ICD-10-CM

## 2018-07-24 PROCEDURE — 99999 PR PBB SHADOW E&M-EST. PATIENT-LVL II: CPT | Mod: PBBFAC,,, | Performed by: INTERNAL MEDICINE

## 2018-07-24 PROCEDURE — 99214 OFFICE O/P EST MOD 30 MIN: CPT | Mod: S$GLB,,, | Performed by: INTERNAL MEDICINE

## 2018-07-24 NOTE — PROGRESS NOTES
Pt presents for colon evaluation. Followed by [facility] in past. Last EGD in 2011 notable for hiatal hernia and gastritis (biopsies negative for Bauman's). Last C-scope 2013 hx polyps, 5 year f/u exam recommended, currently past due. Pt doing well. No bleeding or pain. No N/V. No change bowel habits. Appetite and weight stable. No fever or jaundice. Occasional pyrosis controlled with PRN acid suppressive medication. No dysphagia.    REVIEW OF SYSTEMS:   Constitutional: Negative for fever, appetite change and unexpected weight change.  HENT: Negative for sore throat and trouble swallowing.  Eyes: Negative for visual disturbance.  Respiratory: Negative for chest tightness, shortness of breath and wheezing.  Cardiovascular: Negative for chest pain.  Gastrointestinal:  as per HPI  Genitourinary: Negative for dysuria, frequency and hematuria.    PHYSICAL EXAMINATION:                                                        GENERAL:  Comfortable, in no acute distress.      SKIN: Non-jaundiced.                             HEENT EXAM:  Nonicteric.  No adenopathy.  Oropharynx is clear.               NECK:  Supple.                                                               LUNGS:  Clear.                                                               CARDIAC:  Regular rate and rhythm.  S1, S2.  No murmur.                      ABDOMEN:  Soft, positive bowel sounds, nontender.  No hepatosplenomegaly or masses.  No rebound or guarding.                                             EXTREMITIES:  No edema.       IMP: 1. Hx colon polyps - due for surveillance exam.          2. GERD - stable    PLAN: 1. C-scope

## 2018-07-31 ENCOUNTER — ANESTHESIA EVENT (OUTPATIENT)
Dept: ENDOSCOPY | Facility: HOSPITAL | Age: 73
End: 2018-07-31
Payer: MEDICARE

## 2018-08-01 ENCOUNTER — HOSPITAL ENCOUNTER (OUTPATIENT)
Facility: HOSPITAL | Age: 73
Discharge: HOME | End: 2018-08-01
Attending: INTERNAL MEDICINE | Admitting: INTERNAL MEDICINE
Payer: MEDICARE

## 2018-08-01 ENCOUNTER — ANESTHESIA (OUTPATIENT)
Dept: ENDOSCOPY | Facility: HOSPITAL | Age: 73
End: 2018-08-01
Payer: MEDICARE

## 2018-08-01 ENCOUNTER — SURGERY (OUTPATIENT)
Age: 73
End: 2018-08-01

## 2018-08-01 VITALS
RESPIRATION RATE: 17 BRPM | DIASTOLIC BLOOD PRESSURE: 74 MMHG | HEIGHT: 66 IN | OXYGEN SATURATION: 99 % | BODY MASS INDEX: 25.71 KG/M2 | HEART RATE: 60 BPM | WEIGHT: 160 LBS | TEMPERATURE: 98 F | SYSTOLIC BLOOD PRESSURE: 155 MMHG

## 2018-08-01 DIAGNOSIS — Z86.010 HX OF COLONIC POLYPS: ICD-10-CM

## 2018-08-01 PROBLEM — Z86.0100 HX OF COLONIC POLYPS: Status: ACTIVE | Noted: 2018-08-01

## 2018-08-01 PROCEDURE — 37000009 HC ANESTHESIA EA ADD 15 MINS: Mod: PO | Performed by: INTERNAL MEDICINE

## 2018-08-01 PROCEDURE — D9220A PRA ANESTHESIA: Mod: CRNA,,, | Performed by: NURSE ANESTHETIST, CERTIFIED REGISTERED

## 2018-08-01 PROCEDURE — 25000003 PHARM REV CODE 250: Mod: PO | Performed by: ANESTHESIOLOGY

## 2018-08-01 PROCEDURE — 37000008 HC ANESTHESIA 1ST 15 MINUTES: Mod: PO | Performed by: INTERNAL MEDICINE

## 2018-08-01 PROCEDURE — 45378 DIAGNOSTIC COLONOSCOPY: CPT | Mod: PO | Performed by: INTERNAL MEDICINE

## 2018-08-01 PROCEDURE — G0105 COLORECTAL SCRN; HI RISK IND: HCPCS | Mod: ,,, | Performed by: INTERNAL MEDICINE

## 2018-08-01 PROCEDURE — 63600175 PHARM REV CODE 636 W HCPCS: Mod: PO | Performed by: NURSE ANESTHETIST, CERTIFIED REGISTERED

## 2018-08-01 PROCEDURE — D9220A PRA ANESTHESIA: Mod: ANES,,, | Performed by: ANESTHESIOLOGY

## 2018-08-01 RX ORDER — SODIUM CHLORIDE 0.9 % (FLUSH) 0.9 %
3 SYRINGE (ML) INJECTION
Status: DISCONTINUED | OUTPATIENT
Start: 2018-08-01 | End: 2018-08-01 | Stop reason: HOSPADM

## 2018-08-01 RX ORDER — SODIUM CHLORIDE, SODIUM LACTATE, POTASSIUM CHLORIDE, CALCIUM CHLORIDE 600; 310; 30; 20 MG/100ML; MG/100ML; MG/100ML; MG/100ML
INJECTION, SOLUTION INTRAVENOUS CONTINUOUS
Status: DISCONTINUED | OUTPATIENT
Start: 2018-08-01 | End: 2018-08-01

## 2018-08-01 RX ORDER — PROPOFOL 10 MG/ML
VIAL (ML) INTRAVENOUS
Status: DISCONTINUED | OUTPATIENT
Start: 2018-08-01 | End: 2018-08-01

## 2018-08-01 RX ORDER — LIDOCAINE HYDROCHLORIDE 10 MG/ML
1 INJECTION, SOLUTION EPIDURAL; INFILTRATION; INTRACAUDAL; PERINEURAL ONCE
Status: COMPLETED | OUTPATIENT
Start: 2018-08-01 | End: 2018-08-01

## 2018-08-01 RX ORDER — LIDOCAINE HCL/PF 100 MG/5ML
SYRINGE (ML) INTRAVENOUS
Status: DISCONTINUED | OUTPATIENT
Start: 2018-08-01 | End: 2018-08-01

## 2018-08-01 RX ORDER — SODIUM CHLORIDE, SODIUM LACTATE, POTASSIUM CHLORIDE, CALCIUM CHLORIDE 600; 310; 30; 20 MG/100ML; MG/100ML; MG/100ML; MG/100ML
INJECTION, SOLUTION INTRAVENOUS CONTINUOUS
Status: DISCONTINUED | OUTPATIENT
Start: 2018-08-01 | End: 2018-08-01 | Stop reason: HOSPADM

## 2018-08-01 RX ADMIN — PROPOFOL 25 MG: 10 INJECTION, EMULSION INTRAVENOUS at 11:08

## 2018-08-01 RX ADMIN — PROPOFOL 50 MG: 10 INJECTION, EMULSION INTRAVENOUS at 10:08

## 2018-08-01 RX ADMIN — PROPOFOL 25 MG: 10 INJECTION, EMULSION INTRAVENOUS at 10:08

## 2018-08-01 RX ADMIN — SODIUM CHLORIDE, SODIUM LACTATE, POTASSIUM CHLORIDE, AND CALCIUM CHLORIDE: .6; .31; .03; .02 INJECTION, SOLUTION INTRAVENOUS at 10:08

## 2018-08-01 RX ADMIN — PROPOFOL 125 MG: 10 INJECTION, EMULSION INTRAVENOUS at 10:08

## 2018-08-01 RX ADMIN — LIDOCAINE HYDROCHLORIDE: 10 INJECTION, SOLUTION EPIDURAL; INFILTRATION; INTRACAUDAL; PERINEURAL at 10:08

## 2018-08-01 RX ADMIN — LIDOCAINE HYDROCHLORIDE 100 MG: 20 INJECTION, SOLUTION INTRAVENOUS at 10:08

## 2018-08-01 NOTE — H&P
History & Physical - Short Stay  Gastroenterology      SUBJECTIVE:     Procedure: Colonoscopy    Chief Complaint/Indication for Procedure: Previous Polyps    PTA Medications   Medication Sig    acetaminophen (TYLENOL) 500 MG tablet Take 500 mg by mouth every 6 (six) hours as needed for Pain.    atorvastatin (LIPITOR) 20 MG tablet Take 1 tablet (20 mg total) by mouth nightly.    budesonide-formoterol 160-4.5 mcg (SYMBICORT) 160-4.5 mcg/actuation HFAA Inhale 2 puffs into the lungs 2 (two) times daily. Controller (Patient taking differently: Inhale 2 puffs into the lungs 2 (two) times daily as needed. Controller)    dorzolamide-timolol 2-0.5% (COSOPT) 22.3-6.8 mg/mL ophthalmic solution Place 1 drop into the right eye 2 (two) times daily.    econazole nitrate 1 % cream Apply topically 2 (two) times daily as needed. Twice a day  PRN    fluorouracil (EFUDEX) 5 % cream CHRISTIAN AA BID FOR 2 WEEKS PRN    FLUoxetine (PROZAC) 10 MG capsule Take 10 mg by mouth once daily.     fluticasone (FLONASE) 50 mcg/actuation nasal spray SPRAY 2 SPRAYS IN EACH NOSTRIL EVERY DAY (Patient taking differently: SPRAY 2 SPRAYS IN EACH NOSTRIL EVERY DAY PRN)    hydrOXYzine HCl (ATARAX) 25 MG tablet 1 and a  1/2 tab po qhs prn insomnia    losartan (COZAAR) 50 MG tablet Take 1 tablet (50 mg total) by mouth every morning.    multivit,stress formula-zinc (STRESS FORMULA WITH ZINC) Tab Take 1 tablet by mouth every morning. Uses Alive    prednisoLONE acetate (PRED FORTE) 1 % DrpS Place 1 drop into the right eye once daily.    pseudoephedrine (SUDAFED) 30 MG tablet Take 30 mg by mouth every 4 (four) hours as needed for Congestion.    sodium chloride (SALINE NASAL) 0.65 % nasal spray 1 spray by Nasal route as needed for Congestion.    traZODone (DESYREL) 50 MG tablet Take 1 tablet (50 mg total) by mouth nightly as needed for Insomnia. May take up to 2 tabs per night prn    VENTOLIN HFA 90 mcg/actuation inhaler Inhale 2 puffs into the lungs  every 6 (six) hours as needed. INHALE ONE OR TWO PUFFS BY MOUTH EVERY SIX HOURS AS NEEDED FOR WHEEZING.    zinc gluconate (COLD-EEZE ORAL) Take 1 lozenge by mouth daily as needed.    zolpidem (AMBIEN CR) 12.5 MG CR tablet Take 1 tablet (12.5 mg total) by mouth nightly as needed for Insomnia.    zolpidem (AMBIEN) 10 mg Tab 1 or 2 tablets every HS prn insomnia (Patient taking differently: Take 10 mg by mouth nightly as needed. 1 or 2 tablets every HS prn insomnia)    aspirin 81 MG Chew Take 81 mg by mouth daily as needed. Usually takes about every 2 weeks.    estradiol (ESTRACE) 0.01 % (0.1 mg/gram) vaginal cream Place 1 g vaginally every other day. (Patient taking differently: Place 1 g vaginally daily as needed. )    fish oil-omega-3 fatty acids 300-1,000 mg capsule Take 1 g by mouth every morning.     guaifenesin (MUCINEX) 600 mg 12 hr tablet Take 1,200 mg by mouth 2 (two) times daily as needed.     omeprazole (PRILOSEC OTC) 20 MG tablet Take 1 tablet (20 mg total) by mouth every morning. Take 30 minutes before your first protein containing meal. (Patient taking differently: Take 20 mg by mouth daily as needed. Take 30 minutes before your first protein containing meal.)       Review of patient's allergies indicates:   Allergen Reactions    Penicillins Hives    Ciprofloxacin      Muscle weakness and pain    Doxycycline Other (See Comments)     unknown    Oxycodone Anxiety        Past Medical History:   Diagnosis Date    Allergy     Asthma     Basal cell carcinoma     COPD (chronic obstructive pulmonary disease)     GERD (gastroesophageal reflux disease)     Hyperlipidemia     Hypertension     Retinal detachment      Past Surgical History:   Procedure Laterality Date    BASAL CELL CARCINOMA EXCISION      BREAST CYST EXCISION Left     long time ago, bening    CATARACT EXTRACTION      COLONOSCOPY  2013    EYE SURGERY      cataracts & retinea detachment    HYSTERECTOMY      UPPER  GASTROINTESTINAL ENDOSCOPY       Family History   Problem Relation Age of Onset    Asthma Mother     Stroke Father 77        cva    Colon cancer Neg Hx     Colon polyps Neg Hx     Celiac disease Neg Hx     Esophageal cancer Neg Hx     Stomach cancer Neg Hx     Irritable bowel syndrome Neg Hx     Inflammatory bowel disease Neg Hx      Social History   Substance Use Topics    Smoking status: Never Smoker    Smokeless tobacco: Never Used    Alcohol use 1.2 oz/week     2 Glasses of wine per week      Comment: socially         OBJECTIVE:     Vital Signs (Most Recent)       Physical Exam:                                                       GENERAL:  Comfortable, in no acute distress.                                 HEENT EXAM:  Nonicteric.  No adenopathy.  Oropharynx is clear.               NECK:  Supple.                                                               LUNGS:  Clear.                                                               CARDIAC:  Regular rate and rhythm.  S1, S2.  No murmur.                      ABDOMEN:  Soft, positive bowel sounds, nontender.  No hepatosplenomegaly or masses.  No rebound or guarding.                                             EXTREMITIES:  No edema.     MENTAL STATUS:  Normal, alert and oriented.      ASSESSMENT/PLAN:     Assessment: Previous Polyps    Plan: Colonoscopy    Anesthesia Plan: General    ASA Grade: ASA 2 - Patient with mild systemic disease with no functional limitations    MALLAMPATI SCORE:  I (soft palate, uvula, fauces, and tonsillar pillars visible)

## 2018-08-01 NOTE — DISCHARGE INSTRUCTIONS
Recovery After Procedural Sedation (Adult)  You have been given medicine by vein to make you sleep during your surgery. This may have included both a pain medicine and sleeping medicine. Most of the effects have worn off. But you may still have some drowsiness for the next 6 to 8 hours.  Home care  Follow these guidelines when you get home:  · For the next 8 hours, you should be watched by a responsible adult. This person should make sure your condition is not getting worse.  · Don't drink any alcohol for the next 24 hours.  · Don't drive, operate dangerous machinery, or make important business or personal decisions during the next 24 hours.  Note: Your healthcare provider may tell you not to take any medicine by mouth for pain or sleep in the next 4 hours. These medicines may react with the medicines you were given in the hospital. This could cause a much stronger response than usual.  Follow-up care  Follow up with your healthcare provider if you are not alert and back to your usual level of activity within 12 hours.  When to seek medical advice  Call your healthcare provider right away if any of these occur:  · Drowsiness gets worse  · Weakness or dizziness gets worse  · Repeated vomiting  · You can't be awakened   Date Last Reviewed: 10/18/2016  © 3095-8591 [employer]. [address] [place] [address], [place] [address]. All rights reserved. This information is not intended as a substitute for professional medical care. Always follow your healthcare professional's instructions.

## 2018-08-01 NOTE — ANESTHESIA POSTPROCEDURE EVALUATION
"Anesthesia Post Evaluation    Patient: Madison Long    Procedure(s) Performed: Procedure(s) (LRB):  COLONOSCOPY (N/A)    Final Anesthesia Type: general  Patient location during evaluation: PACU  Patient participation: Yes- Able to Participate  Level of consciousness: awake and alert and oriented  Post-procedure vital signs: reviewed and stable  Pain management: adequate  Airway patency: patent  PONV status at discharge: No PONV  Anesthetic complications: no      Cardiovascular status: blood pressure returned to baseline  Respiratory status: unassisted, spontaneous ventilation and room air  Hydration status: euvolemic  Follow-up not needed.        Visit Vitals  BP (!) 155/74 (BP Location: Right arm, Patient Position: Lying)   Pulse 60   Temp 36.4 °C (97.5 °F) (Skin)   Resp 17   Ht 5' 6" (1.676 m)   Wt 72.6 kg (160 lb)   SpO2 99%   Breastfeeding? No   BMI 25.82 kg/m²       Pain/Margie Score: Pain Assessment Performed: Yes (8/1/2018 11:05 AM)  Presence of Pain: non-verbal indicators absent (8/1/2018 11:05 AM)  Margie Score: 10 (8/1/2018 11:33 AM)      "

## 2018-08-01 NOTE — ANESTHESIA PREPROCEDURE EVALUATION
08/01/2018  Madison Long is a 73 y.o., female.    Anesthesia Evaluation    I have reviewed the Patient Summary Reports.    I have reviewed the Nursing Notes.   I have reviewed the Medications.     Review of Systems  Anesthesia Hx:  No problems with previous Anesthesia    Social:  Non-Smoker    Hematology/Oncology:         -- Cancer in past history (Basal Cell):   Cardiovascular:   Hypertension, well controlled hyperlipidemia    Pulmonary:   COPD, mild Asthma mild    Renal/:  Renal/ Normal     Hepatic/GI:   GERD, well controlled    Neurological:  Neurology Normal    Endocrine:  Endocrine Normal        Physical Exam  General:  Well nourished    Airway/Jaw/Neck:  Airway Findings: Mouth Opening: Normal Tongue: Normal  General Airway Assessment: Adult  Oropharynx Findings:  Mallampati: II  Jaw/Neck Findings:  Neck ROM: Normal ROM     Eyes/Ears/Nose:  Eyes/Ears/Nose Findings:    Dental:  Dental Findings:   Chest/Lungs:  Chest/Lungs Findings: Normal Respiratory Rate     Heart/Vascular:  Heart Findings: Rate: Normal  Rhythm: Regular Rhythm        Mental Status:  Mental Status Findings:  Cooperative, Alert and Oriented         Anesthesia Plan  Type of Anesthesia, risks & benefits discussed:  Anesthesia Type:  general  Patient's Preference:   Intra-op Monitoring Plan: standard ASA monitors  Intra-op Monitoring Plan Comments:   Post Op Pain Control Plan: multimodal analgesia  Post Op Pain Control Plan Comments:   Induction:   IV  Beta Blocker:  Patient is not currently on a Beta-Blocker (No further documentation required).       Informed Consent: Patient understands risks and agrees with Anesthesia plan.  Questions answered. Anesthesia consent signed with patient.  ASA Score: 3     Day of Surgery Review of History & Physical:  There are no significant changes.   H&P completed by Anesthesiologist.       Ready  For Surgery From Anesthesia Perspective.

## 2018-08-01 NOTE — TRANSFER OF CARE
"Anesthesia Transfer of Care Note    Patient: Madison Long    Procedure(s) Performed: Procedure(s) (LRB):  COLONOSCOPY (N/A)    Patient location: PACU    Anesthesia Type: general    Transport from OR: Transported from OR on room air with adequate spontaneous ventilation    Post pain: adequate analgesia    Post assessment: no apparent anesthetic complications    Post vital signs: stable    Level of consciousness: awake    Nausea/Vomiting: no nausea/vomiting    Complications: none    Transfer of care protocol was followed      Last vitals:   Visit Vitals  BP (!) 147/83 (BP Location: Right arm, Patient Position: Lying)   Pulse 65   Temp 36.3 °C (97.3 °F) (Skin)   Resp 20   Ht 5' 6" (1.676 m)   Wt 72.6 kg (160 lb)   SpO2 96%   Breastfeeding? No   BMI 25.82 kg/m²     "

## 2018-08-01 NOTE — [employer] PATIENT INSTRUCTIONS
Discharge Summary/Instructions after an Endoscopic Procedure  Patient Name: Madison Long  Patient MRN: [id number]  Patient YOB: 1945 Wednesday, August 01, 2018  Dung Durant MD  RESTRICTIONS:  During your procedure today, you received medications for sedation.  These   medications may affect your judgment, balance and coordination.  Therefore,   for 24 hours, you have the following restrictions:   - DO NOT drive a car, operate machinery, make legal/financial decisions,   sign important papers or drink alcohol.    ACTIVITY:  Today: no heavy lifting, straining or running due to procedural   sedation/anesthesia.  The following day: return to full activity including work.  DIET:  Eat and drink normally unless instructed otherwise.     TREATMENT FOR COMMON SIDE EFFECTS:  - Mild abdominal pain, nausea, belching, bloating or excessive gas:  rest,   eat lightly and use a heating pad.  - Sore Throat: treat with throat lozenges and/or gargle with warm salt   water.  - Because air was used during the procedure, expelling large amounts of air   from your rectum or belching is normal.  - If a bowel prep was taken, you may not have a bowel movement for 1-3 days.    This is normal.  SYMPTOMS TO WATCH FOR AND REPORT TO YOUR PHYSICIAN:  1. Abdominal pain or bloating, other than gas cramps.  2. Chest pain.  3. Back pain.  4. Signs of infection such as: chills or fever occurring within 24 hours   after the procedure.  5. Rectal bleeding, which would show as bright red, maroon, or black stools.   (A tablespoon of blood from the rectum is not serious, especially if   hemorrhoids are present.)  6. Vomiting.  7. Weakness or dizziness.  GO DIRECTLY TO THE NEAREST EMERGENCY ROOM IF YOU HAVE ANY OF THE FOLLOWING:      Difficulty breathing              Chills and/or fever over 101 F   Persistent vomiting and/or vomiting blood   Severe abdominal pain   Severe chest pain   Black, tarry stools   Bleeding- more than one  tablespoon   Any other symptom or condition that you feel may need urgent attention  Your doctor recommends these additional instructions:  If any biopsies were taken, your doctors clinic will contact you in 1 to 2   weeks with any results.  - Repeat colonoscopy in 5 years for surveillance.   - Discharge patient to home (ambulatory).  For questions, problems or results please call your physician - Dung Durant MD at Work: [phone].  EMERGENCY PHONE NUMBER: [phone], LAB RESULTS: [phone]  IF A COMPLICATION OR EMERGENCY SITUATION ARISES AND YOU ARE UNABLE TO REACH   YOUR PHYSICIAN - GO DIRECTLY TO THE EMERGENCY ROOM.  ___________________________________________  Nurse Signature  ___________________________________________  Patient/Designated Responsible Party Signature  Dung Durant MD  8/1/2018 11:05:40 AM  This report has been verified and signed electronically.  [employer]

## 2018-08-01 NOTE — DISCHARGE SUMMARY
Discharge Note  Short Stay      SUMMARY     Admit Date: 8/1/2018    Attending Physician: Dung Durant MD     Discharge Physician: Dung Durant MD    Discharge Date: 8/1/2018 11:06 AM    Final Diagnosis: Hx of colonic polyps [Z86.010]    Disposition: HOME OR SELF CARE    Patient Instructions:   Current Discharge Medication List      CONTINUE these medications which have NOT CHANGED    Details   acetaminophen (TYLENOL) 500 MG tablet Take 500 mg by mouth every 6 (six) hours as needed for Pain.      atorvastatin (LIPITOR) 20 MG tablet Take 1 tablet (20 mg total) by mouth nightly.  Qty: 90 tablet, Refills: 3    Associated Diagnoses: Dyslipidemia      budesonide-formoterol 160-4.5 mcg (SYMBICORT) 160-4.5 mcg/actuation HFAA Inhale 2 puffs into the lungs 2 (two) times daily. Controller  Qty: 11 g, Refills: 7    Associated Diagnoses: Chronic obstructive pulmonary disease, unspecified COPD type      dorzolamide-timolol 2-0.5% (COSOPT) 22.3-6.8 mg/mL ophthalmic solution Place 1 drop into the right eye 2 (two) times daily.  Qty: 10 mL, Refills: 6      econazole nitrate 1 % cream Apply topically 2 (two) times daily as needed. Twice a day  PRN      fish oil-omega-3 fatty acids 300-1,000 mg capsule Take 1 g by mouth every morning.       FLUoxetine (PROZAC) 10 MG capsule Take 10 mg by mouth once daily.       fluticasone (FLONASE) 50 mcg/actuation nasal spray SPRAY 2 SPRAYS IN EACH NOSTRIL EVERY DAY  Qty: 1 Bottle, Refills: 6    Associated Diagnoses: Seasonal allergic rhinitis due to pollen      guaifenesin (MUCINEX) 600 mg 12 hr tablet Take 1,200 mg by mouth 2 (two) times daily as needed.       hydrOXYzine HCl (ATARAX) 25 MG tablet 1 and a  1/2 tab po qhs prn insomnia  Qty: 45 tablet, Refills: 11    Associated Diagnoses: Insomnia, unspecified type      losartan (COZAAR) 50 MG tablet Take 1 tablet (50 mg total) by mouth every morning.  Qty: 30 tablet, Refills: 6    Associated Diagnoses: Essential hypertension       multivit,stress formula-zinc (STRESS FORMULA WITH ZINC) Tab Take 1 tablet by mouth every morning. Uses Alive      prednisoLONE acetate (PRED FORTE) 1 % DrpS Place 1 drop into the right eye once daily.      pseudoephedrine (SUDAFED) 30 MG tablet Take 30 mg by mouth every 4 (four) hours as needed for Congestion.      sodium chloride (SALINE NASAL) 0.65 % nasal spray 1 spray by Nasal route as needed for Congestion.      traZODone (DESYREL) 50 MG tablet Take 1 tablet (50 mg total) by mouth nightly as needed for Insomnia. May take up to 2 tabs per night prn  Qty: 60 tablet, Refills: 5    Associated Diagnoses: Insomnia, unspecified type      VENTOLIN HFA 90 mcg/actuation inhaler Inhale 2 puffs into the lungs every 6 (six) hours as needed. INHALE ONE OR TWO PUFFS BY MOUTH EVERY SIX HOURS AS NEEDED FOR WHEEZING.  Qty: 1 Inhaler, Refills: 11    Associated Diagnoses: Chronic obstructive pulmonary disease, unspecified COPD type      zinc gluconate (COLD-EEZE ORAL) Take 1 lozenge by mouth daily as needed.      zolpidem (AMBIEN CR) 12.5 MG CR tablet Take 1 tablet (12.5 mg total) by mouth nightly as needed for Insomnia.  Qty: 30 tablet, Refills: 5    Associated Diagnoses: Insomnia, unspecified type      aspirin 81 MG Chew Take 81 mg by mouth daily as needed. Usually takes about every 2 weeks.      estradiol (ESTRACE) 0.01 % (0.1 mg/gram) vaginal cream Place 1 g vaginally every other day.  Qty: 1 Tube, Refills: 3    Associated Diagnoses: Vaginitis, atrophic      fluorouracil (EFUDEX) 5 % cream CHRISTIAN AA BID FOR 2 WEEKS PRN  Refills: 0      omeprazole (PRILOSEC OTC) 20 MG tablet Take 1 tablet (20 mg total) by mouth every morning. Take 30 minutes before your first protein containing meal.  Qty: 30 tablet, Refills: 11    Associated Diagnoses: Gastroesophageal reflux disease with esophagitis             Discharge Procedure Orders (must include Diet, Follow-up, Activity)    Follow Up:  Follow up with PCP as previously scheduled  Resume  routine diet.  Activity as tolerated.    No driving day of procedure.

## 2018-08-09 ENCOUNTER — TELEPHONE (OUTPATIENT)
Dept: GASTROENTEROLOGY | Facility: CLINIC | Age: 73
End: 2018-08-09

## 2018-08-09 NOTE — TELEPHONE ENCOUNTER
----- Message from Khushbu Millan sent at 8/9/2018 12:35 PM CDT -----  Contact: self   Patient need a copy of her colonoscopy report, please call back at [phone] (emug)

## 2018-08-13 ENCOUNTER — OFFICE VISIT (OUTPATIENT)
Dept: OPHTHALMOLOGY | Facility: CLINIC | Age: 73
End: 2018-08-13
Payer: MEDICARE

## 2018-08-13 DIAGNOSIS — H33.021 RETINAL DETACHMENT OF RIGHT EYE WITH MULTIPLE BREAKS: Primary | ICD-10-CM

## 2018-08-13 DIAGNOSIS — H35.21 PROLIFERATIVE VITREORETINOPATHY, RIGHT: ICD-10-CM

## 2018-08-13 PROCEDURE — 99999 PR PBB SHADOW E&M-EST. PATIENT-LVL IV: CPT | Mod: PBBFAC,,, | Performed by: OPHTHALMOLOGY

## 2018-08-13 PROCEDURE — 92134 CPTRZ OPH DX IMG PST SGM RTA: CPT | Mod: S$GLB,,, | Performed by: OPHTHALMOLOGY

## 2018-08-13 PROCEDURE — 92226 PR SPECIAL EYE EXAM, SUBSEQUENT: CPT | Mod: RT,S$GLB,, | Performed by: OPHTHALMOLOGY

## 2018-08-13 PROCEDURE — 92014 COMPRE OPH EXAM EST PT 1/>: CPT | Mod: S$GLB,,, | Performed by: OPHTHALMOLOGY

## 2018-08-13 NOTE — PROGRESS NOTES
HPI     8 wk   DLS- 06/11/2018 Dr. Chaves     Pt thinks she is doing well vision has been stable for a while can see   okay reading could be better.   Denies pain , occasional ocular migraine ? Occasionally will get a [place]   in OD goes around takes tylenol and goes away.  (-)Flashes (+)Floaters OS  (-)Photophobia  (-)Glare        PF x QD OD      Prior OCT - RD OD  OS - No ME      A/P    1. RRD OD   Macula involving x 4  Days    S/p  25g PPV/EL/SF6 OD for RRD 9/6/17  IOP improved    10/30/17 - Recurrent IT RD with early PVR and small break    s/p 25g PPV/membrane stripping/inferior retinopexy/C3F8 OD for RRD/PVR OD 11/1/17 12/4/17 - recurrent inferior RD - small holes posterior to laser    s/p 25g PPV/EL/AFx/1000cs Silicone Oil OD for Recurrent RRD with PVR 12/6/17    Doing well, good IOP    1/9/18 increasing inferior fluid collection beneath oil into macula - needs SB - multiple small break    s/p /270, 25g PPV/SO removal/membrane stripping/AFx/EL/1000cs Silicone Oil OD for RRD/PVR 1/10/18    Doing well, good IOP  Had ST scleral dehiscence - closed with 5-0 mersilene    Side sleep or stomach    Inferior PVR OD with shallow submacular fluid   IOP low - stop Cosopt  Will likely need inferior retinectomy    2/18 - some progression    s/p 25 PPV/SO removal/membrane stripping/inferior retinectomy/posterior capsulectomy/EL/Leonora oil OD for RRD with PVR and PCO OD 4/11/18    Stable inferonasal RD under oil  IOP improved - can hold on cosopt for now  Continue PF Q day     12 weeks OCT    Will need heavier inferonasal laser during oil removal 6-9 months       2. PCIOL OU    3. HTN   Good BP control    4. PVD OS  No breaks OS

## 2018-08-20 DIAGNOSIS — N95.2 VAGINITIS, ATROPHIC: ICD-10-CM

## 2018-08-20 RX ORDER — ESTRADIOL 0.1 MG/G
1 CREAM VAGINAL DAILY PRN
Qty: 42.5 G | Refills: 3 | Status: SHIPPED | OUTPATIENT
Start: 2018-08-20 | End: 2022-02-15 | Stop reason: SDUPTHER

## 2018-09-02 NOTE — INTERVAL H&P NOTE
Pt interviewed. HPI reviewed. No changes. Continue with planned procedure.     Warner Medrano    
I personally performed the service described in the documentation recorded by the scribe in my presence, and it accurately and completely records my words and actions.

## 2018-09-03 DIAGNOSIS — J44.9 CHRONIC OBSTRUCTIVE PULMONARY DISEASE, UNSPECIFIED COPD TYPE: ICD-10-CM

## 2018-09-04 DIAGNOSIS — J44.9 CHRONIC OBSTRUCTIVE PULMONARY DISEASE, UNSPECIFIED COPD TYPE: ICD-10-CM

## 2018-09-04 RX ORDER — ALBUTEROL SULFATE 90 UG/1
AEROSOL, METERED RESPIRATORY (INHALATION)
Qty: 18 G | Refills: 0 | Status: SHIPPED | OUTPATIENT
Start: 2018-09-04 | End: 2018-11-19

## 2018-09-04 RX ORDER — ALBUTEROL SULFATE 90 UG/1
AEROSOL, METERED RESPIRATORY (INHALATION)
Qty: 18 G | Refills: 0 | Status: SHIPPED | OUTPATIENT
Start: 2018-09-04 | End: 2019-01-25 | Stop reason: SDUPTHER

## 2018-09-05 RX ORDER — BUDESONIDE AND FORMOTEROL FUMARATE DIHYDRATE 160; 4.5 UG/1; UG/1
AEROSOL RESPIRATORY (INHALATION)
Qty: 10.2 G | Refills: 11 | Status: SHIPPED | OUTPATIENT
Start: 2018-09-05 | End: 2020-11-23 | Stop reason: SDUPTHER

## 2018-11-19 ENCOUNTER — OFFICE VISIT (OUTPATIENT)
Dept: OPHTHALMOLOGY | Facility: CLINIC | Age: 73
End: 2018-11-19
Payer: MEDICARE

## 2018-11-19 VITALS — HEART RATE: 85 BPM | SYSTOLIC BLOOD PRESSURE: 158 MMHG | DIASTOLIC BLOOD PRESSURE: 100 MMHG

## 2018-11-19 DIAGNOSIS — H33.021 RETINAL DETACHMENT OF RIGHT EYE WITH MULTIPLE BREAKS: ICD-10-CM

## 2018-11-19 DIAGNOSIS — H33.001 MACULA-OFF RHEGMATOGENOUS RETINAL DETACHMENT, RIGHT: Primary | ICD-10-CM

## 2018-11-19 DIAGNOSIS — H40.051 OCULAR HYPERTENSION, RIGHT: ICD-10-CM

## 2018-11-19 DIAGNOSIS — H33.001 RETINAL DETACHMENT, RHEGMATOGENOUS, RIGHT EYE: ICD-10-CM

## 2018-11-19 DIAGNOSIS — H35.21 PROLIFERATIVE VITREORETINOPATHY, RIGHT: ICD-10-CM

## 2018-11-19 PROCEDURE — 99999 PR PBB SHADOW E&M-EST. PATIENT-LVL III: CPT | Mod: PBBFAC,HCWC,, | Performed by: OPHTHALMOLOGY

## 2018-11-19 PROCEDURE — 92014 COMPRE OPH EXAM EST PT 1/>: CPT | Mod: HCWC,S$GLB,, | Performed by: OPHTHALMOLOGY

## 2018-11-19 PROCEDURE — 92226 PR SPECIAL EYE EXAM, SUBSEQUENT: CPT | Mod: HCWC,RT,S$GLB, | Performed by: OPHTHALMOLOGY

## 2018-11-19 RX ORDER — FLUOXETINE 10 MG/1
TABLET ORAL
Refills: 7 | COMMUNITY
Start: 2018-11-01 | End: 2019-01-25 | Stop reason: CLARIF

## 2018-11-19 RX ORDER — DORZOLAMIDE HYDROCHLORIDE AND TIMOLOL MALEATE 20; 5 MG/ML; MG/ML
1 SOLUTION/ DROPS OPHTHALMIC 2 TIMES DAILY
Qty: 10 ML | Refills: 6 | Status: SHIPPED | OUTPATIENT
Start: 2018-11-19 | End: 2019-04-15 | Stop reason: SDUPTHER

## 2018-11-19 RX ORDER — ZOLPIDEM TARTRATE 12.5 MG/1
TABLET, FILM COATED, EXTENDED RELEASE ORAL
Refills: 4 | COMMUNITY
Start: 2018-11-01 | End: 2019-07-31

## 2018-11-19 NOTE — PROGRESS NOTES
HPI     3 mos ck   DLS- 08/13/2018 Dr. Chaves     Pt  States tghat eyes are doing fine no changes since last visit. No pain,      Denies pain , occasional ocular migraine ? Occasionally will get a [place]   in OD a white flash   (+)Floaters OS     Eye Med(s) -   PF Q day OD    Refresh tears OS PRN         Prior OCT - RD OD  OS - No ME      A/P    1. RRD OD   Macula involving x 4  Days    S/p  25g PPV/EL/SF6 OD for RRD 9/6/17  IOP improved    10/30/17 - Recurrent IT RD with early PVR and small break    s/p 25g PPV/membrane stripping/inferior retinopexy/C3F8 OD for RRD/PVR OD 11/1/17 12/4/17 - recurrent inferior RD - small holes posterior to laser    s/p 25g PPV/EL/AFx/1000cs Silicone Oil OD for Recurrent RRD with PVR 12/6/17    Doing well, good IOP    1/9/18 increasing inferior fluid collection beneath oil into macula - needs SB - multiple small break    s/p /270, 25g PPV/SO removal/membrane stripping/AFx/EL/1000cs Silicone Oil OD for RRD/PVR 1/10/18    Doing well, good IOP  Had ST scleral dehiscence - closed with 5-0 mersilene    Side sleep or stomach    Inferior PVR OD with shallow submacular fluid   IOP low - stop Cosopt  Will likely need inferior retinectomy    2/18 - some progression    s/p 25 PPV/SO removal/membrane stripping/inferior retinectomy/posterior capsulectomy/EL/Leonora oil OD for RRD with PVR and PCO OD 4/11/18    Stable inferonasal RD under oil  IOP increased - Resume COsopt BID  Continue PF Q day     12 weeks OCT    Will need heavier inferonasal laser during oil removal 3-6 months (around 4/19)       2. PCIOL OU    3. HTN   Good BP control    4. PVD OS  No breaks OS      2 weeks IOP check

## 2018-12-03 ENCOUNTER — OFFICE VISIT (OUTPATIENT)
Dept: OPHTHALMOLOGY | Facility: CLINIC | Age: 73
End: 2018-12-03
Payer: MEDICARE

## 2018-12-03 DIAGNOSIS — H35.21 PROLIFERATIVE VITREORETINOPATHY, RIGHT: ICD-10-CM

## 2018-12-03 DIAGNOSIS — H33.021 RETINAL DETACHMENT OF RIGHT EYE WITH MULTIPLE BREAKS: Primary | ICD-10-CM

## 2018-12-03 PROCEDURE — 92012 INTRM OPH EXAM EST PATIENT: CPT | Mod: HCWC,S$GLB,, | Performed by: OPHTHALMOLOGY

## 2018-12-03 PROCEDURE — 99999 PR PBB SHADOW E&M-EST. PATIENT-LVL III: CPT | Mod: PBBFAC,HCWC,, | Performed by: OPHTHALMOLOGY

## 2018-12-03 NOTE — PROGRESS NOTES
HPI     IOP check   DLS-11/19/2018  Dr. Chaves     Pt sts no change in va since last visit denies pain   (+)Flashes white circles but nothing new  (+)Floaters no change   (-)Photophobia  (-)Glare     Eye Med(s) -   PF Q day OD                          Cosopt BID OD    Refresh tears OS PRN         Prior OCT - RD OD  OS - No ME      A/P    1. RRD OD   Macula involving x 4  Days    S/p  25g PPV/EL/SF6 OD for RRD 9/6/17  IOP improved    10/30/17 - Recurrent IT RD with early PVR and small break    s/p 25g PPV/membrane stripping/inferior retinopexy/C3F8 OD for RRD/PVR OD 11/1/17 12/4/17 - recurrent inferior RD - small holes posterior to laser    s/p 25g PPV/EL/AFx/1000cs Silicone Oil OD for Recurrent RRD with PVR 12/6/17    Doing well, good IOP    1/9/18 increasing inferior fluid collection beneath oil into macula - needs SB - multiple small break    s/p /270, 25g PPV/SO removal/membrane stripping/AFx/EL/1000cs Silicone Oil OD for RRD/PVR 1/10/18    Doing well, good IOP  Had ST scleral dehiscence - closed with 5-0 mersilene    Side sleep or stomach    Inferior PVR OD with shallow submacular fluid   IOP low - stop Cosopt  Will likely need inferior retinectomy    2/18 - some progression    s/p 25 PPV/SO removal/membrane stripping/inferior retinectomy/posterior capsulectomy/EL/Leonora oil OD for RRD with PVR and PCO OD 4/11/18    Stable inferonasal RD under oil  IOP increased - Resume COsopt BID  Continue PF Q day     Will need heavier inferonasal laser during oil removal 3-6 months (around 4/19)       2. PCIOL OU    3. HTN   Good BP control    4. PVD OS  No breaks OS       IOP check

## 2019-01-18 ENCOUNTER — LAB VISIT (OUTPATIENT)
Dept: LAB | Facility: HOSPITAL | Age: 74
End: 2019-01-18
Attending: INTERNAL MEDICINE
Payer: MEDICARE

## 2019-01-18 DIAGNOSIS — E78.5 DYSLIPIDEMIA: ICD-10-CM

## 2019-01-18 DIAGNOSIS — I10 ESSENTIAL HYPERTENSION: ICD-10-CM

## 2019-01-18 LAB
ALBUMIN SERPL BCP-MCNC: 4.4 G/DL
ALP SERPL-CCNC: 48 U/L
ALT SERPL W/O P-5'-P-CCNC: 19 U/L
ANION GAP SERPL CALC-SCNC: 8 MMOL/L
AST SERPL-CCNC: 23 U/L
BILIRUB SERPL-MCNC: 0.6 MG/DL
BUN SERPL-MCNC: 9 MG/DL
CALCIUM SERPL-MCNC: 10 MG/DL
CHLORIDE SERPL-SCNC: 99 MMOL/L
CHOLEST SERPL-MCNC: 165 MG/DL
CHOLEST/HDLC SERPL: 2.2 {RATIO}
CO2 SERPL-SCNC: 27 MMOL/L
CREAT SERPL-MCNC: 0.8 MG/DL
EST. GFR  (AFRICAN AMERICAN): >60 ML/MIN/1.73 M^2
EST. GFR  (NON AFRICAN AMERICAN): >60 ML/MIN/1.73 M^2
GLUCOSE SERPL-MCNC: 109 MG/DL
HDLC SERPL-MCNC: 74 MG/DL
HDLC SERPL: 44.8 %
LDLC SERPL CALC-MCNC: 77.8 MG/DL
NONHDLC SERPL-MCNC: 91 MG/DL
POTASSIUM SERPL-SCNC: 4.8 MMOL/L
PROT SERPL-MCNC: 7.2 G/DL
SODIUM SERPL-SCNC: 134 MMOL/L
TRIGL SERPL-MCNC: 66 MG/DL

## 2019-01-18 PROCEDURE — 80061 LIPID PANEL: CPT

## 2019-01-18 PROCEDURE — 80053 COMPREHEN METABOLIC PANEL: CPT

## 2019-01-18 PROCEDURE — 36415 COLL VENOUS BLD VENIPUNCTURE: CPT | Mod: PO

## 2019-01-25 ENCOUNTER — OFFICE VISIT (OUTPATIENT)
Dept: FAMILY MEDICINE | Facility: CLINIC | Age: 74
End: 2019-01-25
Payer: MEDICARE

## 2019-01-25 VITALS
BODY MASS INDEX: 24.87 KG/M2 | SYSTOLIC BLOOD PRESSURE: 132 MMHG | DIASTOLIC BLOOD PRESSURE: 84 MMHG | OXYGEN SATURATION: 97 % | HEIGHT: 66 IN | HEART RATE: 80 BPM | WEIGHT: 154.75 LBS | RESPIRATION RATE: 18 BRPM

## 2019-01-25 DIAGNOSIS — E78.5 DYSLIPIDEMIA: ICD-10-CM

## 2019-01-25 DIAGNOSIS — N95.2 VAGINITIS, ATROPHIC: ICD-10-CM

## 2019-01-25 DIAGNOSIS — I10 ESSENTIAL HYPERTENSION: ICD-10-CM

## 2019-01-25 DIAGNOSIS — J44.9 CHRONIC OBSTRUCTIVE PULMONARY DISEASE, UNSPECIFIED COPD TYPE: ICD-10-CM

## 2019-01-25 DIAGNOSIS — Z00.00 ROUTINE PHYSICAL EXAMINATION: Primary | ICD-10-CM

## 2019-01-25 DIAGNOSIS — F32.A DEPRESSION, UNSPECIFIED DEPRESSION TYPE: ICD-10-CM

## 2019-01-25 DIAGNOSIS — Z12.31 ENCOUNTER FOR SCREENING MAMMOGRAM FOR BREAST CANCER: ICD-10-CM

## 2019-01-25 PROCEDURE — 99499 UNLISTED E&M SERVICE: CPT | Mod: HCNC,S$GLB,, | Performed by: INTERNAL MEDICINE

## 2019-01-25 PROCEDURE — 3075F SYST BP GE 130 - 139MM HG: CPT | Mod: HCNC,CPTII,S$GLB, | Performed by: INTERNAL MEDICINE

## 2019-01-25 PROCEDURE — 99999 PR PBB SHADOW E&M-EST. PATIENT-LVL III: CPT | Mod: PBBFAC,HCNC,, | Performed by: INTERNAL MEDICINE

## 2019-01-25 PROCEDURE — 99397 PR PREVENTIVE VISIT,EST,65 & OVER: ICD-10-PCS | Mod: HCNC,S$GLB,, | Performed by: INTERNAL MEDICINE

## 2019-01-25 PROCEDURE — 99499 RISK ADDL DX/OHS AUDIT: ICD-10-PCS | Mod: HCNC,S$GLB,, | Performed by: INTERNAL MEDICINE

## 2019-01-25 PROCEDURE — 3075F PR MOST RECENT SYSTOLIC BLOOD PRESS GE 130-139MM HG: ICD-10-PCS | Mod: HCNC,CPTII,S$GLB, | Performed by: INTERNAL MEDICINE

## 2019-01-25 PROCEDURE — 99397 PER PM REEVAL EST PAT 65+ YR: CPT | Mod: HCNC,S$GLB,, | Performed by: INTERNAL MEDICINE

## 2019-01-25 PROCEDURE — 99999 PR PBB SHADOW E&M-EST. PATIENT-LVL III: ICD-10-PCS | Mod: PBBFAC,HCNC,, | Performed by: INTERNAL MEDICINE

## 2019-01-25 PROCEDURE — 3079F DIAST BP 80-89 MM HG: CPT | Mod: HCNC,CPTII,S$GLB, | Performed by: INTERNAL MEDICINE

## 2019-01-25 PROCEDURE — 3079F PR MOST RECENT DIASTOLIC BLOOD PRESSURE 80-89 MM HG: ICD-10-PCS | Mod: HCNC,CPTII,S$GLB, | Performed by: INTERNAL MEDICINE

## 2019-01-25 RX ORDER — ALBUTEROL SULFATE 90 UG/1
2 AEROSOL, METERED RESPIRATORY (INHALATION) EVERY 6 HOURS PRN
Qty: 18 G | Refills: 3 | Status: SHIPPED | OUTPATIENT
Start: 2019-01-25 | End: 2020-03-24

## 2019-01-25 RX ORDER — ESTRADIOL 0.1 MG/G
1 CREAM VAGINAL DAILY PRN
Qty: 42.5 G | Refills: 3 | Status: CANCELLED | OUTPATIENT
Start: 2019-01-25 | End: 2019-02-24

## 2019-01-25 RX ORDER — FLUOXETINE 10 MG/1
10 CAPSULE ORAL DAILY
Qty: 90 CAPSULE | Refills: 3 | Status: SHIPPED | OUTPATIENT
Start: 2019-01-25 | End: 2019-07-31

## 2019-01-25 RX ORDER — LOSARTAN POTASSIUM 50 MG/1
50 TABLET ORAL EVERY MORNING
Qty: 90 TABLET | Refills: 1 | Status: CANCELLED | OUTPATIENT
Start: 2019-01-25

## 2019-01-25 NOTE — PROGRESS NOTES
Subjective:       Patient ID: Madison Long is a 73 y.o. female.    Chief Complaint: Routine physical examination    Here for routine health maintenance.   4 eye surgeries in past. Dr Sotelo  HTN - controlled  HLD - controlled; goal < 130 age, htn  COPD - no sob;      Insomnia - trazodone works most nights, but uses ambien for bad nights or other not working. ok     ALEX - controlled     Atrophic vaginitis - estrogen cream used for 1-2 weeks and then will take a break.  due for mammogram 3/23/19; rx originally by urology Dr Hughes.         Review of Systems   Constitutional: Negative for appetite change and fever.   HENT: Negative for nosebleeds and trouble swallowing.    Eyes: Negative for discharge and visual disturbance.   Respiratory: Negative for choking and shortness of breath.    Cardiovascular: Negative for chest pain and palpitations.   Gastrointestinal: Negative for abdominal pain, nausea and vomiting.   Musculoskeletal: Negative for arthralgias and joint swelling.   Skin: Negative for rash and wound.   Neurological: Negative for dizziness and syncope.   Psychiatric/Behavioral: Negative for confusion and dysphoric mood.       Objective:      Vitals:    01/25/19 1342   BP: 132/84   Pulse: 80   Resp: 18     Physical Exam   Constitutional: She appears well-nourished.   Eyes: Conjunctivae and EOM are normal.   Neck: Trachea normal and normal range of motion. No thyromegaly present.   Cardiovascular: Normal rate, regular rhythm and normal heart sounds.   Edema negative   Pulmonary/Chest: Effort normal and breath sounds normal.   Abdominal: Soft. There is no hepatomegaly.   Musculoskeletal:   Normal ROM bilateral    Neurological: No cranial nerve deficit (grossly intact).   DTR decreased bilateral   Skin: Skin is warm, dry and intact.   Psychiatric: She has a normal mood and affect.   Alert and orientated   Vitals reviewed.        Assessment:       1. Routine physical examination    2. Chronic obstructive  pulmonary disease, unspecified COPD type    3. Essential hypertension    4. Vaginitis, atrophic    5. Dyslipidemia    6. Depression, unspecified depression type    7. Encounter for screening mammogram for breast cancer        Plan:       Routine physical examination    Chronic obstructive pulmonary disease, unspecified COPD type  -     albuterol (VENTOLIN HFA) 90 mcg/actuation inhaler; Inhale 2 puffs into the lungs every 6 (six) hours as needed. Rescue  Dispense: 18 g; Refill: 3    Essential hypertension  -     Comprehensive metabolic panel; Future; Expected date: 07/24/2019    Vaginitis, atrophic    Dyslipidemia  -     Lipid panel; Future; Expected date: 07/24/2019    Depression, unspecified depression type  -     FLUoxetine 10 MG capsule; Take 1 capsule (10 mg total) by mouth once daily.  Dispense: 90 capsule; Refill: 3    Encounter for screening mammogram for breast cancer  -     Mammo Digital Screening Bilat; Future; Expected date: 05/01/2019    Other orders  -     Cancel: losartan (COZAAR) 50 MG tablet; Take 1 tablet (50 mg total) by mouth every morning.  Dispense: 90 tablet; Refill: 1  -     Cancel: estradiol (ESTRACE) 0.01 % (0.1 mg/gram) vaginal cream; Place 1 g vaginally daily as needed.  Dispense: 42.5 g; Refill: 3               Medication List           Accurate as of 1/25/19  2:26 PM. If you have any questions, ask your nurse or doctor.               CHANGE how you take these medications    albuterol 90 mcg/actuation inhaler  Commonly known as:  VENTOLIN HFA  Inhale 2 puffs into the lungs every 6 (six) hours as needed. Rescue  What changed:  additional instructions  Changed by:  Darian Arango MD     FLUoxetine 10 MG capsule  Take 1 capsule (10 mg total) by mouth once daily.  What changed:  Another medication with the same name was removed. Continue taking this medication, and follow the directions you see here.  Changed by:  Darian Arango MD     fluticasone 50 mcg/actuation nasal spray  Commonly known  as:  FLONASE  SPRAY 2 SPRAYS IN EACH NOSTRIL EVERY DAY  What changed:    · how much to take  · how to take this  · when to take this     omeprazole 20 MG tablet  Commonly known as:  PriLOSEC OTC  Take 1 tablet (20 mg total) by mouth every morning. Take 30 minutes before your first protein containing meal.  What changed:    · when to take this  · reasons to take this  · additional instructions        CONTINUE taking these medications    acetaminophen 500 MG tablet  Commonly known as:  TYLENOL     aspirin 81 MG Chew     atorvastatin 20 MG tablet  Commonly known as:  LIPITOR  Take 1 tablet (20 mg total) by mouth nightly.     COLD-EEZE ORAL     dorzolamide-timolol 2-0.5% 22.3-6.8 mg/mL ophthalmic solution  Commonly known as:  COSOPT  Place 1 drop into the right eye 2 (two) times daily.     econazole nitrate 1 % cream     estradiol 0.01 % (0.1 mg/gram) vaginal cream  Commonly known as:  ESTRACE  Place 1 g vaginally daily as needed.     fish oil-omega-3 fatty acids 300-1,000 mg capsule     fluorouracil 5 % cream  Commonly known as:  EFUDEX     guaiFENesin 600 mg 12 hr tablet  Commonly known as:  MUCINEX     losartan 50 MG tablet  Commonly known as:  COZAAR  Take 1 tablet (50 mg total) by mouth every morning.     prednisoLONE acetate 1 % Drps  Commonly known as:  PRED FORTE     pseudoephedrine 30 MG tablet  Commonly known as:  SUDAFED     SALINE NASAL 0.65 % nasal spray  Generic drug:  sodium chloride     STRESS FORMULA WITH ZINC Tab  Generic drug:  multivit,stress formula-zinc     SYMBICORT 160-4.5 mcg/actuation Hfaa  Generic drug:  budesonide-formoterol 160-4.5 mcg  INHALE 2 PUFFS BY MOUTH TWICE DAILY     traZODone 50 MG tablet  Commonly known as:  DESYREL  Take 1 tablet (50 mg total) by mouth nightly as needed for Insomnia. May take up to 2 tabs per night prn     zolpidem 12.5 MG CR tablet  Commonly known as:  AMBIEN CR        STOP taking these medications    hydrOXYzine HCl 25 MG tablet  Commonly known as:   "ATARAX  Stopped by:  Darian Arango MD           Where to Get Your Medications      These medications were sent to [id number] - [place] - [address] AT [facility] [address]  [address] [address]    Phone:  [phone]   · albuterol 90 mcg/actuation inhaler  · FLUoxetine 10 MG capsule       Wellness reviewed  Continue current management    Counseled on regular exercise, maintenance of a healthy weight, balanced diet rich in fruits/vegetables and lean protein, and avoidance of unhealthy habits like smoking and excessive alcohol intake.   Also, counseled on importance of being compliant with medication, health appointments, diet and exercise.     Follow-up in about 6 months (around 7/25/2019).    "This note will not be shared with the patient."  "

## 2019-03-10 DIAGNOSIS — I10 ESSENTIAL HYPERTENSION: ICD-10-CM

## 2019-03-11 ENCOUNTER — OFFICE VISIT (OUTPATIENT)
Dept: OPHTHALMOLOGY | Facility: CLINIC | Age: 74
End: 2019-03-11
Payer: MEDICARE

## 2019-03-11 ENCOUNTER — TELEPHONE (OUTPATIENT)
Dept: OPHTHALMOLOGY | Facility: CLINIC | Age: 74
End: 2019-03-11

## 2019-03-11 DIAGNOSIS — H33.001 RETINAL DETACHMENT, RHEGMATOGENOUS, RIGHT EYE: ICD-10-CM

## 2019-03-11 DIAGNOSIS — H33.001 RHEGMATOGENOUS RETINAL DETACHMENT OF RIGHT EYE: Primary | ICD-10-CM

## 2019-03-11 DIAGNOSIS — H33.021 RETINAL DETACHMENT OF RIGHT EYE WITH MULTIPLE BREAKS: Primary | ICD-10-CM

## 2019-03-11 PROCEDURE — 99999 PR PBB SHADOW E&M-EST. PATIENT-LVL IV: ICD-10-PCS | Mod: PBBFAC,HCNC,, | Performed by: OPHTHALMOLOGY

## 2019-03-11 PROCEDURE — 92226 PR SPECIAL EYE EXAM, SUBSEQUENT: ICD-10-PCS | Mod: HCNC,RT,S$GLB, | Performed by: OPHTHALMOLOGY

## 2019-03-11 PROCEDURE — 92014 PR EYE EXAM, EST PATIENT,COMPREHESV: ICD-10-PCS | Mod: HCNC,S$GLB,, | Performed by: OPHTHALMOLOGY

## 2019-03-11 PROCEDURE — 99999 PR PBB SHADOW E&M-EST. PATIENT-LVL IV: CPT | Mod: PBBFAC,HCNC,, | Performed by: OPHTHALMOLOGY

## 2019-03-11 PROCEDURE — 92014 COMPRE OPH EXAM EST PT 1/>: CPT | Mod: HCNC,S$GLB,, | Performed by: OPHTHALMOLOGY

## 2019-03-11 PROCEDURE — 92226 PR SPECIAL EYE EXAM, SUBSEQUENT: CPT | Mod: HCNC,RT,S$GLB, | Performed by: OPHTHALMOLOGY

## 2019-03-11 NOTE — PROGRESS NOTES
HPI     Eye Problem      Additional comments: 3 mos check              Comments     DLS 12/3/18- No new concerns x last visit    PF daily OD  cosopt BID OD    HPI     IOP check   DLS-11/19/2018  Dr. Chaves     Pt sts no change in va since last visit denies pain   (+)Flashes white circles but nothing new  (+)Floaters no change   (-)Photophobia  (-)Glare     Eye Med(s) -   PF Q day OD                          Cosopt BID OD    Refresh tears OS PRN         Prior OCT - RD OD  OS - No ME      A/P    1. RRD OD   Macula involving x 4  Days    S/p  25g PPV/EL/SF6 OD for RRD 9/6/17  IOP improved    10/30/17 - Recurrent IT RD with early PVR and small break    s/p 25g PPV/membrane stripping/inferior retinopexy/C3F8 OD for RRD/PVR OD 11/1/17 12/4/17 - recurrent inferior RD - small holes posterior to laser    s/p 25g PPV/EL/AFx/1000cs Silicone Oil OD for Recurrent RRD with PVR 12/6/17    Doing well, good IOP    1/9/18 increasing inferior fluid collection beneath oil into macula - needs SB - multiple small break    s/p /270, 25g PPV/SO removal/membrane stripping/AFx/EL/1000cs Silicone Oil OD for RRD/PVR 1/10/18    Doing well, good IOP  Had ST scleral dehiscence - closed with 5-0 [place]    Side sleep or stomach    Inferior PVR OD with shallow submacular fluid   IOP low - stop Cosopt  Will likely need inferior retinectomy    2/18 - some progression    s/p 25 PPV/SO removal/membrane stripping/inferior retinectomy/posterior capsulectomy/EL/Leonora oil OD for RRD with PVR and PCO OD 4/11/18    Stable inferonasal RD under oil  IOP increased  - continue COsopt BID  Continue PF Q day     Will need heavier inferonasal laser during oil removal 3-6 months (around 4/19)    PLan 25g PPV/1000cs SO removal/EL/AFx/C3F8 OD     LMA  LOC 60 minutes    Risks, benefits, and alternatives to treatment discussed in detail with the patient.  The patient voiced understanding and wished to proceed with the procedure       2. PCIOL OU    3. HTN    Good BP control    4. PVD OS  No breaks OS      TO OR

## 2019-03-12 RX ORDER — LOSARTAN POTASSIUM 50 MG/1
TABLET ORAL
Qty: 30 TABLET | Refills: 11 | Status: SHIPPED | OUTPATIENT
Start: 2019-03-12 | End: 2019-07-24 | Stop reason: SDUPTHER

## 2019-03-13 ENCOUNTER — LAB VISIT (OUTPATIENT)
Dept: LAB | Facility: HOSPITAL | Age: 74
End: 2019-03-13
Attending: NURSE PRACTITIONER
Payer: MEDICARE

## 2019-03-13 ENCOUNTER — OFFICE VISIT (OUTPATIENT)
Dept: FAMILY MEDICINE | Facility: CLINIC | Age: 74
End: 2019-03-13
Payer: MEDICARE

## 2019-03-13 VITALS
HEART RATE: 95 BPM | BODY MASS INDEX: 25.12 KG/M2 | DIASTOLIC BLOOD PRESSURE: 86 MMHG | SYSTOLIC BLOOD PRESSURE: 136 MMHG | TEMPERATURE: 98 F | OXYGEN SATURATION: 97 % | WEIGHT: 156.31 LBS | HEIGHT: 66 IN

## 2019-03-13 DIAGNOSIS — B96.89 ACUTE BACTERIAL SINUSITIS: ICD-10-CM

## 2019-03-13 DIAGNOSIS — B37.0 ORAL THRUSH: ICD-10-CM

## 2019-03-13 DIAGNOSIS — J01.90 ACUTE BACTERIAL SINUSITIS: ICD-10-CM

## 2019-03-13 DIAGNOSIS — K13.0 LIP ULCER: ICD-10-CM

## 2019-03-13 DIAGNOSIS — J01.90 ACUTE BACTERIAL SINUSITIS: Primary | ICD-10-CM

## 2019-03-13 DIAGNOSIS — B96.89 ACUTE BACTERIAL SINUSITIS: Primary | ICD-10-CM

## 2019-03-13 LAB
BASOPHILS # BLD AUTO: 0.04 K/UL
BASOPHILS NFR BLD: 0.4 %
DIFFERENTIAL METHOD: ABNORMAL
EOSINOPHIL # BLD AUTO: 0.1 K/UL
EOSINOPHIL NFR BLD: 0.6 %
ERYTHROCYTE [DISTWIDTH] IN BLOOD BY AUTOMATED COUNT: 12.1 %
HCT VFR BLD AUTO: 42.7 %
HGB BLD-MCNC: 14.2 G/DL
IMM GRANULOCYTES # BLD AUTO: 0.03 K/UL
IMM GRANULOCYTES NFR BLD AUTO: 0.3 %
LYMPHOCYTES # BLD AUTO: 2.5 K/UL
LYMPHOCYTES NFR BLD: 26.8 %
MCH RBC QN AUTO: 31.6 PG
MCHC RBC AUTO-ENTMCNC: 33.3 G/DL
MCV RBC AUTO: 95 FL
MONOCYTES # BLD AUTO: 0.7 K/UL
MONOCYTES NFR BLD: 7.7 %
NEUTROPHILS # BLD AUTO: 6 K/UL
NEUTROPHILS NFR BLD: 64.2 %
NRBC BLD-RTO: 0 /100 WBC
PLATELET # BLD AUTO: 382 K/UL
PMV BLD AUTO: 10.1 FL
RBC # BLD AUTO: 4.5 M/UL
WBC # BLD AUTO: 9.35 K/UL

## 2019-03-13 PROCEDURE — 3075F PR MOST RECENT SYSTOLIC BLOOD PRESS GE 130-139MM HG: ICD-10-PCS | Mod: HCNC,CPTII,S$GLB, | Performed by: NURSE PRACTITIONER

## 2019-03-13 PROCEDURE — 99999 PR PBB SHADOW E&M-EST. PATIENT-LVL V: ICD-10-PCS | Mod: PBBFAC,HCNC,, | Performed by: NURSE PRACTITIONER

## 2019-03-13 PROCEDURE — 3075F SYST BP GE 130 - 139MM HG: CPT | Mod: HCNC,CPTII,S$GLB, | Performed by: NURSE PRACTITIONER

## 2019-03-13 PROCEDURE — 85025 COMPLETE CBC W/AUTO DIFF WBC: CPT | Mod: HCNC

## 2019-03-13 PROCEDURE — 3079F PR MOST RECENT DIASTOLIC BLOOD PRESSURE 80-89 MM HG: ICD-10-PCS | Mod: HCNC,CPTII,S$GLB, | Performed by: NURSE PRACTITIONER

## 2019-03-13 PROCEDURE — 1101F PT FALLS ASSESS-DOCD LE1/YR: CPT | Mod: HCNC,CPTII,S$GLB, | Performed by: NURSE PRACTITIONER

## 2019-03-13 PROCEDURE — 3079F DIAST BP 80-89 MM HG: CPT | Mod: HCNC,CPTII,S$GLB, | Performed by: NURSE PRACTITIONER

## 2019-03-13 PROCEDURE — 99214 OFFICE O/P EST MOD 30 MIN: CPT | Mod: HCNC,S$GLB,, | Performed by: NURSE PRACTITIONER

## 2019-03-13 PROCEDURE — 99999 PR PBB SHADOW E&M-EST. PATIENT-LVL V: CPT | Mod: PBBFAC,HCNC,, | Performed by: NURSE PRACTITIONER

## 2019-03-13 PROCEDURE — 36415 COLL VENOUS BLD VENIPUNCTURE: CPT | Mod: HCNC,PO

## 2019-03-13 PROCEDURE — 99214 PR OFFICE/OUTPT VISIT, EST, LEVL IV, 30-39 MIN: ICD-10-PCS | Mod: HCNC,S$GLB,, | Performed by: NURSE PRACTITIONER

## 2019-03-13 PROCEDURE — 1101F PR PT FALLS ASSESS DOC 0-1 FALLS W/OUT INJ PAST YR: ICD-10-PCS | Mod: HCNC,CPTII,S$GLB, | Performed by: NURSE PRACTITIONER

## 2019-03-13 RX ORDER — MUPIROCIN 20 MG/G
OINTMENT TOPICAL 2 TIMES DAILY
Qty: 22 G | Refills: 0 | Status: SHIPPED | OUTPATIENT
Start: 2019-03-13 | End: 2022-06-30

## 2019-03-13 RX ORDER — AZITHROMYCIN 250 MG/1
TABLET, FILM COATED ORAL
Qty: 6 TABLET | Refills: 0 | Status: SHIPPED | OUTPATIENT
Start: 2019-03-13 | End: 2019-03-18

## 2019-03-13 RX ORDER — CLOTRIMAZOLE 10 MG/1
10 LOZENGE ORAL; TOPICAL 4 TIMES DAILY
Qty: 40 TABLET | Refills: 0 | Status: SHIPPED | OUTPATIENT
Start: 2019-03-13 | End: 2019-03-23

## 2019-03-13 NOTE — PROGRESS NOTES
Subjective:       Patient ID: Madison Long is a 73 y.o. female.    Chief Complaint: Cough and Mouth Lesions    Patient had cough a couple of weeks ago. Started to use symbicort and albuterol and mucinex cough medications, flonase and saline nasal sprays, and sudaphed.  Still not improving. She has developed an ulceration inside her mouth on the lower right side, she has noticed some redness and irritation in her mouth from using the symbicort.  She is scheduled to have surgery in 2 weeks.     Zoster Vaccine due on 04/13/2006  Mammogram due on 03/23/2019    Past Medical History:  Past Medical History:  No date: Allergy  No date: Asthma  No date: Basal cell carcinoma  No date: COPD (chronic obstructive pulmonary disease)  No date: GERD (gastroesophageal reflux disease)  No date: Hyperlipidemia  No date: Hypertension  No date: Retinal detachment  Past Surgical History:  No date: BASAL CELL CARCINOMA EXCISION  No date: BREAST CYST EXCISION; Left      Comment:  long time ago, bening  No date: CATARACT EXTRACTION  2013: COLONOSCOPY  8/1/2018: COLONOSCOPY; N/A      Comment:  Performed by Dung Durant MD at [facility] ENDO  3/1/2013: COLONOSCOPY; N/A      Comment:  Performed by Vanec Anderson MD at [facility] ENDO (4TH                FLR)  4/11/2018: ENDOLASER-EYE      Comment:  Performed by JANEL Chaves MD at [facility] OR 1ST FLR  4/11/2018: EXCHANGE-AIR/FLUID-EYE      Comment:  Performed by JANEL Chaves MD at [facility] OR 1ST FLR  No date: EYE SURGERY      Comment:  cataracts & retinea detachment  No date: HYSTERECTOMY  4/11/2018: PEELING-EPI RETINAL MEMBRANE      Comment:  Performed by JANEL Chaves MD at [facility] OR 1ST FLR  4/11/2018: REPAIR-RETINA (VITRECTOMY); Right      Comment:  Performed by JANEL Chaves MD at [facility] OR [facility] FLR  1/10/2018: REPAIR-RETINA (VITRECTOMY); Right      Comment:  Performed by JANEL Chaves MD at [facility] OR [facility] FLR  12/6/2017: REPAIR-RETINA (VITRECTOMY); Right       Comment:  Performed by JANEL Chaves MD at [facility] OR 1ST FLR  11/1/2017: REPAIR-RETINA (VITRECTOMY); Right      Comment:  [facility]  No date: UPPER GASTROINTESTINAL ENDOSCOPY  4/11/2018: VITRECTOMY-PARS PLANA      Comment:  Performed by JANEL Chaves MD at [facility] OR 1ST FLR  9/6/2017: VITRECTOMY-PARS PLANA; Right      Comment:  [facility]  Review of patient's allergies indicates:   -- Penicillins -- Hives   -- Penicillin -- Hives   -- Ciprofloxacin     --  Muscle weakness and pain   -- Doxycycline -- Other (See Comments)    --  unknown   -- Oxycodone -- Anxiety  Current Outpatient Medications on File Prior to Visit:  acetaminophen (TYLENOL) 500 MG tablet, Take 500 mg by mouth every 6 (six) hours as needed for Pain., Disp: , Rfl:   albuterol (VENTOLIN HFA) 90 mcg/actuation inhaler, Inhale 2 puffs into the lungs every 6 (six) hours as needed. Rescue, Disp: 18 g, Rfl: 3  aspirin 81 MG Chew, Take 81 mg by mouth daily as needed. Usually takes about every 2 weeks., Disp: , Rfl:   atorvastatin (LIPITOR) 20 MG tablet, Take 1 tablet (20 mg total) by mouth nightly., Disp: 90 tablet, Rfl: 3  dorzolamide-timolol 2-0.5% (COSOPT) 22.3-6.8 mg/mL ophthalmic solution, Place 1 drop into the right eye 2 (two) times daily., Disp: 10 mL, Rfl: 6  econazole nitrate 1 % cream, Apply topically 2 (two) times daily as needed. Twice a day  PRN, Disp: , Rfl:   fish oil-omega-3 fatty acids 300-1,000 mg capsule, Take 1 g by mouth every morning. , Disp: , Rfl:   fluorouracil (EFUDEX) 5 % cream, CHRISTIAN AA BID FOR 2 WEEKS PRN, Disp: , Rfl: 0  FLUoxetine 10 MG capsule, Take 1 capsule (10 mg total) by mouth once daily., Disp: 90 capsule, Rfl: 3  fluticasone (FLONASE) 50 mcg/actuation nasal spray, SPRAY 2 SPRAYS IN EACH NOSTRIL EVERY DAY (Patient taking differently: SPRAY 2 SPRAYS IN EACH NOSTRIL EVERY DAY PRN), Disp: 1 Bottle, Rfl: 6  guaifenesin  (MUCINEX) 600 mg 12 hr tablet, Take 1,200 mg by mouth 2 (two) times daily as needed. , Disp: , Rfl:   losartan (COZAAR) 50 MG tablet, TAKE 1 TABLET(50 MG) BY MOUTH EVERY MORNING, Disp: 30 tablet, Rfl: 11  multivit,stress formula-zinc (STRESS FORMULA WITH ZINC) Tab, Take 1 tablet by mouth every morning. Uses Alive, Disp: , Rfl:   prednisoLONE acetate (PRED FORTE) 1 % DrpS, Place 1 drop into the right eye once daily., Disp: , Rfl:   pseudoephedrine (SUDAFED) 30 MG tablet, Take 30 mg by mouth every 4 (four) hours as needed for Congestion., Disp: , Rfl:   sodium chloride (SALINE NASAL) 0.65 % nasal spray, 1 spray by Nasal route as needed for Congestion., Disp: , Rfl:   SYMBICORT 160-4.5 mcg/actuation HFAA, INHALE 2 PUFFS BY MOUTH TWICE DAILY, Disp: 10.2 g, Rfl: 11  traZODone (DESYREL) 50 MG tablet, Take 1 tablet (50 mg total) by mouth nightly as needed for Insomnia. May take up to 2 tabs per night prn, Disp: 60 tablet, Rfl: 5  zinc gluconate (COLD-EEZE ORAL), Take 1 lozenge by mouth daily as needed., Disp: , Rfl:   zolpidem (AMBIEN CR) 12.5 MG CR tablet, , Disp: , Rfl: 4  estradiol (ESTRACE) 0.01 % (0.1 mg/gram) vaginal cream, Place 1 g vaginally daily as needed., Disp: 42.5 g, Rfl: 3  omeprazole (PRILOSEC OTC) 20 MG tablet, Take 1 tablet (20 mg total) by mouth every morning. Take 30 minutes before your first protein containing meal. (Patient taking differently: Take 20 mg by mouth daily as needed. Take 30 minutes before your first protein containing meal.), Disp: 30 tablet, Rfl: 11    No current facility-administered medications on file prior to visit.     Social History    Socioeconomic History      Marital status:       Spouse name: Not on file      Number of children: 0      Years of education: Not on file      Highest education level: Not on file    Social Needs      Financial resource strain: Not on file      Food insecurity - worry: Not on file      Food insecurity - inability: Not on file       Transportation needs - medical: Not on file      Transportation needs - non-medical: Not on file    Occupational History        Employer: [employer]    Tobacco Use      Smoking status: Never Smoker      Smokeless tobacco: Never Used    Substance and Sexual Activity      Alcohol use: Yes        Alcohol/week: 1.2 oz        Types: 2 Glasses of wine per week        Comment: socially      Drug use: No      Sexual activity: Not on file    Other Topics      Concerns:        Are you pregnant or think you may be?: Not Asked        Breast-feeding: Not Asked    Social History Narrative      Not on file    Review of patient's family history indicates:  Problem: Asthma      Relation: Mother          Age of Onset: (Not Specified)  Problem: Stroke      Relation: Father          Age of Onset: 77          Comment: cva  Problem: Colon cancer      Relation: Neg Hx          Age of Onset: (Not Specified)  Problem: Colon polyps      Relation: Neg Hx          Age of Onset: (Not Specified)  Problem: Celiac disease      Relation: Neg Hx          Age of Onset: (Not Specified)  Problem: Esophageal cancer      Relation: Neg Hx          Age of Onset: (Not Specified)  Problem: Stomach cancer      Relation: Neg Hx          Age of Onset: (Not Specified)  Problem: Irritable bowel syndrome      Relation: Neg Hx          Age of Onset: (Not Specified)  Problem: Inflammatory bowel disease      Relation: Neg Hx          Age of Onset: (Not Specified)            Review of Systems   Constitutional: Positive for diaphoresis and fatigue. Negative for chills and fever.   HENT: Positive for mouth sores, postnasal drip, rhinorrhea and sore throat.    Respiratory: Positive for cough.    Cardiovascular: Negative.    Gastrointestinal: Negative.        Objective:      Physical Exam   Constitutional: She is oriented to person, place, and time. No distress.   HENT:   Head: Normocephalic and atraumatic.       Nose: Mucosal edema and rhinorrhea present.    Mouth/Throat: Posterior oropharyngeal erythema present.       Eyes: Pupils are equal, round, and reactive to light.   Cardiovascular: Normal rate and regular rhythm. Exam reveals no friction rub.   No murmur heard.  Pulmonary/Chest: Effort normal and breath sounds normal. No stridor. No respiratory distress.   Musculoskeletal: She exhibits no edema.   Neurological: She is alert and oriented to person, place, and time.   Skin: She is not diaphoretic.   Psychiatric: She has a normal mood and affect.   Vitals reviewed.      Assessment:       1. Acute bacterial sinusitis    2. Oral thrush    3. Lip ulcer        Plan:       1. Acute bacterial sinusitis  Continue flonase and mucinex as needed.   - CBC auto differential; Future  - azithromycin (Z-KISHORE) 250 MG tablet; Take 2 tablets by mouth on day 1; Take 1 tablet by mouth on days 2-5  Dispense: 6 tablet; Refill: 0    2. Oral thrush    Nystatin oral, rinse mouth after using symbicort.     3. Lip ulcer  Follow up if not resolving.   - mupirocin (BACTROBAN) 2 % ointment; Apply topically 2 (two) times daily.  Dispense: 22 g; Refill: 0

## 2019-03-14 ENCOUNTER — TELEPHONE (OUTPATIENT)
Dept: FAMILY MEDICINE | Facility: CLINIC | Age: 74
End: 2019-03-14

## 2019-03-14 DIAGNOSIS — B37.31 YEAST VAGINITIS: Primary | ICD-10-CM

## 2019-03-14 DIAGNOSIS — R79.89 ELEVATED PLATELET COUNT: Primary | ICD-10-CM

## 2019-03-14 RX ORDER — FLUCONAZOLE 150 MG/1
150 TABLET ORAL DAILY
Qty: 1 TABLET | Refills: 0 | Status: SHIPPED | OUTPATIENT
Start: 2019-03-14 | End: 2019-03-15

## 2019-03-14 NOTE — TELEPHONE ENCOUNTER
Pt does not feel comfortable about using the neosporin,  she will use vaseline. You told her that she has yeast in the crease of her mouth.   1.)She wants to look with over records and see what she needs.   Her vagina started getting dry and ichy last night. She uses estrace cream for vaginal dryness. Does she need diflucan?   2) she had one watery diarrhea stool this am. She said one of the medicines you gave her said to let the doctor know if has diarrhea with medication.   3)can she continue mucinex with fungal infection?   I asked pt if she needed to come back in due to many questions. She said no. Just send questions to cornelio.   Since she is going to have surgery soon.   Please call her.

## 2019-03-14 NOTE — TELEPHONE ENCOUNTER
Pt verbalizes understanding that yeast is indide of mouth and that is what nystatin is for. The crease in her mouth is from a cut with scissors and cornelio wanted her to use neosporin in the crease to prevent bacterial infection. The vaginal problem may be yeast if she is having a discharge. If having a discharge then she can use the diflucan that she has called into her pharmacy. If it does not resolve then she needs to see GYN. The mucinex is not going to cause any issues. The zithromax can cause diarrhea. If her diarrhea continues or increased in quantity you need to call cornelio and let her know.

## 2019-03-14 NOTE — TELEPHONE ENCOUNTER
She has yeast inside of her mouth, that is what the bnystatin is for, the crease of her mouth may be yeast, but I gave her the neosporin because she cut that area with scissors and I was concerned about bacterial infection. If she is having vaginal symptoms now it could be yeast if she has discharge, but if she does not then it may not be yeast, I can send in one diflucan for her to take, sent to [facility], if that does not resolve the issue then she would need to see gyn. The mucinex is not going to cause any issues.   Zithromax can sometimes cause diarrhea as a side effect, she can take an OTC probiotic. If she has multiple diarrhea stools then she should let us know.

## 2019-03-14 NOTE — TELEPHONE ENCOUNTER
----- Message from Mo Bray sent at 3/14/2019 11:47 AM CDT -----  Type: Needs Medical Advice    Who Called:  Patient  Best Call Back Number: [phone] (home)   Additional Information: Patient has a question regarding the mupirocin (BACTROBAN) 2 % ointment she was instructed to put in the creases of your mouth, but on the box it advises not to use around mouth, eyes, or nose. Would like to talk to office about this before starting. Please call to advise.

## 2019-03-14 NOTE — TELEPHONE ENCOUNTER
It is ok for her to use in the crease of her mouth, she should not put it inside her mouth, use it right before eating, or lick her lip frequently while it is on that area. That ointment is used inside the nose frequently. If she would prefer nt to use it then she can use Vaseline in the area instead.

## 2019-03-21 ENCOUNTER — LAB VISIT (OUTPATIENT)
Dept: LAB | Facility: HOSPITAL | Age: 74
End: 2019-03-21
Attending: NURSE PRACTITIONER
Payer: MEDICARE

## 2019-03-21 DIAGNOSIS — R79.89 ELEVATED PLATELET COUNT: ICD-10-CM

## 2019-03-21 LAB
BASOPHILS # BLD AUTO: 0.05 K/UL
BASOPHILS NFR BLD: 0.7 %
DIFFERENTIAL METHOD: ABNORMAL
EOSINOPHIL # BLD AUTO: 0.2 K/UL
EOSINOPHIL NFR BLD: 2.4 %
ERYTHROCYTE [DISTWIDTH] IN BLOOD BY AUTOMATED COUNT: 12.2 %
HCT VFR BLD AUTO: 41.5 %
HGB BLD-MCNC: 13.7 G/DL
IMM GRANULOCYTES # BLD AUTO: 0.02 K/UL
IMM GRANULOCYTES NFR BLD AUTO: 0.3 %
LYMPHOCYTES # BLD AUTO: 2.3 K/UL
LYMPHOCYTES NFR BLD: 34.6 %
MCH RBC QN AUTO: 31.9 PG
MCHC RBC AUTO-ENTMCNC: 33 G/DL
MCV RBC AUTO: 97 FL
MONOCYTES # BLD AUTO: 0.6 K/UL
MONOCYTES NFR BLD: 8.8 %
NEUTROPHILS # BLD AUTO: 3.6 K/UL
NEUTROPHILS NFR BLD: 53.2 %
NRBC BLD-RTO: 0 /100 WBC
PLATELET # BLD AUTO: 295 K/UL
PMV BLD AUTO: 10.7 FL
RBC # BLD AUTO: 4.29 M/UL
WBC # BLD AUTO: 6.71 K/UL

## 2019-03-21 PROCEDURE — 85025 COMPLETE CBC W/AUTO DIFF WBC: CPT | Mod: HCNC

## 2019-03-21 PROCEDURE — 36415 COLL VENOUS BLD VENIPUNCTURE: CPT | Mod: HCNC,PO

## 2019-03-25 ENCOUNTER — TELEPHONE (OUTPATIENT)
Dept: FAMILY MEDICINE | Facility: CLINIC | Age: 74
End: 2019-03-25

## 2019-03-25 NOTE — TELEPHONE ENCOUNTER
----- Message from Michael Nava sent at 3/25/2019 11:49 AM CDT -----  Contact: Patient  Advised needs to speak with the nurse regarding her test results.  Please call [phone] (C)

## 2019-03-26 ENCOUNTER — TELEPHONE (OUTPATIENT)
Dept: OPHTHALMOLOGY | Facility: CLINIC | Age: 74
End: 2019-03-26

## 2019-03-27 ENCOUNTER — ANESTHESIA EVENT (OUTPATIENT)
Dept: SURGERY | Facility: HOSPITAL | Age: 74
End: 2019-03-27
Payer: MEDICARE

## 2019-03-27 ENCOUNTER — ANESTHESIA (OUTPATIENT)
Dept: SURGERY | Facility: HOSPITAL | Age: 74
End: 2019-03-27
Payer: MEDICARE

## 2019-03-27 ENCOUNTER — HOSPITAL ENCOUNTER (OUTPATIENT)
Facility: HOSPITAL | Age: 74
Discharge: HOME | End: 2019-03-27
Attending: OPHTHALMOLOGY | Admitting: OPHTHALMOLOGY
Payer: MEDICARE

## 2019-03-27 VITALS
RESPIRATION RATE: 18 BRPM | OXYGEN SATURATION: 97 % | SYSTOLIC BLOOD PRESSURE: 132 MMHG | DIASTOLIC BLOOD PRESSURE: 69 MMHG | TEMPERATURE: 97 F | HEART RATE: 66 BPM | WEIGHT: 156 LBS | HEIGHT: 66 IN | BODY MASS INDEX: 25.07 KG/M2

## 2019-03-27 DIAGNOSIS — H33.001 RETINAL DETACHMENT, RHEGMATOGENOUS, RIGHT EYE: ICD-10-CM

## 2019-03-27 DIAGNOSIS — H33.21 RETINAL DETACHMENT, RIGHT: ICD-10-CM

## 2019-03-27 DIAGNOSIS — H35.21 PROLIFERATIVE VITREORETINOPATHY, RIGHT: ICD-10-CM

## 2019-03-27 DIAGNOSIS — H33.021 RETINAL DETACHMENT OF RIGHT EYE WITH MULTIPLE BREAKS: Primary | ICD-10-CM

## 2019-03-27 PROCEDURE — 27201423 OPTIME MED/SURG SUP & DEVICES STERILE SUPPLY: Mod: HCNC | Performed by: OPHTHALMOLOGY

## 2019-03-27 PROCEDURE — 27600004 OPTIME MED/SURG SUP & DEVICES INTRAOCULAR LENS: Mod: HCNC | Performed by: OPHTHALMOLOGY

## 2019-03-27 PROCEDURE — S0020 INJECTION, BUPIVICAINE HYDRO: HCPCS | Mod: HCNC | Performed by: OPHTHALMOLOGY

## 2019-03-27 PROCEDURE — 36000707: Mod: HCNC | Performed by: OPHTHALMOLOGY

## 2019-03-27 PROCEDURE — 36000706: Mod: HCNC | Performed by: OPHTHALMOLOGY

## 2019-03-27 PROCEDURE — 25000003 PHARM REV CODE 250: Mod: HCNC | Performed by: NURSE ANESTHETIST, CERTIFIED REGISTERED

## 2019-03-27 PROCEDURE — 67108 REPAIR DETACHED RETINA: CPT | Mod: HCNC,RT,, | Performed by: OPHTHALMOLOGY

## 2019-03-27 PROCEDURE — C1784 OCULAR DEV, INTRAOP, DET RET: HCPCS | Mod: HCNC | Performed by: OPHTHALMOLOGY

## 2019-03-27 PROCEDURE — 63600175 PHARM REV CODE 636 W HCPCS: Mod: HCNC | Performed by: ANESTHESIOLOGY

## 2019-03-27 PROCEDURE — 63600175 PHARM REV CODE 636 W HCPCS: Mod: HCNC | Performed by: NURSE ANESTHETIST, CERTIFIED REGISTERED

## 2019-03-27 PROCEDURE — 25000003 PHARM REV CODE 250: Mod: HCNC | Performed by: OPHTHALMOLOGY

## 2019-03-27 PROCEDURE — 37000008 HC ANESTHESIA 1ST 15 MINUTES: Mod: HCNC | Performed by: OPHTHALMOLOGY

## 2019-03-27 PROCEDURE — 71000015 HC POSTOP RECOV 1ST HR: Mod: HCNC | Performed by: OPHTHALMOLOGY

## 2019-03-27 PROCEDURE — 71000044 HC DOSC ROUTINE RECOVERY FIRST HOUR: Mod: HCNC | Performed by: OPHTHALMOLOGY

## 2019-03-27 PROCEDURE — 67108 PR REPAIR DETACH RETINA,W VITRECTOMY: ICD-10-PCS | Mod: HCNC,RT,, | Performed by: OPHTHALMOLOGY

## 2019-03-27 PROCEDURE — 27200651 HC AIRWAY, LMA: Mod: HCNC | Performed by: NURSE ANESTHETIST, CERTIFIED REGISTERED

## 2019-03-27 PROCEDURE — D9220A PRA ANESTHESIA: Mod: HCNC,ANES,, | Performed by: ANESTHESIOLOGY

## 2019-03-27 PROCEDURE — 37000009 HC ANESTHESIA EA ADD 15 MINS: Mod: HCNC | Performed by: OPHTHALMOLOGY

## 2019-03-27 PROCEDURE — D9220A PRA ANESTHESIA: ICD-10-PCS | Mod: HCNC,ANES,, | Performed by: ANESTHESIOLOGY

## 2019-03-27 PROCEDURE — 63600175 PHARM REV CODE 636 W HCPCS: Mod: HCNC | Performed by: OPHTHALMOLOGY

## 2019-03-27 RX ORDER — ONDANSETRON 8 MG/1
8 TABLET, ORALLY DISINTEGRATING ORAL EVERY 8 HOURS PRN
Status: DISCONTINUED | OUTPATIENT
Start: 2019-03-27 | End: 2019-03-27 | Stop reason: HOSPADM

## 2019-03-27 RX ORDER — HYDROCODONE BITARTRATE AND ACETAMINOPHEN 10; 325 MG/1; MG/1
1 TABLET ORAL EVERY 4 HOURS PRN
Qty: 12 TABLET | Refills: 0 | Status: SHIPPED | OUTPATIENT
Start: 2019-03-27 | End: 2019-07-22

## 2019-03-27 RX ORDER — BUPIVACAINE HYDROCHLORIDE 7.5 MG/ML
INJECTION, SOLUTION EPIDURAL; RETROBULBAR
Status: DISCONTINUED | OUTPATIENT
Start: 2019-03-27 | End: 2019-03-27 | Stop reason: HOSPADM

## 2019-03-27 RX ORDER — LIDOCAINE HYDROCHLORIDE 20 MG/ML
INJECTION, SOLUTION EPIDURAL; INFILTRATION; INTRACAUDAL; PERINEURAL
Status: DISCONTINUED
Start: 2019-03-27 | End: 2019-03-27 | Stop reason: HOSPADM

## 2019-03-27 RX ORDER — PHENYLEPHRINE HYDROCHLORIDE 10 MG/ML
INJECTION INTRAVENOUS
Status: DISCONTINUED | OUTPATIENT
Start: 2019-03-27 | End: 2019-03-27

## 2019-03-27 RX ORDER — HYDROCODONE BITARTRATE AND ACETAMINOPHEN 5; 325 MG/1; MG/1
TABLET ORAL
Status: DISCONTINUED
Start: 2019-03-27 | End: 2019-03-27 | Stop reason: HOSPADM

## 2019-03-27 RX ORDER — ONDANSETRON 4 MG/1
4 TABLET, FILM COATED ORAL EVERY 8 HOURS PRN
Qty: 12 TABLET | Refills: 0 | Status: SHIPPED | OUTPATIENT
Start: 2019-03-27 | End: 2019-04-01

## 2019-03-27 RX ORDER — ONDANSETRON 2 MG/ML
4 INJECTION INTRAMUSCULAR; INTRAVENOUS DAILY PRN
Status: DISCONTINUED | OUTPATIENT
Start: 2019-03-27 | End: 2019-03-27 | Stop reason: HOSPADM

## 2019-03-27 RX ORDER — LIDOCAINE HCL/PF 100 MG/5ML
SYRINGE (ML) INTRAVENOUS
Status: DISCONTINUED | OUTPATIENT
Start: 2019-03-27 | End: 2019-03-27

## 2019-03-27 RX ORDER — DEXAMETHASONE SODIUM PHOSPHATE 4 MG/ML
INJECTION, SOLUTION INTRA-ARTICULAR; INTRALESIONAL; INTRAMUSCULAR; INTRAVENOUS; SOFT TISSUE
Status: DISCONTINUED | OUTPATIENT
Start: 2019-03-27 | End: 2019-03-27 | Stop reason: HOSPADM

## 2019-03-27 RX ORDER — VANCOMYCIN HYDROCHLORIDE 500 MG/10ML
INJECTION, POWDER, LYOPHILIZED, FOR SOLUTION INTRAVENOUS
Status: DISCONTINUED | OUTPATIENT
Start: 2019-03-27 | End: 2019-03-27 | Stop reason: HOSPADM

## 2019-03-27 RX ORDER — DEXAMETHASONE SODIUM PHOSPHATE 4 MG/ML
INJECTION, SOLUTION INTRA-ARTICULAR; INTRALESIONAL; INTRAMUSCULAR; INTRAVENOUS; SOFT TISSUE
Status: DISCONTINUED
Start: 2019-03-27 | End: 2019-03-27 | Stop reason: HOSPADM

## 2019-03-27 RX ORDER — FENTANYL CITRATE 50 UG/ML
INJECTION, SOLUTION INTRAMUSCULAR; INTRAVENOUS
Status: DISCONTINUED | OUTPATIENT
Start: 2019-03-27 | End: 2019-03-27

## 2019-03-27 RX ORDER — EPHEDRINE SULFATE 50 MG/ML
INJECTION, SOLUTION INTRAVENOUS
Status: DISCONTINUED | OUTPATIENT
Start: 2019-03-27 | End: 2019-03-27

## 2019-03-27 RX ORDER — TROPICAMIDE 10 MG/ML
1 SOLUTION/ DROPS OPHTHALMIC
Status: DISCONTINUED | OUTPATIENT
Start: 2019-03-27 | End: 2019-03-27 | Stop reason: HOSPADM

## 2019-03-27 RX ORDER — SODIUM CHLORIDE 0.9 % (FLUSH) 0.9 %
3 SYRINGE (ML) INJECTION
Status: DISCONTINUED | OUTPATIENT
Start: 2019-03-27 | End: 2019-03-27 | Stop reason: HOSPADM

## 2019-03-27 RX ORDER — SODIUM CHLORIDE 9 MG/ML
INJECTION, SOLUTION INTRAVENOUS CONTINUOUS PRN
Status: DISCONTINUED | OUTPATIENT
Start: 2019-03-27 | End: 2019-03-27

## 2019-03-27 RX ORDER — NEOMYCIN SULFATE, POLYMYXIN B SULFATE, AND DEXAMETHASONE 3.5; 10000; 1 MG/G; [USP'U]/G; MG/G
OINTMENT OPHTHALMIC
Status: DISCONTINUED | OUTPATIENT
Start: 2019-03-27 | End: 2019-03-27 | Stop reason: HOSPADM

## 2019-03-27 RX ORDER — MOXIFLOXACIN 5 MG/ML
1 SOLUTION/ DROPS OPHTHALMIC
Status: DISCONTINUED | OUTPATIENT
Start: 2019-03-27 | End: 2019-03-27 | Stop reason: HOSPADM

## 2019-03-27 RX ORDER — PHENYLEPHRINE HYDROCHLORIDE 25 MG/ML
1 SOLUTION/ DROPS OPHTHALMIC
Status: DISCONTINUED | OUTPATIENT
Start: 2019-03-27 | End: 2019-03-27 | Stop reason: HOSPADM

## 2019-03-27 RX ORDER — HYDROCODONE BITARTRATE AND ACETAMINOPHEN 5; 325 MG/1; MG/1
1 TABLET ORAL EVERY 4 HOURS PRN
Status: DISCONTINUED | OUTPATIENT
Start: 2019-03-27 | End: 2019-03-27 | Stop reason: HOSPADM

## 2019-03-27 RX ORDER — OXYCODONE AND ACETAMINOPHEN 5; 325 MG/1; MG/1
1 TABLET ORAL EVERY 6 HOURS PRN
Qty: 12 TABLET | Refills: 0 | Status: SHIPPED | OUTPATIENT
Start: 2019-03-27 | End: 2019-04-01

## 2019-03-27 RX ORDER — MIDAZOLAM HYDROCHLORIDE 1 MG/ML
INJECTION, SOLUTION INTRAMUSCULAR; INTRAVENOUS
Status: DISCONTINUED | OUTPATIENT
Start: 2019-03-27 | End: 2019-03-27

## 2019-03-27 RX ORDER — PREDNISOLONE ACETATE 10 MG/ML
1 SUSPENSION/ DROPS OPHTHALMIC
Status: DISCONTINUED | OUTPATIENT
Start: 2019-03-27 | End: 2019-03-27 | Stop reason: HOSPADM

## 2019-03-27 RX ORDER — NEOMYCIN SULFATE, POLYMYXIN B SULFATE, AND DEXAMETHASONE 3.5; 10000; 1 MG/G; [USP'U]/G; MG/G
OINTMENT OPHTHALMIC
Status: DISCONTINUED
Start: 2019-03-27 | End: 2019-03-27 | Stop reason: HOSPADM

## 2019-03-27 RX ORDER — TETRACAINE HYDROCHLORIDE 5 MG/ML
1 SOLUTION OPHTHALMIC
Status: DISCONTINUED | OUTPATIENT
Start: 2019-03-27 | End: 2019-03-27 | Stop reason: HOSPADM

## 2019-03-27 RX ORDER — LIDOCAINE HYDROCHLORIDE 20 MG/ML
INJECTION, SOLUTION EPIDURAL; INFILTRATION; INTRACAUDAL; PERINEURAL
Status: DISCONTINUED | OUTPATIENT
Start: 2019-03-27 | End: 2019-03-27 | Stop reason: HOSPADM

## 2019-03-27 RX ORDER — PROPOFOL 10 MG/ML
VIAL (ML) INTRAVENOUS
Status: DISCONTINUED | OUTPATIENT
Start: 2019-03-27 | End: 2019-03-27

## 2019-03-27 RX ORDER — EPINEPHRINE 1 MG/ML
INJECTION, SOLUTION INTRACARDIAC; INTRAMUSCULAR; INTRAVENOUS; SUBCUTANEOUS
Status: DISCONTINUED
Start: 2019-03-27 | End: 2019-03-27 | Stop reason: HOSPADM

## 2019-03-27 RX ORDER — FENTANYL CITRATE 50 UG/ML
25 INJECTION, SOLUTION INTRAMUSCULAR; INTRAVENOUS EVERY 5 MIN PRN
Status: DISCONTINUED | OUTPATIENT
Start: 2019-03-27 | End: 2019-03-27 | Stop reason: HOSPADM

## 2019-03-27 RX ORDER — BUPIVACAINE HYDROCHLORIDE 7.5 MG/ML
INJECTION, SOLUTION EPIDURAL; RETROBULBAR
Status: DISCONTINUED
Start: 2019-03-27 | End: 2019-03-27 | Stop reason: HOSPADM

## 2019-03-27 RX ORDER — EPINEPHRINE 1 MG/ML
INJECTION, SOLUTION INTRACARDIAC; INTRAMUSCULAR; INTRAVENOUS; SUBCUTANEOUS
Status: DISCONTINUED | OUTPATIENT
Start: 2019-03-27 | End: 2019-03-27 | Stop reason: HOSPADM

## 2019-03-27 RX ORDER — VANCOMYCIN HYDROCHLORIDE 500 MG/10ML
INJECTION, POWDER, LYOPHILIZED, FOR SOLUTION INTRAVENOUS
Status: DISCONTINUED
Start: 2019-03-27 | End: 2019-03-27 | Stop reason: HOSPADM

## 2019-03-27 RX ORDER — ACETAMINOPHEN 325 MG/1
650 TABLET ORAL EVERY 4 HOURS PRN
Status: DISCONTINUED | OUTPATIENT
Start: 2019-03-27 | End: 2019-03-27 | Stop reason: HOSPADM

## 2019-03-27 RX ORDER — MOXIFLOXACIN 5 MG/ML
SOLUTION/ DROPS OPHTHALMIC
Status: DISCONTINUED | OUTPATIENT
Start: 2019-03-27 | End: 2019-03-27 | Stop reason: HOSPADM

## 2019-03-27 RX ORDER — CYCLOPENTOLATE HYDROCHLORIDE 10 MG/ML
1 SOLUTION/ DROPS OPHTHALMIC
Status: DISCONTINUED | OUTPATIENT
Start: 2019-03-27 | End: 2019-03-27 | Stop reason: HOSPADM

## 2019-03-27 RX ADMIN — MOXIFLOXACIN 1 DROP: 5 SOLUTION/ DROPS OPHTHALMIC at 11:03

## 2019-03-27 RX ADMIN — EPHEDRINE SULFATE 10 MG: 50 INJECTION, SOLUTION INTRAMUSCULAR; INTRAVENOUS; SUBCUTANEOUS at 12:03

## 2019-03-27 RX ADMIN — TROPICAMIDE 1 DROP: 10 SOLUTION/ DROPS OPHTHALMIC at 11:03

## 2019-03-27 RX ADMIN — CYCLOPENTOLATE HYDROCHLORIDE 1 DROP: 10 SOLUTION/ DROPS OPHTHALMIC at 11:03

## 2019-03-27 RX ADMIN — PHENYLEPHRINE HYDROCHLORIDE 100 MCG: 10 INJECTION INTRAVENOUS at 12:03

## 2019-03-27 RX ADMIN — FENTANYL CITRATE 25 MCG: 50 INJECTION, SOLUTION INTRAMUSCULAR; INTRAVENOUS at 12:03

## 2019-03-27 RX ADMIN — PHENYLEPHRINE HYDROCHLORIDE 1 DROP: 25 SOLUTION/ DROPS OPHTHALMIC at 11:03

## 2019-03-27 RX ADMIN — PREDNISOLONE ACETATE 1 DROP: 10 SUSPENSION/ DROPS OPHTHALMIC at 11:03

## 2019-03-27 RX ADMIN — MIDAZOLAM HYDROCHLORIDE 1 MG: 1 INJECTION, SOLUTION INTRAMUSCULAR; INTRAVENOUS at 12:03

## 2019-03-27 RX ADMIN — HOMATROPINE HYDROBROMIDE 1 DROP: 50 SOLUTION OPHTHALMIC at 11:03

## 2019-03-27 RX ADMIN — ONDANSETRON 4 MG: 2 INJECTION INTRAMUSCULAR; INTRAVENOUS at 01:03

## 2019-03-27 RX ADMIN — HYDROCODONE BITARTRATE AND ACETAMINOPHEN 1 TABLET: 5; 325 TABLET ORAL at 02:03

## 2019-03-27 RX ADMIN — EPHEDRINE SULFATE 10 MG: 50 INJECTION, SOLUTION INTRAMUSCULAR; INTRAVENOUS; SUBCUTANEOUS at 01:03

## 2019-03-27 RX ADMIN — TETRACAINE HYDROCHLORIDE 1 DROP: 5 SOLUTION OPHTHALMIC at 11:03

## 2019-03-27 RX ADMIN — SODIUM CHLORIDE: 0.9 INJECTION, SOLUTION INTRAVENOUS at 12:03

## 2019-03-27 RX ADMIN — LIDOCAINE HYDROCHLORIDE 40 MG: 20 INJECTION, SOLUTION INTRAVENOUS at 12:03

## 2019-03-27 RX ADMIN — PROPOFOL 200 MG: 10 INJECTION, EMULSION INTRAVENOUS at 12:03

## 2019-03-27 NOTE — BRIEF OP NOTE
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  Inferonasal posterior retinotomy created with extensive inferior laser    Date of Discharge: 3/27/19    Discharge Disposition: stable to recovery then home    F/U: tomorrow

## 2019-03-27 NOTE — H&P
Pre-Operative History & Physical  Ophthalmology      SUBJECTIVE:     History of Present Illness:  Patient is a 73 y.o. 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    MEDICATIONS:   PTA Medications   Medication Sig    acetaminophen (TYLENOL) 500 MG tablet Take 500 mg by mouth every 6 (six) hours as needed for Pain.    albuterol (VENTOLIN HFA) 90 mcg/actuation inhaler Inhale 2 puffs into the lungs every 6 (six) hours as needed. Rescue    aspirin 81 MG Chew Take 81 mg by mouth daily as needed. Usually takes about every 2 weeks.    atorvastatin (LIPITOR) 20 MG tablet Take 1 tablet (20 mg total) by mouth nightly.    dorzolamide-timolol 2-0.5% (COSOPT) 22.3-6.8 mg/mL ophthalmic solution Place 1 drop into the right eye 2 (two) times daily.    fish oil-omega-3 fatty acids 300-1,000 mg capsule Take 1 g by mouth every morning.     FLUoxetine 10 MG capsule Take 1 capsule (10 mg total) by mouth once daily.    fluticasone (FLONASE) 50 mcg/actuation nasal spray SPRAY 2 SPRAYS IN EACH NOSTRIL EVERY DAY (Patient taking differently: SPRAY 2 SPRAYS IN EACH NOSTRIL EVERY DAY PRN)    guaifenesin (MUCINEX) 600 mg 12 hr tablet Take 1,200 mg by mouth 2 (two) times daily as needed.     losartan (COZAAR) 50 MG tablet TAKE 1 TABLET(50 MG) BY MOUTH EVERY MORNING    multivit,stress formula-zinc (STRESS FORMULA WITH ZINC) Tab Take 1 tablet by mouth every morning. Uses Alive    omeprazole (PRILOSEC OTC) 20 MG tablet Take 1 tablet (20 mg total) by mouth every morning. Take 30 minutes before your first protein containing meal. (Patient taking differently: Take 20 mg by mouth daily as needed. Take 30 minutes before your first protein containing meal.)    prednisoLONE acetate (PRED FORTE) 1 % DrpS Place 1 drop into the right eye once daily.    pseudoephedrine (SUDAFED) 30 MG tablet Take 30 mg by mouth every 4 (four) hours as needed for Congestion.    sodium  chloride (SALINE NASAL) 0.65 % nasal spray 1 spray by Nasal route as needed for Congestion.    SYMBICORT 160-4.5 mcg/actuation HFAA INHALE 2 PUFFS BY MOUTH TWICE DAILY    traZODone (DESYREL) 50 MG tablet Take 1 tablet (50 mg total) by mouth nightly as needed for Insomnia. May take up to 2 tabs per night prn    zinc gluconate (COLD-EEZE ORAL) Take 1 lozenge by mouth daily as needed.    zolpidem (AMBIEN CR) 12.5 MG CR tablet     econazole nitrate 1 % cream Apply topically 2 (two) times daily as needed. Twice a day  PRN    estradiol (ESTRACE) 0.01 % (0.1 mg/gram) vaginal cream Place 1 g vaginally daily as needed.    fluorouracil (EFUDEX) 5 % cream CHRISTIAN AA BID FOR 2 WEEKS PRN    mupirocin (BACTROBAN) 2 % ointment Apply topically 2 (two) times daily.       ALLERGIES:   Review of patient's allergies indicates:   Allergen Reactions    Penicillins Hives    Penicillin Hives    Ciprofloxacin      Muscle weakness and pain    Doxycycline Other (See Comments)     unknown    Oxycodone Anxiety       PAST MEDICAL HISTORY:   Past Medical History:   Diagnosis Date    Allergy     Asthma     Basal cell carcinoma     COPD (chronic obstructive pulmonary disease)     GERD (gastroesophageal reflux disease)     Hyperlipidemia     Hypertension     Retinal detachment      PAST SURGICAL HISTORY:   Past Surgical History:   Procedure Laterality Date    BASAL CELL CARCINOMA EXCISION      BREAST CYST EXCISION Left     long time ago, bening    CATARACT EXTRACTION      COLONOSCOPY  2013    COLONOSCOPY N/A 8/1/2018    Performed [facility]ung [facility] at [facility] ENDO    COLONOSCOPY N/A 3/1/2013    Performed by Vance Anderson MD at [facility] ENDO (4TH FLR)    ENDOLASER-EYE  4/11/2018    Performed by JANEL Chaves MD at [facility] OR 1ST FLR    EXCHANGE-AIR/FLUID-EYE  4/11/2018    Performed by JANEL Chaves MD at [facility] OR 1ST FLR    EYE SURGERY      cataracts & retinea detachment    HYSTERECTOMY      PEELING-EPI  RETINAL MEMBRANE  4/11/2018    Performed by [facility]aves MD at [facility] OR 1ST FLR    REPAIR-RETINA (VITRECTOMY) Right 4/11/2018    Performed by [facility] at [facility] OR 1ST FLR    REPAIR-RETINA (VITRECTOMY) Right 1/10/2018    Performed by [facility] MD at [facility] OR 1ST FLR    REPAIR-RETINA (VITRECTOMY) Right 12/6/2017    [facility] FLR    REPAIR-RETINA (VITRECTOMY) Right 11/1/2017    Performed by [facility] at [facility] OR [facility] FLR    UPPER GASTROINTESTINAL ENDOSCOPY      VITRECTOMY-PARS PLANA  4/11/2018    Performed [facility] at [facility] OR [facility] FLR    VITRECTOMY-PARS PLANA Right 9/6/2017    Performed by [facility] at [facility] OR [facility] FLR     PAST FAMILY HISTORY:   Family History   Problem Relation Age of Onset    Asthma Mother     Stroke Father 77        cva    Colon cancer Neg Hx     Colon polyps Neg Hx     Celiac disease Neg Hx     Esophageal cancer Neg Hx     Stomach cancer Neg Hx     Irritable bowel syndrome Neg Hx     Inflammatory bowel disease Neg Hx      SOCIAL HISTORY:   Social History     Tobacco Use    Smoking status: Never Smoker    Smokeless tobacco: Never Used   Substance Use Topics    Alcohol use: Yes     Alcohol/week: 1.2 oz     Types: 2 Glasses of wine per week     Comment: socially    Drug use: No        MENTAL STATUS: Alert    REVIEW OF SYSTEMS: Negative    OBJECTIVE:     Vital Signs (Most Recent)  Temp: 97.7 °F (36.5 °C) (03/27/19 1121)  Pulse: 60 (03/27/19 1121)  Resp: 18 (03/27/19 1121)  BP: (!) 140/76 (03/27/19 1121)  SpO2: 98 % (03/27/19 1121)    Physical Exam:  General: NAD  HEENT: moist mucous membranes  Lungs: Adequate respirations, symmetrical movements, non-labored  Heart: Intact distal pulses  Abdomen: Soft, nondistended, nontender    ASSESSMENT/PLAN:     Patient is a 73 y.o. 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    - Plan 25g PPV/1000cs SO removal/EL/AFx/C3F8 OD   - Risks/benefits/alternatives of the procedure including, but not limited to scarring, bleeding, infection, loss or decreased vision, and/or need for possible repeat surgery discussed with the patient and family.   - Informed consent obtained prior to surgery and the patient/family voiced good understanding.

## 2019-03-27 NOTE — ANESTHESIA POSTPROCEDURE EVALUATION
Anesthesia Post Evaluation    Patient: Madison Long    Procedure(s) Performed: Procedure(s) (LRB):  REPAIR, RETINAL DETACHMENT, WITH VITRECTOMY (Right)    Final Anesthesia Type: general  Patient location during evaluation: PACU  Patient participation: Yes- Able to Participate  Level of consciousness: awake and alert and oriented  Post-procedure vital signs: reviewed and stable  Pain management: adequate  Airway patency: patent  PONV status at discharge: No PONV  Anesthetic complications: no      Cardiovascular status: blood pressure returned to baseline  Respiratory status: unassisted  Hydration status: euvolemic  Follow-up not needed.          Vitals Value Taken Time   /66 3/27/2019  2:00 PM   Temp 35.9 °C (96.6 °F) 3/27/2019  1:36 PM   Pulse 64 3/27/2019  2:00 PM   Resp 15 3/27/2019  2:00 PM   SpO2 95 % 3/27/2019  2:00 PM         No case tracking events are documented in the log.      Pain/Margie Score: Pain Rating Prior to Med Admin: 3 (3/27/2019  2:06 PM)  Margie Score: 10 (3/27/2019  1:59 PM)

## 2019-03-27 NOTE — ANESTHESIA PREPROCEDURE EVALUATION
03/27/2019  Madison Long is a 73 y.o., female.    Anesthesia Evaluation    I have reviewed the Patient Summary Reports.     I have reviewed the Medications.     Review of Systems  Anesthesia Hx:  No problems with previous Anesthesia  History of prior surgery of interest to airway management or planning: Previous anesthesia: General Denies Family Hx of Anesthesia complications.   Denies Personal Hx of Anesthesia complications.   Social:  Non-Smoker    Cardiovascular:   Hypertension    Pulmonary:   COPD, moderate Asthma    Hepatic/GI:   GERD, well controlled    Neurological:  Neurology Normal Denies TIA.  Denies CVA. Denies Seizures.    Endocrine:  Endocrine Normal        Physical Exam  General:  Well nourished    Airway/Jaw/Neck:  Airway Findings: Mouth Opening: Normal Tongue: Normal  General Airway Assessment: Adult  Mallampati: II  TM Distance: Normal, at least 6 cm        Eyes/Ears/Nose:  EYES/EARS/NOSE FINDINGS: Normal   Dental:  DENTAL FINDINGS: Normal   Chest/Lungs:  Chest/Lungs Findings: Clear to auscultation, Normal Respiratory Rate     Heart/Vascular:  Heart Findings: Rate: Normal  Rhythm: Regular Rhythm        Mental Status:  Mental Status Findings:  Cooperative, Alert and Oriented         Anesthesia Plan  Type of Anesthesia, risks & benefits discussed:  Anesthesia Type:  general  Patient's Preference:   Intra-op Monitoring Plan: standard ASA monitors  Intra-op Monitoring Plan Comments:   Post Op Pain Control Plan:   Post Op Pain Control Plan Comments:   Induction:   IV  Beta Blocker:  Patient is not currently on a Beta-Blocker (No further documentation required).       Informed Consent: Patient understands risks and agrees with Anesthesia plan.  Questions answered. Anesthesia consent signed with patient.  ASA Score: 2     Day of Surgery Review of History & Physical:    H&P update referred to  the surgeon.         Ready For Surgery From Anesthesia Perspective.

## 2019-03-27 NOTE — DISCHARGE INSTRUCTIONS
Post Op Instructions:  Patient should Maintain Eye shield & Dressing until seen tomorrow in eye clinic  Tylenol as needed for general discomfort  Use Prescription for pain medication if pain is severe  Use Prescription for Nausea (Zofran) if nausea or vomiting  No excessive exercise   No Bending, Lifting or Straining  Call MD if significant pain or nausea / vomiting uncontrolled by medications  Call MD if temperature in excess of 101' F  Maintain Head in a Face-Down Position or either side down  Return to eye clinic for Post Op Examination tomorrow Morning.  Bring Medicine bag to tomorrow's appointment.        Having a Vitrectomy    A vitrectomy is a type of eye surgery to treat problems with the retina and vitreous. The vitreous is a gel-like substance that fills the middle portion of your eye. During the surgery, your surgeon removes the vitreous and replaces it with another solution.  What to tell your health care provider  Before your surgery, tell your health care provider:  · What medicines you take. This includes over-the-counter medicines such as ibuprofen. It also includes vitamins, herbs, and other supplements. You may need to stop taking some medicines before the procedure, such as blood thinners and aspirin.  · If you smoke. You may need to stop before your surgery. Smoking can delay healing. Talk with your healthcare provider if you need help to stop smoking.  · If youve had recent changes in your health. This includes an infection or fever.  · If you are sensitive or allergic to anything. This includes medicines, latex, tape, and anesthetic medicines.  · If you are pregnant. Also tell your healthcare provider if you think you may be pregnant.  Getting ready for your surgery  Make sure to:  · Ask a family member or friend to take you home from the hospital  · Make plans for some help at home while you recover  · Follow all other instructions from your health care provider  · Read the consent form and  ask questions if something is not clear  · Not eat or drink after midnight before your surgery  Tests before your surgery  Before your surgery, your doctor may look at your retina. Special tools are used to shine a light in your eye and look at your retina. You may need to have your eyes dilated for this eye exam. You also may need to have an ultrasound of your eye. This helps your doctor view the retina. An ultrasound uses sound waves to create images on a computer screen.  On the day of your surgery  Talk with your doctor about what to expect during your surgery. The details of the surgery may differ somewhat. A doctor specially trained in eye surgery (ophthalmologist) will do your operation. In general, you can expect the following:  · You may have general anesthesia. This will cause you to sleep through the surgery. Or you may be awake during the surgery. You will receive a medicine to help you relax. You also may be given anesthetic eye drops and injections. This is to make sure you dont feel anything.  · Your surgeon will make an incision in the outer layer of your eye.  · He or she will make a small cut in the white part of the eye (sclera).  · Your doctor will remove the vitreous and any scar tissue or other material.  · Your doctor will do other repairs to your eye as needed. For example, he or she might use a laser to fix a tear in your retina. In some cases, your doctor may inject a gas bubble into your eye. This is to help keep the retina in place.  · Your doctor will replace the vitreous with another type of fluid. It may be replaced with silicone oil or saline.  · Your doctor will close your incisions with stitches.  · Your doctor will put an antibiotic ointment on your eye to help prevent infection.  · Your eye will be covered with a patch.  After your surgery  Youll likely be able to go home the same day. Have someone drive you home.  Recovering at home  Follow all of your doctors instructions  about eye care. Your eye may be a little sore after the procedure. You can take over-the-counter pain medicine as approved by your healthcare provider. You may need to use eye drops with antibiotics. This is to help prevent infection. You may need to wear an eye patch for a day or so.  If you had a gas bubble placed in your eye during your vitrectomy, you will need to follow specific instructions about positioning. You will also need to not travel on an airplane for a period of time after the surgery. Ask your doctor when it will be safe for you to fly again.  Follow-up care  You will need close follow-up with your doctor to see how well the surgery worked. You may have an appointment the day after the surgery. You may need follow-up surgery in the future to remove the replacement fluid from your eye.  Your vision may not be completely normal after your vitrectomy, especially if you had permanent damage to your retina. Ask your doctor how much improvement you can expect.     When to call your health care provider  Call your health care provider right away if you have any of the below:  · Vision that gets worse  · Pain or swelling around your eye that gets worse   Date Last Reviewed: 6/16/2015  © 3255-4025 [employer] [address] [place] [address], [place] [address]. All rights reserved. This information is not intended as a substitute for professional medical care. Always follow your healthcare professional's instructions.

## 2019-03-27 NOTE — TRANSFER OF CARE
"Anesthesia Transfer of Care Note    Patient: Madison Long    Procedure(s) Performed: Procedure(s) (LRB):  REPAIR, RETINAL DETACHMENT, WITH VITRECTOMY (Right)    Patient location: PACU    Anesthesia Type: general    Transport from OR: Transported from OR on 6-10 L/min O2 by face mask with adequate spontaneous ventilation    Post pain: adequate analgesia    Post assessment: no apparent anesthetic complications and tolerated procedure well    Post vital signs: stable    Level of consciousness: awake and alert    Nausea/Vomiting: no nausea/vomiting    Complications: none    Transfer of care protocol was followed      Last vitals:   Visit Vitals  BP (!) 140/76   Pulse 60   Temp 36.5 °C (97.7 °F) (Oral)   Resp 18   Ht 5' 6" (1.676 m)   Wt 70.8 kg (156 lb)   SpO2 98%   Breastfeeding? No   BMI 25.18 kg/m²     "

## 2019-03-27 NOTE — PLAN OF CARE
Patient tolerated procedure/anesthesia well, vss, no complications. Denies pain, denies nausea, tolerates PO intake. Consents with chart. Discharge instructions reviewed with patient at the bedside, verbalizes understanding. Paper script given to patient.

## 2019-03-28 ENCOUNTER — OFFICE VISIT (OUTPATIENT)
Dept: OPHTHALMOLOGY | Facility: CLINIC | Age: 74
End: 2019-03-28
Payer: MEDICARE

## 2019-03-28 DIAGNOSIS — H33.001 MACULA-OFF RHEGMATOGENOUS RETINAL DETACHMENT, RIGHT: ICD-10-CM

## 2019-03-28 DIAGNOSIS — H33.021 RETINAL DETACHMENT OF RIGHT EYE WITH MULTIPLE BREAKS: Primary | ICD-10-CM

## 2019-03-28 PROCEDURE — 99999 PR PBB SHADOW E&M-EST. PATIENT-LVL II: CPT | Mod: PBBFAC,HCNC,, | Performed by: OPHTHALMOLOGY

## 2019-03-28 PROCEDURE — 99024 PR POST-OP FOLLOW-UP VISIT: ICD-10-PCS | Mod: HCNC,S$GLB,, | Performed by: OPHTHALMOLOGY

## 2019-03-28 PROCEDURE — 99999 PR PBB SHADOW E&M-EST. PATIENT-LVL II: ICD-10-PCS | Mod: PBBFAC,HCNC,, | Performed by: OPHTHALMOLOGY

## 2019-03-28 PROCEDURE — 99024 POSTOP FOLLOW-UP VISIT: CPT | Mod: HCNC,S$GLB,, | Performed by: OPHTHALMOLOGY

## 2019-03-28 NOTE — OP NOTE
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    PROCEDURE PERFORMED:  A 25-gauge pars plana vitrectomy, silicone oil removal,   anterior chamber washout, air-fluid exchange, endolaser, injection of C3F8 14%   to the right eye.    ENDOLASER PARAMETERS:  Numbers of spots 804, power 150 to 200 milliwatts,   duration 0.1 seconds.    ATTENDING SURGEON:  JANEL Chaves M.D.    ASSISTANT SURGEON:  Fellow, Warner Medrano.    ANESTHESIA:  LMA with a retrobulbar injection of 4.0 mL with mixture of 0.75%   Marcaine and 2% Xylocaine.    ESTIMATED BLOOD LOSS:  Minimal.    COMPLICATIONS:  None.    DISPOSITION:  Stable to recovery.    INDICATIONS FOR SURGERY:  This is a 73-year-old female who has had recurrent   retinal detachment with multiple procedures including multiple vitrectomies, any   scleral buckle placement in the past.  The patient stabilized under oil with a   low-lying inferonasal detachment.  Decision was made to take the patient back to   surgery to remove the oil bubble stabilize inferonasal detachment and placed a   gas bubble and attempt to stabilize her without oil in the eye.  Risks,   benefits, and alternatives of surgery were discussed in detail with risks   including loss of vision, loss of eye, recurrent retinal detachment, infection,   hemorrhage, lens dislocation, glaucoma, hypotony, ptosis and diplopia.  The   patient voiced understanding and wished to proceed with the procedure.    DESCRIPTION OF PROCEDURE:  After proper informed consent was obtained, the   patient was brought back to the Operative Suite at [facility] where   LMA was induced.  Retrobulbar injection was provided as above without   complication.  The patient was prepped and draped in a normal sterile  fashion   for eye surgery.  Lid speculum was placed in the right eye.  A standard 3-port   25-gauge pars plana vitrectomy was set up with the infusion cannula inserted 3.5   mm posterior to the limbus.  The infusion cannula was turned on only after   observed to be free and clear of all underlying retinal tissue.  Supranasal and   supratemporal trocars were also placed 3.5 mm posterior to the limbus.  [facility]   oil removal device was used to remove the oil from the posterior segment.  There   was a couple of small oil bubbles migrated to the anterior chamber.  A   paracentesis port was used to enter the anterior chamber and a BSS was used to   irrigate the small bubbles in the anterior chamber and washing it out.    Posterior segment examination revealed an inferonasal detachment up to the   infratemporal arcades with two small breaks at the 6 o'clock position.  Attempt   was made to do a fluid exchange, but it was still elevated and there was not   enough retinal pigment epithelium left to create good chorioretinal adherence.    An additional posterior retinotomy was created inferonasally.  A soft tipped   extrusion was used to perform an air-fluid exchange to flatten the subretinal   fluid out.  Then, additional laser was applied around the posterior retinotomy,   the retinal breaks as well as complete barrier fashion from the inframacular   anterior.  Supranasal trocar was removed and not leaking after gentle massage   and then a 14% C3F8 gas mixture was created approximately 40 mL were cycled   through the eye and the supratemporal infusion trocars were removed not leaking   after gentle massage.  The eye was normal pressure via palpation.    Subconjunctival injections of vancomycin and Decadron were given to the patient.    The drapes were removed from the patient.  She was washed free of Betadine   prep solution.  Maxitrol ointment was placed in the right eye.  The eye was   patch shielded.  LMA was reversed.   She was brought to the Recovery Room in   stable condition, tolerating the procedure well.  Dr. Chaves was present for   the entire case.      DAM/IN  dd: 03/27/2019 13:33:48 (CDT)  td: 03/27/2019 18:59:45 (CDT)  Doc ID   #[id number]  Job ID #[id number]    CC:

## 2019-03-28 NOTE — PROGRESS NOTES
HPI     Post-op Evaluation      Additional comments: 1 day check              Comments     1 day check    HPI     Eye Problem      Additional comments: 3 mos check              Comments     DLS 12/3/18- No new concerns x last visit    PF daily OD  cosopt BID OD    HPI     IOP check   DLS-11/19/2018  Dr. Chaves     Pt sts no change in va since last visit denies pain   (+)Flashes white circles but nothing new  (+)Floaters no change   (-)Photophobia  (-)Glare     Eye Med(s) -   PF Q day OD                          Cosopt BID OD    Refresh tears OS PRN         Prior OCT - RD OD  OS - No ME      A/P    1. RRD OD   Macula involving x 4  Days    S/p  25g PPV/EL/SF6 OD for RRD 9/6/17  IOP improved    10/30/17 - Recurrent IT RD with early PVR and small break    s/p 25g PPV/membrane stripping/inferior retinopexy/C3F8 OD for RRD/PVR OD 11/1/17 12/4/17 - recurrent inferior RD - small holes posterior to laser    s/p 25g PPV/EL/AFx/1000cs Silicone Oil OD for Recurrent RRD with PVR 12/6/17    Doing well, good IOP    1/9/18 increasing inferior fluid collection beneath oil into macula - needs SB - multiple small break    s/p /270, 25g PPV/SO removal/membrane stripping/AFx/EL/1000cs Silicone Oil OD for RRD/PVR 1/10/18    Doing well, good IOP  Had ST scleral dehiscence - closed with 5-0 mersilene    Side sleep or stomach    Inferior PVR OD with shallow submacular fluid   IOP low - stop Cosopt  Will likely need inferior retinectomy    2/18 - some progression    s/p 25 PPV/SO removal/membrane stripping/inferior retinectomy/posterior capsulectomy/EL/Leonora oil OD for RRD with PVR and PCO OD 4/11/18    Stable inferonasal RD under oil  IOP increased  - continue COsopt BID  Continue PF Q day     Will need heavier inferonasal laser during oil removal 3-6 months (around 4/19)    S/p 25g PPV/1000cs SO removal/EL/AFx/C3F8 OD 3/27/19    Doing well, good IOP    Start gtts QID  Ointment/shield QHS     2. PCIOL OU    3. HTN   Good BP  control    4. PVD OS  No breaks OS

## 2019-04-01 ENCOUNTER — OFFICE VISIT (OUTPATIENT)
Dept: OPHTHALMOLOGY | Facility: CLINIC | Age: 74
End: 2019-04-01
Payer: MEDICARE

## 2019-04-01 DIAGNOSIS — H33.021 RETINAL DETACHMENT OF RIGHT EYE WITH MULTIPLE BREAKS: Primary | ICD-10-CM

## 2019-04-01 DIAGNOSIS — H35.21 PROLIFERATIVE VITREORETINOPATHY, RIGHT: ICD-10-CM

## 2019-04-01 DIAGNOSIS — H33.001 MACULA-OFF RHEGMATOGENOUS RETINAL DETACHMENT, RIGHT: ICD-10-CM

## 2019-04-01 PROCEDURE — 99024 POSTOP FOLLOW-UP VISIT: CPT | Mod: HCNC,S$GLB,, | Performed by: OPHTHALMOLOGY

## 2019-04-01 PROCEDURE — 99024 PR POST-OP FOLLOW-UP VISIT: ICD-10-PCS | Mod: HCNC,S$GLB,, | Performed by: OPHTHALMOLOGY

## 2019-04-01 PROCEDURE — 99999 PR PBB SHADOW E&M-EST. PATIENT-LVL III: ICD-10-PCS | Mod: PBBFAC,HCNC,, | Performed by: OPHTHALMOLOGY

## 2019-04-01 PROCEDURE — 99999 PR PBB SHADOW E&M-EST. PATIENT-LVL III: CPT | Mod: PBBFAC,HCNC,, | Performed by: OPHTHALMOLOGY

## 2019-04-15 ENCOUNTER — OFFICE VISIT (OUTPATIENT)
Dept: OPHTHALMOLOGY | Facility: CLINIC | Age: 74
End: 2019-04-15
Payer: MEDICARE

## 2019-04-15 DIAGNOSIS — H35.21 PROLIFERATIVE VITREORETINOPATHY, RIGHT: ICD-10-CM

## 2019-04-15 DIAGNOSIS — H33.021 RETINAL DETACHMENT OF RIGHT EYE WITH MULTIPLE BREAKS: Primary | ICD-10-CM

## 2019-04-15 PROCEDURE — 99024 PR POST-OP FOLLOW-UP VISIT: ICD-10-PCS | Mod: HCNC,S$GLB,, | Performed by: OPHTHALMOLOGY

## 2019-04-15 PROCEDURE — 99999 PR PBB SHADOW E&M-EST. PATIENT-LVL III: CPT | Mod: PBBFAC,HCNC,, | Performed by: OPHTHALMOLOGY

## 2019-04-15 PROCEDURE — 99024 POSTOP FOLLOW-UP VISIT: CPT | Mod: HCNC,S$GLB,, | Performed by: OPHTHALMOLOGY

## 2019-04-15 PROCEDURE — 99999 PR PBB SHADOW E&M-EST. PATIENT-LVL III: ICD-10-PCS | Mod: PBBFAC,HCNC,, | Performed by: OPHTHALMOLOGY

## 2019-04-15 RX ORDER — DORZOLAMIDE HYDROCHLORIDE AND TIMOLOL MALEATE 20; 5 MG/ML; MG/ML
1 SOLUTION/ DROPS OPHTHALMIC 2 TIMES DAILY
Qty: 10 ML | Refills: 6 | Status: SHIPPED | OUTPATIENT
Start: 2019-04-15 | End: 2019-06-17 | Stop reason: ALTCHOICE

## 2019-04-15 RX ORDER — PREDNISOLONE ACETATE 10 MG/ML
1 SUSPENSION/ DROPS OPHTHALMIC 2 TIMES DAILY
Qty: 5 ML | Refills: 2 | Status: SHIPPED | OUTPATIENT
Start: 2019-04-15 | End: 2019-06-17 | Stop reason: ALTCHOICE

## 2019-04-15 NOTE — PROGRESS NOTES
HPI     Post-op Evaluation      Additional comments: 2 week check              Comments     DLS 4/1/19- She is having no pain at present but a few days ago she was getting a pain above her brow area. This was relieved with tylenol. She is not sure if it was eye related or sinus related    PF x 2  HA x 2      HPI     4 day PO   DLS- 03/28/2019 Dr. Chaves     Pt sts still has bubble blocking vision and still the same no change.   Slight achy pain 1-2/10 pain scale comes and goes     VIG PF HA QID   Cosopt BID          Prior OCT - RD OD  OS - No ME      A/P    1. RRD OD   Macula involving x 4  Days    S/p  25g PPV/EL/SF6 OD for RRD 9/6/17  IOP improved    10/30/17 - Recurrent IT RD with early PVR and small break    s/p 25g PPV/membrane stripping/inferior retinopexy/C3F8 OD for RRD/PVR OD 11/1/17 12/4/17 - recurrent inferior RD - small holes posterior to laser    s/p 25g PPV/EL/AFx/1000cs Silicone Oil OD for Recurrent RRD with PVR 12/6/17    Doing well, good IOP    1/9/18 increasing inferior fluid collection beneath oil into macula - needs SB - multiple small break    s/p /270, 25g PPV/SO removal/membrane stripping/AFx/EL/1000cs Silicone Oil OD for RRD/PVR 1/10/18    Doing well, good IOP  Had ST scleral dehiscence - closed with 5-0 mersilene    Side sleep or stomach    Inferior PVR OD with shallow submacular fluid   IOP low - stop Cosopt  Will likely need inferior retinectomy    2/18 - some progression    s/p 25 PPV/SO removal/membrane stripping/inferior retinectomy/posterior capsulectomy/EL/Leonora oil OD for RRD with PVR and PCO OD 4/11/18    Stable inferonasal RD under oil  IOP increased  - continue COsopt BID  Continue PF Q day     Will need heavier inferonasal laser during oil removal 3-6 months (around 4/19)    S/p 25g PPV/1000cs SO removal/EL/AFx/C3F8 OD 3/27/19    Doing well, good IOP  Flat beneath bubble!!    HA and PF QD  Resume Cosopt BID  ok for face forward throughout the day and then sleep on either  side.     2. PCIOL OU    3. HTN   Good BP control    4. PVD OS  No breaks OS    3-4 weeks

## 2019-05-13 ENCOUNTER — TELEPHONE (OUTPATIENT)
Dept: FAMILY MEDICINE | Facility: CLINIC | Age: 74
End: 2019-05-13

## 2019-05-13 ENCOUNTER — OFFICE VISIT (OUTPATIENT)
Dept: OPHTHALMOLOGY | Facility: CLINIC | Age: 74
End: 2019-05-13
Payer: MEDICARE

## 2019-05-13 DIAGNOSIS — H35.21 PROLIFERATIVE VITREORETINOPATHY, RIGHT: ICD-10-CM

## 2019-05-13 DIAGNOSIS — H33.021 RETINAL DETACHMENT OF RIGHT EYE WITH MULTIPLE BREAKS: Primary | ICD-10-CM

## 2019-05-13 PROCEDURE — 99024 POSTOP FOLLOW-UP VISIT: CPT | Mod: HCNC,S$GLB,, | Performed by: OPHTHALMOLOGY

## 2019-05-13 PROCEDURE — 99999 PR PBB SHADOW E&M-EST. PATIENT-LVL III: CPT | Mod: PBBFAC,HCNC,, | Performed by: OPHTHALMOLOGY

## 2019-05-13 PROCEDURE — 99024 PR POST-OP FOLLOW-UP VISIT: ICD-10-PCS | Mod: HCNC,S$GLB,, | Performed by: OPHTHALMOLOGY

## 2019-05-13 PROCEDURE — 99999 PR PBB SHADOW E&M-EST. PATIENT-LVL III: ICD-10-PCS | Mod: PBBFAC,HCNC,, | Performed by: OPHTHALMOLOGY

## 2019-05-13 RX ORDER — FLUCONAZOLE 150 MG/1
TABLET ORAL
Qty: 3 TABLET | Refills: 1 | Status: SHIPPED | OUTPATIENT
Start: 2019-05-13 | End: 2020-05-18 | Stop reason: SDUPTHER

## 2019-05-13 NOTE — PROGRESS NOTES
HPI     DLS: 04/15/2019        PF x 1  HA x 1  Cosopt x 2        Prior OCT - RD OD  OS - No ME      A/P    1. RRD OD   Macula involving x 4  Days    S/p  25g PPV/EL/SF6 OD for RRD 9/6/17  IOP improved    10/30/17 - Recurrent IT RD with early PVR and small break    s/p 25g PPV/membrane stripping/inferior retinopexy/C3F8 OD for RRD/PVR OD 11/1/17 12/4/17 - recurrent inferior RD - small holes posterior to laser    s/p 25g PPV/EL/AFx/1000cs Silicone Oil OD for Recurrent RRD with PVR 12/6/17    Doing well, good IOP    1/9/18 increasing inferior fluid collection beneath oil into macula - needs SB - multiple small break    s/p /270, 25g PPV/SO removal/membrane stripping/AFx/EL/1000cs Silicone Oil OD for RRD/PVR 1/10/18    Doing well, good IOP  Had ST scleral dehiscence - closed with 5-0 mersilene    Side sleep or stomach    Inferior PVR OD with shallow submacular fluid   IOP low - stop Cosopt  Will likely need inferior retinectomy    2/18 - some progression    s/p 25 PPV/SO removal/membrane stripping/inferior retinectomy/posterior capsulectomy/EL/Leonora oil OD for RRD with PVR and PCO OD 4/11/18    Stable inferonasal RD under oil  IOP increased  - continue COsopt BID  Continue PF Q day     Will need heavier inferonasal laser during oil removal 3-6 months (around 4/19)    S/p 25g PPV/1000cs SO removal/EL/AFx/C3F8 OD 3/27/19    Doing well, good IOP  Flat beneath bubble!!    HA and PF QD until out  Cosopt BID until out  ok for face forward throughout the day and then sleep on either side.     2. PCIOL OU    3. HTN   Good BP control    4. PVD OS  No breaks OS    4-5 weeks OCT

## 2019-05-13 NOTE — TELEPHONE ENCOUNTER
----- Message from Bria Joey sent at 5/13/2019  8:24 AM CDT -----  Contact: Patient  Type: Needs Medical Advice    Who Called:  Madison  Symptoms (please be specific): vaginal yeast infection  How long has patient had these symptoms:  Few days  Pharmacy name and phone #:    [id number] - [place], [place] - [address] AT [facility] [address]  [address]  [place] [address]  Phone: [phone] Fax: [phone]        Best Call Back Number: [phone]  Additional Information: Patient is stating she is having a vaginal yeast problem and would like for the doctor to call in Fluconazole 150 mg tab has he did before.Please call back and advise.

## 2019-05-13 NOTE — TELEPHONE ENCOUNTER
"LOV 1.84.35  Phoned call placed to patient to check on request for Diflucan. Pt reports vaginal yeast infection x 3 days, and complains of vaginal itching, "no discharge yet", and no foul odor. Please advise.   "

## 2019-06-17 ENCOUNTER — OFFICE VISIT (OUTPATIENT)
Dept: OPHTHALMOLOGY | Facility: CLINIC | Age: 74
End: 2019-06-17
Payer: MEDICARE

## 2019-06-17 VITALS — DIASTOLIC BLOOD PRESSURE: 94 MMHG | SYSTOLIC BLOOD PRESSURE: 142 MMHG | HEART RATE: 83 BPM

## 2019-06-17 DIAGNOSIS — H33.021 RETINAL DETACHMENT OF RIGHT EYE WITH MULTIPLE BREAKS: Primary | ICD-10-CM

## 2019-06-17 DIAGNOSIS — H35.21 PROLIFERATIVE VITREORETINOPATHY, RIGHT: ICD-10-CM

## 2019-06-17 PROCEDURE — 99499 UNLISTED E&M SERVICE: CPT | Mod: HCNC,S$GLB,, | Performed by: OPHTHALMOLOGY

## 2019-06-17 PROCEDURE — 92134 CPTRZ OPH DX IMG PST SGM RTA: CPT | Mod: HCNC,S$GLB,, | Performed by: OPHTHALMOLOGY

## 2019-06-17 PROCEDURE — 99999 PR PBB SHADOW E&M-EST. PATIENT-LVL III: CPT | Mod: PBBFAC,HCNC,, | Performed by: OPHTHALMOLOGY

## 2019-06-17 PROCEDURE — 99499 RISK ADDL DX/OHS AUDIT: ICD-10-PCS | Mod: HCNC,S$GLB,, | Performed by: OPHTHALMOLOGY

## 2019-06-17 PROCEDURE — 92134 POSTERIOR SEGMENT OCT RETINA (OCULAR COHERENCE TOMOGRAPHY)-BOTH EYES: ICD-10-PCS | Mod: HCNC,S$GLB,, | Performed by: OPHTHALMOLOGY

## 2019-06-17 PROCEDURE — 99999 PR PBB SHADOW E&M-EST. PATIENT-LVL III: ICD-10-PCS | Mod: PBBFAC,HCNC,, | Performed by: OPHTHALMOLOGY

## 2019-06-17 PROCEDURE — 99024 POSTOP FOLLOW-UP VISIT: CPT | Mod: HCNC,S$GLB,, | Performed by: OPHTHALMOLOGY

## 2019-06-17 PROCEDURE — 99024 PR POST-OP FOLLOW-UP VISIT: ICD-10-PCS | Mod: HCNC,S$GLB,, | Performed by: OPHTHALMOLOGY

## 2019-06-17 RX ORDER — DORZOLAMIDE HYDROCHLORIDE AND TIMOLOL MALEATE 20; 5 MG/ML; MG/ML
1 SOLUTION/ DROPS OPHTHALMIC 2 TIMES DAILY
Qty: 10 ML | Refills: 12 | Status: SHIPPED | OUTPATIENT
Start: 2019-06-17 | End: 2019-09-09 | Stop reason: SDUPTHER

## 2019-06-17 NOTE — PROGRESS NOTES
HPI     DLS: 05/13/2019    Patient states she has been working out in her yard, the last couple of days she has noticed a HA over the right eye. She took a Tylenol this morning and the HA has improved.      No gtts.     HPI     DLS: 04/15/2019      OCT - RD OD  OS - No ME      A/P    1. RRD OD   Macula involving x 4  Days    S/p  25g PPV/EL/SF6 OD for RRD 9/6/17  IOP improved    10/30/17 - Recurrent IT RD with early PVR and small break    s/p 25g PPV/membrane stripping/inferior retinopexy/C3F8 OD for RRD/PVR OD 11/1/17 12/4/17 - recurrent inferior RD - small holes posterior to laser    s/p 25g PPV/EL/AFx/1000cs Silicone Oil OD for Recurrent RRD with PVR 12/6/17    Doing well, good IOP    1/9/18 increasing inferior fluid collection beneath oil into macula - needs SB - multiple small break    s/p /270, 25g PPV/SO removal/membrane stripping/AFx/EL/1000cs Silicone Oil OD for RRD/PVR 1/10/18    Doing well, good IOP  Had ST scleral dehiscence - closed with 5-0 mersilene    Side sleep or stomach    Inferior PVR OD with shallow submacular fluid   IOP low - stop Cosopt  Will likely need inferior retinectomy    2/18 - some progression    s/p 25 PPV/SO removal/membrane stripping/inferior retinectomy/posterior capsulectomy/EL/Leonora oil OD for RRD with PVR and PCO OD 4/11/18    Stable inferonasal RD under oil  IOP increased  - continue COsopt BID  Continue PF Q day     Will need heavier inferonasal laser during oil removal 3-6 months (around 4/19)    S/p 25g PPV/1000cs SO removal/EL/AFx/C3F8 OD 3/27/19    Doing well, good IOP  Flat!    IOP increased off Cosopt - Resume BID     2. PCIOL OU    3. HTN   Good BP control    4. PVD OS  No breaks OS      3 months OCT/MRx

## 2019-06-20 ENCOUNTER — PES CALL (OUTPATIENT)
Dept: ADMINISTRATIVE | Facility: CLINIC | Age: 74
End: 2019-06-20

## 2019-07-17 ENCOUNTER — PATIENT OUTREACH (OUTPATIENT)
Dept: ADMINISTRATIVE | Facility: HOSPITAL | Age: 74
End: 2019-07-17

## 2019-07-17 NOTE — PROGRESS NOTES
Health Maintenance Due   Topic Date Due    Mammogram  03/23/2019       Chart review completed 07/17/2019

## 2019-07-17 NOTE — LETTER
July 17, 2019    Madison Long  [id number] [id number]             [facility] [address] [facility] [address]  Phone: [phone] Dear Mrs. Long:    [facility] is committed to your overall health.  To help you get the most out of each of your visits, we will review your information to make sure you are up to date on all of your recommended tests and/or procedures.      Darian Arango MD  has found that your chart shows you may be due for the following:    Health Maintenance Due   Topic    Mammogram        If you have had any of the above done at another facility, please bring the records with you or FAX them to [phone] so that your record at [facility] will be complete.  If you have not had any of these tests or procedures done recently and would like to complete this testing, please call [phone] or send a message through your [facility] portal to your provider's office.    If you have an upcoming scheduled appointment for the above test and/or procedures, please disregard this letter.        If you have any questions or concerns, please don't hesitate to call.    Sincerely,    Yolande ARREGUIN, Panel Care Coordinator, [phone]   [facility]  [address] [place] [address]  [phone] (f)

## 2019-07-18 ENCOUNTER — HOSPITAL ENCOUNTER (OUTPATIENT)
Dept: RADIOLOGY | Facility: HOSPITAL | Age: 74
Discharge: HOME | End: 2019-07-18
Attending: INTERNAL MEDICINE
Payer: MEDICARE

## 2019-07-18 DIAGNOSIS — Z12.31 ENCOUNTER FOR SCREENING MAMMOGRAM FOR BREAST CANCER: ICD-10-CM

## 2019-07-18 PROCEDURE — 77067 SCR MAMMO BI INCL CAD: CPT | Mod: 26,HCNC,, | Performed by: RADIOLOGY

## 2019-07-18 PROCEDURE — 77067 MAMMO DIGITAL SCREENING BILAT WITH TOMOSYNTHESIS_CAD: ICD-10-PCS | Mod: 26,HCNC,, | Performed by: RADIOLOGY

## 2019-07-18 PROCEDURE — 77063 BREAST TOMOSYNTHESIS BI: CPT | Mod: 26,HCNC,, | Performed by: RADIOLOGY

## 2019-07-18 PROCEDURE — 77067 SCR MAMMO BI INCL CAD: CPT | Mod: TC,HCNC,PO

## 2019-07-18 PROCEDURE — 77063 MAMMO DIGITAL SCREENING BILAT WITH TOMOSYNTHESIS_CAD: ICD-10-PCS | Mod: 26,HCNC,, | Performed by: RADIOLOGY

## 2019-07-22 ENCOUNTER — OFFICE VISIT (OUTPATIENT)
Dept: FAMILY MEDICINE | Facility: CLINIC | Age: 74
End: 2019-07-22
Payer: MEDICARE

## 2019-07-22 VITALS
SYSTOLIC BLOOD PRESSURE: 120 MMHG | HEART RATE: 93 BPM | BODY MASS INDEX: 25.68 KG/M2 | OXYGEN SATURATION: 99 % | TEMPERATURE: 99 F | WEIGHT: 159.81 LBS | DIASTOLIC BLOOD PRESSURE: 80 MMHG | HEIGHT: 66 IN

## 2019-07-22 DIAGNOSIS — I10 ESSENTIAL HYPERTENSION: Primary | Chronic | ICD-10-CM

## 2019-07-22 DIAGNOSIS — L25.5 PLANT DERMATITIS: ICD-10-CM

## 2019-07-22 PROCEDURE — 3079F DIAST BP 80-89 MM HG: CPT | Mod: HCNC,CPTII,S$GLB, | Performed by: PHYSICIAN ASSISTANT

## 2019-07-22 PROCEDURE — 3079F PR MOST RECENT DIASTOLIC BLOOD PRESSURE 80-89 MM HG: ICD-10-PCS | Mod: HCNC,CPTII,S$GLB, | Performed by: PHYSICIAN ASSISTANT

## 2019-07-22 PROCEDURE — 1101F PR PT FALLS ASSESS DOC 0-1 FALLS W/OUT INJ PAST YR: ICD-10-PCS | Mod: HCNC,CPTII,S$GLB, | Performed by: PHYSICIAN ASSISTANT

## 2019-07-22 PROCEDURE — 99999 PR PBB SHADOW E&M-EST. PATIENT-LVL III: ICD-10-PCS | Mod: PBBFAC,HCNC,, | Performed by: PHYSICIAN ASSISTANT

## 2019-07-22 PROCEDURE — 99999 PR PBB SHADOW E&M-EST. PATIENT-LVL III: CPT | Mod: PBBFAC,HCNC,, | Performed by: PHYSICIAN ASSISTANT

## 2019-07-22 PROCEDURE — 1101F PT FALLS ASSESS-DOCD LE1/YR: CPT | Mod: HCNC,CPTII,S$GLB, | Performed by: PHYSICIAN ASSISTANT

## 2019-07-22 PROCEDURE — 3074F SYST BP LT 130 MM HG: CPT | Mod: HCNC,CPTII,S$GLB, | Performed by: PHYSICIAN ASSISTANT

## 2019-07-22 PROCEDURE — 99214 PR OFFICE/OUTPT VISIT, EST, LEVL IV, 30-39 MIN: ICD-10-PCS | Mod: HCNC,S$GLB,, | Performed by: PHYSICIAN ASSISTANT

## 2019-07-22 PROCEDURE — 3074F PR MOST RECENT SYSTOLIC BLOOD PRESSURE < 130 MM HG: ICD-10-PCS | Mod: HCNC,CPTII,S$GLB, | Performed by: PHYSICIAN ASSISTANT

## 2019-07-22 PROCEDURE — 99214 OFFICE O/P EST MOD 30 MIN: CPT | Mod: HCNC,S$GLB,, | Performed by: PHYSICIAN ASSISTANT

## 2019-07-22 RX ORDER — LEVOCETIRIZINE DIHYDROCHLORIDE 5 MG/1
5 TABLET, FILM COATED ORAL NIGHTLY
Qty: 30 TABLET | Refills: 0 | Status: SHIPPED | OUTPATIENT
Start: 2019-07-22 | End: 2019-09-09

## 2019-07-22 RX ORDER — BETAMETHASONE DIPROPIONATE 0.5 MG/G
CREAM TOPICAL 2 TIMES DAILY
Qty: 45 G | Refills: 0 | Status: SHIPPED | OUTPATIENT
Start: 2019-07-22 | End: 2020-09-09 | Stop reason: SDUPTHER

## 2019-07-22 RX ORDER — PREDNISONE 20 MG/1
20 TABLET ORAL DAILY
Qty: 12 TABLET | Refills: 0 | Status: SHIPPED | OUTPATIENT
Start: 2019-07-22 | End: 2019-07-31

## 2019-07-22 NOTE — PROGRESS NOTES
"Subjective:      Patient ID: Madison Long is a 74 y.o. female.    Chief Complaint: Rash (itchy red rash possible poison ivy to both arms and butt for about a week)    Patient is new to me, here today for poison ivy rash  Rash started after cleaning our her ditch    Rash   This is a new problem. The current episode started in the past 7 days. The problem has been gradually worsening since onset. Location: L arm (worse), R arm, buttocks (from scooting down ditch) The rash is characterized by draining, blistering, redness and itchiness (clear drainage). She was exposed to plant contact. Pertinent negatives include no congestion, cough, diarrhea, fever, shortness of breath, sore throat or vomiting. Treatments tried: anti-itch ointment, cleansing area, alcohol. The treatment provided no relief.     Review of Systems   Constitutional: Negative for chills, diaphoresis and fever.   HENT: Negative for congestion and sore throat.    Respiratory: Negative for cough, shortness of breath and wheezing.    Gastrointestinal: Negative for abdominal pain, diarrhea, nausea and vomiting.   Skin: Positive for rash.       Objective:   /80 (BP Location: Left arm, Patient Position: Sitting)   Pulse 93   Temp 98.7 °F (37.1 °C) (Oral)   Ht 5' 6" (1.676 m)   Wt 72.5 kg (159 lb 13.3 oz)   SpO2 99%   BMI 25.80 kg/m²   Physical Exam   Constitutional: She appears well-developed and well-nourished. She does not appear ill. No distress.   HENT:   Head: Normocephalic and atraumatic.   Cardiovascular: Normal rate, regular rhythm and normal heart sounds.   No murmur heard.  Pulmonary/Chest: Effort normal and breath sounds normal. No respiratory distress. She has no decreased breath sounds.   Skin: Skin is warm, dry and intact. Rash noted. Rash is vesicular.        Psychiatric: She has a normal mood and affect. Her speech is normal and behavior is normal. Thought content normal.     Assessment:      1. Essential hypertension    2. " Plant dermatitis       Plan:   Essential hypertension   Controlled on current regimen    Plant dermatitis  -     predniSONE (DELTASONE) 20 MG tablet; Take 1 tablet (20 mg total) by mouth once daily. 3 pills Day 1 2 pills Day 2-4 1 pill Day 5-7  Dispense: 12 tablet; Refill: 0  -     betamethasone dipropionate (DIPROLENE) 0.05 % cream; Apply topically 2 (two) times daily.  Dispense: 45 g; Refill: 0  -     levocetirizine (XYZAL) 5 MG tablet; Take 1 tablet (5 mg total) by mouth every evening.  Dispense: 30 tablet; Refill: 0    Discussed worsening signs/symptoms and when to return to clinic or go to ED.   Patient expresses understanding and agrees with treatment plan.

## 2019-07-24 DIAGNOSIS — I10 ESSENTIAL HYPERTENSION: ICD-10-CM

## 2019-07-24 RX ORDER — LOSARTAN POTASSIUM 50 MG/1
TABLET ORAL
Qty: 30 TABLET | Refills: 5 | Status: SHIPPED | OUTPATIENT
Start: 2019-07-24 | End: 2020-01-31 | Stop reason: SDUPTHER

## 2019-07-31 ENCOUNTER — LAB VISIT (OUTPATIENT)
Dept: LAB | Facility: HOSPITAL | Age: 74
End: 2019-07-31
Attending: INTERNAL MEDICINE
Payer: MEDICARE

## 2019-07-31 ENCOUNTER — OFFICE VISIT (OUTPATIENT)
Dept: FAMILY MEDICINE | Facility: CLINIC | Age: 74
End: 2019-07-31
Payer: MEDICARE

## 2019-07-31 VITALS
WEIGHT: 161.63 LBS | BODY MASS INDEX: 25.97 KG/M2 | DIASTOLIC BLOOD PRESSURE: 80 MMHG | HEART RATE: 74 BPM | HEIGHT: 66 IN | OXYGEN SATURATION: 98 % | SYSTOLIC BLOOD PRESSURE: 134 MMHG

## 2019-07-31 DIAGNOSIS — F32.A DEPRESSION, UNSPECIFIED DEPRESSION TYPE: ICD-10-CM

## 2019-07-31 DIAGNOSIS — R79.89 ABNORMAL CBC: ICD-10-CM

## 2019-07-31 DIAGNOSIS — E78.5 DYSLIPIDEMIA: ICD-10-CM

## 2019-07-31 DIAGNOSIS — F41.1 GAD (GENERALIZED ANXIETY DISORDER): ICD-10-CM

## 2019-07-31 DIAGNOSIS — M85.80 OSTEOPENIA, UNSPECIFIED LOCATION: ICD-10-CM

## 2019-07-31 DIAGNOSIS — I10 ESSENTIAL HYPERTENSION: Primary | ICD-10-CM

## 2019-07-31 DIAGNOSIS — Z78.0 POST-MENOPAUSAL: ICD-10-CM

## 2019-07-31 DIAGNOSIS — E87.5 HYPERKALEMIA: ICD-10-CM

## 2019-07-31 DIAGNOSIS — L57.0 KERATOSIS: ICD-10-CM

## 2019-07-31 DIAGNOSIS — G47.00 INSOMNIA, UNSPECIFIED TYPE: ICD-10-CM

## 2019-07-31 LAB — POTASSIUM SERPL-SCNC: 4 MMOL/L (ref 3.5–5.1)

## 2019-07-31 PROCEDURE — 99999 PR PBB SHADOW E&M-EST. PATIENT-LVL III: CPT | Mod: PBBFAC,HCNC,, | Performed by: INTERNAL MEDICINE

## 2019-07-31 PROCEDURE — 1101F PR PT FALLS ASSESS DOC 0-1 FALLS W/OUT INJ PAST YR: ICD-10-PCS | Mod: HCNC,CPTII,S$GLB, | Performed by: INTERNAL MEDICINE

## 2019-07-31 PROCEDURE — 99999 PR PBB SHADOW E&M-EST. PATIENT-LVL III: ICD-10-PCS | Mod: PBBFAC,HCNC,, | Performed by: INTERNAL MEDICINE

## 2019-07-31 PROCEDURE — 3079F PR MOST RECENT DIASTOLIC BLOOD PRESSURE 80-89 MM HG: ICD-10-PCS | Mod: HCNC,CPTII,S$GLB, | Performed by: INTERNAL MEDICINE

## 2019-07-31 PROCEDURE — 3075F SYST BP GE 130 - 139MM HG: CPT | Mod: HCNC,CPTII,S$GLB, | Performed by: INTERNAL MEDICINE

## 2019-07-31 PROCEDURE — 36415 COLL VENOUS BLD VENIPUNCTURE: CPT | Mod: HCNC,PO

## 2019-07-31 PROCEDURE — 3075F PR MOST RECENT SYSTOLIC BLOOD PRESS GE 130-139MM HG: ICD-10-PCS | Mod: HCNC,CPTII,S$GLB, | Performed by: INTERNAL MEDICINE

## 2019-07-31 PROCEDURE — 1101F PT FALLS ASSESS-DOCD LE1/YR: CPT | Mod: HCNC,CPTII,S$GLB, | Performed by: INTERNAL MEDICINE

## 2019-07-31 PROCEDURE — 3079F DIAST BP 80-89 MM HG: CPT | Mod: HCNC,CPTII,S$GLB, | Performed by: INTERNAL MEDICINE

## 2019-07-31 PROCEDURE — 84132 ASSAY OF SERUM POTASSIUM: CPT | Mod: HCNC,PO

## 2019-07-31 PROCEDURE — 99214 OFFICE O/P EST MOD 30 MIN: CPT | Mod: HCNC,S$GLB,, | Performed by: INTERNAL MEDICINE

## 2019-07-31 PROCEDURE — 99214 PR OFFICE/OUTPT VISIT, EST, LEVL IV, 30-39 MIN: ICD-10-PCS | Mod: HCNC,S$GLB,, | Performed by: INTERNAL MEDICINE

## 2019-07-31 RX ORDER — ROSUVASTATIN CALCIUM 10 MG/1
10 TABLET, COATED ORAL DAILY
Qty: 90 TABLET | Refills: 3 | Status: SHIPPED | OUTPATIENT
Start: 2019-07-31 | End: 2020-01-31

## 2019-07-31 RX ORDER — FLUOXETINE 10 MG/1
10 CAPSULE ORAL EVERY OTHER DAY
Qty: 45 CAPSULE | Refills: 3
Start: 2019-07-31 | End: 2020-03-24

## 2019-07-31 RX ORDER — TRAZODONE HYDROCHLORIDE 50 MG/1
50 TABLET ORAL NIGHTLY PRN
Qty: 60 TABLET | Refills: 5
Start: 2019-07-31 | End: 2019-09-26 | Stop reason: SDUPTHER

## 2019-07-31 NOTE — PROGRESS NOTES
Subjective:       Patient ID: Madison Long is a 74 y.o. female.    Chief Complaint: Hypertension    4 eye surgeries in past. Dr Sotelo  HTN - controlled  HLD - uncontrolled; goal < 130 age, htn; had severe LE cramps.  Stopped Lipitor 1 month ago and cramps resolved after 5-6 days.  Wants to try different.   COPD - no sob;      Insomnia - 1- 2 trazodone works most nights; stopped her Ambien      ALEX - controlled and retired now, so wants to decrease to qod.     Atrophic vaginitis - estrogen cream used for 1-2 weeks and then will take a break.  due for mammogram 3/23/19; rx originally by urology Dr Hughes.     Keratosis - hereditary.  Wants to change to our derm.     Was on oral steroids for poison ivy.        Mildly high potassium    Osteopenia - on vit D and calcium     Review of Systems   Constitutional: Negative for appetite change and fever.   HENT: Negative for nosebleeds and trouble swallowing.    Eyes: Negative for discharge and visual disturbance.   Respiratory: Negative for choking and shortness of breath.    Cardiovascular: Negative for chest pain and palpitations.   Gastrointestinal: Negative for abdominal pain, nausea and vomiting.   Musculoskeletal: Negative for arthralgias and joint swelling.   Skin: Negative for rash and wound.   Neurological: Negative for dizziness and syncope.   Psychiatric/Behavioral: Negative for confusion and dysphoric mood.       Objective:      Vitals:    07/31/19 0828   BP: 134/80   Pulse: 74     Physical Exam   Constitutional: She appears well-nourished.   Eyes: Conjunctivae and EOM are normal.   Neck: Normal range of motion.   Cardiovascular: Normal rate and regular rhythm.   Pulmonary/Chest: Effort normal and breath sounds normal.   Musculoskeletal:   Normal ROM bilateral    Neurological: No cranial nerve deficit (grossly intact).   Skin: Skin is warm and dry.   Psychiatric: She has a normal mood and affect.   Alert and orientated   Vitals reviewed.        Assessment:        1. Essential hypertension    2. Dyslipidemia    3. Keratosis    4. ALEX (generalized anxiety disorder)    5. Insomnia, unspecified type    6. Depression, unspecified depression type    7. Post-menopausal    8. Osteopenia, unspecified location    9. Abnormal CBC    10. Hyperkalemia        Plan:       Essential hypertension  -     Comprehensive metabolic panel; Future; Expected date: 01/27/2020    Dyslipidemia  -     Lipid panel; Future; Expected date: 01/27/2020  -     rosuvastatin (CRESTOR) 10 MG tablet; Take 1 tablet (10 mg total) by mouth once daily.  Dispense: 90 tablet; Refill: 3  -     Lipid panel; Future; Expected date: 09/29/2019    Keratosis  -     Ambulatory referral to Dermatology    ALEX (generalized anxiety disorder)  -     FLUoxetine 10 MG capsule; Take 1 capsule (10 mg total) by mouth every other day.  Dispense: 45 capsule; Refill: 3    Insomnia, unspecified type  -     traZODone (DESYREL) 50 MG tablet; Take 1 tablet (50 mg total) by mouth nightly as needed for Insomnia (may take 2nd tab if needed qhs). May take up to 2 tabs per night prn  Dispense: 60 tablet; Refill: 5    Depression, unspecified depression type  -     FLUoxetine 10 MG capsule; Take 1 capsule (10 mg total) by mouth every other day.  Dispense: 45 capsule; Refill: 3    Post-menopausal  -     DXA Bone Density Spine And Hip; Future; Expected date: 07/31/2019    Osteopenia, unspecified location  -     DXA Bone Density Spine And Hip; Future; Expected date: 07/31/2019    Abnormal CBC  -     CBC auto differential; Future; Expected date: 01/27/2020    Hyperkalemia  -     Potassium; Future; Expected date: 07/31/2019            Medication List with Changes/Refills   New Medications    ROSUVASTATIN (CRESTOR) 10 MG TABLET    Take 1 tablet (10 mg total) by mouth once daily.   Current Medications    ACETAMINOPHEN (TYLENOL) 500 MG TABLET    Take 500 mg by mouth every 6 (six) hours as needed for Pain.    ALBUTEROL (VENTOLIN HFA) 90 MCG/ACTUATION  INHALER    Inhale 2 puffs into the lungs every 6 (six) hours as needed. Rescue    ASPIRIN 81 MG CHEW    Take 81 mg by mouth daily as needed. Usually takes about every 2 weeks.    BETAMETHASONE DIPROPIONATE (DIPROLENE) 0.05 % CREAM    Apply topically 2 (two) times daily.    DORZOLAMIDE-TIMOLOL 2-0.5% (COSOPT) 22.3-6.8 MG/ML OPHTHALMIC SOLUTION    Place 1 drop into the right eye 2 (two) times daily.    ECONAZOLE NITRATE 1 % CREAM    Apply topically 2 (two) times daily as needed. Twice a day  PRN    ESTRADIOL (ESTRACE) 0.01 % (0.1 MG/GRAM) VAGINAL CREAM    Place 1 g vaginally daily as needed.    FISH OIL-OMEGA-3 FATTY ACIDS 300-1,000 MG CAPSULE    Take 1 g by mouth every morning.     FLUCONAZOLE (DIFLUCAN) 150 MG TAB    1 po daily for 3 days    FLUOROURACIL (EFUDEX) 5 % CREAM    CHRISTIAN AA BID FOR 2 WEEKS PRN    FLUTICASONE (FLONASE) 50 MCG/ACTUATION NASAL SPRAY    SPRAY 2 SPRAYS IN EACH NOSTRIL EVERY DAY    GUAIFENESIN (MUCINEX) 600 MG 12 HR TABLET    Take 1,200 mg by mouth 2 (two) times daily as needed.     LEVOCETIRIZINE (XYZAL) 5 MG TABLET    Take 1 tablet (5 mg total) by mouth every evening.    LOSARTAN (COZAAR) 50 MG TABLET    TAKE 1 TABLET(50 MG) BY MOUTH EVERY MORNING    MULTIVIT,STRESS FORMULA-ZINC (STRESS FORMULA WITH ZINC) TAB    Take 1 tablet by mouth every morning. Uses Alive    MUPIROCIN (BACTROBAN) 2 % OINTMENT    Apply topically 2 (two) times daily.    OMEPRAZOLE (PRILOSEC OTC) 20 MG TABLET    Take 1 tablet (20 mg total) by mouth every morning. Take 30 minutes before your first protein containing meal.    PSEUDOEPHEDRINE (SUDAFED) 30 MG TABLET    Take 30 mg by mouth every 4 (four) hours as needed for Congestion.    SODIUM CHLORIDE (SALINE NASAL) 0.65 % NASAL SPRAY    1 spray by Nasal route as needed for Congestion.    SYMBICORT 160-4.5 MCG/ACTUATION HFAA    INHALE 2 PUFFS BY MOUTH TWICE DAILY    ZINC GLUCONATE (COLD-EEZE ORAL)    Take 1 lozenge by mouth daily as needed.   Changed and/or Refilled Medications     Modified Medication Previous Medication    FLUOXETINE 10 MG CAPSULE FLUoxetine 10 MG capsule       Take 1 capsule (10 mg total) by mouth every other day.    Take 1 capsule (10 mg total) by mouth once daily.    TRAZODONE (DESYREL) 50 MG TABLET traZODone (DESYREL) 50 MG tablet       Take 1 tablet (50 mg total) by mouth nightly as needed for Insomnia (may take 2nd tab if needed qhs). May take up to 2 tabs per night prn    Take 1 tablet (50 mg total) by mouth nightly as needed for Insomnia. May take up to 2 tabs per night prn   Discontinued Medications    ATORVASTATIN (LIPITOR) 20 MG TABLET    Take 1 tablet (20 mg total) by mouth nightly.    PREDNISONE (DELTASONE) 20 MG TABLET    Take 1 tablet (20 mg total) by mouth once daily. 3 pills Day 1 2 pills Day 2-4 1 pill Day 5-7    ZOLPIDEM (AMBIEN CR) 12.5 MG CR TABLET         May need 2nd round of oral steroids for poison ivy - will let me know.   Continue current management and monitor.    Counseled on regular exercise, maintenance of a healthy weight, balanced diet rich in fruits/vegetables and lean protein, and avoidance of unhealthy habits like smoking and excessive alcohol intake.   Also, counseled on importance of being compliant with medication, health appointments, diet and exercise.     Follow up in about 6 months (around 1/31/2020).

## 2019-08-12 ENCOUNTER — OFFICE VISIT (OUTPATIENT)
Dept: FAMILY MEDICINE | Facility: CLINIC | Age: 74
End: 2019-08-12
Payer: MEDICARE

## 2019-08-12 VITALS
WEIGHT: 158.31 LBS | TEMPERATURE: 98 F | SYSTOLIC BLOOD PRESSURE: 120 MMHG | BODY MASS INDEX: 25.44 KG/M2 | HEIGHT: 66 IN | HEART RATE: 74 BPM | DIASTOLIC BLOOD PRESSURE: 80 MMHG | OXYGEN SATURATION: 98 %

## 2019-08-12 DIAGNOSIS — H00.014 HORDEOLUM OF LEFT UPPER EYELID, UNSPECIFIED HORDEOLUM TYPE: ICD-10-CM

## 2019-08-12 DIAGNOSIS — L03.213 PRESEPTAL CELLULITIS OF LEFT UPPER EYELID: Primary | ICD-10-CM

## 2019-08-12 DIAGNOSIS — E78.5 DYSLIPIDEMIA: ICD-10-CM

## 2019-08-12 PROCEDURE — 1101F PR PT FALLS ASSESS DOC 0-1 FALLS W/OUT INJ PAST YR: ICD-10-PCS | Mod: HCNC,CPTII,S$GLB, | Performed by: NURSE PRACTITIONER

## 2019-08-12 PROCEDURE — 99999 PR PBB SHADOW E&M-EST. PATIENT-LVL III: ICD-10-PCS | Mod: PBBFAC,HCNC,, | Performed by: NURSE PRACTITIONER

## 2019-08-12 PROCEDURE — 3079F DIAST BP 80-89 MM HG: CPT | Mod: HCNC,CPTII,S$GLB, | Performed by: NURSE PRACTITIONER

## 2019-08-12 PROCEDURE — 99999 PR PBB SHADOW E&M-EST. PATIENT-LVL III: CPT | Mod: PBBFAC,HCNC,, | Performed by: NURSE PRACTITIONER

## 2019-08-12 PROCEDURE — 3079F PR MOST RECENT DIASTOLIC BLOOD PRESSURE 80-89 MM HG: ICD-10-PCS | Mod: HCNC,CPTII,S$GLB, | Performed by: NURSE PRACTITIONER

## 2019-08-12 PROCEDURE — 1101F PT FALLS ASSESS-DOCD LE1/YR: CPT | Mod: HCNC,CPTII,S$GLB, | Performed by: NURSE PRACTITIONER

## 2019-08-12 PROCEDURE — 99214 PR OFFICE/OUTPT VISIT, EST, LEVL IV, 30-39 MIN: ICD-10-PCS | Mod: HCNC,S$GLB,, | Performed by: NURSE PRACTITIONER

## 2019-08-12 PROCEDURE — 99214 OFFICE O/P EST MOD 30 MIN: CPT | Mod: HCNC,S$GLB,, | Performed by: NURSE PRACTITIONER

## 2019-08-12 PROCEDURE — 3074F SYST BP LT 130 MM HG: CPT | Mod: HCNC,CPTII,S$GLB, | Performed by: NURSE PRACTITIONER

## 2019-08-12 PROCEDURE — 3074F PR MOST RECENT SYSTOLIC BLOOD PRESSURE < 130 MM HG: ICD-10-PCS | Mod: HCNC,CPTII,S$GLB, | Performed by: NURSE PRACTITIONER

## 2019-08-12 RX ORDER — ATORVASTATIN CALCIUM 20 MG/1
TABLET, FILM COATED ORAL
Qty: 90 TABLET | Refills: 1 | Status: SHIPPED | OUTPATIENT
Start: 2019-08-12 | End: 2019-09-09

## 2019-08-12 RX ORDER — SULFAMETHOXAZOLE AND TRIMETHOPRIM 800; 160 MG/1; MG/1
1 TABLET ORAL 2 TIMES DAILY
Qty: 14 TABLET | Refills: 0 | Status: SHIPPED | OUTPATIENT
Start: 2019-08-12 | End: 2019-08-19

## 2019-08-12 NOTE — PATIENT INSTRUCTIONS
Facial Cellulitis  Cellulitis is an infection of the deep layers of skin. A break in the skin, such as a cut or scratch, can let bacteria under the skin. It may also occur from an infected oil gland (pimple) or hair follicle. If the bacteria get to deep layers of the skin, it can be serious. If not treated, cellulitis can get into the bloodstream and lymph nodes. The infection can then spread throughout the body. This causes serious illness.  Cellulitis causes the affected skin to become red, swollen, warm, and sore. The reddened areas have a visible border. You may have a fever, chills, and pain.  Cellulitis is treated with antibiotics taken for 7 to 10 days. Symptoms should get better 1 to 2 days after treatment is started. Make sure to take all the antibiotics for the full number of days until they are gone. Keep taking the medication even if your symptoms go away.  Home care  Follow these tips:  · Take all of the antibiotic medicine exactly as directed until it is gone. Dont miss any doses, especially during the first 7 days. Dont stop taking it when your symptoms get better.  · Use a cool compress (face cloth soaked in cool water) on your face to help reduce swelling and pain.  · You may use acetaminophen or ibuprofen to reduce pain. Dont use these if you have chronic liver or kidney disease, or ever had a stomach ulcer or gastrointestinal bleeding. Talk with your healthcare provider first.  Follow-up care  Follow up with your healthcare provider, or as advised. If your infection does not go away on the first antibiotic, your healthcare provider will prescribe a different one.  When to seek medical advice  Call your healthcare provider right away if any of these occur:  · Fever higher of 100.4º F (38.0º C) or higher after 2 days on antibiotics  · Red areas that spread  · Swelling or pain that gets worse  · Fluid leaking from the skin (pus)  · An eyelid that swells shut or leaks fluid (pus)  · Headache  or neck pain that gets worse  · Unusual drowsiness or confusion  · Convulsions (seizure)  · Change in eyesight     Date Last Reviewed: 9/1/2016 © 2000-2017 [employer]. [address] [place] [address], [place] [address]. All rights reserved. This information is not intended as a substitute for professional medical care. Always follow your healthcare professional's instructions.        Sty (or Stye)  A sty is an infection of the oil gland of the eyelid. It may develop into a small pocket of pus (abscess). This can cause pain, redness, and swelling. In early stages, styes are treated with antibiotic cream, eye drops, or warm packs (small towels soaked in warm water). More severe cases may need to be opened and drained by a health care provider.  Home care  · Eye drops or ointment are usually prescribed to treat the infection. Use these as directed.   ¨ Artificial tears may also be used to lubricate the eye and make it more comfortable. These may be purchased without a prescription.   ¨ Talk to your health care provider before using any over-the-counter treatment for a sty.  · Apply a warm, damp towel to the affected eye for at least 5 minutes, 3 to 4 times a day for a week. Warm compresses open the pores and speed the healing. If the compresses are too hot, they may burn your eyelid.  · Sometimes the sty will drain with this treatment alone. If this happens, continue the antibiotic until all the redness and swelling are gone.  · Wash your hands before and after touching the infected eye to avoid spreading the infection.  · Do not squeeze or try to puncture the sty.  Follow-up care  Follow up with your health care provider, or as advised.   When to seek medical advice  Call your health care provider right away if you have:  · Increase in swelling or redness around the eyelid after 48 to 72 hours  · Increase in eye pain or the eyelid blisters  · Increase in warmth--the eyelid feels hot  · Drainage of blood or  thick pus from the sty  · Blister on the eyelid  · Inability to open the eyelid due to swelling  · Fever  ¨ 1 degree above your normal temperature lasting for 24 to 48 hours, or  ¨ Whatever your health care provider told you to report based on your medical condition  · Vision changes  · Headache or stiff neck  · Recurrence of the sty  Date Last Reviewed: 6/14/2015  © 1112-6030 [employer]. [address] [place] [address], [place] [address]. All rights reserved. This information is not intended as a substitute for professional medical care. Always follow your healthcare professional's instructions.

## 2019-08-12 NOTE — PROGRESS NOTES
Subjective:       Patient ID: Madison Long is a 74 y.o. female.    Chief Complaint: Stye (left eye, 1 week )    HPI   Ms. Long is a new patient to me. She presents today for stye and erythema. Noticed nonpainful stye to left upper eyelid 1 week ago--came to head and popped; gradually worsening/spreading erythema surrounding stye with swelling. She has been using warm compresses regularly   Vitals:    08/12/19 1406   BP: 120/80   Pulse: 74   Temp: 97.9 °F (36.6 °C)     Review of Systems   Constitutional: Negative for diaphoresis and fever.   HENT: Negative for facial swelling and trouble swallowing.    Eyes: Negative for discharge and redness.   Respiratory: Negative for cough and shortness of breath.    Cardiovascular: Negative for chest pain and palpitations.   Gastrointestinal: Negative for diarrhea.   Genitourinary: Negative for difficulty urinating.   Musculoskeletal: Negative for gait problem.   Skin: Positive for color change. Negative for pallor and rash.   Neurological: Negative for facial asymmetry and speech difficulty.   Psychiatric/Behavioral: Negative for confusion. The patient is not nervous/anxious.        Past Medical History:   Diagnosis Date    Allergy     Asthma     Basal cell carcinoma     COPD (chronic obstructive pulmonary disease)     GERD (gastroesophageal reflux disease)     Hyperlipidemia     Hypertension     Retinal detachment      Objective:      Physical Exam   Constitutional: She is oriented to person, place, and time. She does not have a sickly appearance. No distress.   HENT:   Head: Normocephalic.   Right Ear: Hearing normal.   Left Ear: Hearing normal.   Nose: Nose normal.   Eyes: Conjunctivae, EOM and lids are normal. Right eye exhibits no discharge and no exudate. Left eye exhibits hordeolum. Left eye exhibits no discharge and no exudate.       Neck: No JVD present. No tracheal deviation present.   Cardiovascular: Normal rate.   Pulmonary/Chest: Effort normal.    Musculoskeletal: She exhibits no deformity.   Neurological: She is alert and oriented to person, place, and time.   Skin: She is not diaphoretic. No pallor.   Psychiatric: She has a normal mood and affect. Her speech is normal and behavior is normal. Judgment and thought content normal. Cognition and memory are normal.   Nursing note and vitals reviewed.      Assessment:       1. Preseptal cellulitis of left upper eyelid    2. Hordeolum of left upper eyelid, unspecified hordeolum type        Plan:       Preseptal cellulitis of left upper eyelid  -     sulfamethoxazole-trimethoprim 800-160mg (BACTRIM DS) 800-160 mg Tab; Take 1 tablet by mouth 2 (two) times daily for 7 days.  Dispense: 14 tablet; Refill: 0    Hordeolum of left upper eyelid, unspecified hordeolum type        cont warm compresses, hand hygiene  Educated on safety/return precautions     Follow up for further evaluation if s/s worsen, fail to improve, or new symptoms arise.    Medication List with Changes/Refills   New Medications    SULFAMETHOXAZOLE-TRIMETHOPRIM 800-160MG (BACTRIM DS) 800-160 MG TAB    Take 1 tablet by mouth 2 (two) times daily for 7 days.   Current Medications    ACETAMINOPHEN (TYLENOL) 500 MG TABLET    Take 500 mg by mouth every 6 (six) hours as needed for Pain.    ALBUTEROL (VENTOLIN HFA) 90 MCG/ACTUATION INHALER    Inhale 2 puffs into the lungs every 6 (six) hours as needed. Rescue    ASPIRIN 81 MG CHEW    Take 81 mg by mouth daily as needed. Usually takes about every 2 weeks.    BETAMETHASONE DIPROPIONATE (DIPROLENE) 0.05 % CREAM    Apply topically 2 (two) times daily.    DORZOLAMIDE-TIMOLOL 2-0.5% (COSOPT) 22.3-6.8 MG/ML OPHTHALMIC SOLUTION    Place 1 drop into the right eye 2 (two) times daily.    ECONAZOLE NITRATE 1 % CREAM    Apply topically 2 (two) times daily as needed. Twice a day  PRN    ESTRADIOL (ESTRACE) 0.01 % (0.1 MG/GRAM) VAGINAL CREAM    Place 1 g vaginally daily as needed.    FISH OIL-OMEGA-3 FATTY ACIDS 300-1,000  MG CAPSULE    Take 1 g by mouth every morning.     FLUCONAZOLE (DIFLUCAN) 150 MG TAB    1 po daily for 3 days    FLUOROURACIL (EFUDEX) 5 % CREAM    CHRISTIAN AA BID FOR 2 WEEKS PRN    FLUOXETINE 10 MG CAPSULE    Take 1 capsule (10 mg total) by mouth every other day.    FLUTICASONE (FLONASE) 50 MCG/ACTUATION NASAL SPRAY    SPRAY 2 SPRAYS IN EACH NOSTRIL EVERY DAY    GUAIFENESIN (MUCINEX) 600 MG 12 HR TABLET    Take 1,200 mg by mouth 2 (two) times daily as needed.     LEVOCETIRIZINE (XYZAL) 5 MG TABLET    Take 1 tablet (5 mg total) by mouth every evening.    LOSARTAN (COZAAR) 50 MG TABLET    TAKE 1 TABLET(50 MG) BY MOUTH EVERY MORNING    MULTIVIT,STRESS FORMULA-ZINC (STRESS FORMULA WITH ZINC) TAB    Take 1 tablet by mouth every morning. Uses Alive    MUPIROCIN (BACTROBAN) 2 % OINTMENT    Apply topically 2 (two) times daily.    OMEPRAZOLE (PRILOSEC OTC) 20 MG TABLET    Take 1 tablet (20 mg total) by mouth every morning. Take 30 minutes before your first protein containing meal.    PSEUDOEPHEDRINE (SUDAFED) 30 MG TABLET    Take 30 mg by mouth every 4 (four) hours as needed for Congestion.    ROSUVASTATIN (CRESTOR) 10 MG TABLET    Take 1 tablet (10 mg total) by mouth once daily.    SODIUM CHLORIDE (SALINE NASAL) 0.65 % NASAL SPRAY    1 spray by Nasal route as needed for Congestion.    SYMBICORT 160-4.5 MCG/ACTUATION HFAA    INHALE 2 PUFFS BY MOUTH TWICE DAILY    TRAZODONE (DESYREL) 50 MG TABLET    Take 1 tablet (50 mg total) by mouth nightly as needed for Insomnia (may take 2nd tab if needed qhs). May take up to 2 tabs per night prn    ZINC GLUCONATE (COLD-EEZE ORAL)    Take 1 lozenge by mouth daily as needed.

## 2019-08-14 ENCOUNTER — TELEPHONE (OUTPATIENT)
Dept: OPTOMETRY | Facility: CLINIC | Age: 74
End: 2019-08-14

## 2019-08-14 NOTE — TELEPHONE ENCOUNTER
----- Message from RT Rc sent at 8/14/2019 12:53 PM CDT -----  Contact: pt    pt , requesting an appt to be worked, preferably before noon time tomorrow,(Possible Eye Stye), thanks.

## 2019-08-15 ENCOUNTER — INITIAL CONSULT (OUTPATIENT)
Dept: DERMATOLOGY | Facility: CLINIC | Age: 74
End: 2019-08-15
Payer: MEDICARE

## 2019-08-15 ENCOUNTER — OFFICE VISIT (OUTPATIENT)
Dept: OPTOMETRY | Facility: CLINIC | Age: 74
End: 2019-08-15
Payer: MEDICARE

## 2019-08-15 ENCOUNTER — HOSPITAL ENCOUNTER (OUTPATIENT)
Dept: RADIOLOGY | Facility: HOSPITAL | Age: 74
Discharge: HOME | End: 2019-08-15
Attending: INTERNAL MEDICINE
Payer: MEDICARE

## 2019-08-15 VITALS — BODY MASS INDEX: 25.39 KG/M2 | RESPIRATION RATE: 16 BRPM | HEIGHT: 66 IN | WEIGHT: 158 LBS

## 2019-08-15 DIAGNOSIS — Z78.0 POST-MENOPAUSAL: ICD-10-CM

## 2019-08-15 DIAGNOSIS — Z85.828 PERSONAL HISTORY OF MALIGNANT NEOPLASM OF SKIN: ICD-10-CM

## 2019-08-15 DIAGNOSIS — H10.503 BLEPHAROCONJUNCTIVITIS OF BOTH EYES, UNSPECIFIED BLEPHAROCONJUNCTIVITIS TYPE: ICD-10-CM

## 2019-08-15 DIAGNOSIS — H00.024 HORDEOLUM INTERNUM OF LEFT UPPER EYELID: Primary | ICD-10-CM

## 2019-08-15 DIAGNOSIS — L82.1 SEBORRHEIC KERATOSES: Primary | ICD-10-CM

## 2019-08-15 DIAGNOSIS — L57.0 ACTINIC KERATOSIS: ICD-10-CM

## 2019-08-15 DIAGNOSIS — M85.80 OSTEOPENIA, UNSPECIFIED LOCATION: ICD-10-CM

## 2019-08-15 DIAGNOSIS — D18.00 ANGIOMA: ICD-10-CM

## 2019-08-15 PROCEDURE — 77080 DXA BONE DENSITY AXIAL: CPT | Mod: 26,HCNC,, | Performed by: RADIOLOGY

## 2019-08-15 PROCEDURE — 1101F PT FALLS ASSESS-DOCD LE1/YR: CPT | Mod: HCNC,CPTII,S$GLB, | Performed by: DERMATOLOGY

## 2019-08-15 PROCEDURE — 77080 DEXA BONE DENSITY SPINE HIP: ICD-10-PCS | Mod: 26,HCNC,, | Performed by: RADIOLOGY

## 2019-08-15 PROCEDURE — 17000 DESTRUCT PREMALG LESION: CPT | Mod: HCNC,S$GLB,, | Performed by: DERMATOLOGY

## 2019-08-15 PROCEDURE — 17000 PR DESTRUCTION(LASER SURGERY,CRYOSURGERY,CHEMOSURGERY),PREMALIGNANT LESIONS,FIRST LESION: ICD-10-PCS | Mod: HCNC,S$GLB,, | Performed by: DERMATOLOGY

## 2019-08-15 PROCEDURE — 99999 PR PBB SHADOW E&M-EST. PATIENT-LVL III: ICD-10-PCS | Mod: PBBFAC,HCNC,, | Performed by: DERMATOLOGY

## 2019-08-15 PROCEDURE — 99999 PR PBB SHADOW E&M-EST. PATIENT-LVL IV: ICD-10-PCS | Mod: PBBFAC,HCNC,, | Performed by: OPTOMETRIST

## 2019-08-15 PROCEDURE — 99999 PR PBB SHADOW E&M-EST. PATIENT-LVL III: CPT | Mod: PBBFAC,HCNC,, | Performed by: DERMATOLOGY

## 2019-08-15 PROCEDURE — 99999 PR PBB SHADOW E&M-EST. PATIENT-LVL IV: CPT | Mod: PBBFAC,HCNC,, | Performed by: OPTOMETRIST

## 2019-08-15 PROCEDURE — 92012 INTRM OPH EXAM EST PATIENT: CPT | Mod: HCNC,S$GLB,, | Performed by: OPTOMETRIST

## 2019-08-15 PROCEDURE — 99214 PR OFFICE/OUTPT VISIT, EST, LEVL IV, 30-39 MIN: ICD-10-PCS | Mod: 25,HCNC,S$GLB, | Performed by: DERMATOLOGY

## 2019-08-15 PROCEDURE — 92012 PR EYE EXAM, EST PATIENT,INTERMED: ICD-10-PCS | Mod: HCNC,S$GLB,, | Performed by: OPTOMETRIST

## 2019-08-15 PROCEDURE — 99214 OFFICE O/P EST MOD 30 MIN: CPT | Mod: 25,HCNC,S$GLB, | Performed by: DERMATOLOGY

## 2019-08-15 PROCEDURE — 1101F PR PT FALLS ASSESS DOC 0-1 FALLS W/OUT INJ PAST YR: ICD-10-PCS | Mod: HCNC,CPTII,S$GLB, | Performed by: DERMATOLOGY

## 2019-08-15 PROCEDURE — 77080 DXA BONE DENSITY AXIAL: CPT | Mod: TC,HCNC,PO

## 2019-08-15 RX ORDER — FLUOROURACIL 50 MG/G
CREAM TOPICAL 2 TIMES DAILY
Qty: 40 G | Refills: 3 | Status: SHIPPED | OUTPATIENT
Start: 2019-08-15 | End: 2022-06-30

## 2019-08-15 NOTE — PROGRESS NOTES
Subjective:       Patient ID:  Madison Long is a 74 y.o. female who presents for   Chief Complaint   Patient presents with    Lesion     arms , legs & scalp      73 yo F presents for evaluation of skin lesions on scalp, arms, legs, & back. Last seen by Dr Garcia 6-5-17.  She describes the lesions as rough. No bleeding. Some of the lesions have been treated with Efudex in the past.      Derm Hx;  BCC on right cheek (cutaneous lateral lower eyelid) treated by Mohs (Dr Hooper 2013)             Past Medical History:   Diagnosis Date    Allergy     Asthma     Basal cell carcinoma 2013     right cheek (cutaneous lateral lower eyelid) MOHBHUMI Hooper     BCC (basal cell carcinoma)     COPD (chronic obstructive pulmonary disease)     GERD (gastroesophageal reflux disease)     Hyperlipidemia     Hypertension     Retinal detachment      Review of Systems   Skin: Positive for activity-related sunscreen use and sensitivity to antibiotic ointment. Negative for daily sunscreen use and sensitivity to bandage adhesive.   Hematologic/Lymphatic: Bruises/bleeds easily.        Objective:    Physical Exam   Constitutional: She appears well-developed and well-nourished.   Neurological: She is alert and oriented to person, place, and time.   Psychiatric: She has a normal mood and affect.   Skin:   Areas Examined (abnormalities noted in diagram):   Head / Face Inspection Performed  Neck Inspection Performed  Chest / Axilla Inspection Performed  Abdomen Inspection Performed  Genitals / Buttocks / Groin Inspection Performed  Back Inspection Performed  RUE Inspected  LUE Inspection Performed  RLE Inspected  LLE Inspection Performed                   Diagram Legend     Erythematous scaling macule/papule c/w actinic keratosis       Vascular papule c/w angioma      Pigmented verrucoid papule/plaque c/w seborrheic keratosis      Yellow umbilicated papule c/w sebaceous hyperplasia      Irregularly shaped tan macule c/w lentigo     1-2  mm smooth white papules consistent with Milia      Movable subcutaneous cyst with punctum c/w epidermal inclusion cyst      Subcutaneous movable cyst c/w pilar cyst      Firm pink to brown papule c/w dermatofibroma      Pedunculated fleshy papule(s) c/w skin tag(s)      Evenly pigmented macule c/w junctional nevus     Mildly variegated pigmented, slightly irregular-bordered macule c/w mildly atypical nevus      Flesh colored to evenly pigmented papule c/w intradermal nevus       Pink pearly papule/plaque c/w basal cell carcinoma      Erythematous hyperkeratotic cursted plaque c/w SCC      Surgical scar with no sign of skin cancer recurrence      Open and closed comedones      Inflammatory papules and pustules      Verrucoid papule consistent consistent with wart     Erythematous eczematous patches and plaques     Dystrophic onycholytic nail with subungual debris c/w onychomycosis     Umbilicated papule    Erythematous-base heme-crusted tan verrucoid plaque consistent with inflamed seborrheic keratosis     Erythematous Silvery Scaling Plaque c/w Psoriasis     See annotation      Assessment / Plan:        Seborrheic keratoses  These are benign inherited growths without a malignant potential. Reassurance given to patient. No treatment is necessary.     Actinic keratosis  Pt would like to continue to treat with Efudex.  She did want to treat the 1 thicker lesion on her scalp with LN today  Cryosurgery Procedure Note    Verbal consent from the patient is obtained and the patient is aware of the precancerous quality and need for treatment of these lesions. Liquid nitrogen cryosurgery is applied to the 1 actinic keratosis, as detailed in the physical exam, to produce a freeze injury. The patient is aware that blisters may form and is instructed on wound care with gentle cleansing and use of vaseline ointment to keep moist until healed. The patient is instructed to call if lesion does not completely resolve.  -      fluorouracil (EFUDEX) 5 % cream; Apply topically 2 (two) times daily.  Dispense: 40 g; Refill: 3    Angioma  This is a benign vascular lesion. Reassurance given. No treatment required.     Personal history of malignant neoplasm of skin  Area of previous NMSC evaluated with no signs of recurrence.    Upper body skin examination performed today including at least 12 points as noted in physical examination. No lesions suspicious for malignancy noted.             Follow up in about 6 months (around 2/15/2020).

## 2019-08-15 NOTE — PROGRESS NOTES
HPI     Urgent care--Cellulitis OS    Pt states she noticed stye x 1.5 week on upper lid. Pt went to NP x 3 days   ago and was told she has cellulitis. Pt states redness and pain has gone   down since taking Bactrim but still sees a bump. Complains of blurred   vision. Denies any eye pain. Cosopt BID     Last edited by Kerrie Gamboa on 8/15/2019  2:54 PM. (History)        ROS     Positive for: Eyes    Negative for: Constitutional, Gastrointestinal, Neurological, Skin,   Genitourinary, Musculoskeletal, HENT, Endocrine, Cardiovascular,   Respiratory, Psychiatric, Allergic/Imm, Heme/Lymph    Last edited by ASHANTI Noriega, OD on 8/15/2019  3:17 PM. (History)        Assessment /Plan     For exam results, see Encounter Report.    Hordeolum internum of left upper eyelid  -     tobramycin-dexamethasone 0.3-0.1% (TOBRADEX) 0.3-0.1 % Oint; Apply sparingly to eyelid margin/ lesion OS bid x 5 days, then prn to control blepharitis symptoms  Dispense: 3.5 g; Refill: 1

## 2019-08-15 NOTE — PROGRESS NOTES
HPI     Urgent care--Cellulitis OS    Pt states she noticed stye x 1.5 week on upper lid. Pt went to NP x 3 days   ago and was told she has cellulitis. Pt states redness and pain has gone   down since taking Bactrim but still sees a bump. Complains of blurred   vision. Denies any eye pain. Cosopt BID     Agree above  Concern w/ eyelid infection in better seeing eye  On 3rd day of 7 day course of bactrim ds, no issues  No topical meds given  Feels improved, minimal tenderness, swelling better    Last edited by ASHANTI Noriega, OD on 8/15/2019  3:46 PM. (History)        ROS     Positive for: Eyes    Negative for: Constitutional, Gastrointestinal, Neurological, Skin,   Genitourinary, Musculoskeletal, HENT, Endocrine, Cardiovascular,   Respiratory, Psychiatric, Allergic/Imm, Heme/Lymph    Last edited by ASHANTI Noriega, OD on 8/15/2019  3:17 PM. (History)        Assessment /Plan     For exam results, see Encounter Report.    Hordeolum internum of left upper eyelid  -     tobramycin-dexamethasone 0.3-0.1% (TOBRADEX) 0.3-0.1 % Oint; Apply sparingly to eyelid margin/ lesion OS bid x 5 days, then prn to control blepharitis symptoms  Dispense: 3.5 g; Refill: 1    Blepharoconjunctivitis of both eyes, unspecified blepharoconjunctivitis type      1. Hordeolum improving by history and appearance OS  Will continue oral bactrim, has 4 more days  Add tobradex jake as directed    ? Rosacea component with OU marked bleph and telang vessels OS>OD  Discussed lid hygiene to help prevent chronic lid issues  Gave info, scrubs qhs, ATs daily, gave suggestions    Call/ message if lid not improving  F/u with Dr Chaves as scheduled

## 2019-08-15 NOTE — LETTER
August 15, 2019      Darian Arango MD  [address] [facility] [address] [place] [address]           [facility]  [address] [facility] [address] [place] [address]  Phone: [phone]  Fax: [phone]          Patient: Madison Long   MR Number: [id number]   YOB: 1945   Date of Visit: 8/15/2019       Dear Dr. Darian Arango:    Thank you for referring Madison Long to me for evaluation. Attached you will find relevant portions of my assessment and plan of care.    If you have questions, please do not hesitate to call me. I look forward to following Madison Long along with you.    Sincerely,    Homa Wagner MD    Enclosure  CC:  No Recipients    If you would like to receive this communication electronically, please contact [email or web address] or [phone] to request more information on [employer] access.    For providers and/or their staff who would like to refer a patient to [facility], please contact us through our one-stop-shop provider referral line, [facility], at [phone].    If you feel you have received this communication in error or would no longer like to receive these types of communications, please e-mail [email or web address]

## 2019-08-20 ENCOUNTER — TELEPHONE (OUTPATIENT)
Dept: OPTOMETRY | Facility: CLINIC | Age: 74
End: 2019-08-20

## 2019-08-20 NOTE — TELEPHONE ENCOUNTER
----- Message from Ruma Ji sent at 8/20/2019  3:09 PM CDT -----  Contact: self [phone]  She called the pharmacy to see about the delay for the prescription of tobradex.  They told her that her insurance will not cover it.  They also said they will be contacting you about prescribing a different medication.  Please call her.  Thank you!

## 2019-08-22 ENCOUNTER — TELEPHONE (OUTPATIENT)
Dept: OPTOMETRY | Facility: CLINIC | Age: 74
End: 2019-08-22

## 2019-08-22 DIAGNOSIS — H00.024 HORDEOLUM INTERNUM OF LEFT UPPER EYELID: Primary | ICD-10-CM

## 2019-08-22 RX ORDER — NEOMYCIN SULFATE, POLYMYXIN B SULFATE AND DEXAMETHASONE 3.5; 10000; 1 MG/ML; [USP'U]/ML; MG/ML
1 SUSPENSION/ DROPS OPHTHALMIC 3 TIMES DAILY
Qty: 5 ML | Refills: 0 | Status: SHIPPED | OUTPATIENT
Start: 2019-08-22 | End: 2019-08-27

## 2019-08-26 ENCOUNTER — TELEPHONE (OUTPATIENT)
Dept: OPHTHALMOLOGY | Facility: CLINIC | Age: 74
End: 2019-08-26

## 2019-08-26 NOTE — TELEPHONE ENCOUNTER
----- Message from Nicolle Coles sent at 8/26/2019  3:07 PM CDT -----  Contact: Self  Patient is asking for the nurse to call her because she is having trouble with her right eye and she is having crust on both eyes.    Please Call   [phone]

## 2019-08-30 ENCOUNTER — LAB VISIT (OUTPATIENT)
Dept: LAB | Facility: HOSPITAL | Age: 74
End: 2019-08-30
Attending: INTERNAL MEDICINE
Payer: MEDICARE

## 2019-08-30 DIAGNOSIS — E78.5 DYSLIPIDEMIA: ICD-10-CM

## 2019-08-30 LAB
CHOLEST SERPL-MCNC: 198 MG/DL (ref 120–199)
CHOLEST/HDLC SERPL: 2.9 {RATIO} (ref 2–5)
HDLC SERPL-MCNC: 68 MG/DL (ref 40–75)
HDLC SERPL: 34.3 % (ref 20–50)
LDLC SERPL CALC-MCNC: 114.4 MG/DL (ref 63–159)
NONHDLC SERPL-MCNC: 130 MG/DL
TRIGL SERPL-MCNC: 78 MG/DL (ref 30–150)

## 2019-08-30 PROCEDURE — 80061 LIPID PANEL: CPT | Mod: HCNC

## 2019-08-30 PROCEDURE — 36415 COLL VENOUS BLD VENIPUNCTURE: CPT | Mod: HCNC,PO

## 2019-09-06 ENCOUNTER — TELEPHONE (OUTPATIENT)
Dept: FAMILY MEDICINE | Facility: CLINIC | Age: 74
End: 2019-09-06

## 2019-09-06 NOTE — LETTER
September 6, 2019    Madison Long  [address] [id number]             [facility]  [address]  [place] [address]  Phone: [phone]  Fax: [phone] Dear Madison Long      Lab Visit on 08/30/2019   Component Date Value Ref Range Status    Cholesterol 08/30/2019 198  120 - 199 mg/dL Final    Comment: The National Cholesterol Education Program (NCEP) has set the  following guidelines (reference ranges) for Cholesterol:  Optimal.....................<200 mg/dL  Borderline High.............200-239 mg/dL  High........................> or = 240 mg/dL      Triglycerides 08/30/2019 78  30 - 150 mg/dL Final    Comment: The National Cholesterol Education Program (NCEP) has set the  following guidelines (reference values) for triglycerides:  Normal......................<150 mg/dL  Borderline High.............150-199 mg/dL  High........................200-499 mg/dL      HDL 08/30/2019 68  40 - 75 mg/dL Final    Comment: The National Cholesterol Education Program (NCEP) has set the  following guidelines (reference values) for HDL Cholesterol:  Low...............<40 mg/dL  Optimal...........>60 mg/dL      LDL Cholesterol 08/30/2019 114.4  63.0 - 159.0 mg/dL Final    Comment: The National Cholesterol Education Program (NCEP) has set the  following guidelines (reference values) for LDL Cholesterol:  Optimal.......................<130 mg/dL  Borderline High...............130-159 mg/dL  High..........................160-189 mg/dL  Very High.....................>190 mg/dL      Hdl/Cholesterol Ratio 08/30/2019 34.3  20.0 - 50.0 % Final    Total Cholesterol/HDL Ratio 08/30/2019 2.9  2.0 - 5.0 Final    Non-HDL Cholesterol 08/30/2019 130  mg/dL Final    Comment: Risk category and Non-HDL cholesterol goals:  Coronary heart disease (CHD)or equivalent (10-year risk of CHD >20%):  Non-HDL cholesterol goal     <130 mg/dL  Two or more CHD risk factors and 10-year risk of CHD <=  20%:  Non-HDL cholesterol goal     <160 mg/dL  0 to 1 CHD risk factor:  Non-HDL cholesterol goal     <190 mg/dL       Results from your bone density:    Your result shows osteopenia, which is less dense than normal bone density, but not classified as osteoporosis.     I would suggest you take calcium 1200mg and vitamin D 2,000 IU daily in addition to regular walking to help strengthen your bones.  We will need to repeat the bone density test in 2 years.    Please find enclosed copies of your results. If you have any questions or concerns, please don't hesitate to call.    Sincerely,        Krissy L. Sandifer, LPN

## 2019-09-06 NOTE — TELEPHONE ENCOUNTER
----- Message from Caity Melton sent at 9/6/2019  1:03 PM CDT -----  Contact: Madison pt  Type:  Test Results    Who Called:  Madison  Name of Test (Lab/Mammo/Etc):  Labs, bone density  Date of Test:  Last week  Ordering Provider:  Nba  Where the test was performed:  [facility] Call Back Number:  [phone]  Additional Information:  Pls call pt regarding her test results

## 2019-09-09 ENCOUNTER — OFFICE VISIT (OUTPATIENT)
Dept: OPHTHALMOLOGY | Facility: CLINIC | Age: 74
End: 2019-09-09
Payer: MEDICARE

## 2019-09-09 DIAGNOSIS — H33.021 RETINAL DETACHMENT OF RIGHT EYE WITH MULTIPLE BREAKS: Primary | ICD-10-CM

## 2019-09-09 DIAGNOSIS — H35.21 PROLIFERATIVE VITREORETINOPATHY, RIGHT: ICD-10-CM

## 2019-09-09 PROCEDURE — 92134 CPTRZ OPH DX IMG PST SGM RTA: CPT | Mod: HCNC,S$GLB,, | Performed by: OPHTHALMOLOGY

## 2019-09-09 PROCEDURE — 92226 PR SPECIAL EYE EXAM, SUBSEQUENT: CPT | Mod: 50,HCNC,S$GLB, | Performed by: OPHTHALMOLOGY

## 2019-09-09 PROCEDURE — 99499 UNLISTED E&M SERVICE: CPT | Mod: S$GLB,,, | Performed by: OPHTHALMOLOGY

## 2019-09-09 PROCEDURE — 99999 PR PBB SHADOW E&M-EST. PATIENT-LVL II: ICD-10-PCS | Mod: PBBFAC,HCNC,, | Performed by: OPHTHALMOLOGY

## 2019-09-09 PROCEDURE — 92014 COMPRE OPH EXAM EST PT 1/>: CPT | Mod: HCNC,S$GLB,, | Performed by: OPHTHALMOLOGY

## 2019-09-09 PROCEDURE — 92014 PR EYE EXAM, EST PATIENT,COMPREHESV: ICD-10-PCS | Mod: HCNC,S$GLB,, | Performed by: OPHTHALMOLOGY

## 2019-09-09 PROCEDURE — 99999 PR PBB SHADOW E&M-EST. PATIENT-LVL II: CPT | Mod: PBBFAC,HCNC,, | Performed by: OPHTHALMOLOGY

## 2019-09-09 PROCEDURE — 92134 POSTERIOR SEGMENT OCT RETINA (OCULAR COHERENCE TOMOGRAPHY)-BOTH EYES: ICD-10-PCS | Mod: HCNC,S$GLB,, | Performed by: OPHTHALMOLOGY

## 2019-09-09 PROCEDURE — 92226 PR SPECIAL EYE EXAM, SUBSEQUENT: ICD-10-PCS | Mod: 50,HCNC,S$GLB, | Performed by: OPHTHALMOLOGY

## 2019-09-09 PROCEDURE — 99499 RISK ADDL DX/OHS AUDIT: ICD-10-PCS | Mod: S$GLB,,, | Performed by: OPHTHALMOLOGY

## 2019-09-09 RX ORDER — DORZOLAMIDE HYDROCHLORIDE AND TIMOLOL MALEATE 20; 5 MG/ML; MG/ML
1 SOLUTION/ DROPS OPHTHALMIC 2 TIMES DAILY
Qty: 10 ML | Refills: 12 | Status: SHIPPED | OUTPATIENT
Start: 2019-09-09 | End: 2022-06-30

## 2019-09-09 NOTE — PROGRESS NOTES
HPI     3 mo OCT  DLS- 06/17/2019 Dr. Chaves      Pt sts vision seems stable doing well still using IOP gtts Cosopt BID OD  Denies pain       OCT - OD - flat, trace ME near peaked thickening from prior PVR detachment - stable  OS - No ME      A/P    1. RRD OD   Macula involving x 4  Days    S/p  25g PPV/EL/SF6 OD for RRD 9/6/17  IOP improved    10/30/17 - Recurrent IT RD with early PVR and small break    s/p 25g PPV/membrane stripping/inferior retinopexy/C3F8 OD for RRD/PVR OD 11/1/17 12/4/17 - recurrent inferior RD - small holes posterior to laser    s/p 25g PPV/EL/AFx/1000cs Silicone Oil OD for Recurrent RRD with PVR 12/6/17    Doing well, good IOP    1/9/18 increasing inferior fluid collection beneath oil into macula - needs SB - multiple small break    s/p /270, 25g PPV/SO removal/membrane stripping/AFx/EL/1000cs Silicone Oil OD for RRD/PVR 1/10/18    Doing well, good IOP  Had ST scleral dehiscence - closed with 5-0 mersilene    Side sleep or stomach    Inferior PVR OD with shallow submacular fluid   IOP low - stop Cosopt  Will likely need inferior retinectomy    2/18 - some progression    s/p 25 PPV/SO removal/membrane stripping/inferior retinectomy/posterior capsulectomy/EL/Leonora oil OD for RRD with PVR and PCO OD 4/11/18    Stable inferonasal RD under oil  IOP increased  - continue COsopt BID  Continue PF Q day     Will need heavier inferonasal laser during oil removal 3-6 months (around 4/19)    S/p 25g PPV/1000cs SO removal/EL/AFx/C3F8 OD 3/27/19    Doing well, good IOP  Flat!     Cosopt BID     2. PCIOL OU    3. HTN   Good BP control    4. PVD OS  No breaks OS        12 months OCT

## 2019-09-26 DIAGNOSIS — G47.00 INSOMNIA, UNSPECIFIED TYPE: ICD-10-CM

## 2019-09-26 RX ORDER — TRAZODONE HYDROCHLORIDE 50 MG/1
50 TABLET ORAL NIGHTLY PRN
Qty: 60 TABLET | Refills: 5 | Status: SHIPPED | OUTPATIENT
Start: 2019-09-26 | End: 2019-10-31 | Stop reason: SDUPTHER

## 2019-09-26 NOTE — TELEPHONE ENCOUNTER
----- Message from Khushbu Millan sent at 9/26/2019 12:55 PM CDT -----  Contact: patient   Type:  RX Refill Request    Who Called:  Patient   Refill or New Rx:  Refill   RX Name and Strength:  Trazodone 50 mg tabs    Preferred Pharmacy with phone number:   [facility] #[id number] - [place] - [address] AT [place] [address]  [address] [address]  Phone: [phone] Fax: [phone]  Best Call Back Number:  [phone] (home)  Pt need it filled by tomorrow or this evening

## 2019-09-27 DIAGNOSIS — G47.00 INSOMNIA, UNSPECIFIED TYPE: ICD-10-CM

## 2019-09-27 RX ORDER — TRAZODONE HYDROCHLORIDE 50 MG/1
50 TABLET ORAL NIGHTLY PRN
Qty: 60 TABLET | Refills: 5 | Status: CANCELLED | OUTPATIENT
Start: 2019-09-27

## 2019-09-27 NOTE — TELEPHONE ENCOUNTER
----- Message from Khushbu Millan sent at 9/27/2019 11:43 AM CDT -----  Contact: patient   Type:  RX Refill Request    Who Called:  Patient   Refill or New Rx:  New   RX Name and Strength:  traZODone (DESYREL) 50 MG tablet  Preferred Pharmacy with phone number:   [facility] #[id number] - [place], [place] - [address] AT [place] [address]  [address] [address]  Phone: [phone] Fax: [phone]  Best Call Back Number:  [phone] (home) please call back to update   Additional Information:  Please send to pharmacy today asap, pt is going on a trip tomorrow

## 2019-09-27 NOTE — TELEPHONE ENCOUNTER
"Please see request for refill   LOV 8/12/2019    Patient requesting "ASAP" due to travel this weekend.   "

## 2019-10-30 DIAGNOSIS — G47.00 INSOMNIA, UNSPECIFIED TYPE: ICD-10-CM

## 2019-10-30 NOTE — PROGRESS NOTES
Pharmacy Call Documentation    Pharmacy Called:  Gracie Call Time: 2:52pm   Spoke with: Bruno       Called to verify if patient dropped off script for trazodone.    Script was not received by pharmacy      Current Medication Requested:   Requested Prescriptions     Pending Prescriptions Disp Refills    traZODone (DESYREL) 50 MG tablet [Pharmacy Med Name: TRAZODONE 50MG TABLETS] 180 tablet 1     Sig: TAKE 1 TABLET(50 MG) BY MOUTH EVERY NIGHT AS NEEDED FOR INSOMNIA. MAY TAKE UP TO 2 TABLET EVERY NIGHT AS NEEDED

## 2019-10-30 NOTE — PROGRESS NOTES
Patient complains of continuous palpitations, concerned her K is high. NSR on tele. Labs obtained and sent. Awaiting labs to come back. Tech notified of patient needing to go to [facility].       Anthony Quiroga RN  08/14/18 6312 Refill Authorization Note     is requesting a refill authorization.    Brief assessment and rationale for refill: APPROVE: prr  Name and strength of medication: traZODone (DESYREL) 50 MG tablet       Medication Therapy Plan: approve per printed script that was never picked up for 6 more    Medication reconciliation completed: No              How patient will take medication: t1t po nightly. may take 2nd tablet as needed          Comments:   Appointments past 12m or future 3m    Date Provider   Last Visit   7/31/2019 Darian Arango MD   Next Visit   1/31/2020 Darian Arango MD     Last Prescribed Info:   Ordering Provider: -- DEON #:  -- NPI:  --    Authorizing Provider: Darian Arango MD DEON #:  [id number] NPI:  [id number]    Ordering User:  Darian Arango MD            Diagnosis Association: Insomnia, unspecified type (G47.00)      Original Order:  traZODone (DESYREL) 50 MG tablet [[id number]]      Pharmacy:  [facility] #[id number] [address] AT [place] [address] DEON #:  [id number]     Pharmacy Comments:  --          Fill quantity remaining:  -- Fill quantity used:  -- Next fill due: --       Outpatient Medication Detail      Disp Refills Start End    traZODone (DESYREL) 50 MG tablet 60 tablet 5 9/26/2019     Sig - Route: Take 1 tablet (50 mg total) by mouth nightly as needed for Insomnia (may take 2nd tab if needed qhs). May take up to 2 tabs per night prn - Oral    Class: Print

## 2019-10-30 NOTE — PROGRESS NOTES
Call Documentation    Person Called: Patient Call Time: 3:03pm   Spoke with: Mrs. Long        Reason for call: To confirmed that she has not received the printed script for trazodone.         Clarification details and Actions Taken: e-prescribe to Gracie        Current Medication Requested:   Requested Prescriptions     Pending Prescriptions Disp Refills    traZODone (DESYREL) 50 MG tablet [Pharmacy Med Name: TRAZODONE 50MG TABLETS] 180 tablet 1     Sig: TAKE 1 TABLET(50 MG) BY MOUTH EVERY NIGHT AS NEEDED FOR INSOMNIA. MAY TAKE UP TO 2 TABLET EVERY NIGHT AS NEEDED

## 2019-10-31 RX ORDER — TRAZODONE HYDROCHLORIDE 50 MG/1
TABLET ORAL
Qty: 180 TABLET | Refills: 1 | Status: SHIPPED | OUTPATIENT
Start: 2019-10-31 | End: 2021-08-17 | Stop reason: SDUPTHER

## 2020-01-24 ENCOUNTER — LAB VISIT (OUTPATIENT)
Dept: LAB | Facility: HOSPITAL | Age: 75
End: 2020-01-24
Attending: INTERNAL MEDICINE
Payer: MEDICARE

## 2020-01-24 DIAGNOSIS — R79.89 ABNORMAL CBC: ICD-10-CM

## 2020-01-24 DIAGNOSIS — I10 ESSENTIAL HYPERTENSION: ICD-10-CM

## 2020-01-24 DIAGNOSIS — E78.5 DYSLIPIDEMIA: ICD-10-CM

## 2020-01-24 LAB
ALBUMIN SERPL BCP-MCNC: 4.1 G/DL (ref 3.5–5.2)
ALP SERPL-CCNC: 61 U/L (ref 55–135)
ALT SERPL W/O P-5'-P-CCNC: 23 U/L (ref 10–44)
ANION GAP SERPL CALC-SCNC: 7 MMOL/L (ref 8–16)
AST SERPL-CCNC: 28 U/L (ref 10–40)
BASOPHILS # BLD AUTO: 0.06 K/UL (ref 0–0.2)
BASOPHILS NFR BLD: 0.9 % (ref 0–1.9)
BILIRUB SERPL-MCNC: 0.5 MG/DL (ref 0.1–1)
BUN SERPL-MCNC: 17 MG/DL (ref 8–23)
CALCIUM SERPL-MCNC: 9.5 MG/DL (ref 8.7–10.5)
CHLORIDE SERPL-SCNC: 101 MMOL/L (ref 95–110)
CHOLEST SERPL-MCNC: 238 MG/DL (ref 120–199)
CHOLEST/HDLC SERPL: 3.2 {RATIO} (ref 2–5)
CO2 SERPL-SCNC: 29 MMOL/L (ref 23–29)
CREAT SERPL-MCNC: 0.9 MG/DL (ref 0.5–1.4)
DIFFERENTIAL METHOD: ABNORMAL
EOSINOPHIL # BLD AUTO: 0.2 K/UL (ref 0–0.5)
EOSINOPHIL NFR BLD: 2.5 % (ref 0–8)
ERYTHROCYTE [DISTWIDTH] IN BLOOD BY AUTOMATED COUNT: 11.9 % (ref 11.5–14.5)
EST. GFR  (AFRICAN AMERICAN): >60 ML/MIN/1.73 M^2
EST. GFR  (NON AFRICAN AMERICAN): >60 ML/MIN/1.73 M^2
GLUCOSE SERPL-MCNC: 96 MG/DL (ref 70–110)
HCT VFR BLD AUTO: 44.3 % (ref 37–48.5)
HDLC SERPL-MCNC: 74 MG/DL (ref 40–75)
HDLC SERPL: 31.1 % (ref 20–50)
HGB BLD-MCNC: 14.6 G/DL (ref 12–16)
IMM GRANULOCYTES # BLD AUTO: 0.03 K/UL (ref 0–0.04)
IMM GRANULOCYTES NFR BLD AUTO: 0.4 % (ref 0–0.5)
LDLC SERPL CALC-MCNC: 150.2 MG/DL (ref 63–159)
LYMPHOCYTES # BLD AUTO: 2.3 K/UL (ref 1–4.8)
LYMPHOCYTES NFR BLD: 33.7 % (ref 18–48)
MCH RBC QN AUTO: 31.9 PG (ref 27–31)
MCHC RBC AUTO-ENTMCNC: 33 G/DL (ref 32–36)
MCV RBC AUTO: 97 FL (ref 82–98)
MONOCYTES # BLD AUTO: 0.5 K/UL (ref 0.3–1)
MONOCYTES NFR BLD: 7.4 % (ref 4–15)
NEUTROPHILS # BLD AUTO: 3.8 K/UL (ref 1.8–7.7)
NEUTROPHILS NFR BLD: 55.1 % (ref 38–73)
NONHDLC SERPL-MCNC: 164 MG/DL
NRBC BLD-RTO: 0 /100 WBC
PLATELET # BLD AUTO: 289 K/UL (ref 150–350)
PMV BLD AUTO: 10.4 FL (ref 9.2–12.9)
POTASSIUM SERPL-SCNC: 4.8 MMOL/L (ref 3.5–5.1)
PROT SERPL-MCNC: 6.9 G/DL (ref 6–8.4)
RBC # BLD AUTO: 4.58 M/UL (ref 4–5.4)
SODIUM SERPL-SCNC: 137 MMOL/L (ref 136–145)
TRIGL SERPL-MCNC: 69 MG/DL (ref 30–150)
WBC # BLD AUTO: 6.8 K/UL (ref 3.9–12.7)

## 2020-01-24 PROCEDURE — 80053 COMPREHEN METABOLIC PANEL: CPT | Mod: HCNC

## 2020-01-24 PROCEDURE — 36415 COLL VENOUS BLD VENIPUNCTURE: CPT | Mod: HCNC,PO

## 2020-01-24 PROCEDURE — 85025 COMPLETE CBC W/AUTO DIFF WBC: CPT | Mod: HCNC

## 2020-01-24 PROCEDURE — 80061 LIPID PANEL: CPT | Mod: HCNC

## 2020-01-31 ENCOUNTER — OFFICE VISIT (OUTPATIENT)
Dept: FAMILY MEDICINE | Facility: CLINIC | Age: 75
End: 2020-01-31
Payer: MEDICARE

## 2020-01-31 VITALS
OXYGEN SATURATION: 97 % | DIASTOLIC BLOOD PRESSURE: 80 MMHG | SYSTOLIC BLOOD PRESSURE: 126 MMHG | WEIGHT: 163.81 LBS | BODY MASS INDEX: 26.33 KG/M2 | HEART RATE: 84 BPM | HEIGHT: 66 IN

## 2020-01-31 DIAGNOSIS — G47.00 INSOMNIA, UNSPECIFIED TYPE: ICD-10-CM

## 2020-01-31 DIAGNOSIS — F41.1 GAD (GENERALIZED ANXIETY DISORDER): ICD-10-CM

## 2020-01-31 DIAGNOSIS — Z00.00 ROUTINE PHYSICAL EXAMINATION: Primary | ICD-10-CM

## 2020-01-31 DIAGNOSIS — I10 ESSENTIAL HYPERTENSION: ICD-10-CM

## 2020-01-31 DIAGNOSIS — N95.2 ATROPHIC VAGINITIS: ICD-10-CM

## 2020-01-31 DIAGNOSIS — E78.5 DYSLIPIDEMIA: ICD-10-CM

## 2020-01-31 DIAGNOSIS — J44.9 CHRONIC OBSTRUCTIVE PULMONARY DISEASE, UNSPECIFIED COPD TYPE: ICD-10-CM

## 2020-01-31 PROCEDURE — 99999 PR PBB SHADOW E&M-EST. PATIENT-LVL III: CPT | Mod: PBBFAC,HCNC,, | Performed by: INTERNAL MEDICINE

## 2020-01-31 PROCEDURE — 3079F DIAST BP 80-89 MM HG: CPT | Mod: HCNC,CPTII,S$GLB, | Performed by: INTERNAL MEDICINE

## 2020-01-31 PROCEDURE — 99397 PR PREVENTIVE VISIT,EST,65 & OVER: ICD-10-PCS | Mod: HCNC,S$GLB,, | Performed by: INTERNAL MEDICINE

## 2020-01-31 PROCEDURE — 99999 PR PBB SHADOW E&M-EST. PATIENT-LVL III: ICD-10-PCS | Mod: PBBFAC,HCNC,, | Performed by: INTERNAL MEDICINE

## 2020-01-31 PROCEDURE — 3074F SYST BP LT 130 MM HG: CPT | Mod: HCNC,CPTII,S$GLB, | Performed by: INTERNAL MEDICINE

## 2020-01-31 PROCEDURE — 99499 UNLISTED E&M SERVICE: CPT | Mod: HCNC,S$GLB,, | Performed by: INTERNAL MEDICINE

## 2020-01-31 PROCEDURE — 99499 RISK ADDL DX/OHS AUDIT: ICD-10-PCS | Mod: HCNC,S$GLB,, | Performed by: INTERNAL MEDICINE

## 2020-01-31 PROCEDURE — 3074F PR MOST RECENT SYSTOLIC BLOOD PRESSURE < 130 MM HG: ICD-10-PCS | Mod: HCNC,CPTII,S$GLB, | Performed by: INTERNAL MEDICINE

## 2020-01-31 PROCEDURE — 99397 PER PM REEVAL EST PAT 65+ YR: CPT | Mod: HCNC,S$GLB,, | Performed by: INTERNAL MEDICINE

## 2020-01-31 PROCEDURE — 3079F PR MOST RECENT DIASTOLIC BLOOD PRESSURE 80-89 MM HG: ICD-10-PCS | Mod: HCNC,CPTII,S$GLB, | Performed by: INTERNAL MEDICINE

## 2020-01-31 RX ORDER — LOSARTAN POTASSIUM 50 MG/1
TABLET ORAL
Qty: 90 TABLET | Refills: 3 | Status: SHIPPED | OUTPATIENT
Start: 2020-01-31 | End: 2021-04-06 | Stop reason: SDUPTHER

## 2020-01-31 RX ORDER — ROSUVASTATIN CALCIUM 20 MG/1
20 TABLET, COATED ORAL DAILY
Qty: 90 TABLET | Refills: 3
Start: 2020-01-31 | End: 2020-02-20 | Stop reason: SDUPTHER

## 2020-01-31 NOTE — PROGRESS NOTES
Subjective:       Patient ID: Madison Long is a 74 y.o. female.    Chief Complaint: Annual Exam    Here for routine health maintenance.      4 eye surgeries in past. Dr Sotelo  HTN - controlled  HLD - uncontrolled; goal < 130 age, htn; taking Crestor 10 mg qd  Recall: had severe LE cramps.  Stopped Lipitor 1 month ago and cramps resolved after 5-6 days.    COPD - no sob;      Insomnia - 1- 2 trazodone works most nights; stopped her Ambien      ALEX - controlled and retired now, so wants to decrease to qod.     Atrophic vaginitis - estrogen cream used for 1-2 weeks and then will take a break.  due for mammogram 3/23/19; rx originally by urology Dr Hughes.     Keratosis - hereditary.  Wants to change to our derm.       Osteopenia - on vit D and calcium     Review of Systems   Constitutional: Negative for appetite change and fever.   HENT: Negative for nosebleeds and trouble swallowing.    Eyes: Negative for discharge and visual disturbance.   Respiratory: Negative for choking and shortness of breath.    Cardiovascular: Negative for chest pain and palpitations.   Gastrointestinal: Negative for abdominal pain, nausea and vomiting.   Musculoskeletal: Negative for arthralgias and joint swelling.   Skin: Negative for rash and wound.   Neurological: Negative for dizziness and syncope.   Psychiatric/Behavioral: Negative for confusion and dysphoric mood.       Objective:      Vitals:    01/31/20 1320   BP: 126/80   Pulse: 84     Physical Exam   Constitutional: She appears well-nourished.   Eyes: Conjunctivae and EOM are normal.   Neck: Trachea normal and normal range of motion. No thyromegaly present.   Cardiovascular: Normal heart sounds.   Edema negative   Pulmonary/Chest: Effort normal and breath sounds normal.   Abdominal: Soft. There is no hepatomegaly.   Neurological: No cranial nerve deficit.   DTR decreased bilateral   Skin: Skin is warm, dry and intact.   Psychiatric: She has a normal mood and affect.   Alert and  Oriented    Vitals reviewed.        Assessment:       1. Routine physical examination    2. Essential hypertension    3. Dyslipidemia    4. Chronic obstructive pulmonary disease, unspecified COPD type    5. ALEX (generalized anxiety disorder)    6. Insomnia, unspecified type    7. Atrophic vaginitis        Plan:       Routine physical examination    Essential hypertension  -     Comprehensive metabolic panel; Future; Expected date: 07/29/2020  -     losartan (COZAAR) 50 MG tablet; TAKE 1 TABLET(50 MG) BY MOUTH EVERY MORNING  Dispense: 90 tablet; Refill: 3    Dyslipidemia  -     rosuvastatin (CRESTOR) 20 MG tablet; Take 1 tablet (20 mg total) by mouth once daily.  Dispense: 90 tablet; Refill: 3  -     Lipid panel; Future; Expected date: 07/29/2020  -     Comprehensive metabolic panel; Future; Expected date: 07/29/2020    Chronic obstructive pulmonary disease, unspecified COPD type    ALEX (generalized anxiety disorder)    Insomnia, unspecified type    Atrophic vaginitis            Medication List with Changes/Refills   Current Medications    ACETAMINOPHEN (TYLENOL) 500 MG TABLET    Take 500 mg by mouth every 6 (six) hours as needed for Pain.    ALBUTEROL (VENTOLIN HFA) 90 MCG/ACTUATION INHALER    Inhale 2 puffs into the lungs every 6 (six) hours as needed. Rescue    ASPIRIN 81 MG CHEW    Take 81 mg by mouth daily as needed. Usually takes about every 2 weeks.    BETAMETHASONE DIPROPIONATE (DIPROLENE) 0.05 % CREAM    Apply topically 2 (two) times daily.    DORZOLAMIDE-TIMOLOL 2-0.5% (COSOPT) 22.3-6.8 MG/ML OPHTHALMIC SOLUTION    Place 1 drop into the right eye 2 (two) times daily.    ECONAZOLE NITRATE 1 % CREAM    Apply topically 2 (two) times daily as needed. Twice a day  PRN    ESTRADIOL (ESTRACE) 0.01 % (0.1 MG/GRAM) VAGINAL CREAM    Place 1 g vaginally daily as needed.    FISH OIL-OMEGA-3 FATTY ACIDS 300-1,000 MG CAPSULE    Take 1 g by mouth every morning.     FLUCONAZOLE (DIFLUCAN) 150 MG TAB    1 po daily for 3  days    FLUOROURACIL (EFUDEX) 5 % CREAM    Apply topically 2 (two) times daily.    FLUOXETINE 10 MG CAPSULE    Take 1 capsule (10 mg total) by mouth every other day.    FLUTICASONE (FLONASE) 50 MCG/ACTUATION NASAL SPRAY    SPRAY 2 SPRAYS IN EACH NOSTRIL EVERY DAY    GUAIFENESIN (MUCINEX) 600 MG 12 HR TABLET    Take 1,200 mg by mouth 2 (two) times daily as needed.     IBUPROFEN/DIPHENHYDRAMINE CIT (ADVIL PM ORAL)    Take by mouth.    MULTIVIT,STRESS FORMULA-ZINC (STRESS FORMULA WITH ZINC) TAB    Take 1 tablet by mouth every morning. Uses Alive    MUPIROCIN (BACTROBAN) 2 % OINTMENT    Apply topically 2 (two) times daily.    OMEPRAZOLE (PRILOSEC OTC) 20 MG TABLET    Take 1 tablet (20 mg total) by mouth every morning. Take 30 minutes before your first protein containing meal.    PSEUDOEPHEDRINE (SUDAFED) 30 MG TABLET    Take 30 mg by mouth every 4 (four) hours as needed for Congestion.    SODIUM CHLORIDE (SALINE NASAL) 0.65 % NASAL SPRAY    1 spray by Nasal route as needed for Congestion.    SYMBICORT 160-4.5 MCG/ACTUATION HFAA    INHALE 2 PUFFS BY MOUTH TWICE DAILY    TRAZODONE (DESYREL) 50 MG TABLET    TAKE 1 TABLET(50 MG) BY MOUTH EVERY NIGHT AS NEEDED FOR INSOMNIA. MAY TAKE UP TO 2 TABLET EVERY NIGHT AS NEEDED    ZINC GLUCONATE (COLD-EEZE ORAL)    Take 1 lozenge by mouth daily as needed.   Changed and/or Refilled Medications    Modified Medication Previous Medication    LOSARTAN (COZAAR) 50 MG TABLET losartan (COZAAR) 50 MG tablet       TAKE 1 TABLET(50 MG) BY MOUTH EVERY MORNING    TAKE 1 TABLET(50 MG) BY MOUTH EVERY MORNING    ROSUVASTATIN (CRESTOR) 20 MG TABLET rosuvastatin (CRESTOR) 10 MG tablet       Take 1 tablet (20 mg total) by mouth once daily.    Take 1 tablet (10 mg total) by mouth once daily.   Discontinued Medications    FLUOROURACIL (EFUDEX) 5 % CREAM    CHRISTIAN AA BID FOR 2 WEEKS PRN       Continue current management and monitor.    Counseled on regular exercise, maintenance of a healthy weight, balanced  diet rich in fruits/vegetables and lean protein, and avoidance of unhealthy habits like smoking and excessive alcohol intake.   Also, counseled on importance of being compliant with medication, health appointments, diet and exercise.     Follow up in about 6 months (around 7/31/2020). will call if no cramps = send in 20 mg Crestor Rx

## 2020-02-18 DIAGNOSIS — E78.5 DYSLIPIDEMIA: ICD-10-CM

## 2020-02-18 RX ORDER — ROSUVASTATIN CALCIUM 20 MG/1
20 TABLET, COATED ORAL DAILY
Qty: 90 TABLET | Refills: 3 | Status: CANCELLED | OUTPATIENT
Start: 2020-02-18 | End: 2021-02-17

## 2020-02-18 NOTE — TELEPHONE ENCOUNTER
----- Message from Winter Warren sent at 2/18/2020  1:36 PM CST -----  Contact: pt  Type:  RX Refill Request    Who Called:  pt  Refill or New Rx:  refill  RX Name and Strength:  rosuvastatin (CRESTOR) 20 MG tablet  How is the patient currently taking it? (ex. 1XDay):  1x day  Is this a 30 day or 90 day RX:  90  Preferred Pharmacy with phone number:    [facility] #[id number] - [place], [place] - [address] AT [facility] [address]  [address]  [place] [address]  Phone: [phone] Fax: [phone]  Local or Mail Order:  Local  Ordering Provider:  Dr. Nba Singleton Call Back Number:  [phone] (home)   Additional Information:  Pt stated that on her last visit provider stated he was going increase dosage from 10mg to 20 mg, pls call pt to advise

## 2020-02-20 DIAGNOSIS — E78.5 DYSLIPIDEMIA: ICD-10-CM

## 2020-02-20 RX ORDER — ROSUVASTATIN CALCIUM 20 MG/1
20 TABLET, COATED ORAL DAILY
Qty: 30 TABLET | Refills: 0 | Status: SHIPPED | OUTPATIENT
Start: 2020-02-20 | End: 2020-02-26

## 2020-02-20 RX ORDER — ROSUVASTATIN CALCIUM 20 MG/1
20 TABLET, COATED ORAL DAILY
Qty: 90 TABLET | Refills: 3 | Status: CANCELLED | OUTPATIENT
Start: 2020-02-20 | End: 2021-02-19

## 2020-02-20 NOTE — TELEPHONE ENCOUNTER
Duplicate Pended Encounter Details:    Xochitl Mcfarland LPNLicensed NurseSigned  10:52 AM                ----- Message from Nuria Queen sent at 2/20/2020 10:49 AM [facility] -----  Type:  RX Refill Request     Who Called:  patient  Refill or New Rx:  refill  RX Name and Strength:  rosuvastatin (CRESTOR) 20 MG tablet  How is the patient currently taking it? (ex. 1XDay):  Sig - Route: Take 1 tablet (20 mg total) by mouth once daily. - Oral  Is this a 30 day or 90 day RX:  90  Preferred Pharmacy with phone number:    [facility] #[id number] - [place], [place] - [address] AT [facility] [address]  [address] [address]  Phone: [phone] Fax: [phone]  Local or Mail Order:  local  Ordering Provider:  Nba Singleton Call Back Number:  [phone]  Additional Information:                                       Note composed:2:05 PM 02/20/2020

## 2020-02-20 NOTE — PROGRESS NOTES
Refill Authorization Note     is requesting a refill authorization.    Brief assessment and rationale for refill: APPROVE: prr     Medication-related problems identified: Dose adjustment    Medication Therapy Plan: Crestor increased to 20 mg (from 10 mg) on 1/31/20; Dr. Arango out until 3/1. Approve 30 day supply                              Comments:     Requested Prescriptions   Pending Prescriptions Disp Refills    rosuvastatin (CRESTOR) 20 MG tablet 90 tablet 0     Sig: Take 1 tablet (20 mg total) by mouth once daily.       Cardiovascular:  Antilipid - Statins Passed - 2/20/2020  2:05 PM        Passed - Patient is at least 18 years old        Passed - Office visit in past 12 months or future 90 days.     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    [facility] JANEL Chaves MD    6 months ago Hordeolum internum of left upper eyelid    [facility] ASHANTI Noriega, OD    6 months ago Preseptal cellulitis of left upper eyelid    [facility] Berkley Zuluaga, NP    6 months ago Essential hypertension    [facility] Darian Arango MD          Future Appointments              In 5 months LAB, [place] [facility]    In 5 months Darian Arango MD [facility]                Passed - Lipid Panel completed in last 360 days     Lab Results   Component Value Date    CHOL 238 (H) 01/24/2020    HDL 74 01/24/2020    LDLCALC 150.2 01/24/2020    TRIG 69 01/24/2020             Passed - ALT is 94 or below and within 360 days     ALT   Date Value Ref Range Status   01/24/2020 23 10 - 44 U/L Final   07/18/2019 14 10 - 44 U/L Final   01/18/2019 19 10 - 44 U/L Final              Passed - AST is 54 or below and within 360 days     AST   Date Value  Ref Range Status   01/24/2020 28 10 - 40 U/L Final   07/18/2019 23 10 - 40 U/L Final   01/18/2019 23 10 - 40 U/L Final               Appointments  past 12m or future 3m with PCP    Date Provider   Last Visit   1/31/2020 Darian Arango MD   Next Visit   2/20/2020 Darian Arango MD   .  ED visits in past 90 days: 0       Note composed:2:09 PM 02/20/2020

## 2020-02-20 NOTE — PROGRESS NOTES
Quick DC. Duplicate Request  Refill Authorization Note     is requesting a refill authorization.    Brief assessment and rationale for refill: Quick Dc; duplicate request                Medication reconciliation completed: No                         Comments:   Duplicate refill request created, medication is already pended in previous encounter, will add any additional notes to previous encounter and close out this duplicate request.       Pended Medication(s)   Requested Prescriptions      No prescriptions requested or ordered in this encounter        Duplicate Pended Encounter Details:    Ordering Encounter Report     Associated Reports   View Encounter                  Note composed:2:06 PM 02/20/2020

## 2020-02-20 NOTE — TELEPHONE ENCOUNTER
----- Message from Nuria Queen sent at 2/20/2020 10:49 AM CST -----  Type:  RX Refill Request    Who Called:  patient  Refill or New Rx:  refill  RX Name and Strength:  rosuvastatin (CRESTOR) 20 MG tablet  How is the patient currently taking it? (ex. 1XDay):  Sig - Route: Take 1 tablet (20 mg total) by mouth once daily. - Oral  Is this a 30 day or 90 day RX:  90  Preferred Pharmacy with phone number:    [id number] - [place] - [address] AT [facility] [address]  [address] [place] [address]  Phone: [phone] Fax: [phone]  Local or Mail Order:  local  Ordering Provider:  Nba Singleton Call Back Number:  [phone]  Additional Information:

## 2020-02-26 NOTE — PROGRESS NOTES
Refill Authorization Note     is requesting a refill authorization.    Brief assessment and rationale for refill: REVIEW: Recent Dose Adjustment     Medication-related problems identified: Dose adjustment    Medication Therapy Plan: Pt recently increased to 20 mg - #30 was recently sent in; pharmacy requesting #90; please review    Medication reconciliation completed: No   Pharmacist Review Requested: Yes                     Comments:   Requested Prescriptions   Pending Prescriptions Disp Refills    rosuvastatin (CRESTOR) 20 MG tablet [Pharmacy Med Name: ROSUVASTATIN 20MG TABLETS] 90 tablet 0     Sig: TAKE 1 TABLET(20 MG) BY MOUTH EVERY DAY       Cardiovascular:  Antilipid - Statins Passed - 2/20/2020  2:34 PM        Passed - Patient is at least 18 years old        Passed - Office visit in past 12 months or future 90 days.     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    [facility] JANEL Chaves MD    6 months ago Hordeolum internum of left upper eyelid    [facility] Optometry ASHANTI Noriega, OD    6 months ago Preseptal cellulitis of left upper eyelid    [facility] Berkley Zuluaga, NP    7 months ago Essential hypertension    [facility] Darian Arango MD          Future Appointments              In 5 months LAB, [place] [facility]    In 5 months Darian Arango MD [facility]                Passed - Lipid Panel completed in last 360 days     Lab Results   Component Value Date    CHOL 238 (H) 01/24/2020    HDL 74 01/24/2020    LDLCALC 150.2 01/24/2020    TRIG 69 01/24/2020             Passed - ALT is 94 or below and within 360 days     ALT   Date Value Ref Range Status   01/24/2020 23 10 - 44 U/L Final   07/18/2019 14 10 - 44 U/L  Final   01/18/2019 19 10 - 44 U/L Final              Passed - AST is 54 or below and within 360 days     AST   Date Value Ref Range Status   01/24/2020 28 10 - 40 U/L Final   07/18/2019 23 10 - 40 U/L Final   01/18/2019 23 10 - 40 U/L Final               Appointments  past 12m or future 3m with PCP    Date Provider   Last Visit   1/31/2020 Darian Arango MD   Next Visit   8/6/2020 Darian Arango MD   .  ED visits in past 90 days: 0       Note composed:4:55 PM 02/26/2020

## 2020-02-27 NOTE — TELEPHONE ENCOUNTER
I have reviewed and agree with the assessment below.  Dose was increased at [facility] visit 2/2 uncontrolled HLD; sent in 30d while you were out of office on 2/20/2020; previous script set to no print; will queue for your review.  Thanks

## 2020-03-02 RX ORDER — ROSUVASTATIN CALCIUM 20 MG/1
TABLET, COATED ORAL
Qty: 90 TABLET | Refills: 3 | Status: SHIPPED | OUTPATIENT
Start: 2020-03-02 | End: 2021-04-19

## 2020-03-18 DIAGNOSIS — F41.1 GAD (GENERALIZED ANXIETY DISORDER): ICD-10-CM

## 2020-03-18 DIAGNOSIS — F32.A DEPRESSION, UNSPECIFIED DEPRESSION TYPE: ICD-10-CM

## 2020-03-18 DIAGNOSIS — J44.9 CHRONIC OBSTRUCTIVE PULMONARY DISEASE, UNSPECIFIED COPD TYPE: ICD-10-CM

## 2020-03-24 RX ORDER — ALBUTEROL SULFATE 90 UG/1
AEROSOL, METERED RESPIRATORY (INHALATION)
Qty: 18 G | Refills: 3 | Status: SHIPPED | OUTPATIENT
Start: 2020-03-24 | End: 2021-12-08 | Stop reason: SDUPTHER

## 2020-03-24 RX ORDER — FLUOXETINE 10 MG/1
CAPSULE ORAL
Qty: 90 CAPSULE | Refills: 3 | Status: SHIPPED | OUTPATIENT
Start: 2020-03-24 | End: 2021-03-17

## 2020-03-24 NOTE — TELEPHONE ENCOUNTER
----- Message from Michael Nava sent at 3/24/2020  1:34 PM CDT -----  Contact: Ptnt [phone]  Type:  RX Refill Request    Who Called:  Ptnt [phone]    Refill or New Rx:  Refill    RX Name and Strength:  FLUoxetine 10 MG capsule 45 capsule 3 7/31/2019 7/30/2020   Sig - Route: Take 1 capsule (10 mg total) by mouth every other day. - Oral     And    albuterol (VENTOLIN HFA) 90 mcg/actuation inhaler 18 g 3 1/25/2019    Sig - Route: Inhale 2 puffs into the lungs every 6 (six) hours as needed. Rescue - Inhalation   Sent to pharmacy as: albuterol (VENTOLIN HFA) 90 mcg/actuation inhaler       How is the patient currently taking it? (ex. 1XDay):  See above.    Is this a 30 day or 90 day RX:  See above    Preferred Pharmacy with phone number:      [facility]  [address] [place] [address]  [phone]    Local or Mail Order:  Local    Ordering Provider:  Dr Nba Singleton Call Back Number: Ptnt [phone]    Additional Information:  Advised she is in short supply on both of these medications. Please end to pharmacy today. Please confirm when sent.

## 2020-04-03 ENCOUNTER — TELEPHONE (OUTPATIENT)
Dept: FAMILY MEDICINE | Facility: CLINIC | Age: 75
End: 2020-04-03

## 2020-04-03 RX ORDER — SULFAMETHOXAZOLE AND TRIMETHOPRIM 800; 160 MG/1; MG/1
1 TABLET ORAL 2 TIMES DAILY
Qty: 14 TABLET | Refills: 0 | Status: SHIPPED | OUTPATIENT
Start: 2020-04-03 | End: 2020-04-10

## 2020-04-03 NOTE — TELEPHONE ENCOUNTER
Called patient she was wanting to  see if she can have antibiotic called in. States she toke azo for a uti and it started to clear up but now it hurting again she states she does not want to come in to the building are go any where. She just taught a mild antibiotic could be called in. I told her not sure  What  The provider will request but someone will be in touch once we get a response.     Offered  For her to drop off  Urine to get  Checked but she does not want to.     Offered virtual visit she also refused.     Call ended. Please advise thank you

## 2020-04-03 NOTE — TELEPHONE ENCOUNTER
Spoke to pt. Advised rx was sent to the pharmacy. Pt. Verbalized understanding. Phone call ended.

## 2020-04-03 NOTE — TELEPHONE ENCOUNTER
----- Message from Kymberly Martinez sent at 4/3/2020  9:25 AM CDT -----  Contact: pt  Type:  Patient Returning Call    Who Called:  pt  Does the patient know what this is regarding?:  Pt to Azo for 3 days and UTI is returning. Pt would like for the office to call her in a antibiotic. Please call to follow up.  Best Call Back Number:    Additional Information:  Thank you

## 2020-05-18 DIAGNOSIS — B37.31 YEAST VAGINITIS: Primary | ICD-10-CM

## 2020-05-18 RX ORDER — FLUCONAZOLE 150 MG/1
TABLET ORAL
Qty: 3 TABLET | Refills: 1 | Status: SHIPPED | OUTPATIENT
Start: 2020-05-18 | End: 2021-08-17 | Stop reason: SDUPTHER

## 2020-05-18 NOTE — TELEPHONE ENCOUNTER
----- Message from Michael Nava sent at 2020 10:33 AM CDT -----  Contact: Ptnt   Type:  RX Refill Request    Who Called:  Ptnt     Refill or New Rx:  Refill    RX Name and Strength:  fluconazole (DIFLUCAN) 150 MG Tab 3 tablet 1 2019    Si po daily for 3 days   Patient not taking: Reported on 2020       Sent to pharmacy as: fluconazole (DIFLUCAN) 150 MG Tab       How is the patient currently taking it? (ex. 1XDay):  See above    Is this a 30 day or 90 day RX:  See above    Preferred Pharmacy with phone number:      [id number] - [place], [place] - [address] AT [facility] [address]  [address] [address]  Phone: [phone] Fax: [phone]    Local or Mail Order: Local    Ordering Provider:  Dr Nba Singleton Call Back Number:  [phone]    Additional Information:      Advised needs to speak with the nurse, would like a refill on this medication for her current symptoms.  Has dryness and itching.

## 2020-05-18 NOTE — TELEPHONE ENCOUNTER
No new care gaps identified.  Powered by [employer]. Reference number: [id number]. 5/18/2020 1:18:44 PM CDT

## 2020-07-30 ENCOUNTER — TELEPHONE (OUTPATIENT)
Dept: FAMILY MEDICINE | Facility: CLINIC | Age: 75
End: 2020-07-30

## 2020-07-30 DIAGNOSIS — Z20.822 EXPOSURE TO COVID-19 VIRUS: Primary | ICD-10-CM

## 2020-07-30 DIAGNOSIS — R05.9 COUGH: ICD-10-CM

## 2020-07-30 NOTE — TELEPHONE ENCOUNTER
Someone will need to be responsible for this test and advise her around what going on.   Can we do a virtual visit - if she has a smart phone we can use [employer] or if iphone, [employer].  Needs an appointment with a provider via telemedicine only.  She can also go to the urgent care for evaluation, advise and testing.

## 2020-07-30 NOTE — TELEPHONE ENCOUNTER
Spoke with pt, she sounds really good. She actually has been taking over the counter medication. She feels a lot better, she states she can just wait till your visit that she already has on 8/6/2020 with you. I tried getting virtual visit or doximety but she has no clue on where or what kind of phone she has and wouldn't have no one around to assist her in doing so.

## 2020-07-30 NOTE — TELEPHONE ENCOUNTER
----- Message from Sammy NICHOLS Frisard sent at 7/30/2020 11:58 AM CDT -----  Regarding: Covid19 test  Contact: patient  Patient called in and stated she came back from vacation earlier in this week & on this past Tuesday she had chills & achy.  Patient did not have any fever.  Patient stated she is still feeling kind of weak & wanted to see if she could be Covid tested.    Patient call back number is [phone]

## 2020-07-30 NOTE — TELEPHONE ENCOUNTER
Please advise pt call message. She was on vacation and just came back and she is feeling achy, tired, weak. No fever. She is requesting a covid test be done

## 2020-07-31 ENCOUNTER — OFFICE VISIT (OUTPATIENT)
Dept: URGENT CARE | Facility: CLINIC | Age: 75
End: 2020-07-31
Payer: MEDICARE

## 2020-07-31 VITALS
TEMPERATURE: 101 F | OXYGEN SATURATION: 98 % | HEART RATE: 86 BPM | DIASTOLIC BLOOD PRESSURE: 76 MMHG | RESPIRATION RATE: 16 BRPM | SYSTOLIC BLOOD PRESSURE: 102 MMHG

## 2020-07-31 DIAGNOSIS — R68.83 CHILLS: ICD-10-CM

## 2020-07-31 DIAGNOSIS — R06.02 SHORTNESS OF BREATH: ICD-10-CM

## 2020-07-31 DIAGNOSIS — Z87.898 HISTORY OF WHEEZING: ICD-10-CM

## 2020-07-31 DIAGNOSIS — B34.9 VIRAL SYNDROME: Primary | ICD-10-CM

## 2020-07-31 DIAGNOSIS — R52 BODY ACHES: ICD-10-CM

## 2020-07-31 DIAGNOSIS — J02.9 SORE THROAT: ICD-10-CM

## 2020-07-31 DIAGNOSIS — R09.82 POST-NASAL DRIP: ICD-10-CM

## 2020-07-31 DIAGNOSIS — R61 DIAPHORESIS: ICD-10-CM

## 2020-07-31 DIAGNOSIS — R50.9 FEVER, UNSPECIFIED: ICD-10-CM

## 2020-07-31 DIAGNOSIS — R05.9 COUGH: ICD-10-CM

## 2020-07-31 DIAGNOSIS — J34.89 SINUS PRESSURE: ICD-10-CM

## 2020-07-31 DIAGNOSIS — R51.9 HEADACHE, UNSPECIFIED HEADACHE TYPE: ICD-10-CM

## 2020-07-31 DIAGNOSIS — R50.9 FEVER, UNSPECIFIED FEVER CAUSE: ICD-10-CM

## 2020-07-31 PROCEDURE — 99214 OFFICE O/P EST MOD 30 MIN: CPT | Mod: S$GLB,,, | Performed by: NURSE PRACTITIONER

## 2020-07-31 PROCEDURE — U0003 INFECTIOUS AGENT DETECTION BY NUCLEIC ACID (DNA OR RNA); SEVERE ACUTE RESPIRATORY SYNDROME CORONAVIRUS 2 (SARS-COV-2) (CORONAVIRUS DISEASE [COVID-19]), AMPLIFIED PROBE TECHNIQUE, MAKING USE OF HIGH THROUGHPUT TECHNOLOGIES AS DESCRIBED BY CMS-2020-01-R: HCPCS | Mod: HCNC

## 2020-07-31 PROCEDURE — 99214 PR OFFICE/OUTPT VISIT, EST, LEVL IV, 30-39 MIN: ICD-10-PCS | Mod: S$GLB,,, | Performed by: NURSE PRACTITIONER

## 2020-07-31 RX ORDER — AZELASTINE 1 MG/ML
1 SPRAY, METERED NASAL 2 TIMES DAILY
Qty: 30 ML | Refills: 0 | Status: SHIPPED | OUTPATIENT
Start: 2020-07-31 | End: 2021-05-14 | Stop reason: SDUPTHER

## 2020-07-31 RX ORDER — PROMETHAZINE HYDROCHLORIDE AND DEXTROMETHORPHAN HYDROBROMIDE 6.25; 15 MG/5ML; MG/5ML
5 SYRUP ORAL EVERY 6 HOURS PRN
Qty: 100 ML | Refills: 0 | Status: SHIPPED | OUTPATIENT
Start: 2020-07-31 | End: 2020-08-07

## 2020-07-31 RX ORDER — BENZONATATE 100 MG/1
100 CAPSULE ORAL 3 TIMES DAILY PRN
Qty: 21 CAPSULE | Refills: 0 | Status: SHIPPED | OUTPATIENT
Start: 2020-07-31 | End: 2020-08-07

## 2020-07-31 NOTE — PROGRESS NOTES
Subjective:       Patient ID: Madison Long is a 75 y.o. female.    Vitals:  temperature is 101 °F (38.3 °C) (abnormal). Her blood pressure is 102/76 and her pulse is 86. Her respiration is 16 and oxygen saturation is 98%.     Chief Complaint: Fever    Pt just got back from the [facility] and she states she wore her mast and followed all of the cdc guidelines. Pt fever showed up on Tuesday night.     Fever   This is a new problem. The current episode started in the past 7 days. The problem occurs constantly. The problem has been gradually worsening. Associated symptoms include coughing and muscle aches. Pertinent negatives include no abdominal pain, chest pain, congestion, diarrhea, ear pain, headaches, nausea, rash, sleepiness, sore throat, urinary pain, vomiting or wheezing. She has tried acetaminophen and NSAIDs for the symptoms. The treatment provided mild relief.       Constitution: Positive for chills, fatigue and fever.   HENT: Negative for ear pain, congestion and sore throat.    Neck: Negative for painful lymph nodes.   Cardiovascular: Negative for chest pain and leg swelling.   Eyes: Negative for double vision and blurred vision.   Respiratory: Positive for cough. Negative for shortness of breath and wheezing.    Gastrointestinal: Negative for abdominal pain, nausea, vomiting and diarrhea.   Genitourinary: Negative for dysuria, frequency, urgency and history of kidney stones.   Musculoskeletal: Negative for joint pain, joint swelling, muscle cramps and muscle ache.   Skin: Negative for color change, pale, rash and bruising.   Allergic/Immunologic: Negative for seasonal allergies.   Neurological: Negative for dizziness, history of vertigo, light-headedness, passing out and headaches.   Hematologic/Lymphatic: Negative for swollen lymph nodes.   Psychiatric/Behavioral: Negative for nervous/anxious, sleep disturbance and depression. The patient is not nervous/anxious.        Objective:      Physical  Exam   Constitutional: She is oriented to person, place, and time. She appears well-developed. She is cooperative.  Non-toxic appearance. She does not appear ill. No distress.   HENT:   Head: Normocephalic and atraumatic.   Ears:   Right Ear: Hearing, external ear and ear canal normal. A middle ear effusion is present.   Left Ear: Hearing, external ear and ear canal normal. A middle ear effusion is present.   Nose: Mucosal edema present. No rhinorrhea or nasal deformity. No epistaxis. Right sinus exhibits no maxillary sinus tenderness and no frontal sinus tenderness. Left sinus exhibits no maxillary sinus tenderness and no frontal sinus tenderness.   Mouth/Throat: Uvula is midline, oropharynx is clear and moist and mucous membranes are normal. No trismus in the jaw. Normal dentition. No uvula swelling. No posterior oropharyngeal erythema.   Eyes: Conjunctivae and lids are normal. Right eye exhibits no discharge. Left eye exhibits no discharge. No scleral icterus.   Neck: Trachea normal, normal range of motion, full passive range of motion without pain and phonation normal. Neck supple.   Cardiovascular: Normal rate, regular rhythm, normal heart sounds and normal pulses.   Pulmonary/Chest: Effort normal and breath sounds normal. No respiratory distress.   Abdominal: Soft. Normal appearance and bowel sounds are normal. She exhibits no distension, no pulsatile midline mass and no mass. There is no abdominal tenderness.   Musculoskeletal: Normal range of motion.         General: No deformity.   Neurological: She is alert and oriented to person, place, and time. She exhibits normal muscle tone. Coordination normal.   Skin: Skin is warm, dry, intact, not diaphoretic and not pale. Psychiatric: Her speech is normal and behavior is normal. Judgment and thought content normal.   Nursing note and vitals reviewed.        Assessment:       1. Viral syndrome    2. Cough    3. Fever, unspecified fever cause    4. Fever,  unspecified    5. Body aches    6. Chills    7. Diaphoresis    8. Headache, unspecified headache type    9. Sore throat    10. Post-nasal drip    11. Sinus pressure    12. Shortness of breath    13. History of wheezing        Plan:         Viral syndrome  -     azelastine (ASTELIN) 137 mcg (0.1 %) nasal spray; 1 spray (137 mcg total) by Nasal route 2 (two) times daily. for 14 days  Dispense: 30 mL; Refill: 0    Cough  -     COVID-19 Routine Screening  -     benzonatate (TESSALON) 100 MG capsule; Take 1 capsule (100 mg total) by mouth 3 (three) times daily as needed for Cough.  Dispense: 21 capsule; Refill: 0  -     promethazine-dextromethorphan (PROMETHAZINE-DM) 6.25-15 mg/5 mL Syrp; Take 5 mLs by mouth every 6 (six) hours as needed (May take every 4 hours, PRN. DoNOT take more than 30 mL in 24 hours.).  Dispense: 100 mL; Refill: 0    Fever, unspecified fever cause    Fever, unspecified    Body aches    Chills    Diaphoresis    Headache, unspecified headache type    Sore throat    Post-nasal drip    Sinus pressure    Shortness of breath    History of wheezing         Patient Instructions     Always follow your healthcare professional's instructions.    You have received urgent care diagnosis and treatment and you may be released before all of your medical problems are known or treated. Unless you have been given a referral, you (the patient), will arrange for follow-up care as instructed.     If you have been given a referral,  will contact you (their phone number is [phone]). If they do NOT call you by the end of the business day, please call them the following day.    You have been diagnosed with Viral Syndrome. TREATMENT: YOUR TREATMENT IS AIMED AT SYMPTOM MANAGEMENT.       A viral illness may cause a number of symptoms. The symptoms depend on the part of the body that the virus affects. If it settles in your nose, throat, and lungs, it may cause cough, sore throat, congestion, and sometimes  "headache. If it settles in your stomach and intestinal tract, it may cause vomiting and diarrhea. Sometimes it causes vague symptoms like "aching all over," feeling tired, loss of appetite, or fever. A viral illness usually lasts 1 to 2 weeks, but sometimes it lasts longer. In some cases, a more serious infection can look like a viral syndrome in the first few days of the illness; therefore, you may need another exam and additional tests to know the difference.          Home care  Follow these guidelines for taking care of yourself at home:  · If symptoms are severe, rest at home for the first 2 to 3 days.  · Stay away from cigarette smoke - both your smoke and the smoke from others.  · You may use over-the-counter acetaminophen or ibuprofen for fever, muscle aching, and headache, unless another medicine was prescribed for this. If you have chronic liver or kidney disease or ever had a stomach ulcer or GI bleeding, talk with your doctor before using these medicines. No one who is younger than 18 and ill with a fever should take aspirin. It may cause severe disease or death.  · Your appetite may be poor, so a light diet is fine. Avoid dehydration by drinking 8 to 12 8-ounce glasses of fluids each day. This may include water; orange juice; lemonade; apple, grape, and cranberry juice; clear fruit drinks; electrolyte replacement and sports drinks; and decaffeinated teas and coffee. If you have been diagnosed with a kidney disease, ask your doctor how much and what types of fluids you should drink to prevent dehydration. If you have kidney disease, drinking too much fluid can cause it build up in the your body and be dangerous to your health.  · Over-the-counter remedies won't shorten the length of the illness but may be helpful for cough, sore throat; and nasal and sinus congestion. Don't use decongestants if you have high blood pressure.    Follow-up care  Follow up with your healthcare provider if your symptoms " "continue for more than 7 days.    Fever Cough Body Aches Nausea and Vomiting Diarrhea Congestion or Runny Nose    OTC Tylenol   OTC NSAIDs  Tessalon Pearls   Phenergan DM   OTC cough medications  Mobic   OTC Tylenol   OTC NSAIDs  Zofran   Phenergan   OTC Emetrol  DO NOT take ant-diarrheal medications  OTC Claritin, Zyrtec, Allegra, OR Xyzal   OTC Flonase   OTC Benadryl      Cough Allergy Symptoms Asthma Headache or Body Aches   Tessalon Perles are a non-narcotic cough medicine. It works by numbing the throat and lungs, making the cough reflex less active, it is used to relieve coughing during the day. If you have been given Phenergan DM cough syrup, you do NOT need to take both medications at the same time.    Phenergan DM is a combination medication that is used to treat cough. Phenergan DM works like an antihistamine and cough suppressant. This medication will make you sleepy like Benadryl, have a drying effect, and act on a part of the brain (cough center) to reduce the need to cough. DO NOT take Benadryl and Phenergan together. You may take over-the-counter claritin, zyrtec, allegra, or xyzal as directed, these are antihistamines that will work to dry up secretions/mucus. Antihistamines work to block the effects of a certain natural substance (histamine), which causes allergy symptoms.    OR    Use over-the-counter Flonase (a nasal steroid) or prescribed Azelastine (a nasal antihistamine) to treat runny nose, sneezing, itchy nose, nasal congestion, and postnasal drip.    Proper administration is to "look down at your toes and aim for your nose". The goal is to aim for your nasolabial folds, the creases in your nose. If you feel the medication drip down your throat, you have NOT administered it correctly. If you can "smell the roses" (floral scent), then you have administered it correctly. If you have a history of asthma, expressed a concern about wheezing or have been told you were wheezing during " your exam today, you are being given Albuterol. Albuterol is a bronchodilator that relaxes muscles in the airways and increases air flow to the lungs; it is used to treat or prevent bronchospasm, or narrowing of the airways in the lungs.     If you have a history of asthma, expressed a concern about wheezing or have been told you were wheezing during your exam today and are NOT being prescribed Albuterol that is because you have insured me that you have an adequate supply of the drug at home.    Mobic is a nonsteroidal anti-inflammatory drug (NSAID), it is used to treat inflammation. It reduces pain, swelling, and stiffness of the joints. Please do NOT take any other NSAID medications while on this medication, you can take Tylenol if you feel the need. If you were not prescribed an anti-inflammatory medication, and if you do not have any history of stomach/intestinal ulcers, or kidney disease, or are not taking a blood thinner such as Coumadin, Plavix, Pradaxa, Eloquis, or Xaralta for example, it is OK to take over the counter Ibuprofen or Advil or Motrin or Aleve as directed.  Do not take any of these medications on an empty stomach.         Dehydration (Adult)  Dehydration occurs when your body loses too much fluid. This may be the result of prolonged vomiting or diarrhea, excessive sweating, or a high fever. It may also happen if you don't drink enough fluid when you're sick or out in the heat. Misuse of diuretics (water pills) can also be a cause.  Symptoms include thirst and decreased urine output. You may also feel dizzy, weak, fatigued, or very drowsy. The diet described below is usually enough to treat dehydration. In some cases, you may need medicine.    Home care  · Drink at least 12 8-ounce glasses of fluid every day to resolve the dehydration. Fluid may include water; orange juice; lemonade; apple, grape, or cranberry juice; clear fruit drinks; electrolyte replacement and sports drinks; and teas and  coffee without caffeine. If you have been diagnosed with a kidney disease, ask your doctor how much and what types of fluids you should drink to prevent dehydration. If you have kidney disease, fluid can build up in the body. This can be dangerous to your health.  · If you have a fever, muscle aches, or a headache as a result of a cold or flu, you may take acetaminophen or ibuprofen, unless another medicine was prescribed. If you have chronic liver or kidney disease, or have ever had a stomach ulcer or gastrointestinal bleeding, talk with your health care provider before using these medicines. Don't take aspirin if you are younger than 18 and have a fever. Aspirin raises the chance for severe liver injury.    When to seek medical advice  DEHYDRATION:  · Continued vomiting  · Frequent diarrhea (more than 5 times a day); blood (red or black color) or mucus in diarrhea  · Blood in vomit or stool  · Swollen abdomen or increasing abdominal pain  · Weakness, dizziness, or fainting  · Unusual drowsiness or confusion  · Reduced urine output or extreme thirst    FEVER:  Call your healthcare provider right away if any of these occur:  · Your child is 3 months old or younger and has a fever of 100.4°F (38°C) or higher. Get medical care right away. Fever in a young baby can be a sign of a dangerous infection.  · Your child is of any age and has repeated fevers above 104°F (40°C).  · Your child is younger than 2 years of age and a fever of 100.4°F (38°C) continues for more than 1 day.  · A fever of 100.4°F (38°C) for more than 3 days in anyone older than 2 years of age.       Why didn't I get a steroid today?    The benefits are small and, unlike topical glucocorticoids, systemic glucocorticoids possess a significant side effect profile. Major side effects include:    · Effects immunity predisposing you to getting a more severe infection and increases your white blood cell count. You have the flu, you do not need anything to  "weaken your immune system right now.  · Young children -- Growth impairment   · Skin consequences: Skin thinning and ecchymoses, Cushingoid appearance (rounded face), acne, weight gain, mild hirsutism, facial erythema, and striae.  · Eye consequences: Cataracts, increased intraocular pressure, exophthalmos.   · Cardiovascular consequences: Fluid retention, premature atherosclerotic disease, and arrhythmias.   · GI consequences: Increased risk for adverse gastrointestinal effects, such as gastritis, ulcer formation, and gastrointestinal bleeding  · Bone and muscle consequences: Osteoporosis, osteonecrosis, and myopathy.  · Neuropsychiatric effects: Mood disorders, psychosis, memory impairment, and   · Metabolic and Endocrine consequences: Suppress the hypothalamic-pituitary-adrenal (HPA) axis and increase blood sugar.     Can I just have a shot instead of pills?  No. If you are sick, you need a course of treatment (not just a one time dose).     Why didn't I get an antibiotic today?  You have been diagnosed with a virus an antibiotic will not help you. Antibiotics work by destroying the cell walls of bacteria, viruses do not have cell walls.     When to seek medical advice:    DEHYDRATION:  · Continued vomiting  · Frequent diarrhea (more than 5 times a day); blood (red or black color) or mucus in diarrhea  · Blood in vomit or stool  · Swollen abdomen or increasing abdominal pain  · Reduced urine output or extreme thirst  · Repeated vomiting after the first 4 hours on fluids  · Occasional vomiting for more than 48 hours  · Frequent diarrhea (more than 5 times a day), blood in diarrhea (red or black color), or mucus in diarrhea  · Blood in vomit or stool  · Swollen abdomen or signs of abdominal pain  · No urine for 8 hours, no tears when crying, "sunken" eyes, or dry mouth  · Unusual behavior changes, fussiness, drowsiness, confusion, or seizure  · Fever (see Fever and children, below)  FEVER:  Call your healthcare " "provider right away if any of these occur:  · Your child is 3 months old or younger and has a fever of 100.4°F (38°C) or higher. Get medical care right away. Fever in a young baby can be a sign of a dangerous infection.  · Your child is of any age and has repeated fevers above 104°F (40°C).  · Your child is younger than 2 years of age and a fever of 100.4°F (38°C) continues for more than 1 day.  · A fever of 100.4°F (38°C) for more than 3 days in anyone older than 2 years of age.  Call 911  Call 911 or your local emergency services number if the child shows any of these symptoms or signs:  · Trouble breathing  · Confusion  · Is very drowsy or difficult to awaken  · Fainting or loss of consciousness  · Rapid heart rate  · Seizure  · Stiff neck     You have NOT tested positive for the flu; your symptoms are consistent with COVID-19. There is no current "cure" for COVID-19; rather, like the flu, treatment is geared at symptom management.    COVID-19     Viral infections are spread through the air in droplets when someone who has a virus breaths, coughs, sneezes, laughs, or talks.     You have been tested for COVID-19 in this clinic today.     Guidelines for self-quarantine:    · Stay home: People who are mildly ill with COVID-19 are able to recover at home. Do not leave, except to get medical care. Do not visit public areas.  · Call ahead: If you have a medical appointment, call your doctors office or emergency department and tell them you may have COVID-19. This will help the office protect themselves and other patients. Let them know how long your symptoms have been present and that you did NOT have a positive test result for the flu.  · Avoid public transportation: Do NOT use public transportation, ride-sharing, taxis, or flying.  · Stay away from others: As much as possible, you should keep a distance of at least 6 feet from your self and others. Stay in a specific sick room and away from other people in your " home. Use a separate bathroom, if available.  · When you must go out (for healthcare purposes), you should wear a facemask.  · Wash hands: Wash your hands often with soap and water for at least 20 seconds. This is especially important after blowing your nose, coughing, or sneezing; going to the bathroom; and before eating or preparing food.  Hand : If soap and water are not available, use an alcohol-based hand  with at least 60% alcohol, covering all surfaces of your hands and rubbing them together until they feel dry.  Soap and water: Soap and water are the best option, especially if hands are visibly dirty.  · Avoid touching: Avoid touching your eyes, nose, and mouth with unwashed hands.  · You can discontinue your self-quarantine when 14 days have elapse AND you have NOT had fever for at least 72 hours (that is three full days of no fever without the use medicine that reduces fevers) AND other symptoms have improved (for example, when your cough or shortness of breath have improved).    How is COVID-19 treated?  Not all patients with COVID-19 will require medical supportive care. Clinical management for hospitalized patients with COVID-19 is focused on supportive care of complications, including advanced organ support for respiratory failure, septic shock, and multi-organ failure. There are currently no antiviral drugs licensed by the [place] Food and Drug Administration (FDA) to treat COVID-19.     Follow the Center for Disease Control's (CDC) recommendations for your safety and the safety of others! From the CDC:    When you can be around others (end home isolation) depends on different factors for different situations.    Find CDCs recommendations for your situation below.    I think or know I had COVID-19, and I had symptoms.  You can be with others after 3 days with no fever and respiratory symptoms have improved (e.g. cough, shortness of breath) and 10 days since symptoms first  appeared.    Depending on your healthcare providers advice and availability of testing, you might get tested to see if you still have COVID-19. If you will be tested, you can be around others when you have no fever, respiratory symptoms have improved, and you receive two negative test results in a row, at least 24 hours apart.    I tested positive for COVID-19 but had no symptoms.  If you continue to have no symptoms, you can be with others after:  10 days have passed since test.  Depending on your healthcare providers advice and availability of testing, you might get tested to see if you still have COVID-19. If you will be tested, you can be around others after you receive two negative test results in a row, at least 24 hours apart.    If you develop symptoms after testing positive, follow the guidance above for I think or know I had COVID, and I had symptoms.    I have a weakened immune system (immunocompromised) due to a health condition or medication. When can I be around others?  People with conditions that weaken their immune system might need to stay home longer than 10 days. Talk to your healthcare provider for more information. If testing is available in your community, it may be recommended by your healthcare provider.  You can be with others after you receive two negative test results in a row, at least 24 hours apart.    For Anyone Who Has Been Around a Person with COVID-19 it is important to remember that anyone who has close contact with someone with COVID-19 should stay home for 14 days after exposure based on the time it takes to develop illness.    For more information go to: https://www.cdc.gov/coronavirus/2019-ncov/if-you-are-sick/end-home-isolation.html    Your provider discussed your plan of care with you during your physical exam. It was reviewed once more by the provider when giving you an after visit summary. If the patient is a minor, the discharge instructions were discussed with an  adult and that adult acknowledge their understanding of the provider's teaching.

## 2020-07-31 NOTE — PATIENT INSTRUCTIONS
"Always follow your healthcare professional's instructions.    You have received urgent care diagnosis and treatment and you may be released before all of your medical problems are known or treated. Unless you have been given a referral, you (the patient), will arrange for follow-up care as instructed.     If you have been given a referral, basil will contact you (their phone number is [phone]). If they do NOT call you by the end of the business day, please call them the following day.    You have been diagnosed with Viral Syndrome. TREATMENT: YOUR TREATMENT IS AIMED AT SYMPTOM MANAGEMENT.       A viral illness may cause a number of symptoms. The symptoms depend on the part of the body that the virus affects. If it settles in your nose, throat, and lungs, it may cause cough, sore throat, congestion, and sometimes headache. If it settles in your stomach and intestinal tract, it may cause vomiting and diarrhea. Sometimes it causes vague symptoms like "aching all over," feeling tired, loss of appetite, or fever. A viral illness usually lasts 1 to 2 weeks, but sometimes it lasts longer. In some cases, a more serious infection can look like a viral syndrome in the first few days of the illness; therefore, you may need another exam and additional tests to know the difference.          Home care  Follow these guidelines for taking care of yourself at home:  · If symptoms are severe, rest at home for the first 2 to 3 days.  · Stay away from cigarette smoke - both your smoke and the smoke from others.  · You may use over-the-counter acetaminophen or ibuprofen for fever, muscle aching, and headache, unless another medicine was prescribed for this. If you have chronic liver or kidney disease or ever had a stomach ulcer or GI bleeding, talk with your doctor before using these medicines. No one who is younger than 18 and ill with a fever should take aspirin. It may cause severe disease or death.  · Your appetite may be " poor, so a light diet is fine. Avoid dehydration by drinking 8 to 12 8-ounce glasses of fluids each day. This may include water; orange juice; lemonade; apple, grape, and cranberry juice; clear fruit drinks; electrolyte replacement and sports drinks; and decaffeinated teas and coffee. If you have been diagnosed with a kidney disease, ask your doctor how much and what types of fluids you should drink to prevent dehydration. If you have kidney disease, drinking too much fluid can cause it build up in the your body and be dangerous to your health.  · Over-the-counter remedies won't shorten the length of the illness but may be helpful for cough, sore throat; and nasal and sinus congestion. Don't use decongestants if you have high blood pressure.    Follow-up care  Follow up with your healthcare provider if your symptoms continue for more than 7 days.    Fever Cough Body Aches Nausea and Vomiting Diarrhea Congestion or Runny Nose    OTC Tylenol   OTC NSAIDs  Tessalon Pearls   Phenergan DM   OTC cough medications  Mobic   OTC Tylenol   OTC NSAIDs  Zofran   Phenergan   OTC Emetrol  DO NOT take ant-diarrheal medications  OTC Claritin, Zyrtec, Allegra, OR Xyzal   OTC Flonase   OTC Benadryl      Cough Allergy Symptoms Asthma Headache or Body Aches   Tessalon Perles are a non-narcotic cough medicine. It works by numbing the throat and lungs, making the cough reflex less active, it is used to relieve coughing during the day. If you have been given Phenergan DM cough syrup, you do NOT need to take both medications at the same time.    Phenergan DM is a combination medication that is used to treat cough. Phenergan DM works like an antihistamine and cough suppressant. This medication will make you sleepy like Benadryl, have a drying effect, and act on a part of the brain (cough center) to reduce the need to cough. DO NOT take Benadryl and Phenergan together. You may take over-the-counter claritin, zyrtec, allegra, or  "xyzal as directed, these are antihistamines that will work to dry up secretions/mucus. Antihistamines work to block the effects of a certain natural substance (histamine), which causes allergy symptoms.    OR    Use over-the-counter Flonase (a nasal steroid) or prescribed Azelastine (a nasal antihistamine) to treat runny nose, sneezing, itchy nose, nasal congestion, and postnasal drip.    Proper administration is to "look down at your toes and aim for your nose". The goal is to aim for your nasolabial folds, the creases in your nose. If you feel the medication drip down your throat, you have NOT administered it correctly. If you can "smell the roses" (floral scent), then you have administered it correctly. If you have a history of asthma, expressed a concern about wheezing or have been told you were wheezing during your exam today, you are being given Albuterol. Albuterol is a bronchodilator that relaxes muscles in the airways and increases air flow to the lungs; it is used to treat or prevent bronchospasm, or narrowing of the airways in the lungs.     If you have a history of asthma, expressed a concern about wheezing or have been told you were wheezing during your exam today and are NOT being prescribed Albuterol that is because you have insured me that you have an adequate supply of the drug at home.    Mobic is a nonsteroidal anti-inflammatory drug (NSAID), it is used to treat inflammation. It reduces pain, swelling, and stiffness of the joints. Please do NOT take any other NSAID medications while on this medication, you can take Tylenol if you feel the need. If you were not prescribed an anti-inflammatory medication, and if you do not have any history of stomach/intestinal ulcers, or kidney disease, or are not taking a blood thinner such as Coumadin, Plavix, Pradaxa, Eloquis, or Xaralta for example, it is OK to take over the counter Ibuprofen or Advil or Motrin or Aleve as directed.  Do not take any of these " medications on an empty stomach.         Dehydration (Adult)  Dehydration occurs when your body loses too much fluid. This may be the result of prolonged vomiting or diarrhea, excessive sweating, or a high fever. It may also happen if you don't drink enough fluid when you're sick or out in the heat. Misuse of diuretics (water pills) can also be a cause.  Symptoms include thirst and decreased urine output. You may also feel dizzy, weak, fatigued, or very drowsy. The diet described below is usually enough to treat dehydration. In some cases, you may need medicine.    Home care  · Drink at least 12 8-ounce glasses of fluid every day to resolve the dehydration. Fluid may include water; orange juice; lemonade; apple, grape, or cranberry juice; clear fruit drinks; electrolyte replacement and sports drinks; and teas and coffee without caffeine. If you have been diagnosed with a kidney disease, ask your doctor how much and what types of fluids you should drink to prevent dehydration. If you have kidney disease, fluid can build up in the body. This can be dangerous to your health.  · If you have a fever, muscle aches, or a headache as a result of a cold or flu, you may take acetaminophen or ibuprofen, unless another medicine was prescribed. If you have chronic liver or kidney disease, or have ever had a stomach ulcer or gastrointestinal bleeding, talk with your health care provider before using these medicines. Don't take aspirin if you are younger than 18 and have a fever. Aspirin raises the chance for severe liver injury.    When to seek medical advice  DEHYDRATION:  · Continued vomiting  · Frequent diarrhea (more than 5 times a day); blood (red or black color) or mucus in diarrhea  · Blood in vomit or stool  · Swollen abdomen or increasing abdominal pain  · Weakness, dizziness, or fainting  · Unusual drowsiness or confusion  · Reduced urine output or extreme thirst    FEVER:  Call your healthcare provider right away if  any of these occur:  · Your child is 3 months old or younger and has a fever of 100.4°F (38°C) or higher. Get medical care right away. Fever in a young baby can be a sign of a dangerous infection.  · Your child is of any age and has repeated fevers above 104°F (40°C).  · Your child is younger than 2 years of age and a fever of 100.4°F (38°C) continues for more than 1 day.  · A fever of 100.4°F (38°C) for more than 3 days in anyone older than 2 years of age.       Why didn't I get a steroid today?    The benefits are small and, unlike topical glucocorticoids, systemic glucocorticoids possess a significant side effect profile. Major side effects include:    · Effects immunity predisposing you to getting a more severe infection and increases your white blood cell count. You have the flu, you do not need anything to weaken your immune system right now.  · Young children -- Growth impairment   · Skin consequences: Skin thinning and ecchymoses, Cushingoid appearance (rounded face), acne, weight gain, mild hirsutism, facial erythema, and striae.  · Eye consequences: Cataracts, increased intraocular pressure, exophthalmos.   · Cardiovascular consequences: Fluid retention, premature atherosclerotic disease, and arrhythmias.   · GI consequences: Increased risk for adverse gastrointestinal effects, such as gastritis, ulcer formation, and gastrointestinal bleeding  · Bone and muscle consequences: Osteoporosis, osteonecrosis, and myopathy.  · Neuropsychiatric effects: Mood disorders, psychosis, memory impairment, and   · Metabolic and Endocrine consequences: Suppress the hypothalamic-pituitary-adrenal (HPA) axis and increase blood sugar.     Can I just have a shot instead of pills?  No. If you are sick, you need a course of treatment (not just a one time dose).     Why didn't I get an antibiotic today?  You have been diagnosed with a virus an antibiotic will not help you. Antibiotics work by destroying the cell walls of  "bacteria, viruses do not have cell walls.     When to seek medical advice:    DEHYDRATION:  · Continued vomiting  · Frequent diarrhea (more than 5 times a day); blood (red or black color) or mucus in diarrhea  · Blood in vomit or stool  · Swollen abdomen or increasing abdominal pain  · Reduced urine output or extreme thirst  · Repeated vomiting after the first 4 hours on fluids  · Occasional vomiting for more than 48 hours  · Frequent diarrhea (more than 5 times a day), blood in diarrhea (red or black color), or mucus in diarrhea  · Blood in vomit or stool  · Swollen abdomen or signs of abdominal pain  · No urine for 8 hours, no tears when crying, "sunken" eyes, or dry mouth  · Unusual behavior changes, fussiness, drowsiness, confusion, or seizure  · Fever (see Fever and children, below)  FEVER:  Call your healthcare provider right away if any of these occur:  · Your child is 3 months old or younger and has a fever of 100.4°F (38°C) or higher. Get medical care right away. Fever in a young baby can be a sign of a dangerous infection.  · Your child is of any age and has repeated fevers above 104°F (40°C).  · Your child is younger than 2 years of age and a fever of 100.4°F (38°C) continues for more than 1 day.  · A fever of 100.4°F (38°C) for more than 3 days in anyone older than 2 years of age.  Call 911  Call 911 or your local emergency services number if the child shows any of these symptoms or signs:  · Trouble breathing  · Confusion  · Is very drowsy or difficult to awaken  · Fainting or loss of consciousness  · Rapid heart rate  · Seizure  · Stiff neck     You have NOT tested positive for the flu; your symptoms are consistent with COVID-19. There is no current "cure" for COVID-19; rather, like the flu, treatment is geared at symptom management.    COVID-19     Viral infections are spread through the air in droplets when someone who has a virus breaths, coughs, sneezes, laughs, or talks.     You have been tested " for COVID-19 in this clinic today.     Guidelines for self-quarantine:    · Stay home: People who are mildly ill with COVID-19 are able to recover at home. Do not leave, except to get medical care. Do not visit public areas.  · Call ahead: If you have a medical appointment, call your doctors office or emergency department and tell them you may have COVID-19. This will help the office protect themselves and other patients. Let them know how long your symptoms have been present and that you did NOT have a positive test result for the flu.  · Avoid public transportation: Do NOT use public transportation, ride-sharing, taxis, or flying.  · Stay away from others: As much as possible, you should keep a distance of at least 6 feet from your self and others. Stay in a specific sick room and away from other people in your home. Use a separate bathroom, if available.  · When you must go out (for healthcare purposes), you should wear a facemask.  · Wash hands: Wash your hands often with soap and water for at least 20 seconds. This is especially important after blowing your nose, coughing, or sneezing; going to the bathroom; and before eating or preparing food.  Hand : If soap and water are not available, use an alcohol-based hand  with at least 60% alcohol, covering all surfaces of your hands and rubbing them together until they feel dry.  Soap and water: Soap and water are the best option, especially if hands are visibly dirty.  · Avoid touching: Avoid touching your eyes, nose, and mouth with unwashed hands.  · You can discontinue your self-quarantine when 14 days have elapse AND you have NOT had fever for at least 72 hours (that is three full days of no fever without the use medicine that reduces fevers) AND other symptoms have improved (for example, when your cough or shortness of breath have improved).    How is COVID-19 treated?  Not all patients with COVID-19 will require medical supportive care.  Clinical management for hospitalized patients with COVID-19 is focused on supportive care of complications, including advanced organ support for respiratory failure, septic shock, and multi-organ failure. There are currently no antiviral drugs licensed by the U.S. Food and Drug Administration (FDA) to treat COVID-19.     Follow the Center for Disease Control's (CDC) recommendations for your safety and the safety of others! From the CDC:    When you can be around others (end home isolation) depends on different factors for different situations.    Find CDCs recommendations for your situation below.    I think or know I had COVID-19, and I had symptoms.  You can be with others after 3 days with no fever and respiratory symptoms have improved (e.g. cough, shortness of breath) and 10 days since symptoms first appeared.    Depending on your healthcare providers advice and availability of testing, you might get tested to see if you still have COVID-19. If you will be tested, you can be around others when you have no fever, respiratory symptoms have improved, and you receive two negative test results in a row, at least 24 hours apart.    I tested positive for COVID-19 but had no symptoms.  If you continue to have no symptoms, you can be with others after:  10 days have passed since test.  Depending on your healthcare providers advice and availability of testing, you might get tested to see if you still have COVID-19. If you will be tested, you can be around others after you receive two negative test results in a row, at least 24 hours apart.    If you develop symptoms after testing positive, follow the guidance above for I think or know I had COVID, and I had symptoms.    I have a weakened immune system (immunocompromised) due to a health condition or medication. When can I be around others?  People with conditions that weaken their immune system might need to stay home longer than 10 days. Talk to your healthcare  provider for more information. If testing is available in your community, it may be recommended by your healthcare provider.  You can be with others after you receive two negative test results in a row, at least 24 hours apart.    For Anyone Who Has Been Around a Person with COVID-19 it is important to remember that anyone who has close contact with someone with COVID-19 should stay home for 14 days after exposure based on the time it takes to develop illness.    For more information go to: https://www.cdc.gov/coronavirus/2019-ncov/if-you-are-sick/end-home-isolation.html    Your provider discussed your plan of care with you during your physical exam. It was reviewed once more by the provider when giving you an after visit summary. If the patient is a minor, the discharge instructions were discussed with an adult and that adult acknowledge their understanding of the provider's teaching.

## 2020-07-31 NOTE — LETTER
July 31, 2020      [facility] [address], [address]  [facility] [place] [address]  Phone: [phone]  Fax: [phone]       Patient: Madison Long   YOB: 1945  Date of Visit: 07/31/2020    To Whom It May Concern:    Mckenzie Long  was at [facility] on 07/31/2020. The patient is being tested for COVID-19.     If he/she is negative, he/she may return to work/school when he/she is symptom free without fever reducing medications in 24 hours-with a surgical mask for 10 days.     If the patient is positive, he/she can return to work three days after improved symptoms and no fever without fever reducing medications.The patient has to be out for a minimum of 10 days (because they are expected to self-quarantine according to the Centers for Disease Control's guidelines).     We will NOT reswab you. If you would like to be tested again, you can go to http://ldh.[place].gov for a list of testing sites by [place]. I suggest finding a free testing site to insure that you do not get a bill from your insurance.    If you have any questions or concerns, or if I can be of further assistance, please do not hesitate to contact me.    Sincerely,    Tejal Stratton NP

## 2020-08-02 LAB — SARS-COV-2 RNA RESP QL NAA+PROBE: NOT DETECTED

## 2020-08-06 ENCOUNTER — LAB VISIT (OUTPATIENT)
Dept: LAB | Facility: HOSPITAL | Age: 75
End: 2020-08-06
Attending: INTERNAL MEDICINE
Payer: MEDICARE

## 2020-08-06 ENCOUNTER — OFFICE VISIT (OUTPATIENT)
Dept: FAMILY MEDICINE | Facility: CLINIC | Age: 75
End: 2020-08-06
Payer: MEDICARE

## 2020-08-06 ENCOUNTER — TELEPHONE (OUTPATIENT)
Dept: FAMILY MEDICINE | Facility: CLINIC | Age: 75
End: 2020-08-06

## 2020-08-06 VITALS
WEIGHT: 163.81 LBS | HEIGHT: 66 IN | TEMPERATURE: 98 F | BODY MASS INDEX: 26.33 KG/M2 | DIASTOLIC BLOOD PRESSURE: 76 MMHG | SYSTOLIC BLOOD PRESSURE: 116 MMHG | OXYGEN SATURATION: 96 % | HEART RATE: 96 BPM

## 2020-08-06 DIAGNOSIS — E78.5 DYSLIPIDEMIA: ICD-10-CM

## 2020-08-06 DIAGNOSIS — I10 ESSENTIAL HYPERTENSION: ICD-10-CM

## 2020-08-06 DIAGNOSIS — Z79.899 ENCOUNTER FOR LONG-TERM (CURRENT) USE OF MEDICATIONS: ICD-10-CM

## 2020-08-06 DIAGNOSIS — Z12.31 ENCOUNTER FOR SCREENING MAMMOGRAM FOR BREAST CANCER: ICD-10-CM

## 2020-08-06 DIAGNOSIS — I10 ESSENTIAL HYPERTENSION: Primary | ICD-10-CM

## 2020-08-06 DIAGNOSIS — G47.00 INSOMNIA, UNSPECIFIED TYPE: ICD-10-CM

## 2020-08-06 LAB
ALBUMIN SERPL BCP-MCNC: 3.9 G/DL (ref 3.5–5.2)
ALP SERPL-CCNC: 268 U/L (ref 55–135)
ALT SERPL W/O P-5'-P-CCNC: 64 U/L (ref 10–44)
ANION GAP SERPL CALC-SCNC: 11 MMOL/L (ref 8–16)
AST SERPL-CCNC: 33 U/L (ref 10–40)
BILIRUB SERPL-MCNC: 0.6 MG/DL (ref 0.1–1)
BUN SERPL-MCNC: 16 MG/DL (ref 8–23)
CALCIUM SERPL-MCNC: 9.8 MG/DL (ref 8.7–10.5)
CHLORIDE SERPL-SCNC: 100 MMOL/L (ref 95–110)
CHOLEST SERPL-MCNC: 165 MG/DL (ref 120–199)
CHOLEST/HDLC SERPL: 3.8 {RATIO} (ref 2–5)
CO2 SERPL-SCNC: 25 MMOL/L (ref 23–29)
CREAT SERPL-MCNC: 0.8 MG/DL (ref 0.5–1.4)
EST. GFR  (AFRICAN AMERICAN): >60 ML/MIN/1.73 M^2
EST. GFR  (NON AFRICAN AMERICAN): >60 ML/MIN/1.73 M^2
GLUCOSE SERPL-MCNC: 104 MG/DL (ref 70–110)
HDLC SERPL-MCNC: 44 MG/DL (ref 40–75)
HDLC SERPL: 26.7 % (ref 20–50)
LDLC SERPL CALC-MCNC: 102.8 MG/DL (ref 63–159)
NONHDLC SERPL-MCNC: 121 MG/DL
POTASSIUM SERPL-SCNC: 4.6 MMOL/L (ref 3.5–5.1)
PROT SERPL-MCNC: 7.4 G/DL (ref 6–8.4)
SODIUM SERPL-SCNC: 136 MMOL/L (ref 136–145)
TRIGL SERPL-MCNC: 91 MG/DL (ref 30–150)

## 2020-08-06 PROCEDURE — 36415 COLL VENOUS BLD VENIPUNCTURE: CPT | Mod: HCNC,PO

## 2020-08-06 PROCEDURE — 80061 LIPID PANEL: CPT | Mod: HCNC

## 2020-08-06 PROCEDURE — 3008F BODY MASS INDEX DOCD: CPT | Mod: HCNC,CPTII,S$GLB, | Performed by: INTERNAL MEDICINE

## 2020-08-06 PROCEDURE — 99999 PR PBB SHADOW E&M-EST. PATIENT-LVL V: ICD-10-PCS | Mod: PBBFAC,HCNC,, | Performed by: INTERNAL MEDICINE

## 2020-08-06 PROCEDURE — 1159F MED LIST DOCD IN RCRD: CPT | Mod: HCNC,S$GLB,, | Performed by: INTERNAL MEDICINE

## 2020-08-06 PROCEDURE — 3078F DIAST BP <80 MM HG: CPT | Mod: HCNC,CPTII,S$GLB, | Performed by: INTERNAL MEDICINE

## 2020-08-06 PROCEDURE — 99999 PR PBB SHADOW E&M-EST. PATIENT-LVL V: CPT | Mod: PBBFAC,HCNC,, | Performed by: INTERNAL MEDICINE

## 2020-08-06 PROCEDURE — 99214 OFFICE O/P EST MOD 30 MIN: CPT | Mod: HCNC,S$GLB,, | Performed by: INTERNAL MEDICINE

## 2020-08-06 PROCEDURE — 3074F SYST BP LT 130 MM HG: CPT | Mod: HCNC,CPTII,S$GLB, | Performed by: INTERNAL MEDICINE

## 2020-08-06 PROCEDURE — 3008F PR BODY MASS INDEX (BMI) DOCUMENTED: ICD-10-PCS | Mod: HCNC,CPTII,S$GLB, | Performed by: INTERNAL MEDICINE

## 2020-08-06 PROCEDURE — 1101F PT FALLS ASSESS-DOCD LE1/YR: CPT | Mod: HCNC,CPTII,S$GLB, | Performed by: INTERNAL MEDICINE

## 2020-08-06 PROCEDURE — 3074F PR MOST RECENT SYSTOLIC BLOOD PRESSURE < 130 MM HG: ICD-10-PCS | Mod: HCNC,CPTII,S$GLB, | Performed by: INTERNAL MEDICINE

## 2020-08-06 PROCEDURE — 99214 PR OFFICE/OUTPT VISIT, EST, LEVL IV, 30-39 MIN: ICD-10-PCS | Mod: HCNC,S$GLB,, | Performed by: INTERNAL MEDICINE

## 2020-08-06 PROCEDURE — 3078F PR MOST RECENT DIASTOLIC BLOOD PRESSURE < 80 MM HG: ICD-10-PCS | Mod: HCNC,CPTII,S$GLB, | Performed by: INTERNAL MEDICINE

## 2020-08-06 PROCEDURE — 1159F PR MEDICATION LIST DOCUMENTED IN MEDICAL RECORD: ICD-10-PCS | Mod: HCNC,S$GLB,, | Performed by: INTERNAL MEDICINE

## 2020-08-06 PROCEDURE — 80053 COMPREHEN METABOLIC PANEL: CPT | Mod: HCNC

## 2020-08-06 PROCEDURE — 1101F PR PT FALLS ASSESS DOC 0-1 FALLS W/OUT INJ PAST YR: ICD-10-PCS | Mod: HCNC,CPTII,S$GLB, | Performed by: INTERNAL MEDICINE

## 2020-08-06 NOTE — PROGRESS NOTES
Subjective:       Patient ID: Madison Long is a 75 y.o. female.    Chief Complaint: Hypertension    Viral respiratory infection started 9 days ago.  7 days ago tested negative for covid.     4 eye surgeries in past. Dr Sotelo  HTN - controlled  HLD - uncontrolled; goal < 130 age, htn; taking Crestor 10 mg qd  Recall: had severe LE cramps.  Stopped Lipitor 1 month ago and cramps resolved after 5-6 days.    COPD - no sob;      Insomnia - 1- 2 trazodone works most nights; stopped her Ambien      ALEX - controlled and retired now, so wants to decrease to qod.     Atrophic vaginitis - estrogen cream used for 1-2 weeks and then will take a break.  due for mammogram 3/23/19; rx originally by urology Dr Hughes.     Keratosis - hereditary.  Wants to change to our derm.       Osteopenia - on vit D and calcium     Review of Systems   Constitutional: Negative for appetite change and fever.   HENT: Negative for nosebleeds and trouble swallowing.    Eyes: Negative for discharge and visual disturbance.   Respiratory: Negative for choking and shortness of breath.    Cardiovascular: Negative for chest pain and palpitations.   Gastrointestinal: Negative for abdominal pain, nausea and vomiting.   Musculoskeletal: Negative for arthralgias and joint swelling.   Skin: Negative for rash and wound.   Neurological: Negative for dizziness and syncope.   Psychiatric/Behavioral: Negative for confusion and dysphoric mood.       Objective:      Vitals:    08/06/20 1355   BP: 116/76   Pulse: 96   Temp: 98 °F (36.7 °C)     Physical Exam  Vitals signs reviewed.   Eyes:      Conjunctiva/sclera: Conjunctivae normal.   Neck:      Musculoskeletal: Normal range of motion.   Cardiovascular:      Rate and Rhythm: Normal rate and regular rhythm.   Pulmonary:      Effort: Pulmonary effort is normal.      Breath sounds: Normal breath sounds.   Musculoskeletal:      Comments: Normal ROM bilateral    Skin:     General: Skin is warm and dry.   Neurological:       Cranial Nerves: No cranial nerve deficit (grossly intact).   Psychiatric:      Comments: Alert and orientated           Assessment:       1. Essential hypertension    2. Dyslipidemia    3. Insomnia, unspecified type    4. Encounter for screening mammogram for breast cancer    5. Encounter for long-term (current) use of medications        Plan:       Essential hypertension  -     Comprehensive metabolic panel; Future; Expected date: 02/02/2021    Dyslipidemia  -     Lipid Panel; Future; Expected date: 02/02/2021    Insomnia, unspecified type    Encounter for screening mammogram for breast cancer  -     Mammo Digital Screening Bilat; Future; Expected date: 08/06/2020    Encounter for long-term (current) use of medications  -     CBC auto differential; Future; Expected date: 08/06/2020            Medication List with Changes/Refills   Current Medications    ACETAMINOPHEN (TYLENOL) 500 MG TABLET    Take 500 mg by mouth every 6 (six) hours as needed for Pain.    ALBUTEROL (PROVENTIL/VENTOLIN HFA) 90 MCG/ACTUATION INHALER    INHALE 2 PUFFS INTO THE LUNGS EVERY 6 HOURS AS NEEDED    ASPIRIN 81 MG CHEW    Take 81 mg by mouth daily as needed. Usually takes about every 2 weeks.    AZELASTINE (ASTELIN) 137 MCG (0.1 %) NASAL SPRAY    1 spray (137 mcg total) by Nasal route 2 (two) times daily. for 14 days    BENZONATATE (TESSALON) 100 MG CAPSULE    Take 1 capsule (100 mg total) by mouth 3 (three) times daily as needed for Cough.    BETAMETHASONE DIPROPIONATE (DIPROLENE) 0.05 % CREAM    Apply topically 2 (two) times daily.    DORZOLAMIDE-TIMOLOL 2-0.5% (COSOPT) 22.3-6.8 MG/ML OPHTHALMIC SOLUTION    Place 1 drop into the right eye 2 (two) times daily.    ECONAZOLE NITRATE 1 % CREAM    Apply topically 2 (two) times daily as needed. Twice a day  PRN    ESTRADIOL (ESTRACE) 0.01 % (0.1 MG/GRAM) VAGINAL CREAM    Place 1 g vaginally daily as needed.    FISH OIL-OMEGA-3 FATTY ACIDS 300-1,000 MG CAPSULE    Take 1 g by mouth every  morning.     FLUCONAZOLE (DIFLUCAN) 150 MG TAB    1 po daily for 3 days    FLUOROURACIL (EFUDEX) 5 % CREAM    Apply topically 2 (two) times daily.    FLUOXETINE 10 MG CAPSULE    TAKE 1 CAPSULE(10 MG) BY MOUTH EVERY DAY    FLUTICASONE (FLONASE) 50 MCG/ACTUATION NASAL SPRAY    SPRAY 2 SPRAYS IN EACH NOSTRIL EVERY DAY    GUAIFENESIN (MUCINEX) 600 MG 12 HR TABLET    Take 1,200 mg by mouth 2 (two) times daily as needed.     IBUPROFEN/DIPHENHYDRAMINE CIT (ADVIL PM ORAL)    Take by mouth.    LOSARTAN (COZAAR) 50 MG TABLET    TAKE 1 TABLET(50 MG) BY MOUTH EVERY MORNING    MULTIVIT,STRESS FORMULA-ZINC (STRESS FORMULA WITH ZINC) TAB    Take 1 tablet by mouth every morning. Uses Alive    MUPIROCIN (BACTROBAN) 2 % OINTMENT    Apply topically 2 (two) times daily.    OMEPRAZOLE (PRILOSEC OTC) 20 MG TABLET    Take 1 tablet (20 mg total) by mouth every morning. Take 30 minutes before your first protein containing meal.    PROMETHAZINE-DEXTROMETHORPHAN (PROMETHAZINE-DM) 6.25-15 MG/5 ML SYRP    Take 5 mLs by mouth every 6 (six) hours as needed (May take every 4 hours, PRN. DoNOT take more than 30 mL in 24 hours.).    PSEUDOEPHEDRINE (SUDAFED) 30 MG TABLET    Take 30 mg by mouth every 4 (four) hours as needed for Congestion.    ROSUVASTATIN (CRESTOR) 20 MG TABLET    TAKE 1 TABLET(20 MG) BY MOUTH EVERY DAY    SODIUM CHLORIDE (SALINE NASAL) 0.65 % NASAL SPRAY    1 spray by Nasal route as needed for Congestion.    SYMBICORT 160-4.5 MCG/ACTUATION HFAA    INHALE 2 PUFFS BY MOUTH TWICE DAILY    TRAZODONE (DESYREL) 50 MG TABLET    TAKE 1 TABLET(50 MG) BY MOUTH EVERY NIGHT AS NEEDED FOR INSOMNIA. MAY TAKE UP TO 2 TABLET EVERY NIGHT AS NEEDED    ZINC GLUCONATE (COLD-EEZE ORAL)    Take 1 lozenge by mouth daily as needed.       Continue current management and monitor.    Counseled on regular exercise, maintenance of a healthy weight, balanced diet rich in fruits/vegetables and lean protein, and avoidance of unhealthy habits like smoking and  excessive alcohol intake.   Also, counseled on importance of being compliant with medication, health appointments, diet and exercise.     Follow up in about 6 months (around 2/6/2021). c

## 2020-08-06 NOTE — TELEPHONE ENCOUNTER
----- Message from Gi Cooper sent at 8/6/2020 12:43 PM CDT -----  Contact: call [phone]  Type: Needs Medical Advice  Who Called:  pt  call [phone]  Additional Information: #  pt  is  on the  way to [place] // iveti c  is  slow // may  be 10  mins late

## 2020-08-10 DIAGNOSIS — T78.1XXA OTHER ADVERSE FOOD REACTIONS, NOT ELSEWHERE CLASSIFIED, INITIAL ENCOUNTER: ICD-10-CM

## 2020-08-10 DIAGNOSIS — R79.89 ELEVATED LFTS: Primary | ICD-10-CM

## 2020-08-19 ENCOUNTER — TELEPHONE (OUTPATIENT)
Dept: FAMILY MEDICINE | Facility: CLINIC | Age: 75
End: 2020-08-19

## 2020-09-08 ENCOUNTER — TELEPHONE (OUTPATIENT)
Dept: FAMILY MEDICINE | Facility: CLINIC | Age: 75
End: 2020-09-08

## 2020-09-08 NOTE — TELEPHONE ENCOUNTER
----- Message from Chiara Seo sent at 9/8/2020  9:53 AM CDT -----  Regarding: refill  Contact: JEISON POP [[id number]]  Patient is requesting a call back from the nurse stated she have a poison ivy rash on both arm and she's requesting refill on 3 prescriptions she had prescribed back in July; betamethasone dipropionate cream, prednisone, and Levocetirizine.  Please call the patient upon request at phone number [phone].       Patient will be using   [id number] - [place] - [address] AT [facility] & [facility] [address]  [address] [address]  Phone: [phone] Fax: [phone]    Thanks!

## 2020-09-08 NOTE — TELEPHONE ENCOUNTER
----- Message from Chiara Seo sent at 9/8/2020  9:53 AM CDT -----  Regarding: refill  Contact: JEISON POP [[id number]]  Patient is requesting a call back from the nurse stated she have a poison ivy rash on both arm and she's requesting refill on 3 prescriptions she had prescribed back in July; betamethasone dipropionate cream, prednisone, and Levocetirizine.  Please call the patient upon request at phone number [phone].       Patient will be using   [id number] - [place] - [address] AT [facility] & [facility] [address]  [address] [address]  Phone: [phone] Fax: [phone]    Thanks!

## 2020-09-08 NOTE — TELEPHONE ENCOUNTER
Called and spoke with patient she states having same rash as when she seen jessica in July wanted same meds called in .    Got her scheduled with someone tomorrow due to having labs so she can be evaluated.

## 2020-09-09 ENCOUNTER — OFFICE VISIT (OUTPATIENT)
Dept: FAMILY MEDICINE | Facility: CLINIC | Age: 75
End: 2020-09-09
Payer: MEDICARE

## 2020-09-09 VITALS
DIASTOLIC BLOOD PRESSURE: 86 MMHG | OXYGEN SATURATION: 100 % | SYSTOLIC BLOOD PRESSURE: 128 MMHG | WEIGHT: 165.81 LBS | HEART RATE: 80 BPM | BODY MASS INDEX: 26.76 KG/M2

## 2020-09-09 DIAGNOSIS — L23.7 POISON IVY DERMATITIS: ICD-10-CM

## 2020-09-09 PROCEDURE — 99214 OFFICE O/P EST MOD 30 MIN: CPT | Mod: HCNC,S$GLB,, | Performed by: NURSE PRACTITIONER

## 2020-09-09 PROCEDURE — 1101F PT FALLS ASSESS-DOCD LE1/YR: CPT | Mod: HCNC,CPTII,S$GLB, | Performed by: NURSE PRACTITIONER

## 2020-09-09 PROCEDURE — 99999 PR PBB SHADOW E&M-EST. PATIENT-LVL IV: ICD-10-PCS | Mod: PBBFAC,HCNC,, | Performed by: NURSE PRACTITIONER

## 2020-09-09 PROCEDURE — 3079F DIAST BP 80-89 MM HG: CPT | Mod: HCNC,CPTII,S$GLB, | Performed by: NURSE PRACTITIONER

## 2020-09-09 PROCEDURE — 3008F PR BODY MASS INDEX (BMI) DOCUMENTED: ICD-10-PCS | Mod: HCNC,CPTII,S$GLB, | Performed by: NURSE PRACTITIONER

## 2020-09-09 PROCEDURE — 1126F AMNT PAIN NOTED NONE PRSNT: CPT | Mod: HCNC,S$GLB,, | Performed by: NURSE PRACTITIONER

## 2020-09-09 PROCEDURE — 1126F PR PAIN SEVERITY QUANTIFIED, NO PAIN PRESENT: ICD-10-PCS | Mod: HCNC,S$GLB,, | Performed by: NURSE PRACTITIONER

## 2020-09-09 PROCEDURE — 1159F MED LIST DOCD IN RCRD: CPT | Mod: HCNC,S$GLB,, | Performed by: NURSE PRACTITIONER

## 2020-09-09 PROCEDURE — 3008F BODY MASS INDEX DOCD: CPT | Mod: HCNC,CPTII,S$GLB, | Performed by: NURSE PRACTITIONER

## 2020-09-09 PROCEDURE — 1101F PR PT FALLS ASSESS DOC 0-1 FALLS W/OUT INJ PAST YR: ICD-10-PCS | Mod: HCNC,CPTII,S$GLB, | Performed by: NURSE PRACTITIONER

## 2020-09-09 PROCEDURE — 1159F PR MEDICATION LIST DOCUMENTED IN MEDICAL RECORD: ICD-10-PCS | Mod: HCNC,S$GLB,, | Performed by: NURSE PRACTITIONER

## 2020-09-09 PROCEDURE — 99214 PR OFFICE/OUTPT VISIT, EST, LEVL IV, 30-39 MIN: ICD-10-PCS | Mod: HCNC,S$GLB,, | Performed by: NURSE PRACTITIONER

## 2020-09-09 PROCEDURE — 3074F PR MOST RECENT SYSTOLIC BLOOD PRESSURE < 130 MM HG: ICD-10-PCS | Mod: HCNC,CPTII,S$GLB, | Performed by: NURSE PRACTITIONER

## 2020-09-09 PROCEDURE — 3079F PR MOST RECENT DIASTOLIC BLOOD PRESSURE 80-89 MM HG: ICD-10-PCS | Mod: HCNC,CPTII,S$GLB, | Performed by: NURSE PRACTITIONER

## 2020-09-09 PROCEDURE — 99999 PR PBB SHADOW E&M-EST. PATIENT-LVL IV: CPT | Mod: PBBFAC,HCNC,, | Performed by: NURSE PRACTITIONER

## 2020-09-09 PROCEDURE — 3074F SYST BP LT 130 MM HG: CPT | Mod: HCNC,CPTII,S$GLB, | Performed by: NURSE PRACTITIONER

## 2020-09-09 RX ORDER — BETAMETHASONE DIPROPIONATE 0.5 MG/G
CREAM TOPICAL 2 TIMES DAILY
Qty: 45 G | Refills: 0 | Status: SHIPPED | OUTPATIENT
Start: 2020-09-09 | End: 2022-06-30

## 2020-09-09 RX ORDER — PREDNISONE 10 MG/1
TABLET ORAL
Qty: 12 TABLET | Refills: 0 | Status: SHIPPED | OUTPATIENT
Start: 2020-09-09 | End: 2021-07-01

## 2020-09-09 NOTE — PROGRESS NOTES
Subjective:       Patient ID: Madison Long is a 75 y.o. female.    Chief Complaint: Poison Ivy (Rash on both arms)    Rash  This is a new problem. The current episode started in the past 7 days. The problem has been gradually worsening since onset. The affected locations include the left arm and right arm. The rash is characterized by redness, swelling, scaling, itchiness and blistering. She was exposed to plant contact. Pertinent negatives include no cough, diarrhea, fever or shortness of breath. Past treatments include topical steroids. The treatment provided mild relief.   she is taking antihistamine daily   Vitals:    09/09/20 1039   BP: 128/86   Pulse: 80     Review of Systems   Constitutional: Negative for diaphoresis and fever.   HENT: Negative for facial swelling and trouble swallowing.    Eyes: Negative for discharge and redness.   Respiratory: Negative for cough and shortness of breath.    Cardiovascular: Negative for chest pain and palpitations.   Gastrointestinal: Negative for diarrhea.   Genitourinary: Negative for difficulty urinating.   Musculoskeletal: Negative for gait problem.   Skin: Positive for rash. Negative for pallor.   Neurological: Negative for facial asymmetry and speech difficulty.   Psychiatric/Behavioral: Negative for confusion. The patient is not nervous/anxious.        Past Medical History:   Diagnosis Date    Allergy     Asthma     Basal cell carcinoma 2013     right cheek (cutaneous lateral lower eyelid) MOHS Dr Walia     BCC (basal cell carcinoma)     COPD (chronic obstructive pulmonary disease)     GERD (gastroesophageal reflux disease)     Hyperlipidemia     Hypertension     Retinal detachment      Objective:      Physical Exam  Vitals signs and nursing note reviewed.   Constitutional:       General: She is not in acute distress.     Appearance: She is not diaphoretic.   HENT:      Head: Normocephalic.      Right Ear: Hearing normal.      Left Ear: Hearing normal.       Nose: Nose normal.   Eyes:      General: Lids are normal.      Conjunctiva/sclera: Conjunctivae normal.   Neck:      Vascular: No JVD.      Trachea: No tracheal deviation.   Cardiovascular:      Rate and Rhythm: Normal rate.   Pulmonary:      Effort: Pulmonary effort is normal.   Musculoskeletal:         General: No deformity.   Skin:     Coloration: Skin is not pale.      Findings: Rash present. Rash is crusting and vesicular.   Neurological:      Mental Status: She is alert and oriented to person, place, and time.   Psychiatric:         Speech: Speech normal.         Behavior: Behavior normal.         Thought Content: Thought content normal.         Judgment: Judgment normal.         Assessment:       1. Poison ivy dermatitis        Plan:       Poison ivy dermatitis  -     predniSONE (DELTASONE) 10 MG tablet; Take 3 tabs daily for 2 days, then 2 tabs daily for 2 days, then 1 tabs daily for 2 days  Dispense: 12 tablet; Refill: 0  -     betamethasone dipropionate (DIPROLENE) 0.05 % cream; Apply topically 2 (two) times daily. Use for no more than 2 weeks  Dispense: 45 g; Refill: 0    education provided on supportive care, symptom monitoring and return precautions           Follow up for further evaluation if s/s worsen, fail to improve, or new symptoms arise.    Medication List with Changes/Refills   New Medications    PREDNISONE (DELTASONE) 10 MG TABLET    Take 3 tabs daily for 2 days, then 2 tabs daily for 2 days, then 1 tabs daily for 2 days   Current Medications    ACETAMINOPHEN (TYLENOL) 500 MG TABLET    Take 500 mg by mouth every 6 (six) hours as needed for Pain.    ALBUTEROL (PROVENTIL/VENTOLIN HFA) 90 MCG/ACTUATION INHALER    INHALE 2 PUFFS INTO THE LUNGS EVERY 6 HOURS AS NEEDED    ASPIRIN 81 MG CHEW    Take 81 mg by mouth daily as needed. Usually takes about every 2 weeks.    AZELASTINE (ASTELIN) 137 MCG (0.1 %) NASAL SPRAY    1 spray (137 mcg total) by Nasal route 2 (two) times daily. for 14 days     DORZOLAMIDE-TIMOLOL 2-0.5% (COSOPT) 22.3-6.8 MG/ML OPHTHALMIC SOLUTION    Place 1 drop into the right eye 2 (two) times daily.    ECONAZOLE NITRATE 1 % CREAM    Apply topically 2 (two) times daily as needed. Twice a day  PRN    ESTRADIOL (ESTRACE) 0.01 % (0.1 MG/GRAM) VAGINAL CREAM    Place 1 g vaginally daily as needed.    FISH OIL-OMEGA-3 FATTY ACIDS 300-1,000 MG CAPSULE    Take 1 g by mouth every morning.     FLUCONAZOLE (DIFLUCAN) 150 MG TAB    1 po daily for 3 days    FLUOROURACIL (EFUDEX) 5 % CREAM    Apply topically 2 (two) times daily.    FLUOXETINE 10 MG CAPSULE    TAKE 1 CAPSULE(10 MG) BY MOUTH EVERY DAY    FLUTICASONE (FLONASE) 50 MCG/ACTUATION NASAL SPRAY    SPRAY 2 SPRAYS IN EACH NOSTRIL EVERY DAY    GUAIFENESIN (MUCINEX) 600 MG 12 HR TABLET    Take 1,200 mg by mouth 2 (two) times daily as needed.     IBUPROFEN/DIPHENHYDRAMINE CIT (ADVIL PM ORAL)    Take by mouth.    LOSARTAN (COZAAR) 50 MG TABLET    TAKE 1 TABLET(50 MG) BY MOUTH EVERY MORNING    MULTIVIT,STRESS FORMULA-ZINC (STRESS FORMULA WITH ZINC) TAB    Take 1 tablet by mouth every morning. Uses Alive    MUPIROCIN (BACTROBAN) 2 % OINTMENT    Apply topically 2 (two) times daily.    OMEPRAZOLE (PRILOSEC OTC) 20 MG TABLET    Take 1 tablet (20 mg total) by mouth every morning. Take 30 minutes before your first protein containing meal.    PSEUDOEPHEDRINE (SUDAFED) 30 MG TABLET    Take 30 mg by mouth every 4 (four) hours as needed for Congestion.    ROSUVASTATIN (CRESTOR) 20 MG TABLET    TAKE 1 TABLET(20 MG) BY MOUTH EVERY DAY    SODIUM CHLORIDE (SALINE NASAL) 0.65 % NASAL SPRAY    1 spray by Nasal route as needed for Congestion.    SYMBICORT 160-4.5 MCG/ACTUATION HFAA    INHALE 2 PUFFS BY MOUTH TWICE DAILY    TRAZODONE (DESYREL) 50 MG TABLET    TAKE 1 TABLET(50 MG) BY MOUTH EVERY NIGHT AS NEEDED FOR INSOMNIA. MAY TAKE UP TO 2 TABLET EVERY NIGHT AS NEEDED    ZINC GLUCONATE (COLD-EEZE ORAL)    Take 1 lozenge by mouth daily as needed.   Changed and/or  Refilled Medications    Modified Medication Previous Medication    BETAMETHASONE DIPROPIONATE (DIPROLENE) 0.05 % CREAM betamethasone dipropionate (DIPROLENE) 0.05 % cream       Apply topically 2 (two) times daily. Use for no more than 2 weeks    Apply topically 2 (two) times daily.

## 2020-10-13 NOTE — TELEPHONE ENCOUNTER
Informed pt that her lab results were not completed yet. Pt voiced understanding.  Pt stated that she wanted he Pharmacy changed from [facility] in [place] [address] [facility] [address]. Pt pharmacy has been changed in [facility].   197.9

## 2020-11-04 ENCOUNTER — PES CALL (OUTPATIENT)
Dept: ADMINISTRATIVE | Facility: CLINIC | Age: 75
End: 2020-11-04

## 2020-11-23 DIAGNOSIS — J44.9 CHRONIC OBSTRUCTIVE PULMONARY DISEASE, UNSPECIFIED COPD TYPE: ICD-10-CM

## 2020-11-23 NOTE — TELEPHONE ENCOUNTER
No new care gaps identified.  Powered by [employer]. Reference number: [id number]. 11/23/2020 10:46:28 AM   CST

## 2020-11-23 NOTE — TELEPHONE ENCOUNTER
----- Message from Sharita Pan sent at 11/23/2020 10:19 AM CST -----  Type:  RX Refill Request    Who Called:  Patient  RX Name and Strength:  SYMBICORT 160-4.5 mcg/actuation HFAA (requesting one refill)  Preferred Pharmacy with phone number:  [facility]  Best Call Back Number:  [phone]  Additional Information:

## 2020-11-24 RX ORDER — BUDESONIDE AND FORMOTEROL FUMARATE DIHYDRATE 160; 4.5 UG/1; UG/1
2 AEROSOL RESPIRATORY (INHALATION) 2 TIMES DAILY
Qty: 30.6 G | Refills: 2 | Status: SHIPPED | OUTPATIENT
Start: 2020-11-24 | End: 2022-01-07

## 2020-11-24 NOTE — PROGRESS NOTES
Refill Authorization Note   Madison Long is requesting a refill authorization.  Brief assessment and rationale for refill: Approve     Medication Therapy Plan: CDMR. approve     Medication reconciliation completed: No   Comments:       Requested Prescriptions   Pending Prescriptions Disp Refills    budesonide-formoterol 160-4.5 mcg (SYMBICORT) 160-4.5 mcg/actuation HFAA 30.6 g 2     Sig: Inhale 2 puffs into the lungs 2 (two) times daily. Controller       Pulmonology:  Combination Products Passed - 11/24/2020  1:05 PM        Passed - Patient is at least 18 years old        Passed - Patient has applicable pulmonary diagnosis on their problem list        Passed - Last Heart Rate in normal range within 360 days.     Pulse Readings from Last 3 Encounters:   09/09/20 80   08/06/20 96   07/31/20 86             Passed - Office visit in past 12 months or future 90 days     Recent Outpatient Visits            2 months ago Poison ivy dermatitis    [facility] Berkley Zuluaga NP    3 months ago Essential hypertension    [facility] Darian Arango MD    3 months ago Viral syndrome    [facility] Tejal Stratton NP    9 months ago Routine physical examination    [facility] Darian Arango MD    1 year ago Retinal detachment of right eye with multiple breaks    [place] - Op[facility] JANEL Chaves MD          Future Appointments              In 1 month [facility] [place] [facility]    In 2 months LAB, [place] [facility]    In 2 months [facility] MD [place] [facility]                    Appointments  past 12m or future 3m with PCP    Date Provider   Last Visit   8/6/2020 Darian Arango MD   Next Visit   2/9/2021 Darian Arango MD   ED visits in past 90 days: 0     Note composed:1:05 PM 11/24/2020

## 2021-01-12 ENCOUNTER — PATIENT OUTREACH (OUTPATIENT)
Dept: ADMINISTRATIVE | Facility: OTHER | Age: 76
End: 2021-01-12

## 2021-01-14 ENCOUNTER — OFFICE VISIT (OUTPATIENT)
Dept: OPTOMETRY | Facility: CLINIC | Age: 76
End: 2021-01-14
Payer: MEDICARE

## 2021-01-14 DIAGNOSIS — H52.203 HYPEROPIA OF BOTH EYES WITH ASTIGMATISM: ICD-10-CM

## 2021-01-14 DIAGNOSIS — Z96.1 BILATERAL PSEUDOPHAKIA: Primary | ICD-10-CM

## 2021-01-14 DIAGNOSIS — H52.03 HYPEROPIA OF BOTH EYES WITH ASTIGMATISM: ICD-10-CM

## 2021-01-14 PROCEDURE — 1157F PR ADVANCE CARE PLAN OR EQUIV PRESENT IN MEDICAL RECORD: ICD-10-PCS | Mod: HCNC,S$GLB,, | Performed by: OPTOMETRIST

## 2021-01-14 PROCEDURE — 92015 DETERMINE REFRACTIVE STATE: CPT | Mod: HCNC,S$GLB,, | Performed by: OPTOMETRIST

## 2021-01-14 PROCEDURE — 92015 PR REFRACTION: ICD-10-PCS | Mod: HCNC,S$GLB,, | Performed by: OPTOMETRIST

## 2021-01-14 PROCEDURE — 1157F ADVNC CARE PLAN IN RCRD: CPT | Mod: HCNC,S$GLB,, | Performed by: OPTOMETRIST

## 2021-01-14 PROCEDURE — 1126F PR PAIN SEVERITY QUANTIFIED, NO PAIN PRESENT: ICD-10-PCS | Mod: HCNC,S$GLB,, | Performed by: OPTOMETRIST

## 2021-01-14 PROCEDURE — 99999 PR PBB SHADOW E&M-EST. PATIENT-LVL III: ICD-10-PCS | Mod: PBBFAC,HCNC,, | Performed by: OPTOMETRIST

## 2021-01-14 PROCEDURE — 1126F AMNT PAIN NOTED NONE PRSNT: CPT | Mod: HCNC,S$GLB,, | Performed by: OPTOMETRIST

## 2021-01-14 PROCEDURE — 99999 PR PBB SHADOW E&M-EST. PATIENT-LVL III: CPT | Mod: PBBFAC,HCNC,, | Performed by: OPTOMETRIST

## 2021-02-02 ENCOUNTER — LAB VISIT (OUTPATIENT)
Dept: LAB | Facility: HOSPITAL | Age: 76
End: 2021-02-02
Attending: INTERNAL MEDICINE
Payer: MEDICARE

## 2021-02-02 DIAGNOSIS — E78.5 DYSLIPIDEMIA: ICD-10-CM

## 2021-02-02 DIAGNOSIS — I10 ESSENTIAL HYPERTENSION: ICD-10-CM

## 2021-02-02 PROCEDURE — 80053 COMPREHEN METABOLIC PANEL: CPT

## 2021-02-02 PROCEDURE — 80061 LIPID PANEL: CPT

## 2021-02-02 PROCEDURE — 36415 COLL VENOUS BLD VENIPUNCTURE: CPT | Mod: PO

## 2021-02-03 LAB
ALBUMIN SERPL BCP-MCNC: 4.4 G/DL (ref 3.5–5.2)
ALP SERPL-CCNC: 66 U/L (ref 55–135)
ALT SERPL W/O P-5'-P-CCNC: 24 U/L (ref 10–44)
ANION GAP SERPL CALC-SCNC: 9 MMOL/L (ref 8–16)
AST SERPL-CCNC: 26 U/L (ref 10–40)
BILIRUB SERPL-MCNC: 0.7 MG/DL (ref 0.1–1)
BUN SERPL-MCNC: 17 MG/DL (ref 8–23)
CALCIUM SERPL-MCNC: 9.2 MG/DL (ref 8.7–10.5)
CHLORIDE SERPL-SCNC: 99 MMOL/L (ref 95–110)
CHOLEST SERPL-MCNC: 205 MG/DL (ref 120–199)
CHOLEST/HDLC SERPL: 3.2 {RATIO} (ref 2–5)
CO2 SERPL-SCNC: 27 MMOL/L (ref 23–29)
CREAT SERPL-MCNC: 0.8 MG/DL (ref 0.5–1.4)
EST. GFR  (AFRICAN AMERICAN): >60 ML/MIN/1.73 M^2
EST. GFR  (NON AFRICAN AMERICAN): >60 ML/MIN/1.73 M^2
GLUCOSE SERPL-MCNC: 103 MG/DL (ref 70–110)
HDLC SERPL-MCNC: 64 MG/DL (ref 40–75)
HDLC SERPL: 31.2 % (ref 20–50)
LDLC SERPL CALC-MCNC: 114 MG/DL (ref 63–159)
NONHDLC SERPL-MCNC: 141 MG/DL
POTASSIUM SERPL-SCNC: 4.7 MMOL/L (ref 3.5–5.1)
PROT SERPL-MCNC: 7.2 G/DL (ref 6–8.4)
SODIUM SERPL-SCNC: 135 MMOL/L (ref 136–145)
TRIGL SERPL-MCNC: 135 MG/DL (ref 30–150)

## 2021-02-09 ENCOUNTER — IMMUNIZATION (OUTPATIENT)
Dept: FAMILY MEDICINE | Facility: CLINIC | Age: 76
End: 2021-02-09
Payer: MEDICARE

## 2021-02-09 ENCOUNTER — OFFICE VISIT (OUTPATIENT)
Dept: FAMILY MEDICINE | Facility: CLINIC | Age: 76
End: 2021-02-09
Payer: MEDICARE

## 2021-02-09 ENCOUNTER — HOSPITAL ENCOUNTER (OUTPATIENT)
Dept: RADIOLOGY | Facility: HOSPITAL | Age: 76
Discharge: HOME | End: 2021-02-09
Attending: INTERNAL MEDICINE
Payer: MEDICARE

## 2021-02-09 VITALS
WEIGHT: 171.31 LBS | HEIGHT: 66 IN | OXYGEN SATURATION: 96 % | HEART RATE: 88 BPM | DIASTOLIC BLOOD PRESSURE: 86 MMHG | BODY MASS INDEX: 27.53 KG/M2 | SYSTOLIC BLOOD PRESSURE: 118 MMHG

## 2021-02-09 DIAGNOSIS — E78.5 DYSLIPIDEMIA: ICD-10-CM

## 2021-02-09 DIAGNOSIS — Z79.899 ENCOUNTER FOR LONG-TERM (CURRENT) USE OF MEDICATIONS: ICD-10-CM

## 2021-02-09 DIAGNOSIS — I10 ESSENTIAL HYPERTENSION: ICD-10-CM

## 2021-02-09 DIAGNOSIS — Z12.31 ENCOUNTER FOR SCREENING MAMMOGRAM FOR BREAST CANCER: ICD-10-CM

## 2021-02-09 DIAGNOSIS — Z00.00 ROUTINE PHYSICAL EXAMINATION: Primary | ICD-10-CM

## 2021-02-09 DIAGNOSIS — Z23 NEED FOR VACCINATION: Primary | ICD-10-CM

## 2021-02-09 DIAGNOSIS — Z12.31 SCREENING MAMMOGRAM, ENCOUNTER FOR: ICD-10-CM

## 2021-02-09 PROCEDURE — 1101F PT FALLS ASSESS-DOCD LE1/YR: CPT | Mod: CPTII,S$GLB,, | Performed by: INTERNAL MEDICINE

## 2021-02-09 PROCEDURE — 3079F DIAST BP 80-89 MM HG: CPT | Mod: CPTII,S$GLB,, | Performed by: INTERNAL MEDICINE

## 2021-02-09 PROCEDURE — 91300 COVID-19, MRNA, LNP-S, PF, 30 MCG/0.3 ML DOSE VACCINE: CPT | Mod: HCNC,PBBFAC | Performed by: FAMILY MEDICINE

## 2021-02-09 PROCEDURE — 3008F BODY MASS INDEX DOCD: CPT | Mod: CPTII,S$GLB,, | Performed by: INTERNAL MEDICINE

## 2021-02-09 PROCEDURE — 77067 SCR MAMMO BI INCL CAD: CPT | Mod: 26,,, | Performed by: RADIOLOGY

## 2021-02-09 PROCEDURE — 3079F PR MOST RECENT DIASTOLIC BLOOD PRESSURE 80-89 MM HG: ICD-10-PCS | Mod: CPTII,S$GLB,, | Performed by: INTERNAL MEDICINE

## 2021-02-09 PROCEDURE — 3074F SYST BP LT 130 MM HG: CPT | Mod: CPTII,S$GLB,, | Performed by: INTERNAL MEDICINE

## 2021-02-09 PROCEDURE — 77067 MAMMO DIGITAL SCREENING BILAT WITH TOMO: ICD-10-PCS | Mod: 26,,, | Performed by: RADIOLOGY

## 2021-02-09 PROCEDURE — 99999 PR PBB SHADOW E&M-EST. PATIENT-LVL V: CPT | Mod: PBBFAC,,, | Performed by: INTERNAL MEDICINE

## 2021-02-09 PROCEDURE — 1126F AMNT PAIN NOTED NONE PRSNT: CPT | Mod: S$GLB,,, | Performed by: INTERNAL MEDICINE

## 2021-02-09 PROCEDURE — 1126F PR PAIN SEVERITY QUANTIFIED, NO PAIN PRESENT: ICD-10-PCS | Mod: S$GLB,,, | Performed by: INTERNAL MEDICINE

## 2021-02-09 PROCEDURE — 3074F PR MOST RECENT SYSTOLIC BLOOD PRESSURE < 130 MM HG: ICD-10-PCS | Mod: CPTII,S$GLB,, | Performed by: INTERNAL MEDICINE

## 2021-02-09 PROCEDURE — 1157F ADVNC CARE PLAN IN RCRD: CPT | Mod: S$GLB,,, | Performed by: INTERNAL MEDICINE

## 2021-02-09 PROCEDURE — 77063 BREAST TOMOSYNTHESIS BI: CPT | Mod: 26,,, | Performed by: RADIOLOGY

## 2021-02-09 PROCEDURE — 99397 PR PREVENTIVE VISIT,EST,65 & OVER: ICD-10-PCS | Mod: S$GLB,,, | Performed by: INTERNAL MEDICINE

## 2021-02-09 PROCEDURE — 3288F PR FALLS RISK ASSESSMENT DOCUMENTED: ICD-10-PCS | Mod: CPTII,S$GLB,, | Performed by: INTERNAL MEDICINE

## 2021-02-09 PROCEDURE — 99397 PER PM REEVAL EST PAT 65+ YR: CPT | Mod: S$GLB,,, | Performed by: INTERNAL MEDICINE

## 2021-02-09 PROCEDURE — 1157F PR ADVANCE CARE PLAN OR EQUIV PRESENT IN MEDICAL RECORD: ICD-10-PCS | Mod: S$GLB,,, | Performed by: INTERNAL MEDICINE

## 2021-02-09 PROCEDURE — 77063 MAMMO DIGITAL SCREENING BILAT WITH TOMO: ICD-10-PCS | Mod: 26,,, | Performed by: RADIOLOGY

## 2021-02-09 PROCEDURE — 3008F PR BODY MASS INDEX (BMI) DOCUMENTED: ICD-10-PCS | Mod: CPTII,S$GLB,, | Performed by: INTERNAL MEDICINE

## 2021-02-09 PROCEDURE — 3288F FALL RISK ASSESSMENT DOCD: CPT | Mod: CPTII,S$GLB,, | Performed by: INTERNAL MEDICINE

## 2021-02-09 PROCEDURE — 1101F PR PT FALLS ASSESS DOC 0-1 FALLS W/OUT INJ PAST YR: ICD-10-PCS | Mod: CPTII,S$GLB,, | Performed by: INTERNAL MEDICINE

## 2021-02-09 PROCEDURE — 99999 PR PBB SHADOW E&M-EST. PATIENT-LVL V: ICD-10-PCS | Mod: PBBFAC,,, | Performed by: INTERNAL MEDICINE

## 2021-02-09 PROCEDURE — 77067 SCR MAMMO BI INCL CAD: CPT | Mod: TC,PO

## 2021-02-11 ENCOUNTER — TELEPHONE (OUTPATIENT)
Dept: FAMILY MEDICINE | Facility: CLINIC | Age: 76
End: 2021-02-11

## 2021-02-15 ENCOUNTER — TELEPHONE (OUTPATIENT)
Dept: FAMILY MEDICINE | Facility: CLINIC | Age: 76
End: 2021-02-15

## 2021-03-02 ENCOUNTER — IMMUNIZATION (OUTPATIENT)
Dept: FAMILY MEDICINE | Facility: CLINIC | Age: 76
End: 2021-03-02
Payer: MEDICARE

## 2021-03-02 DIAGNOSIS — Z23 NEED FOR VACCINATION: Primary | ICD-10-CM

## 2021-03-02 PROCEDURE — 91300 COVID-19, MRNA, LNP-S, PF, 30 MCG/0.3 ML DOSE VACCINE: CPT | Mod: PBBFAC | Performed by: INTERNAL MEDICINE

## 2021-03-02 PROCEDURE — 0002A COVID-19, MRNA, LNP-S, PF, 30 MCG/0.3 ML DOSE VACCINE: CPT | Mod: PBBFAC | Performed by: INTERNAL MEDICINE

## 2021-03-18 ENCOUNTER — OFFICE VISIT (OUTPATIENT)
Dept: OPHTHALMOLOGY | Facility: CLINIC | Age: 76
End: 2021-03-18
Payer: MEDICARE

## 2021-03-18 DIAGNOSIS — H35.21 PROLIFERATIVE VITREORETINOPATHY, RIGHT: ICD-10-CM

## 2021-03-18 DIAGNOSIS — H33.021 RETINAL DETACHMENT OF RIGHT EYE WITH MULTIPLE BREAKS: Primary | ICD-10-CM

## 2021-03-18 DIAGNOSIS — H33.001 MACULA-OFF RHEGMATOGENOUS RETINAL DETACHMENT, RIGHT: ICD-10-CM

## 2021-03-18 PROCEDURE — 1126F AMNT PAIN NOTED NONE PRSNT: CPT | Mod: S$GLB,,, | Performed by: OPHTHALMOLOGY

## 2021-03-18 PROCEDURE — 92014 PR EYE EXAM, EST PATIENT,COMPREHESV: ICD-10-PCS | Mod: S$GLB,,, | Performed by: OPHTHALMOLOGY

## 2021-03-18 PROCEDURE — 92201 PR OPHTHALMOSCOPY, EXT, W/RET DRAW/SCLERAL DEPR, I&R, UNI/BI: ICD-10-PCS | Mod: S$GLB,,, | Performed by: OPHTHALMOLOGY

## 2021-03-18 PROCEDURE — 3288F PR FALLS RISK ASSESSMENT DOCUMENTED: ICD-10-PCS | Mod: CPTII,S$GLB,, | Performed by: OPHTHALMOLOGY

## 2021-03-18 PROCEDURE — 92014 COMPRE OPH EXAM EST PT 1/>: CPT | Mod: S$GLB,,, | Performed by: OPHTHALMOLOGY

## 2021-03-18 PROCEDURE — 1101F PR PT FALLS ASSESS DOC 0-1 FALLS W/OUT INJ PAST YR: ICD-10-PCS | Mod: CPTII,S$GLB,, | Performed by: OPHTHALMOLOGY

## 2021-03-18 PROCEDURE — 99999 PR PBB SHADOW E&M-EST. PATIENT-LVL IV: ICD-10-PCS | Mod: PBBFAC,,, | Performed by: OPHTHALMOLOGY

## 2021-03-18 PROCEDURE — 1157F PR ADVANCE CARE PLAN OR EQUIV PRESENT IN MEDICAL RECORD: ICD-10-PCS | Mod: S$GLB,,, | Performed by: OPHTHALMOLOGY

## 2021-03-18 PROCEDURE — 1126F PR PAIN SEVERITY QUANTIFIED, NO PAIN PRESENT: ICD-10-PCS | Mod: S$GLB,,, | Performed by: OPHTHALMOLOGY

## 2021-03-18 PROCEDURE — 99999 PR PBB SHADOW E&M-EST. PATIENT-LVL IV: CPT | Mod: PBBFAC,,, | Performed by: OPHTHALMOLOGY

## 2021-03-18 PROCEDURE — 92201 OPSCPY EXTND RTA DRAW UNI/BI: CPT | Mod: S$GLB,,, | Performed by: OPHTHALMOLOGY

## 2021-03-18 PROCEDURE — 1101F PT FALLS ASSESS-DOCD LE1/YR: CPT | Mod: CPTII,S$GLB,, | Performed by: OPHTHALMOLOGY

## 2021-03-18 PROCEDURE — 1157F ADVNC CARE PLAN IN RCRD: CPT | Mod: S$GLB,,, | Performed by: OPHTHALMOLOGY

## 2021-03-18 PROCEDURE — 3288F FALL RISK ASSESSMENT DOCD: CPT | Mod: CPTII,S$GLB,, | Performed by: OPHTHALMOLOGY

## 2021-04-06 DIAGNOSIS — I10 ESSENTIAL HYPERTENSION: ICD-10-CM

## 2021-04-07 RX ORDER — LOSARTAN POTASSIUM 50 MG/1
TABLET ORAL
Qty: 90 TABLET | Refills: 3 | Status: SHIPPED | OUTPATIENT
Start: 2021-04-07 | End: 2022-05-19 | Stop reason: SDUPTHER

## 2021-04-15 DIAGNOSIS — E78.5 DYSLIPIDEMIA: ICD-10-CM

## 2021-04-19 RX ORDER — ROSUVASTATIN CALCIUM 20 MG/1
20 TABLET, COATED ORAL DAILY
Qty: 90 TABLET | Refills: 3 | OUTPATIENT
Start: 2021-04-19

## 2021-05-14 DIAGNOSIS — B34.9 VIRAL SYNDROME: ICD-10-CM

## 2021-05-18 RX ORDER — AZELASTINE 1 MG/ML
1 SPRAY, METERED NASAL 2 TIMES DAILY
Qty: 90 ML | Refills: 3 | Status: SHIPPED | OUTPATIENT
Start: 2021-05-18 | End: 2022-11-30 | Stop reason: SDUPTHER

## 2021-05-25 NOTE — PROGRESS NOTES
Follow up closely with the Back specialist and Pain Management specialist at your scheduled appointments over the next 2 weeks.    Start 3 medications:  Lidocaine patch. Place one patch to left lower back every 24 hours, remove after 12 hours of wear.  Medrol dose shonda. Steroid. Take as directed  Norco. Pain medication. You may take one tab every 6 hours as needed for your BREAKTHROUGH pain. This may make you drowsy, so please do not take before driving or operating heavy machinery. It may be a good idea to first take the medication at night, so you will not feel the possible side effects. Also, Norco contains tylenol (acetominophen), so please do not take any extra tylenol while taking this medication.    Perform attached back exercises as tolerated. Continue taking Tylenol as needed, again be cautious of total amount of Tylenol consumed.    Come back to the ED immediately for any worsening symptoms or pains, bowel or bladder incontinence or inability to void or defecate, nausea with vomiting or diarrhea, FEVERS, chest pain, or shortness of breath   HPI     Post-op Evaluation    Additional comments: 1 day check           Comments   DLS 1/9/18- She needs a new RX for the pain medication because she can't   take the one that was prescribed. She took tylenol last night for the pain      Prior OCT - RD OD  OS - No ME      A/P    1. RRD OD   Macula involving x 4  Days    S/p  25g PPV/EL/SF6 OD for RRD 9/6/17  IOP improved    10/30/17 - Recurrent IT RD with early PVR and small break    s/p 25g PPV/membrane stripping/inferior retinopexy/C3F8 OD for RRD/PVR OD 11/1/17 12/4/17 - recurrent inferior RD - small holes posterior to laser    s/p 25g PPV/EL/AFx/1000cs Silicone Oil OD for Recurrent RRD with PVR 12/6/17    Doing well, good IOP    1/9/18 increasing inferior fluid collection beneath oil into macula - needs SB - multiple small break    s/p /270, 25g PPV/SO removal/membrane stripping/AFx/EL/1000cs Silicone Oil OD for RRD/PVR 1/10/18    Doing well, good IOP  Had ST scleral dehiscence - closed with 5-0 mersilene    Retina flat    Start gtts QID  Ointment/shield QHS    1 week    Side sleep or stomach      2. PCIOL OU    3. HTN   Good BP control    4. PVD OS  No breaks OS

## 2021-06-17 ENCOUNTER — PES CALL (OUTPATIENT)
Dept: ADMINISTRATIVE | Facility: CLINIC | Age: 76
End: 2021-06-17

## 2021-06-30 ENCOUNTER — TELEPHONE (OUTPATIENT)
Dept: FAMILY MEDICINE | Facility: CLINIC | Age: 76
End: 2021-06-30

## 2021-07-01 ENCOUNTER — OFFICE VISIT (OUTPATIENT)
Dept: SPINE | Facility: CLINIC | Age: 76
End: 2021-07-01
Payer: MEDICARE

## 2021-07-01 VITALS
WEIGHT: 173.31 LBS | DIASTOLIC BLOOD PRESSURE: 99 MMHG | SYSTOLIC BLOOD PRESSURE: 161 MMHG | BODY MASS INDEX: 27.85 KG/M2 | HEIGHT: 66 IN | HEART RATE: 86 BPM

## 2021-07-01 DIAGNOSIS — M54.16 LUMBAR RADICULOPATHY: Primary | ICD-10-CM

## 2021-07-01 PROCEDURE — 1101F PR PT FALLS ASSESS DOC 0-1 FALLS W/OUT INJ PAST YR: ICD-10-PCS | Mod: CPTII,S$GLB,, | Performed by: PHYSICIAN ASSISTANT

## 2021-07-01 PROCEDURE — 3288F PR FALLS RISK ASSESSMENT DOCUMENTED: ICD-10-PCS | Mod: CPTII,S$GLB,, | Performed by: PHYSICIAN ASSISTANT

## 2021-07-01 PROCEDURE — 1101F PT FALLS ASSESS-DOCD LE1/YR: CPT | Mod: CPTII,S$GLB,, | Performed by: PHYSICIAN ASSISTANT

## 2021-07-01 PROCEDURE — 1157F ADVNC CARE PLAN IN RCRD: CPT | Mod: S$GLB,,, | Performed by: PHYSICIAN ASSISTANT

## 2021-07-01 PROCEDURE — 1125F AMNT PAIN NOTED PAIN PRSNT: CPT | Mod: S$GLB,,, | Performed by: PHYSICIAN ASSISTANT

## 2021-07-01 PROCEDURE — 1125F PR PAIN SEVERITY QUANTIFIED, PAIN PRESENT: ICD-10-PCS | Mod: S$GLB,,, | Performed by: PHYSICIAN ASSISTANT

## 2021-07-01 PROCEDURE — 99203 PR OFFICE/OUTPT VISIT, NEW, LEVL III, 30-44 MIN: ICD-10-PCS | Mod: S$GLB,,, | Performed by: PHYSICIAN ASSISTANT

## 2021-07-01 PROCEDURE — 99203 OFFICE O/P NEW LOW 30 MIN: CPT | Mod: S$GLB,,, | Performed by: PHYSICIAN ASSISTANT

## 2021-07-01 PROCEDURE — 1159F PR MEDICATION LIST DOCUMENTED IN MEDICAL RECORD: ICD-10-PCS | Mod: S$GLB,,, | Performed by: PHYSICIAN ASSISTANT

## 2021-07-01 PROCEDURE — 99499 UNLISTED E&M SERVICE: CPT | Mod: S$GLB,,, | Performed by: PHYSICIAN ASSISTANT

## 2021-07-01 PROCEDURE — 99999 PR PBB SHADOW E&M-EST. PATIENT-LVL V: CPT | Mod: PBBFAC,,, | Performed by: PHYSICIAN ASSISTANT

## 2021-07-01 PROCEDURE — 99499 RISK ADDL DX/OHS AUDIT: ICD-10-PCS | Mod: S$GLB,,, | Performed by: PHYSICIAN ASSISTANT

## 2021-07-01 PROCEDURE — 1157F PR ADVANCE CARE PLAN OR EQUIV PRESENT IN MEDICAL RECORD: ICD-10-PCS | Mod: S$GLB,,, | Performed by: PHYSICIAN ASSISTANT

## 2021-07-01 PROCEDURE — 3288F FALL RISK ASSESSMENT DOCD: CPT | Mod: CPTII,S$GLB,, | Performed by: PHYSICIAN ASSISTANT

## 2021-07-01 PROCEDURE — 1159F MED LIST DOCD IN RCRD: CPT | Mod: S$GLB,,, | Performed by: PHYSICIAN ASSISTANT

## 2021-07-01 PROCEDURE — 99999 PR PBB SHADOW E&M-EST. PATIENT-LVL V: ICD-10-PCS | Mod: PBBFAC,,, | Performed by: PHYSICIAN ASSISTANT

## 2021-07-01 RX ORDER — TIZANIDINE 4 MG/1
4 TABLET ORAL NIGHTLY PRN
Qty: 40 TABLET | Refills: 0 | Status: SHIPPED | OUTPATIENT
Start: 2021-07-01 | End: 2022-06-30

## 2021-07-01 RX ORDER — CLINDAMYCIN HYDROCHLORIDE 300 MG/1
CAPSULE ORAL
COMMUNITY
Start: 2021-05-17 | End: 2022-10-27

## 2021-07-01 RX ORDER — METHYLPREDNISOLONE 4 MG/1
TABLET ORAL
Qty: 1 PACKAGE | Refills: 0 | Status: SHIPPED | OUTPATIENT
Start: 2021-07-01 | End: 2021-07-22

## 2021-07-14 ENCOUNTER — TELEPHONE (OUTPATIENT)
Dept: DERMATOLOGY | Facility: CLINIC | Age: 76
End: 2021-07-14

## 2021-08-10 ENCOUNTER — LAB VISIT (OUTPATIENT)
Dept: LAB | Facility: HOSPITAL | Age: 76
End: 2021-08-10
Attending: INTERNAL MEDICINE
Payer: MEDICARE

## 2021-08-10 DIAGNOSIS — E78.5 DYSLIPIDEMIA: ICD-10-CM

## 2021-08-10 DIAGNOSIS — Z79.899 ENCOUNTER FOR LONG-TERM (CURRENT) USE OF MEDICATIONS: ICD-10-CM

## 2021-08-10 DIAGNOSIS — I10 ESSENTIAL HYPERTENSION: ICD-10-CM

## 2021-08-10 LAB
ALBUMIN SERPL BCP-MCNC: 4 G/DL (ref 3.5–5.2)
ALP SERPL-CCNC: 57 U/L (ref 55–135)
ALT SERPL W/O P-5'-P-CCNC: 15 U/L (ref 10–44)
ANION GAP SERPL CALC-SCNC: 8 MMOL/L (ref 8–16)
AST SERPL-CCNC: 23 U/L (ref 10–40)
BASOPHILS # BLD AUTO: 0.05 K/UL (ref 0–0.2)
BASOPHILS NFR BLD: 0.7 % (ref 0–1.9)
BILIRUB SERPL-MCNC: 0.6 MG/DL (ref 0.1–1)
BUN SERPL-MCNC: 10 MG/DL (ref 8–23)
CALCIUM SERPL-MCNC: 9.7 MG/DL (ref 8.7–10.5)
CHLORIDE SERPL-SCNC: 102 MMOL/L (ref 95–110)
CHOLEST SERPL-MCNC: 162 MG/DL (ref 120–199)
CHOLEST/HDLC SERPL: 2.5 {RATIO} (ref 2–5)
CO2 SERPL-SCNC: 27 MMOL/L (ref 23–29)
CREAT SERPL-MCNC: 0.8 MG/DL (ref 0.5–1.4)
DIFFERENTIAL METHOD: ABNORMAL
EOSINOPHIL # BLD AUTO: 0.2 K/UL (ref 0–0.5)
EOSINOPHIL NFR BLD: 2.2 % (ref 0–8)
ERYTHROCYTE [DISTWIDTH] IN BLOOD BY AUTOMATED COUNT: 12.5 % (ref 11.5–14.5)
EST. GFR  (AFRICAN AMERICAN): >60 ML/MIN/1.73 M^2
EST. GFR  (NON AFRICAN AMERICAN): >60 ML/MIN/1.73 M^2
GLUCOSE SERPL-MCNC: 102 MG/DL (ref 70–110)
HCT VFR BLD AUTO: 44.1 % (ref 37–48.5)
HDLC SERPL-MCNC: 64 MG/DL (ref 40–75)
HDLC SERPL: 39.5 % (ref 20–50)
HGB BLD-MCNC: 14.7 G/DL (ref 12–16)
IMM GRANULOCYTES # BLD AUTO: 0.02 K/UL (ref 0–0.04)
IMM GRANULOCYTES NFR BLD AUTO: 0.3 % (ref 0–0.5)
LDLC SERPL CALC-MCNC: 78.8 MG/DL (ref 63–159)
LYMPHOCYTES # BLD AUTO: 2.2 K/UL (ref 1–4.8)
LYMPHOCYTES NFR BLD: 32.4 % (ref 18–48)
MCH RBC QN AUTO: 31.3 PG (ref 27–31)
MCHC RBC AUTO-ENTMCNC: 33.3 G/DL (ref 32–36)
MCV RBC AUTO: 94 FL (ref 82–98)
MONOCYTES # BLD AUTO: 0.5 K/UL (ref 0.3–1)
MONOCYTES NFR BLD: 7.9 % (ref 4–15)
NEUTROPHILS # BLD AUTO: 3.8 K/UL (ref 1.8–7.7)
NEUTROPHILS NFR BLD: 56.5 % (ref 38–73)
NONHDLC SERPL-MCNC: 98 MG/DL
NRBC BLD-RTO: 0 /100 WBC
PLATELET # BLD AUTO: 295 K/UL (ref 150–450)
PMV BLD AUTO: 10.7 FL (ref 9.2–12.9)
POTASSIUM SERPL-SCNC: 4.5 MMOL/L (ref 3.5–5.1)
PROT SERPL-MCNC: 6.9 G/DL (ref 6–8.4)
RBC # BLD AUTO: 4.7 M/UL (ref 4–5.4)
SODIUM SERPL-SCNC: 137 MMOL/L (ref 136–145)
TRIGL SERPL-MCNC: 96 MG/DL (ref 30–150)
WBC # BLD AUTO: 6.69 K/UL (ref 3.9–12.7)

## 2021-08-10 PROCEDURE — 80061 LIPID PANEL: CPT | Performed by: INTERNAL MEDICINE

## 2021-08-10 PROCEDURE — 36415 COLL VENOUS BLD VENIPUNCTURE: CPT | Mod: PO | Performed by: INTERNAL MEDICINE

## 2021-08-10 PROCEDURE — 85025 COMPLETE CBC W/AUTO DIFF WBC: CPT | Performed by: INTERNAL MEDICINE

## 2021-08-10 PROCEDURE — 80053 COMPREHEN METABOLIC PANEL: CPT | Performed by: INTERNAL MEDICINE

## 2021-08-17 ENCOUNTER — OFFICE VISIT (OUTPATIENT)
Dept: FAMILY MEDICINE | Facility: CLINIC | Age: 76
End: 2021-08-17
Payer: MEDICARE

## 2021-08-17 VITALS
HEART RATE: 83 BPM | TEMPERATURE: 98 F | HEIGHT: 67 IN | WEIGHT: 174.19 LBS | DIASTOLIC BLOOD PRESSURE: 82 MMHG | SYSTOLIC BLOOD PRESSURE: 120 MMHG | BODY MASS INDEX: 27.34 KG/M2 | OXYGEN SATURATION: 96 %

## 2021-08-17 DIAGNOSIS — G47.00 INSOMNIA, UNSPECIFIED TYPE: ICD-10-CM

## 2021-08-17 DIAGNOSIS — I10 ESSENTIAL HYPERTENSION: ICD-10-CM

## 2021-08-17 DIAGNOSIS — K21.9 GASTROESOPHAGEAL REFLUX DISEASE, UNSPECIFIED WHETHER ESOPHAGITIS PRESENT: ICD-10-CM

## 2021-08-17 DIAGNOSIS — J45.909 ASTHMA, UNSPECIFIED ASTHMA SEVERITY, UNSPECIFIED WHETHER COMPLICATED, UNSPECIFIED WHETHER PERSISTENT: Primary | ICD-10-CM

## 2021-08-17 DIAGNOSIS — B37.31 YEAST VAGINITIS: ICD-10-CM

## 2021-08-17 DIAGNOSIS — E78.5 DYSLIPIDEMIA: ICD-10-CM

## 2021-08-17 PROCEDURE — 1157F PR ADVANCE CARE PLAN OR EQUIV PRESENT IN MEDICAL RECORD: ICD-10-PCS | Mod: CPTII,S$GLB,, | Performed by: INTERNAL MEDICINE

## 2021-08-17 PROCEDURE — 1160F RVW MEDS BY RX/DR IN RCRD: CPT | Mod: CPTII,S$GLB,, | Performed by: INTERNAL MEDICINE

## 2021-08-17 PROCEDURE — 1157F ADVNC CARE PLAN IN RCRD: CPT | Mod: CPTII,S$GLB,, | Performed by: INTERNAL MEDICINE

## 2021-08-17 PROCEDURE — 99214 PR OFFICE/OUTPT VISIT, EST, LEVL IV, 30-39 MIN: ICD-10-PCS | Mod: S$GLB,,, | Performed by: INTERNAL MEDICINE

## 2021-08-17 PROCEDURE — 1160F PR REVIEW ALL MEDS BY PRESCRIBER/CLIN PHARMACIST DOCUMENTED: ICD-10-PCS | Mod: CPTII,S$GLB,, | Performed by: INTERNAL MEDICINE

## 2021-08-17 PROCEDURE — 3079F DIAST BP 80-89 MM HG: CPT | Mod: CPTII,S$GLB,, | Performed by: INTERNAL MEDICINE

## 2021-08-17 PROCEDURE — 99999 PR PBB SHADOW E&M-EST. PATIENT-LVL V: CPT | Mod: PBBFAC,,, | Performed by: INTERNAL MEDICINE

## 2021-08-17 PROCEDURE — 1159F MED LIST DOCD IN RCRD: CPT | Mod: CPTII,S$GLB,, | Performed by: INTERNAL MEDICINE

## 2021-08-17 PROCEDURE — 1126F PR PAIN SEVERITY QUANTIFIED, NO PAIN PRESENT: ICD-10-PCS | Mod: CPTII,S$GLB,, | Performed by: INTERNAL MEDICINE

## 2021-08-17 PROCEDURE — 99214 OFFICE O/P EST MOD 30 MIN: CPT | Mod: S$GLB,,, | Performed by: INTERNAL MEDICINE

## 2021-08-17 PROCEDURE — 1159F PR MEDICATION LIST DOCUMENTED IN MEDICAL RECORD: ICD-10-PCS | Mod: CPTII,S$GLB,, | Performed by: INTERNAL MEDICINE

## 2021-08-17 PROCEDURE — 3074F PR MOST RECENT SYSTOLIC BLOOD PRESSURE < 130 MM HG: ICD-10-PCS | Mod: CPTII,S$GLB,, | Performed by: INTERNAL MEDICINE

## 2021-08-17 PROCEDURE — 3074F SYST BP LT 130 MM HG: CPT | Mod: CPTII,S$GLB,, | Performed by: INTERNAL MEDICINE

## 2021-08-17 PROCEDURE — 1126F AMNT PAIN NOTED NONE PRSNT: CPT | Mod: CPTII,S$GLB,, | Performed by: INTERNAL MEDICINE

## 2021-08-17 PROCEDURE — 99999 PR PBB SHADOW E&M-EST. PATIENT-LVL V: ICD-10-PCS | Mod: PBBFAC,,, | Performed by: INTERNAL MEDICINE

## 2021-08-17 PROCEDURE — 3079F PR MOST RECENT DIASTOLIC BLOOD PRESSURE 80-89 MM HG: ICD-10-PCS | Mod: CPTII,S$GLB,, | Performed by: INTERNAL MEDICINE

## 2021-08-17 RX ORDER — TRAZODONE HYDROCHLORIDE 50 MG/1
TABLET ORAL
Qty: 180 TABLET | Refills: 3 | Status: SHIPPED | OUTPATIENT
Start: 2021-08-17 | End: 2022-09-13

## 2021-08-17 RX ORDER — FLUCONAZOLE 150 MG/1
TABLET ORAL
Qty: 3 TABLET | Refills: 1 | Status: SHIPPED | OUTPATIENT
Start: 2021-08-17 | End: 2021-10-27 | Stop reason: SDUPTHER

## 2021-09-14 ENCOUNTER — OFFICE VISIT (OUTPATIENT)
Dept: DERMATOLOGY | Facility: CLINIC | Age: 76
End: 2021-09-14
Payer: MEDICARE

## 2021-09-14 VITALS — HEIGHT: 67 IN | BODY MASS INDEX: 27.34 KG/M2 | RESPIRATION RATE: 18 BRPM | WEIGHT: 174.19 LBS

## 2021-09-14 DIAGNOSIS — L82.1 SEBORRHEIC KERATOSES: Primary | ICD-10-CM

## 2021-09-14 DIAGNOSIS — L57.0 ACTINIC KERATOSIS: ICD-10-CM

## 2021-09-14 DIAGNOSIS — Z85.828 PERSONAL HISTORY OF MALIGNANT NEOPLASM OF SKIN: ICD-10-CM

## 2021-09-14 PROCEDURE — 1126F PR PAIN SEVERITY QUANTIFIED, NO PAIN PRESENT: ICD-10-PCS | Mod: CPTII,S$GLB,, | Performed by: DERMATOLOGY

## 2021-09-14 PROCEDURE — 99214 OFFICE O/P EST MOD 30 MIN: CPT | Mod: S$GLB,,, | Performed by: DERMATOLOGY

## 2021-09-14 PROCEDURE — 1157F PR ADVANCE CARE PLAN OR EQUIV PRESENT IN MEDICAL RECORD: ICD-10-PCS | Mod: CPTII,S$GLB,, | Performed by: DERMATOLOGY

## 2021-09-14 PROCEDURE — 1159F PR MEDICATION LIST DOCUMENTED IN MEDICAL RECORD: ICD-10-PCS | Mod: CPTII,S$GLB,, | Performed by: DERMATOLOGY

## 2021-09-14 PROCEDURE — 99999 PR PBB SHADOW E&M-EST. PATIENT-LVL IV: CPT | Mod: PBBFAC,,, | Performed by: DERMATOLOGY

## 2021-09-14 PROCEDURE — 1157F ADVNC CARE PLAN IN RCRD: CPT | Mod: CPTII,S$GLB,, | Performed by: DERMATOLOGY

## 2021-09-14 PROCEDURE — 1101F PT FALLS ASSESS-DOCD LE1/YR: CPT | Mod: CPTII,S$GLB,, | Performed by: DERMATOLOGY

## 2021-09-14 PROCEDURE — 4010F PR ACE/ARB THEARPY RXD/TAKEN: ICD-10-PCS | Mod: CPTII,S$GLB,, | Performed by: DERMATOLOGY

## 2021-09-14 PROCEDURE — 1159F MED LIST DOCD IN RCRD: CPT | Mod: CPTII,S$GLB,, | Performed by: DERMATOLOGY

## 2021-09-14 PROCEDURE — 4010F ACE/ARB THERAPY RXD/TAKEN: CPT | Mod: CPTII,S$GLB,, | Performed by: DERMATOLOGY

## 2021-09-14 PROCEDURE — 3288F FALL RISK ASSESSMENT DOCD: CPT | Mod: CPTII,S$GLB,, | Performed by: DERMATOLOGY

## 2021-09-14 PROCEDURE — 99214 PR OFFICE/OUTPT VISIT, EST, LEVL IV, 30-39 MIN: ICD-10-PCS | Mod: S$GLB,,, | Performed by: DERMATOLOGY

## 2021-09-14 PROCEDURE — 3288F PR FALLS RISK ASSESSMENT DOCUMENTED: ICD-10-PCS | Mod: CPTII,S$GLB,, | Performed by: DERMATOLOGY

## 2021-09-14 PROCEDURE — 99999 PR PBB SHADOW E&M-EST. PATIENT-LVL IV: ICD-10-PCS | Mod: PBBFAC,,, | Performed by: DERMATOLOGY

## 2021-09-14 PROCEDURE — 1126F AMNT PAIN NOTED NONE PRSNT: CPT | Mod: CPTII,S$GLB,, | Performed by: DERMATOLOGY

## 2021-09-14 PROCEDURE — 1101F PR PT FALLS ASSESS DOC 0-1 FALLS W/OUT INJ PAST YR: ICD-10-PCS | Mod: CPTII,S$GLB,, | Performed by: DERMATOLOGY

## 2021-09-14 RX ORDER — FLUOROURACIL 50 MG/G
CREAM TOPICAL 2 TIMES DAILY
Qty: 40 G | Refills: 4 | Status: SHIPPED | OUTPATIENT
Start: 2021-09-14 | End: 2022-06-30

## 2021-10-27 DIAGNOSIS — B37.31 YEAST VAGINITIS: ICD-10-CM

## 2021-10-27 NOTE — TELEPHONE ENCOUNTER
----- Message from Maya Stauffer sent at 10/27/2021 11:39 AM CDT -----  Contact: pt  Type:  RX Refill Request    Who Called  p[t #  Refill or New Rx:  refill   RX Name and Strength:  fluconazole (DIFLUCAN) 150 MG Tab  How is the patient currently taking it? (ex. 1XDay):  #prn  Is this a 30 day or 90 day RX:  30  Preferred Pharmacy with phone number: [id number] - [facility] [place] - [address] AT [facility] & [facility] [address]  Local or Mail Order:  local  Ordering Provider:  Nba Dominguez   Best Call Back Number:  [phone]  Additional Information:  pt is asking for a refill

## 2021-10-28 RX ORDER — FLUCONAZOLE 150 MG/1
TABLET ORAL
Qty: 3 TABLET | Refills: 1 | Status: SHIPPED | OUTPATIENT
Start: 2021-10-28 | End: 2022-06-30

## 2021-11-29 ENCOUNTER — OFFICE VISIT (OUTPATIENT)
Dept: OPHTHALMOLOGY | Facility: CLINIC | Age: 76
End: 2021-11-29
Payer: MEDICARE

## 2021-11-29 DIAGNOSIS — H33.001 MACULA-OFF RHEGMATOGENOUS RETINAL DETACHMENT, RIGHT: ICD-10-CM

## 2021-11-29 DIAGNOSIS — H40.051 OCULAR HYPERTENSION, RIGHT: ICD-10-CM

## 2021-11-29 DIAGNOSIS — H33.021 RETINAL DETACHMENT OF RIGHT EYE WITH MULTIPLE BREAKS: Primary | ICD-10-CM

## 2021-11-29 DIAGNOSIS — H35.21 PROLIFERATIVE VITREORETINOPATHY, RIGHT: ICD-10-CM

## 2021-11-29 DIAGNOSIS — H33.21 RETINAL DETACHMENT, RIGHT: ICD-10-CM

## 2021-11-29 PROCEDURE — 99499 RISK ADDL DX/OHS AUDIT: ICD-10-PCS | Mod: S$GLB,,, | Performed by: OPHTHALMOLOGY

## 2021-11-29 PROCEDURE — 99499 UNLISTED E&M SERVICE: CPT | Mod: S$GLB,,, | Performed by: OPHTHALMOLOGY

## 2021-11-29 PROCEDURE — 92134 POSTERIOR SEGMENT OCT RETINA (OCULAR COHERENCE TOMOGRAPHY)-BOTH EYES: ICD-10-PCS | Mod: HCNC,S$GLB,, | Performed by: OPHTHALMOLOGY

## 2021-11-29 PROCEDURE — 92201 OPSCPY EXTND RTA DRAW UNI/BI: CPT | Mod: HCNC,S$GLB,, | Performed by: OPHTHALMOLOGY

## 2021-11-29 PROCEDURE — 1157F ADVNC CARE PLAN IN RCRD: CPT | Mod: HCNC,CPTII,S$GLB, | Performed by: OPHTHALMOLOGY

## 2021-11-29 PROCEDURE — 92201 PR OPHTHALMOSCOPY, EXT, W/RET DRAW/SCLERAL DEPR, I&R, UNI/BI: ICD-10-PCS | Mod: HCNC,S$GLB,, | Performed by: OPHTHALMOLOGY

## 2021-11-29 PROCEDURE — 4010F ACE/ARB THERAPY RXD/TAKEN: CPT | Mod: HCNC,CPTII,S$GLB, | Performed by: OPHTHALMOLOGY

## 2021-11-29 PROCEDURE — 99999 PR PBB SHADOW E&M-EST. PATIENT-LVL III: CPT | Mod: PBBFAC,HCNC,, | Performed by: OPHTHALMOLOGY

## 2021-11-29 PROCEDURE — 99999 PR PBB SHADOW E&M-EST. PATIENT-LVL III: ICD-10-PCS | Mod: PBBFAC,HCNC,, | Performed by: OPHTHALMOLOGY

## 2021-11-29 PROCEDURE — 92014 PR EYE EXAM, EST PATIENT,COMPREHESV: ICD-10-PCS | Mod: HCNC,S$GLB,, | Performed by: OPHTHALMOLOGY

## 2021-11-29 PROCEDURE — 92134 CPTRZ OPH DX IMG PST SGM RTA: CPT | Mod: HCNC,S$GLB,, | Performed by: OPHTHALMOLOGY

## 2021-11-29 PROCEDURE — 1157F PR ADVANCE CARE PLAN OR EQUIV PRESENT IN MEDICAL RECORD: ICD-10-PCS | Mod: HCNC,CPTII,S$GLB, | Performed by: OPHTHALMOLOGY

## 2021-11-29 PROCEDURE — 92014 COMPRE OPH EXAM EST PT 1/>: CPT | Mod: HCNC,S$GLB,, | Performed by: OPHTHALMOLOGY

## 2021-11-29 PROCEDURE — 4010F PR ACE/ARB THEARPY RXD/TAKEN: ICD-10-PCS | Mod: HCNC,CPTII,S$GLB, | Performed by: OPHTHALMOLOGY

## 2021-11-29 RX ORDER — DORZOLAMIDE HYDROCHLORIDE AND TIMOLOL MALEATE PRESERVATIVE FREE 20; 5 MG/ML; MG/ML
SOLUTION/ DROPS OPHTHALMIC
COMMUNITY
Start: 2021-10-22 | End: 2023-08-14

## 2021-12-08 DIAGNOSIS — J44.9 CHRONIC OBSTRUCTIVE PULMONARY DISEASE, UNSPECIFIED COPD TYPE: ICD-10-CM

## 2021-12-08 RX ORDER — ALBUTEROL SULFATE 90 UG/1
2 AEROSOL, METERED RESPIRATORY (INHALATION) EVERY 6 HOURS PRN
Qty: 18 G | Refills: 3 | Status: SHIPPED | OUTPATIENT
Start: 2021-12-08 | End: 2023-10-09 | Stop reason: SDUPTHER

## 2021-12-20 ENCOUNTER — OFFICE VISIT (OUTPATIENT)
Dept: FAMILY MEDICINE | Facility: CLINIC | Age: 76
End: 2021-12-20
Payer: MEDICARE

## 2021-12-20 VITALS
BODY MASS INDEX: 27.37 KG/M2 | SYSTOLIC BLOOD PRESSURE: 108 MMHG | HEIGHT: 67 IN | DIASTOLIC BLOOD PRESSURE: 78 MMHG | WEIGHT: 174.38 LBS | TEMPERATURE: 98 F | HEART RATE: 88 BPM | OXYGEN SATURATION: 97 %

## 2021-12-20 DIAGNOSIS — R21 RASH: Primary | ICD-10-CM

## 2021-12-20 PROCEDURE — 1157F PR ADVANCE CARE PLAN OR EQUIV PRESENT IN MEDICAL RECORD: ICD-10-PCS | Mod: HCNC,CPTII,S$GLB, | Performed by: NURSE PRACTITIONER

## 2021-12-20 PROCEDURE — 99999 PR PBB SHADOW E&M-EST. PATIENT-LVL IV: CPT | Mod: PBBFAC,HCNC,, | Performed by: NURSE PRACTITIONER

## 2021-12-20 PROCEDURE — 99214 PR OFFICE/OUTPT VISIT, EST, LEVL IV, 30-39 MIN: ICD-10-PCS | Mod: HCNC,S$GLB,, | Performed by: NURSE PRACTITIONER

## 2021-12-20 PROCEDURE — 99999 PR PBB SHADOW E&M-EST. PATIENT-LVL IV: ICD-10-PCS | Mod: PBBFAC,HCNC,, | Performed by: NURSE PRACTITIONER

## 2021-12-20 PROCEDURE — 4010F ACE/ARB THERAPY RXD/TAKEN: CPT | Mod: HCNC,CPTII,S$GLB, | Performed by: NURSE PRACTITIONER

## 2021-12-20 PROCEDURE — 4010F PR ACE/ARB THEARPY RXD/TAKEN: ICD-10-PCS | Mod: HCNC,CPTII,S$GLB, | Performed by: NURSE PRACTITIONER

## 2021-12-20 PROCEDURE — 99214 OFFICE O/P EST MOD 30 MIN: CPT | Mod: HCNC,S$GLB,, | Performed by: NURSE PRACTITIONER

## 2021-12-20 PROCEDURE — 1157F ADVNC CARE PLAN IN RCRD: CPT | Mod: HCNC,CPTII,S$GLB, | Performed by: NURSE PRACTITIONER

## 2021-12-20 RX ORDER — TRIAMCINOLONE ACETONIDE 1 MG/G
CREAM TOPICAL 2 TIMES DAILY
Qty: 80 G | Refills: 0 | Status: SHIPPED | OUTPATIENT
Start: 2021-12-20 | End: 2022-06-30

## 2021-12-20 RX ORDER — FAMOTIDINE 40 MG/1
40 TABLET, FILM COATED ORAL DAILY
Qty: 7 TABLET | Refills: 0 | Status: SHIPPED | OUTPATIENT
Start: 2021-12-20 | End: 2024-02-19

## 2021-12-20 RX ORDER — CETIRIZINE HYDROCHLORIDE 10 MG/1
10 TABLET ORAL DAILY
Qty: 7 TABLET | Refills: 0 | Status: SHIPPED | OUTPATIENT
Start: 2021-12-20 | End: 2024-01-04

## 2022-01-26 ENCOUNTER — OFFICE VISIT (OUTPATIENT)
Dept: DERMATOLOGY | Facility: CLINIC | Age: 77
End: 2022-01-26
Payer: MEDICARE

## 2022-01-26 VITALS — BODY MASS INDEX: 27.37 KG/M2 | HEIGHT: 67 IN | WEIGHT: 174.38 LBS | RESPIRATION RATE: 18 BRPM

## 2022-01-26 DIAGNOSIS — L27.0 DERMATITIS MEDICAMENTOSA: ICD-10-CM

## 2022-01-26 DIAGNOSIS — L82.1 SEBORRHEIC KERATOSES: ICD-10-CM

## 2022-01-26 DIAGNOSIS — L81.4 SOLAR LENTIGO: ICD-10-CM

## 2022-01-26 DIAGNOSIS — L90.5 SCAR: ICD-10-CM

## 2022-01-26 DIAGNOSIS — L57.0 ACTINIC KERATOSES: Primary | ICD-10-CM

## 2022-01-26 DIAGNOSIS — R21 RASH: ICD-10-CM

## 2022-01-26 DIAGNOSIS — Z85.828 HISTORY OF NONMELANOMA SKIN CANCER: ICD-10-CM

## 2022-01-26 PROCEDURE — 1101F PT FALLS ASSESS-DOCD LE1/YR: CPT | Mod: HCNC,CPTII,S$GLB, | Performed by: DERMATOLOGY

## 2022-01-26 PROCEDURE — 1160F PR REVIEW ALL MEDS BY PRESCRIBER/CLIN PHARMACIST DOCUMENTED: ICD-10-PCS | Mod: HCNC,CPTII,S$GLB, | Performed by: DERMATOLOGY

## 2022-01-26 PROCEDURE — 1101F PR PT FALLS ASSESS DOC 0-1 FALLS W/OUT INJ PAST YR: ICD-10-PCS | Mod: HCNC,CPTII,S$GLB, | Performed by: DERMATOLOGY

## 2022-01-26 PROCEDURE — 17004 PR DESTRUCTION, PREMALIGNANT LESIONS; 15 OR MORE LESIONS: ICD-10-PCS | Mod: HCNC,S$GLB,, | Performed by: DERMATOLOGY

## 2022-01-26 PROCEDURE — 1157F ADVNC CARE PLAN IN RCRD: CPT | Mod: HCNC,CPTII,S$GLB, | Performed by: DERMATOLOGY

## 2022-01-26 PROCEDURE — 17004 DESTROY PREMAL LESIONS 15/>: CPT | Mod: HCNC,S$GLB,, | Performed by: DERMATOLOGY

## 2022-01-26 PROCEDURE — 99213 OFFICE O/P EST LOW 20 MIN: CPT | Mod: 25,HCNC,S$GLB, | Performed by: DERMATOLOGY

## 2022-01-26 PROCEDURE — 99999 PR PBB SHADOW E&M-EST. PATIENT-LVL III: CPT | Mod: PBBFAC,HCNC,, | Performed by: DERMATOLOGY

## 2022-01-26 PROCEDURE — 1160F RVW MEDS BY RX/DR IN RCRD: CPT | Mod: HCNC,CPTII,S$GLB, | Performed by: DERMATOLOGY

## 2022-01-26 PROCEDURE — 1126F PR PAIN SEVERITY QUANTIFIED, NO PAIN PRESENT: ICD-10-PCS | Mod: HCNC,CPTII,S$GLB, | Performed by: DERMATOLOGY

## 2022-01-26 PROCEDURE — 3288F PR FALLS RISK ASSESSMENT DOCUMENTED: ICD-10-PCS | Mod: HCNC,CPTII,S$GLB, | Performed by: DERMATOLOGY

## 2022-01-26 PROCEDURE — 1159F PR MEDICATION LIST DOCUMENTED IN MEDICAL RECORD: ICD-10-PCS | Mod: HCNC,CPTII,S$GLB, | Performed by: DERMATOLOGY

## 2022-01-26 PROCEDURE — 3288F FALL RISK ASSESSMENT DOCD: CPT | Mod: HCNC,CPTII,S$GLB, | Performed by: DERMATOLOGY

## 2022-01-26 PROCEDURE — 99999 PR PBB SHADOW E&M-EST. PATIENT-LVL III: ICD-10-PCS | Mod: PBBFAC,HCNC,, | Performed by: DERMATOLOGY

## 2022-01-26 PROCEDURE — 1157F PR ADVANCE CARE PLAN OR EQUIV PRESENT IN MEDICAL RECORD: ICD-10-PCS | Mod: HCNC,CPTII,S$GLB, | Performed by: DERMATOLOGY

## 2022-01-26 PROCEDURE — 99213 PR OFFICE/OUTPT VISIT, EST, LEVL III, 20-29 MIN: ICD-10-PCS | Mod: 25,HCNC,S$GLB, | Performed by: DERMATOLOGY

## 2022-01-26 PROCEDURE — 1159F MED LIST DOCD IN RCRD: CPT | Mod: HCNC,CPTII,S$GLB, | Performed by: DERMATOLOGY

## 2022-01-26 PROCEDURE — 1126F AMNT PAIN NOTED NONE PRSNT: CPT | Mod: HCNC,CPTII,S$GLB, | Performed by: DERMATOLOGY

## 2022-01-26 NOTE — PROGRESS NOTES
Subjective:       Patient ID:  Madison Long is a 76 y.o. female who presents for   Chief Complaint   Patient presents with    Rash     Patient present for initial visit with provider, rash. Pt prior seen by Homa Wagner MD on 9/14/21.  Used efudex on chest, triggered rash      C/o rash to back, arms, legs, chest x approx 1-2 months, pt states pain is a 0/10, rash per pt appears to be  red, raised, itchy and spreading, pt states no known change or new exposures, Medications, soaps, detergents, or topical applications. pt states no known causes to flare rash.    Pets:no   Scented items: no   Traveled: no    Pt uses:  Soap: Dove  Detergent: tide reg, Arm and hammer  Lotions: n/a    Treated with:  pt states no use otc treatments.    Pt was treating area with rash with 5fu.     Prescribed by SALLIE Glover on 12/20/21: TMC 0.1%, Pepcid, and Zytrec    Skin Cancer assessment:  yes Phx of NMSC.  +BCC on right cheek (cutaneous lateral lower eyelid) treated by Mohs (Dr Hooper 2013)    no Fhx of melanoma.      Current Outpatient Medications:     acetaminophen (TYLENOL) 500 MG tablet, Take 500 mg by mouth every 6 (six) hours as needed for Pain., Disp: , Rfl:     albuterol (PROVENTIL/VENTOLIN HFA) 90 mcg/actuation inhaler, Inhale 2 puffs into the lungs every 6 (six) hours as needed. Rescue, Disp: 18 g, Rfl: 3    aspirin 81 MG Chew, Take 81 mg by mouth daily as needed. Usually takes about every 2 weeks., Disp: , Rfl:     betamethasone dipropionate (DIPROLENE) 0.05 % cream, Apply topically 2 (two) times daily. Use for no more than 2 weeks, Disp: 45 g, Rfl: 0    budesonide-formoterol 160-4.5 mcg (SYMBICORT) 160-4.5 mcg/actuation HFAA, INHALE BY MOUTH 2 PUFFS INTO THE LUNGS TWICE DAILY, Disp: 30.6 g, Rfl: 2    cetirizine (ZYRTEC) 10 MG tablet, Take 1 tablet (10 mg total) by mouth once daily., Disp: 7 tablet, Rfl: 0    clindamycin (CLEOCIN) 300 MG capsule, , Disp: , Rfl:     dorzolamide-timolol, PF, (COSOPT PF) 2-0.5 %  Dpet ophthalmic solution, , Disp: , Rfl:     econazole nitrate 1 % cream, Apply topically 2 (two) times daily as needed. Twice a day  PRN, Disp: , Rfl:     famotidine (PEPCID) 40 MG tablet, Take 1 tablet (40 mg total) by mouth once daily., Disp: 7 tablet, Rfl: 0    fish oil-omega-3 fatty acids 300-1,000 mg capsule, Take 1 g by mouth every morning. , Disp: , Rfl:     fluconazole (DIFLUCAN) 150 MG Tab, 1 po daily for 3 days, Disp: 3 tablet, Rfl: 1    fluorouracil (EFUDEX) 5 % cream, Apply topically 2 (two) times daily., Disp: 40 g, Rfl: 3    fluorouraciL (EFUDEX) 5 % cream, Apply topically 2 (two) times daily., Disp: 40 g, Rfl: 4    FLUoxetine 10 MG capsule, TAKE 1 CAPSULE(10 MG) BY MOUTH EVERY DAY, Disp: 90 capsule, Rfl: 3    fluticasone (FLONASE) 50 mcg/actuation nasal spray, SPRAY 2 SPRAYS IN EACH NOSTRIL EVERY DAY, Disp: 1 Bottle, Rfl: 6    guaifenesin (MUCINEX) 600 mg 12 hr tablet, Take 1,200 mg by mouth 2 (two) times daily as needed. , Disp: , Rfl:     ibuprofen/diphenhydramine cit (ADVIL PM ORAL), Take by mouth., Disp: , Rfl:     losartan (COZAAR) 50 MG tablet, TAKE 1 TABLET(50 MG) BY MOUTH EVERY MORNING, Disp: 90 tablet, Rfl: 3    multivit,stress formula-zinc Tab, Take 1 tablet by mouth every morning. Uses Alive, Disp: , Rfl:     mupirocin (BACTROBAN) 2 % ointment, Apply topically 2 (two) times daily., Disp: 22 g, Rfl: 0    pseudoephedrine (SUDAFED) 30 MG tablet, Take 30 mg by mouth every 4 (four) hours as needed for Congestion., Disp: , Rfl:     rosuvastatin (CRESTOR) 20 MG tablet, TAKE 1 TABLET(20 MG) BY MOUTH EVERY DAY, Disp: 90 tablet, Rfl: 3    sodium chloride (OCEAN) 0.65 % nasal spray, 1 spray by Nasal route as needed for Congestion., Disp: , Rfl:     tiZANidine (ZANAFLEX) 4 MG tablet, Take 1 tablet (4 mg total) by mouth nightly as needed (muscle spasms/ pain)., Disp: 40 tablet, Rfl: 0    traZODone (DESYREL) 50 MG tablet, TAKE 1 TABLET(50 MG) BY MOUTH EVERY NIGHT AS NEEDED FOR  INSOMNIA. MAY TAKE UP TO 2 TABLET EVERY NIGHT AS NEEDED, Disp: 180 tablet, Rfl: 3    triamcinolone acetonide 0.1% (KENALOG) 0.1 % cream, Apply topically 2 (two) times daily., Disp: 80 g, Rfl: 0    zinc gluconate (COLD-EEZE ORAL), Take 1 lozenge by mouth daily as needed., Disp: , Rfl:     azelastine (ASTELIN) 137 mcg (0.1 %) nasal spray, 1 spray (137 mcg total) by Nasal route 2 (two) times daily. for 14 days, Disp: 90 mL, Rfl: 3    dorzolamide-timolol 2-0.5% (COSOPT) 22.3-6.8 mg/mL ophthalmic solution, Place 1 drop into the right eye 2 (two) times daily., Disp: 10 mL, Rfl: 12    estradiol (ESTRACE) 0.01 % (0.1 mg/gram) vaginal cream, Place 1 g vaginally daily as needed. (Patient not taking: Reported on 8/17/2021), Disp: 42.5 g, Rfl: 3    omeprazole (PRILOSEC OTC) 20 MG tablet, Take 1 tablet (20 mg total) by mouth every morning. Take 30 minutes before your first protein containing meal., Disp: 30 tablet, Rfl: 11        Review of Systems   Constitutional: Negative for fever and chills.   HENT: Negative for congestion and sore throat.    Respiratory: Negative for cough and shortness of breath.    Skin: Positive for itching, rash, activity-related sunscreen use and sensitivity to antibiotic ointment. Negative for daily sunscreen use and sensitivity to bandage adhesive.   Hematologic/Lymphatic: Bruises/bleeds easily.        Objective:    Physical Exam   Constitutional: She appears well-developed and well-nourished. No distress.   Neurological: She is alert and oriented to person, place, and time. She is not disoriented.   Psychiatric: She has a normal mood and affect.   Skin:   Areas Examined (abnormalities noted in diagram):   Scalp / Hair Palpated and Inspected  Head / Face Inspection Performed  Neck Inspection Performed  Chest / Axilla Inspection Performed  Abdomen Inspection Performed  Genitals / Buttocks / Groin Inspection Performed  Back Inspection Performed  RUE Inspected  LUE Inspection Performed  RLE  Inspected  LLE Inspection Performed                   Diagram Legend     Erythematous scaling macule/papule c/w actinic keratosis       Vascular papule c/w angioma      Pigmented verrucoid papule/plaque c/w seborrheic keratosis      Yellow umbilicated papule c/w sebaceous hyperplasia      Irregularly shaped tan macule c/w lentigo     1-2 mm smooth white papules consistent with Milia      Movable subcutaneous cyst with punctum c/w epidermal inclusion cyst      Subcutaneous movable cyst c/w pilar cyst      Firm pink to brown papule c/w dermatofibroma      Pedunculated fleshy papule(s) c/w skin tag(s)      Evenly pigmented macule c/w junctional nevus     Mildly variegated pigmented, slightly irregular-bordered macule c/w mildly atypical nevus      Flesh colored to evenly pigmented papule c/w intradermal nevus       Pink pearly papule/plaque c/w basal cell carcinoma      Erythematous hyperkeratotic cursted plaque c/w SCC      Surgical scar with no sign of skin cancer recurrence      Open and closed comedones      Inflammatory papules and pustules      Verrucoid papule consistent consistent with wart     Erythematous eczematous patches and plaques     Dystrophic onycholytic nail with subungual debris c/w onychomycosis     Umbilicated papule    Erythematous-base heme-crusted tan verrucoid plaque consistent with inflamed seborrheic keratosis     Erythematous Silvery Scaling Plaque c/w Psoriasis     See annotation      Assessment / Plan:        Actinic keratoses  Cryosurgery Procedure Note    Verbal consent from the patient is obtained and the patient is aware of the precancerous quality and need for treatment of these lesions. Liquid nitrogen cryosurgery is applied to the 18 actinic keratoses, as detailed in the physical exam, to produce a freeze injury. The patient is aware that blisters may form and is instructed on wound care with gentle cleansing and use of vaseline ointment to keep moist until healed. The patient is  supplied a handout on cryosurgery and is instructed to call if lesions do not completely resolve.    Rash  -     Ambulatory referral/consult to Dermatology    Dermatitis medicamentosa  Tail end   Continue to hold 5FU  Tac 0.1% cream daily to twice daily for an additional week or two if skin itchy or irritated (chest, arms and legs)    Seborrheic keratoses  These are benign inherited growths without a malignant potential. Reassurance given to patient. No treatment is necessary.     Solar lentigo  This is a benign hyperpigmented sun induced lesion. Daily sun protection will reduce the number of new lesions. Treatment of these benign lesions are considered cosmetic.    History of nonmelanoma skin cancer  Scar  Area of previous BCC examined. Site well healed with no signs of recurrence.    Total body skin examination performed today including at least 12 points as noted in physical examination. No lesions suspicious for malignancy noted.    Patient instructed in importance in daily broad spectrum sun protection of at least spf 30. Mineral sunscreen ingredients preferred (Zinc +/- Titanium) and can be found OTC.   Recommend Elta MD for daily use on face and neck.  Patient encouraged to wear hat for all outdoor exposure.   Also discussed sun avoidance and use of protective clothing.               Follow up in about 3 months (around 4/26/2022).

## 2022-01-26 NOTE — PATIENT INSTRUCTIONS

## 2022-01-31 ENCOUNTER — OFFICE VISIT (OUTPATIENT)
Dept: OPHTHALMOLOGY | Facility: CLINIC | Age: 77
End: 2022-01-31
Payer: MEDICARE

## 2022-01-31 DIAGNOSIS — H35.21 PROLIFERATIVE VITREORETINOPATHY, RIGHT: ICD-10-CM

## 2022-01-31 DIAGNOSIS — H33.001 MACULA-OFF RHEGMATOGENOUS RETINAL DETACHMENT, RIGHT: ICD-10-CM

## 2022-01-31 DIAGNOSIS — H33.001 RETINAL DETACHMENT, RHEGMATOGENOUS, RIGHT EYE: ICD-10-CM

## 2022-01-31 DIAGNOSIS — H43.811 POSTERIOR VITREOUS DETACHMENT, RIGHT EYE: ICD-10-CM

## 2022-01-31 DIAGNOSIS — H33.021 RETINAL DETACHMENT OF RIGHT EYE WITH MULTIPLE BREAKS: Primary | ICD-10-CM

## 2022-01-31 PROCEDURE — 1157F PR ADVANCE CARE PLAN OR EQUIV PRESENT IN MEDICAL RECORD: ICD-10-PCS | Mod: HCNC,CPTII,S$GLB, | Performed by: OPHTHALMOLOGY

## 2022-01-31 PROCEDURE — 1126F AMNT PAIN NOTED NONE PRSNT: CPT | Mod: HCNC,CPTII,S$GLB, | Performed by: OPHTHALMOLOGY

## 2022-01-31 PROCEDURE — 1159F PR MEDICATION LIST DOCUMENTED IN MEDICAL RECORD: ICD-10-PCS | Mod: HCNC,CPTII,S$GLB, | Performed by: OPHTHALMOLOGY

## 2022-01-31 PROCEDURE — 3288F PR FALLS RISK ASSESSMENT DOCUMENTED: ICD-10-PCS | Mod: HCNC,CPTII,S$GLB, | Performed by: OPHTHALMOLOGY

## 2022-01-31 PROCEDURE — 3288F FALL RISK ASSESSMENT DOCD: CPT | Mod: HCNC,CPTII,S$GLB, | Performed by: OPHTHALMOLOGY

## 2022-01-31 PROCEDURE — 1101F PR PT FALLS ASSESS DOC 0-1 FALLS W/OUT INJ PAST YR: ICD-10-PCS | Mod: HCNC,CPTII,S$GLB, | Performed by: OPHTHALMOLOGY

## 2022-01-31 PROCEDURE — 92201 PR OPHTHALMOSCOPY, EXT, W/RET DRAW/SCLERAL DEPR, I&R, UNI/BI: ICD-10-PCS | Mod: HCNC,S$GLB,, | Performed by: OPHTHALMOLOGY

## 2022-01-31 PROCEDURE — 1101F PT FALLS ASSESS-DOCD LE1/YR: CPT | Mod: HCNC,CPTII,S$GLB, | Performed by: OPHTHALMOLOGY

## 2022-01-31 PROCEDURE — 92201 OPSCPY EXTND RTA DRAW UNI/BI: CPT | Mod: HCNC,S$GLB,, | Performed by: OPHTHALMOLOGY

## 2022-01-31 PROCEDURE — 1157F ADVNC CARE PLAN IN RCRD: CPT | Mod: HCNC,CPTII,S$GLB, | Performed by: OPHTHALMOLOGY

## 2022-01-31 PROCEDURE — 1159F MED LIST DOCD IN RCRD: CPT | Mod: HCNC,CPTII,S$GLB, | Performed by: OPHTHALMOLOGY

## 2022-01-31 PROCEDURE — 1126F PR PAIN SEVERITY QUANTIFIED, NO PAIN PRESENT: ICD-10-PCS | Mod: HCNC,CPTII,S$GLB, | Performed by: OPHTHALMOLOGY

## 2022-01-31 PROCEDURE — 99999 PR PBB SHADOW E&M-EST. PATIENT-LVL IV: ICD-10-PCS | Mod: PBBFAC,HCNC,, | Performed by: OPHTHALMOLOGY

## 2022-01-31 PROCEDURE — 92014 PR EYE EXAM, EST PATIENT,COMPREHESV: ICD-10-PCS | Mod: HCNC,S$GLB,, | Performed by: OPHTHALMOLOGY

## 2022-01-31 PROCEDURE — 99999 PR PBB SHADOW E&M-EST. PATIENT-LVL IV: CPT | Mod: PBBFAC,HCNC,, | Performed by: OPHTHALMOLOGY

## 2022-01-31 PROCEDURE — 92014 COMPRE OPH EXAM EST PT 1/>: CPT | Mod: HCNC,S$GLB,, | Performed by: OPHTHALMOLOGY

## 2022-01-31 NOTE — PROGRESS NOTES
HPI     DLS 11/29/2021    Patient here today for IOP check. Occasional floaters OD. No flashes. VA   stable. No pain.        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 RRD OD   Macula involving x 4  Days    S/p  25g PPV/EL/SF6 OD for RRD 9/6/17  IOP improved    10/30/17 - Recurrent IT RD with early PVR and small break    s/p 25g PPV/membrane stripping/inferior retinopexy/C3F8 OD for RRD/PVR OD 11/1/17 12/4/17 - recurrent inferior RD - small holes posterior to laser    s/p 25g PPV/EL/AFx/1000cs Silicone Oil OD for Recurrent RRD with PVR 12/6/17    Doing well, good IOP    1/9/18 increasing inferior fluid collection beneath oil into macula - needs SB - multiple small break    s/p /270, 25g PPV/SO removal/membrane stripping/AFx/EL/1000cs Silicone Oil OD for RRD/PVR 1/10/18    Doing well, good IOP  Had ST scleral dehiscence - closed with 5-0 mersilene    Side sleep or stomach    Inferior PVR OD with shallow submacular fluid   IOP low - stop Cosopt  Will likely need inferior retinectomy    2/18 - some progression    s/p 25 PPV/SO removal/membrane stripping/inferior retinectomy/posterior capsulectomy/EL/Leonora oil OD for RRD with PVR and PCO OD 4/11/18    Stable inferonasal RD under oil  IOP increased  - continue COsopt BID  Continue PF Q day     Will need heavier inferonasal laser during oil removal 3-6 months (around 4/19)    S/p 25g PPV/1000cs SO removal/EL/AFx/C3F8 OD 3/27/19    Doing well, good IOP  Flat!    IOP good off cosopt     2. PCIOL OU    3. HTN   Good BP control    4. PVD OS  No breaks OS        1 year OCT

## 2022-02-08 ENCOUNTER — LAB VISIT (OUTPATIENT)
Dept: LAB | Facility: HOSPITAL | Age: 77
End: 2022-02-08
Attending: INTERNAL MEDICINE
Payer: MEDICARE

## 2022-02-08 DIAGNOSIS — I10 ESSENTIAL HYPERTENSION: ICD-10-CM

## 2022-02-08 DIAGNOSIS — E78.5 DYSLIPIDEMIA: ICD-10-CM

## 2022-02-08 LAB
ALBUMIN SERPL BCP-MCNC: 4.3 G/DL (ref 3.5–5.2)
ALP SERPL-CCNC: 67 U/L (ref 55–135)
ALT SERPL W/O P-5'-P-CCNC: 17 U/L (ref 10–44)
ANION GAP SERPL CALC-SCNC: 11 MMOL/L (ref 8–16)
AST SERPL-CCNC: 25 U/L (ref 10–40)
BILIRUB SERPL-MCNC: 0.7 MG/DL (ref 0.1–1)
BUN SERPL-MCNC: 15 MG/DL (ref 8–23)
CALCIUM SERPL-MCNC: 9.9 MG/DL (ref 8.7–10.5)
CHLORIDE SERPL-SCNC: 103 MMOL/L (ref 95–110)
CHOLEST SERPL-MCNC: 171 MG/DL (ref 120–199)
CHOLEST/HDLC SERPL: 2.6 {RATIO} (ref 2–5)
CO2 SERPL-SCNC: 24 MMOL/L (ref 23–29)
CREAT SERPL-MCNC: 0.8 MG/DL (ref 0.5–1.4)
EST. GFR  (AFRICAN AMERICAN): >60 ML/MIN/1.73 M^2
EST. GFR  (NON AFRICAN AMERICAN): >60 ML/MIN/1.73 M^2
GLUCOSE SERPL-MCNC: 96 MG/DL (ref 70–110)
HDLC SERPL-MCNC: 67 MG/DL (ref 40–75)
HDLC SERPL: 39.2 % (ref 20–50)
LDLC SERPL CALC-MCNC: 91.4 MG/DL (ref 63–159)
NONHDLC SERPL-MCNC: 104 MG/DL
POTASSIUM SERPL-SCNC: 4.4 MMOL/L (ref 3.5–5.1)
PROT SERPL-MCNC: 7.4 G/DL (ref 6–8.4)
SODIUM SERPL-SCNC: 138 MMOL/L (ref 136–145)
TRIGL SERPL-MCNC: 63 MG/DL (ref 30–150)

## 2022-02-08 PROCEDURE — 36415 COLL VENOUS BLD VENIPUNCTURE: CPT | Mod: HCNC,PO | Performed by: INTERNAL MEDICINE

## 2022-02-08 PROCEDURE — 80053 COMPREHEN METABOLIC PANEL: CPT | Mod: HCNC | Performed by: INTERNAL MEDICINE

## 2022-02-08 PROCEDURE — 80061 LIPID PANEL: CPT | Mod: HCNC | Performed by: INTERNAL MEDICINE

## 2022-02-15 ENCOUNTER — OFFICE VISIT (OUTPATIENT)
Dept: ORTHOPEDICS | Facility: CLINIC | Age: 77
End: 2022-02-15
Payer: MEDICARE

## 2022-02-15 ENCOUNTER — OFFICE VISIT (OUTPATIENT)
Dept: FAMILY MEDICINE | Facility: CLINIC | Age: 77
End: 2022-02-15
Payer: MEDICARE

## 2022-02-15 VITALS
OXYGEN SATURATION: 97 % | HEART RATE: 95 BPM | BODY MASS INDEX: 27.04 KG/M2 | WEIGHT: 172.31 LBS | HEIGHT: 67 IN | DIASTOLIC BLOOD PRESSURE: 84 MMHG | SYSTOLIC BLOOD PRESSURE: 122 MMHG

## 2022-02-15 VITALS — HEIGHT: 67 IN | WEIGHT: 172 LBS | BODY MASS INDEX: 27 KG/M2

## 2022-02-15 DIAGNOSIS — B37.31 VAGINAL YEAST INFECTION: ICD-10-CM

## 2022-02-15 DIAGNOSIS — G89.29 CHRONIC PAIN OF LEFT KNEE: ICD-10-CM

## 2022-02-15 DIAGNOSIS — J43.8 OTHER EMPHYSEMA: ICD-10-CM

## 2022-02-15 DIAGNOSIS — R20.0 NUMBNESS AND TINGLING OF LEFT LOWER EXTREMITY: ICD-10-CM

## 2022-02-15 DIAGNOSIS — N95.2 VAGINITIS, ATROPHIC: ICD-10-CM

## 2022-02-15 DIAGNOSIS — I10 ESSENTIAL HYPERTENSION: ICD-10-CM

## 2022-02-15 DIAGNOSIS — R20.2 NUMBNESS AND TINGLING OF LEFT LOWER EXTREMITY: ICD-10-CM

## 2022-02-15 DIAGNOSIS — H61.23 BILATERAL IMPACTED CERUMEN: ICD-10-CM

## 2022-02-15 DIAGNOSIS — R42 DIZZINESS: ICD-10-CM

## 2022-02-15 DIAGNOSIS — Z12.31 ENCOUNTER FOR SCREENING MAMMOGRAM FOR BREAST CANCER: ICD-10-CM

## 2022-02-15 DIAGNOSIS — E78.5 DYSLIPIDEMIA: ICD-10-CM

## 2022-02-15 DIAGNOSIS — I65.23 ATHEROSCLEROSIS OF BOTH CAROTID ARTERIES: ICD-10-CM

## 2022-02-15 DIAGNOSIS — M25.562 CHRONIC PAIN OF LEFT KNEE: ICD-10-CM

## 2022-02-15 DIAGNOSIS — M72.0 DUPUYTREN'S DISEASE OF PALM OF BOTH HANDS: Primary | ICD-10-CM

## 2022-02-15 DIAGNOSIS — Z79.899 ENCOUNTER FOR LONG-TERM (CURRENT) USE OF MEDICATIONS: ICD-10-CM

## 2022-02-15 DIAGNOSIS — Z00.00 ROUTINE PHYSICAL EXAMINATION: Primary | ICD-10-CM

## 2022-02-15 DIAGNOSIS — Z78.0 POST-MENOPAUSAL: ICD-10-CM

## 2022-02-15 PROCEDURE — 1101F PR PT FALLS ASSESS DOC 0-1 FALLS W/OUT INJ PAST YR: ICD-10-PCS | Mod: HCNC,CPTII,S$GLB, | Performed by: INTERNAL MEDICINE

## 2022-02-15 PROCEDURE — 99397 PER PM REEVAL EST PAT 65+ YR: CPT | Mod: HCNC,S$GLB,, | Performed by: INTERNAL MEDICINE

## 2022-02-15 PROCEDURE — 1160F PR REVIEW ALL MEDS BY PRESCRIBER/CLIN PHARMACIST DOCUMENTED: ICD-10-PCS | Mod: HCNC,CPTII,S$GLB, | Performed by: PHYSICIAN ASSISTANT

## 2022-02-15 PROCEDURE — 99999 PR PBB SHADOW E&M-EST. PATIENT-LVL IV: CPT | Mod: PBBFAC,HCNC,, | Performed by: PHYSICIAN ASSISTANT

## 2022-02-15 PROCEDURE — 1101F PT FALLS ASSESS-DOCD LE1/YR: CPT | Mod: HCNC,CPTII,S$GLB, | Performed by: PHYSICIAN ASSISTANT

## 2022-02-15 PROCEDURE — 99499 RISK ADDL DX/OHS AUDIT: ICD-10-PCS | Mod: HCNC,S$GLB,, | Performed by: INTERNAL MEDICINE

## 2022-02-15 PROCEDURE — 99499 UNLISTED E&M SERVICE: CPT | Mod: HCNC,S$GLB,, | Performed by: INTERNAL MEDICINE

## 2022-02-15 PROCEDURE — 1157F PR ADVANCE CARE PLAN OR EQUIV PRESENT IN MEDICAL RECORD: ICD-10-PCS | Mod: HCNC,CPTII,S$GLB, | Performed by: INTERNAL MEDICINE

## 2022-02-15 PROCEDURE — 3288F FALL RISK ASSESSMENT DOCD: CPT | Mod: HCNC,CPTII,S$GLB, | Performed by: PHYSICIAN ASSISTANT

## 2022-02-15 PROCEDURE — 1126F PR PAIN SEVERITY QUANTIFIED, NO PAIN PRESENT: ICD-10-PCS | Mod: HCNC,CPTII,S$GLB, | Performed by: INTERNAL MEDICINE

## 2022-02-15 PROCEDURE — 3288F FALL RISK ASSESSMENT DOCD: CPT | Mod: HCNC,CPTII,S$GLB, | Performed by: INTERNAL MEDICINE

## 2022-02-15 PROCEDURE — 99203 PR OFFICE/OUTPT VISIT, NEW, LEVL III, 30-44 MIN: ICD-10-PCS | Mod: HCNC,S$GLB,, | Performed by: PHYSICIAN ASSISTANT

## 2022-02-15 PROCEDURE — 1101F PT FALLS ASSESS-DOCD LE1/YR: CPT | Mod: HCNC,CPTII,S$GLB, | Performed by: INTERNAL MEDICINE

## 2022-02-15 PROCEDURE — 3079F DIAST BP 80-89 MM HG: CPT | Mod: HCNC,CPTII,S$GLB, | Performed by: INTERNAL MEDICINE

## 2022-02-15 PROCEDURE — 1126F PR PAIN SEVERITY QUANTIFIED, NO PAIN PRESENT: ICD-10-PCS | Mod: HCNC,CPTII,S$GLB, | Performed by: PHYSICIAN ASSISTANT

## 2022-02-15 PROCEDURE — 3074F SYST BP LT 130 MM HG: CPT | Mod: HCNC,CPTII,S$GLB, | Performed by: INTERNAL MEDICINE

## 2022-02-15 PROCEDURE — 1101F PR PT FALLS ASSESS DOC 0-1 FALLS W/OUT INJ PAST YR: ICD-10-PCS | Mod: HCNC,CPTII,S$GLB, | Performed by: PHYSICIAN ASSISTANT

## 2022-02-15 PROCEDURE — 1157F ADVNC CARE PLAN IN RCRD: CPT | Mod: HCNC,CPTII,S$GLB, | Performed by: PHYSICIAN ASSISTANT

## 2022-02-15 PROCEDURE — 1157F ADVNC CARE PLAN IN RCRD: CPT | Mod: HCNC,CPTII,S$GLB, | Performed by: INTERNAL MEDICINE

## 2022-02-15 PROCEDURE — 99999 PR PBB SHADOW E&M-EST. PATIENT-LVL IV: ICD-10-PCS | Mod: PBBFAC,HCNC,, | Performed by: PHYSICIAN ASSISTANT

## 2022-02-15 PROCEDURE — 1126F AMNT PAIN NOTED NONE PRSNT: CPT | Mod: HCNC,CPTII,S$GLB, | Performed by: PHYSICIAN ASSISTANT

## 2022-02-15 PROCEDURE — 1159F PR MEDICATION LIST DOCUMENTED IN MEDICAL RECORD: ICD-10-PCS | Mod: HCNC,CPTII,S$GLB, | Performed by: INTERNAL MEDICINE

## 2022-02-15 PROCEDURE — 3079F PR MOST RECENT DIASTOLIC BLOOD PRESSURE 80-89 MM HG: ICD-10-PCS | Mod: HCNC,CPTII,S$GLB, | Performed by: INTERNAL MEDICINE

## 2022-02-15 PROCEDURE — 1159F MED LIST DOCD IN RCRD: CPT | Mod: HCNC,CPTII,S$GLB, | Performed by: INTERNAL MEDICINE

## 2022-02-15 PROCEDURE — 99397 PR PREVENTIVE VISIT,EST,65 & OVER: ICD-10-PCS | Mod: HCNC,S$GLB,, | Performed by: INTERNAL MEDICINE

## 2022-02-15 PROCEDURE — 1157F PR ADVANCE CARE PLAN OR EQUIV PRESENT IN MEDICAL RECORD: ICD-10-PCS | Mod: HCNC,CPTII,S$GLB, | Performed by: PHYSICIAN ASSISTANT

## 2022-02-15 PROCEDURE — 1159F MED LIST DOCD IN RCRD: CPT | Mod: HCNC,CPTII,S$GLB, | Performed by: PHYSICIAN ASSISTANT

## 2022-02-15 PROCEDURE — 1160F RVW MEDS BY RX/DR IN RCRD: CPT | Mod: HCNC,CPTII,S$GLB, | Performed by: PHYSICIAN ASSISTANT

## 2022-02-15 PROCEDURE — 99203 OFFICE O/P NEW LOW 30 MIN: CPT | Mod: HCNC,S$GLB,, | Performed by: PHYSICIAN ASSISTANT

## 2022-02-15 PROCEDURE — 99999 PR PBB SHADOW E&M-EST. PATIENT-LVL V: CPT | Mod: PBBFAC,HCNC,, | Performed by: INTERNAL MEDICINE

## 2022-02-15 PROCEDURE — 3288F PR FALLS RISK ASSESSMENT DOCUMENTED: ICD-10-PCS | Mod: HCNC,CPTII,S$GLB, | Performed by: PHYSICIAN ASSISTANT

## 2022-02-15 PROCEDURE — 1159F PR MEDICATION LIST DOCUMENTED IN MEDICAL RECORD: ICD-10-PCS | Mod: HCNC,CPTII,S$GLB, | Performed by: PHYSICIAN ASSISTANT

## 2022-02-15 PROCEDURE — 1126F AMNT PAIN NOTED NONE PRSNT: CPT | Mod: HCNC,CPTII,S$GLB, | Performed by: INTERNAL MEDICINE

## 2022-02-15 PROCEDURE — 3074F PR MOST RECENT SYSTOLIC BLOOD PRESSURE < 130 MM HG: ICD-10-PCS | Mod: HCNC,CPTII,S$GLB, | Performed by: INTERNAL MEDICINE

## 2022-02-15 PROCEDURE — 99999 PR PBB SHADOW E&M-EST. PATIENT-LVL V: ICD-10-PCS | Mod: PBBFAC,HCNC,, | Performed by: INTERNAL MEDICINE

## 2022-02-15 PROCEDURE — 3288F PR FALLS RISK ASSESSMENT DOCUMENTED: ICD-10-PCS | Mod: HCNC,CPTII,S$GLB, | Performed by: INTERNAL MEDICINE

## 2022-02-15 RX ORDER — ESTRADIOL 0.1 MG/G
1 CREAM VAGINAL DAILY PRN
Qty: 42.5 G | Refills: 3 | Status: SHIPPED | OUTPATIENT
Start: 2022-02-15 | End: 2022-03-11

## 2022-02-15 RX ORDER — FLUCONAZOLE 150 MG/1
150 TABLET ORAL DAILY
Qty: 3 TABLET | Refills: 2 | Status: SHIPPED | OUTPATIENT
Start: 2022-02-15 | End: 2022-02-18

## 2022-02-15 RX ORDER — ROSUVASTATIN CALCIUM 40 MG/1
40 TABLET, COATED ORAL DAILY
Start: 2022-02-15 | End: 2022-06-01 | Stop reason: SDUPTHER

## 2022-02-15 NOTE — PROGRESS NOTES
2/15/2022    Chief Complaint:  Chief Complaint   Patient presents with    Right Hand - Pain     Dupuytrens Contracture    Left Hand - Pain     Dupuytrens Contracture       HPI:  Madison Long is a 76 y.o. female, who presents to clinic today for evaluation of Dupuytren's disease of the bilateral hands.  States pain is currently 0/10. Denies that they are painful.  States she is more concerned with the appearance.  Denies any dysfunction related to the Dupuytren's disease of the bilateral hands.  Denies any paresthesias.  States she is overall doing well, and is here today to discuss treatment options for Dupuytren's disease.  Denies any other complaints at this time.    PMHX:  Past Medical History:   Diagnosis Date    Allergy     Asthma     Basal cell carcinoma 2013     right cheek (cutaneous lateral lower eyelid) MOHS Dr Hooper     BCC (basal cell carcinoma)     COPD (chronic obstructive pulmonary disease)     GERD (gastroesophageal reflux disease)     Hyperlipidemia     Hypertension     Retinal detachment        PSHX:  Past Surgical History:   Procedure Laterality Date    BASAL CELL CARCINOMA EXCISION      BREAST CYST EXCISION Left     long time ago, [place]    CATARACT EXTRACTION      COLONOSCOPY  2013    COLONOSCOPY N/A 8/1/2018    Procedure: COLONOSCOPY;  Surgeon: Dung Durant MD;  Location: [facility];  Service: Endoscopy;  Laterality: N/A;    EYE SURGERY      cataracts & retinea detachment    HYSTERECTOMY      REPAIR OF RETINAL DETACHMENT WITH VITRECTOMY Right 3/27/2019    Procedure: REPAIR, RETINAL DETACHMENT, WITH VITRECTOMY;  Surgeon: JANEL Chaves MD;  Location: [address];  Service: Ophthalmology;  Laterality: Right;  LMA  60 min, Endo laser     UPPER GASTROINTESTINAL ENDOSCOPY         FMHX:  Family History   Problem Relation Age of Onset    Asthma Mother     Stroke Father 77        cva    Colon cancer Neg Hx     Colon polyps Neg Hx     Celiac disease Neg Hx      Esophageal cancer Neg Hx     Stomach cancer Neg Hx     Irritable bowel syndrome Neg Hx     Inflammatory bowel disease Neg Hx        SOCHX:  Social History     Tobacco Use    Smoking status: Never Smoker    Smokeless tobacco: Never Used   Substance Use Topics    Alcohol use: Yes     Alcohol/week: 2.0 standard drinks     Types: 2 Glasses of wine per week     Comment: socially       ALLERGIES:  Penicillins, Penicillin, Ciprofloxacin, Doxycycline, and Oxycodone    CURRENT MEDICATIONS:  Current Outpatient Medications on File Prior to Visit   Medication Sig Dispense Refill    acetaminophen (TYLENOL) 500 MG tablet Take 500 mg by mouth every 6 (six) hours as needed for Pain.      albuterol (PROVENTIL/VENTOLIN HFA) 90 mcg/actuation inhaler Inhale 2 puffs into the lungs every 6 (six) hours as needed. Rescue 18 g 3    aspirin 81 MG Chew Take 81 mg by mouth daily as needed. Usually takes about every 2 weeks.      budesonide-formoterol 160-4.5 mcg (SYMBICORT) 160-4.5 mcg/actuation HFAA INHALE BY MOUTH 2 PUFFS INTO THE LUNGS TWICE DAILY 30.6 g 2    cetirizine (ZYRTEC) 10 MG tablet Take 1 tablet (10 mg total) by mouth once daily. 7 tablet 0    clindamycin (CLEOCIN) 300 MG capsule       dorzolamide-timolol, PF, (COSOPT PF) 2-0.5 % Dpet ophthalmic solution       econazole nitrate 1 % cream Apply topically 2 (two) times daily as needed. Twice a day  PRN      estradioL (ESTRACE) 0.01 % (0.1 mg/gram) vaginal cream Place 1 g vaginally daily as needed. 42.5 g 3    famotidine (PEPCID) 40 MG tablet Take 1 tablet (40 mg total) by mouth once daily. 7 tablet 0    fish oil-omega-3 fatty acids 300-1,000 mg capsule Take 1 g by mouth every morning.       fluconazole (DIFLUCAN) 150 MG Tab Take 1 tablet (150 mg total) by mouth once daily. for 3 days 3 tablet 2    FLUoxetine 10 MG capsule TAKE 1 CAPSULE(10 MG) BY MOUTH EVERY DAY 90 capsule 3    fluticasone (FLONASE) 50 mcg/actuation nasal spray SPRAY 2 SPRAYS IN EACH NOSTRIL  EVERY DAY 1 Bottle 6    guaifenesin (MUCINEX) 600 mg 12 hr tablet Take 1,200 mg by mouth 2 (two) times daily as needed.       ibuprofen/diphenhydramine cit (ADVIL PM ORAL) Take by mouth.      losartan (COZAAR) 50 MG tablet TAKE 1 TABLET(50 MG) BY MOUTH EVERY MORNING 90 tablet 3    multivit,stress formula-zinc Tab Take 1 tablet by mouth every morning. Uses Alive      pseudoephedrine (SUDAFED) 30 MG tablet Take 30 mg by mouth every 4 (four) hours as needed for Congestion.      rosuvastatin (CRESTOR) 40 MG Tab Take 1 tablet (40 mg total) by mouth once daily.      sodium chloride (OCEAN) 0.65 % nasal spray 1 spray by Nasal route as needed for Congestion.      traZODone (DESYREL) 50 MG tablet TAKE 1 TABLET(50 MG) BY MOUTH EVERY NIGHT AS NEEDED FOR INSOMNIA. MAY TAKE UP TO 2 TABLET EVERY NIGHT AS NEEDED 180 tablet 3    zinc gluconate (COLD-EEZE ORAL) Take 1 lozenge by mouth daily as needed.      azelastine (ASTELIN) 137 mcg (0.1 %) nasal spray 1 spray (137 mcg total) by Nasal route 2 (two) times daily. for 14 days 90 mL 3    betamethasone dipropionate (DIPROLENE) 0.05 % cream Apply topically 2 (two) times daily. Use for no more than 2 weeks (Patient not taking: No sig reported) 45 g 0    dorzolamide-timolol 2-0.5% (COSOPT) 22.3-6.8 mg/mL ophthalmic solution Place 1 drop into the right eye 2 (two) times daily. (Patient not taking: Reported on 2/15/2022) 10 mL 12    fluconazole (DIFLUCAN) 150 MG Tab 1 po daily for 3 days (Patient not taking: No sig reported) 3 tablet 1    fluorouracil (EFUDEX) 5 % cream Apply topically 2 (two) times daily. (Patient not taking: No sig reported) 40 g 3    fluorouraciL (EFUDEX) 5 % cream Apply topically 2 (two) times daily. (Patient not taking: No sig reported) 40 g 4    mupirocin (BACTROBAN) 2 % ointment Apply topically 2 (two) times daily. (Patient not taking: No sig reported) 22 g 0    omeprazole (PRILOSEC OTC) 20 MG tablet Take 1 tablet (20 mg total) by mouth every  "morning. Take 30 minutes before your first protein containing meal. 30 tablet 11    tiZANidine (ZANAFLEX) 4 MG tablet Take 1 tablet (4 mg total) by mouth nightly as needed (muscle spasms/ pain). (Patient not taking: No sig reported) 40 tablet 0    triamcinolone acetonide 0.1% (KENALOG) 0.1 % cream Apply topically 2 (two) times daily. (Patient not taking: No sig reported) 80 g 0    [DISCONTINUED] estradiol (ESTRACE) 0.01 % (0.1 mg/gram) vaginal cream Place 1 g vaginally daily as needed. 42.5 g 3    [DISCONTINUED] rosuvastatin (CRESTOR) 20 MG tablet TAKE 1 TABLET(20 MG) BY MOUTH EVERY DAY 90 tablet 3     No current facility-administered medications on file prior to visit.       REVIEW OF SYSTEMS:  Review of Systems   Constitutional: Negative.    HENT: Negative.    Eyes: Negative.    Respiratory: Negative.    Cardiovascular: Negative.    Gastrointestinal: Negative.    Genitourinary: Negative.    Musculoskeletal: Negative.    Skin: Positive for rash.   Neurological: Positive for dizziness.   Endo/Heme/Allergies: Bruises/bleeds easily.   Psychiatric/Behavioral: The patient has insomnia.        GENERAL PHYSICAL EXAM:   Ht 5' 6.5" (1.689 m)   Wt 78 kg (172 lb)   BMI 27.35 kg/m²    GEN: well developed, well nourished, no acute distress   HENT: Normocephalic, atraumatic   EYES: No discharge, conjunctiva normal   NECK: Supple, non-tender   PULM: No wheezing, no respiratory distress   CV: RRR   ABD: Soft, non-tender    ORTHO EXAM:   Examination of the bilateral hands reveals Dupuytren's disease of the palm side.  No edema, erythema, ecchymosis, or skin breakdown.  No evidence of any Dupuytren's contracture of the bilateral hands.  Able to make composite fists and fully extend all fingers bilaterally.  5/5 /intrinsic strength.  Full intact range of motion of the bilateral wrists.  Normal sensation in the radial, ulnar, and median nerve distributions bilaterally.  Capillary refill less than 2 seconds in all fingers " bilaterally.    RADIOLOGY:   None.    ASSESSMENT:   Dupuytren's disease of the bilateral hands    PLAN:  1. I discussed with Madison Long the Dupuytren's disease pathology and treatment options in detail during today's visit.  We discussed that considering she has no dysfunction or pain of the bilateral hands in relation to the Dupuytren's disease, the best course of action this time would be to monitor the Dupuytren's disease and be aware of any Dupuytren's contracture formation.  We discussed for any progressive Dupuytren's contracture formation she should reappoint or contact the clinic.  She verbally agreed with the treatment plan.    2. I would like to have her follow up in clinic on a p.r.n. basis for any worsening of her symptoms or for any hand, wrist, or elbow problems/concerns.  She was instructed to contact the clinic for any problems/concerns in the interim.

## 2022-02-15 NOTE — PROGRESS NOTES
Subjective:       Patient ID: Madison Long is a 76 y.o. female.    Chief Complaint: Annual Exam    Here for routine health maintenance.      Many issues today:     Not sure if had total hysterectomy.  Would like referral to gyne    Had < 40% blockage of b/l carotid artery 2017    HLD - uncontrolled; LDL goal < 70, htn; taking Crestor 10 mg qd  Recall: had severe LE cramps.  Stopped Lipitor 1 month ago and cramps resolved after 5-6 days.      Complains of left knee.  Chronic.  Also has pins and needle feeling in left foot occasionally.      Complains of dizziness.  4 episodes while sitting over past 4-6 months.        GERD - mostly when drinks port wine    Asthma - previously mild intermittent but needing something almost daily.  Suggest use Symbicort as Rx bid every day for prevention.     4 eye surgeries in past. Dr Sotelo  HTN - controlled    COPD - no sob;      Insomnia - 1- 2 trazodone works most nights; stopped her Ambien      ALEX - controlled and retired now, so wants to decrease to qod.     Atrophic vaginitis - estrogen cream used for 1-2 weeks and then will take a break.  due for mammogram 3/23/19; rx originally by urology Dr Hughes.     Keratosis - hereditary.  Wants to change to our derm.     Osteopenia - on vit D and calcium     Review of Systems   Constitutional: Negative for appetite change and fever.   HENT: Negative for nosebleeds and trouble swallowing.    Eyes: Negative for discharge and visual disturbance.   Respiratory: Negative for choking and shortness of breath.    Cardiovascular: Negative for chest pain and palpitations.   Gastrointestinal: Negative for abdominal pain, nausea and vomiting.   Genitourinary: Positive for vaginal pain.   Musculoskeletal: Positive for arthralgias. Negative for joint swelling.   Skin: Negative for rash and wound.   Neurological: Positive for dizziness and numbness. Negative for syncope.   Psychiatric/Behavioral: Negative for confusion and dysphoric mood.        Objective:      Vitals:    02/15/22 1040   BP: 122/84   Pulse: 95     Physical Exam  Vitals reviewed.   Constitutional:       Appearance: She is well-nourished.   HENT:      Head:      Comments: B/l cerumen impaction   Eyes:      Extraocular Movements: EOM normal.      Conjunctiva/sclera: Conjunctivae normal.   Neck:      Thyroid: No thyromegaly.      Trachea: Trachea normal.   Cardiovascular:      Heart sounds: Normal heart sounds.      Comments: Edema negative  Pulmonary:      Effort: Pulmonary effort is normal.      Breath sounds: Normal breath sounds.   Abdominal:      Palpations: Abdomen is soft. There is no hepatomegaly.   Musculoskeletal:      Cervical back: Normal range of motion.   Skin:     General: Skin is warm, dry and intact.   Neurological:      Cranial Nerves: No cranial nerve deficit.      Comments: DTR decreased bilateral   Psychiatric:         Mood and Affect: Mood and affect normal.      Comments: Alert and Oriented            Assessment:       1. Routine physical examination    2. Essential hypertension    3. Dyslipidemia    4. Other emphysema    5. Encounter for screening mammogram for breast cancer    6. Post-menopausal    7. Vaginitis, atrophic    8. Vaginal yeast infection    9. Dizziness    10. Chronic pain of left knee    11. Numbness and tingling of left lower extremity    12. Bilateral impacted cerumen    13. Atherosclerosis of both carotid arteries        Plan:       Routine physical examination    Essential hypertension  -     Comprehensive Metabolic Panel; Future; Expected date: 08/14/2022    Dyslipidemia  -     rosuvastatin (CRESTOR) 40 MG Tab; Take 1 tablet (40 mg total) by mouth once daily.  -     Lipid Panel; Future; Expected date: 08/14/2022  -     Comprehensive Metabolic Panel; Future; Expected date: 08/14/2022    Other emphysema    Encounter for screening mammogram for breast cancer  -     Mammo Digital Screening Bilat; Future; Expected date: 02/15/2022    Post-menopausal  -      DXA Bone Density Spine And Hip; Future; Expected date: 02/15/2022  -     Ambulatory referral/consult to Gynecology; Future; Expected date: 02/22/2022    Vaginitis, atrophic  -     Cancel: POCT urinalysis, dipstick or tablet reag  -     estradioL (ESTRACE) 0.01 % (0.1 mg/gram) vaginal cream; Place 1 g vaginally daily as needed.  Dispense: 42.5 g; Refill: 3  -     Urinalysis; Future; Expected date: 02/15/2022    Vaginal yeast infection  -     fluconazole (DIFLUCAN) 150 MG Tab; Take 1 tablet (150 mg total) by mouth once daily. for 3 days  Dispense: 3 tablet; Refill: 2  -     Urinalysis; Future; Expected date: 02/15/2022    Dizziness  -     Ambulatory referral/consult to ENT; Future; Expected date: 02/22/2022    Chronic pain of left knee  -     Ambulatory referral/consult to Physical Medicine Rehab; Future; Expected date: 02/22/2022    Numbness and tingling of left lower extremity  -     Ambulatory referral/consult to Physical Medicine Rehab; Future; Expected date: 02/22/2022    Bilateral impacted cerumen  -     Ambulatory referral/consult to ENT; Future; Expected date: 02/22/2022    Atherosclerosis of both carotid arteries  -     US Carotid Bilateral; Future; Expected date: 02/15/2022  -     rosuvastatin (CRESTOR) 40 MG Tab; Take 1 tablet (40 mg total) by mouth once daily.  -     Lipid Panel; Future; Expected date: 08/14/2022    Encounter for long-term (current) use of medications  -     CBC Auto Differential; Future; Expected date: 08/14/2022            Medication List with Changes/Refills   New Medications    FLUCONAZOLE (DIFLUCAN) 150 MG TAB    Take 1 tablet (150 mg total) by mouth once daily. for 3 days   Current Medications    ACETAMINOPHEN (TYLENOL) 500 MG TABLET    Take 500 mg by mouth every 6 (six) hours as needed for Pain.    ALBUTEROL (PROVENTIL/VENTOLIN HFA) 90 MCG/ACTUATION INHALER    Inhale 2 puffs into the lungs every 6 (six) hours as needed. Rescue    ASPIRIN 81 MG CHEW    Take 81 mg by mouth daily as  needed. Usually takes about every 2 weeks.    AZELASTINE (ASTELIN) 137 MCG (0.1 %) NASAL SPRAY    1 spray (137 mcg total) by Nasal route 2 (two) times daily. for 14 days    BETAMETHASONE DIPROPIONATE (DIPROLENE) 0.05 % CREAM    Apply topically 2 (two) times daily. Use for no more than 2 weeks    BUDESONIDE-FORMOTEROL 160-4.5 MCG (SYMBICORT) 160-4.5 MCG/ACTUATION HFAA    INHALE BY MOUTH 2 PUFFS INTO THE LUNGS TWICE DAILY    CETIRIZINE (ZYRTEC) 10 MG TABLET    Take 1 tablet (10 mg total) by mouth once daily.    CLINDAMYCIN (CLEOCIN) 300 MG CAPSULE        DORZOLAMIDE-TIMOLOL 2-0.5% (COSOPT) 22.3-6.8 MG/ML OPHTHALMIC SOLUTION    Place 1 drop into the right eye 2 (two) times daily.    DORZOLAMIDE-TIMOLOL, PF, (COSOPT PF) 2-0.5 % DPET OPHTHALMIC SOLUTION        ECONAZOLE NITRATE 1 % CREAM    Apply topically 2 (two) times daily as needed. Twice a day  PRN    FAMOTIDINE (PEPCID) 40 MG TABLET    Take 1 tablet (40 mg total) by mouth once daily.    FISH OIL-OMEGA-3 FATTY ACIDS 300-1,000 MG CAPSULE    Take 1 g by mouth every morning.     FLUCONAZOLE (DIFLUCAN) 150 MG TAB    1 po daily for 3 days    FLUOROURACIL (EFUDEX) 5 % CREAM    Apply topically 2 (two) times daily.    FLUOROURACIL (EFUDEX) 5 % CREAM    Apply topically 2 (two) times daily.    FLUOXETINE 10 MG CAPSULE    TAKE 1 CAPSULE(10 MG) BY MOUTH EVERY DAY    FLUTICASONE (FLONASE) 50 MCG/ACTUATION NASAL SPRAY    SPRAY 2 SPRAYS IN EACH NOSTRIL EVERY DAY    GUAIFENESIN (MUCINEX) 600 MG 12 HR TABLET    Take 1,200 mg by mouth 2 (two) times daily as needed.     IBUPROFEN/DIPHENHYDRAMINE CIT (ADVIL PM ORAL)    Take by mouth.    LOSARTAN (COZAAR) 50 MG TABLET    TAKE 1 TABLET(50 MG) BY MOUTH EVERY MORNING    MULTIVIT,STRESS FORMULA-ZINC TAB    Take 1 tablet by mouth every morning. Uses Alive    MUPIROCIN (BACTROBAN) 2 % OINTMENT    Apply topically 2 (two) times daily.    OMEPRAZOLE (PRILOSEC OTC) 20 MG TABLET    Take 1 tablet (20 mg total) by mouth every morning. Take 30  minutes before your first protein containing meal.    PSEUDOEPHEDRINE (SUDAFED) 30 MG TABLET    Take 30 mg by mouth every 4 (four) hours as needed for Congestion.    SODIUM CHLORIDE (OCEAN) 0.65 % NASAL SPRAY    1 spray by Nasal route as needed for Congestion.    TIZANIDINE (ZANAFLEX) 4 MG TABLET    Take 1 tablet (4 mg total) by mouth nightly as needed (muscle spasms/ pain).    TRAZODONE (DESYREL) 50 MG TABLET    TAKE 1 TABLET(50 MG) BY MOUTH EVERY NIGHT AS NEEDED FOR INSOMNIA. MAY TAKE UP TO 2 TABLET EVERY NIGHT AS NEEDED    TRIAMCINOLONE ACETONIDE 0.1% (KENALOG) 0.1 % CREAM    Apply topically 2 (two) times daily.    ZINC GLUCONATE (COLD-EEZE ORAL)    Take 1 lozenge by mouth daily as needed.   Changed and/or Refilled Medications    Modified Medication Previous Medication    ESTRADIOL (ESTRACE) 0.01 % (0.1 MG/GRAM) VAGINAL CREAM estradiol (ESTRACE) 0.01 % (0.1 mg/gram) vaginal cream       Place 1 g vaginally daily as needed.    Place 1 g vaginally daily as needed.    ROSUVASTATIN (CRESTOR) 40 MG TAB rosuvastatin (CRESTOR) 20 MG tablet       Take 1 tablet (40 mg total) by mouth once daily.    TAKE 1 TABLET(20 MG) BY MOUTH EVERY DAY     Wellness reviewed        Continue current management and monitor.    Counseled on regular exercise, maintenance of a healthy weight, balanced diet rich in fruits/vegetables and lean protein, and avoidance of unhealthy habits like smoking and excessive alcohol intake.   Also, counseled on importance of being compliant with medication, health appointments, diet and exercise.     Follow up in about 6 months (around 8/15/2022).

## 2022-02-17 DIAGNOSIS — M25.562 LEFT KNEE PAIN, UNSPECIFIED CHRONICITY: Primary | ICD-10-CM

## 2022-03-07 ENCOUNTER — HOSPITAL ENCOUNTER (OUTPATIENT)
Dept: RADIOLOGY | Facility: HOSPITAL | Age: 77
Discharge: HOME | End: 2022-03-07
Attending: PHYSICAL MEDICINE & REHABILITATION
Payer: MEDICARE

## 2022-03-07 ENCOUNTER — HOSPITAL ENCOUNTER (OUTPATIENT)
Dept: RADIOLOGY | Facility: HOSPITAL | Age: 77
Discharge: HOME | End: 2022-03-07
Attending: INTERNAL MEDICINE
Payer: MEDICARE

## 2022-03-07 ENCOUNTER — OFFICE VISIT (OUTPATIENT)
Dept: PHYSICAL MEDICINE AND REHAB | Facility: CLINIC | Age: 77
End: 2022-03-07
Payer: MEDICARE

## 2022-03-07 VITALS — WEIGHT: 172 LBS | RESPIRATION RATE: 16 BRPM | HEIGHT: 66 IN | BODY MASS INDEX: 27.64 KG/M2

## 2022-03-07 DIAGNOSIS — G89.29 CHRONIC PAIN OF LEFT KNEE: ICD-10-CM

## 2022-03-07 DIAGNOSIS — Z12.31 ENCOUNTER FOR SCREENING MAMMOGRAM FOR BREAST CANCER: ICD-10-CM

## 2022-03-07 DIAGNOSIS — M25.562 CHRONIC PAIN OF LEFT KNEE: ICD-10-CM

## 2022-03-07 DIAGNOSIS — M17.12 PRIMARY OSTEOARTHRITIS OF LEFT KNEE: ICD-10-CM

## 2022-03-07 DIAGNOSIS — Z78.0 POST-MENOPAUSAL: ICD-10-CM

## 2022-03-07 DIAGNOSIS — M25.562 LEFT KNEE PAIN, UNSPECIFIED CHRONICITY: ICD-10-CM

## 2022-03-07 DIAGNOSIS — I65.23 ATHEROSCLEROSIS OF BOTH CAROTID ARTERIES: ICD-10-CM

## 2022-03-07 PROCEDURE — 3288F PR FALLS RISK ASSESSMENT DOCUMENTED: ICD-10-PCS | Mod: HCNC,CPTII,S$GLB, | Performed by: PHYSICAL MEDICINE & REHABILITATION

## 2022-03-07 PROCEDURE — 1101F PR PT FALLS ASSESS DOC 0-1 FALLS W/OUT INJ PAST YR: ICD-10-PCS | Mod: HCNC,CPTII,S$GLB, | Performed by: PHYSICAL MEDICINE & REHABILITATION

## 2022-03-07 PROCEDURE — 77063 MAMMO DIGITAL SCREENING BILAT WITH TOMO: ICD-10-PCS | Mod: 26,HCNC,, | Performed by: RADIOLOGY

## 2022-03-07 PROCEDURE — 3288F FALL RISK ASSESSMENT DOCD: CPT | Mod: HCNC,CPTII,S$GLB, | Performed by: PHYSICAL MEDICINE & REHABILITATION

## 2022-03-07 PROCEDURE — 93880 US CAROTID BILATERAL: ICD-10-PCS | Mod: 26,HCNC,, | Performed by: RADIOLOGY

## 2022-03-07 PROCEDURE — 99204 PR OFFICE/OUTPT VISIT, NEW, LEVL IV, 45-59 MIN: ICD-10-PCS | Mod: HCNC,S$GLB,, | Performed by: PHYSICAL MEDICINE & REHABILITATION

## 2022-03-07 PROCEDURE — 1157F ADVNC CARE PLAN IN RCRD: CPT | Mod: HCNC,CPTII,S$GLB, | Performed by: PHYSICAL MEDICINE & REHABILITATION

## 2022-03-07 PROCEDURE — 1125F AMNT PAIN NOTED PAIN PRSNT: CPT | Mod: HCNC,CPTII,S$GLB, | Performed by: PHYSICAL MEDICINE & REHABILITATION

## 2022-03-07 PROCEDURE — 77067 SCR MAMMO BI INCL CAD: CPT | Mod: 26,HCNC,, | Performed by: RADIOLOGY

## 2022-03-07 PROCEDURE — 1157F PR ADVANCE CARE PLAN OR EQUIV PRESENT IN MEDICAL RECORD: ICD-10-PCS | Mod: HCNC,CPTII,S$GLB, | Performed by: PHYSICAL MEDICINE & REHABILITATION

## 2022-03-07 PROCEDURE — 93880 EXTRACRANIAL BILAT STUDY: CPT | Mod: 26,HCNC,, | Performed by: RADIOLOGY

## 2022-03-07 PROCEDURE — 93880 EXTRACRANIAL BILAT STUDY: CPT | Mod: TC,HCNC,PO

## 2022-03-07 PROCEDURE — 1101F PT FALLS ASSESS-DOCD LE1/YR: CPT | Mod: HCNC,CPTII,S$GLB, | Performed by: PHYSICAL MEDICINE & REHABILITATION

## 2022-03-07 PROCEDURE — 1159F MED LIST DOCD IN RCRD: CPT | Mod: HCNC,CPTII,S$GLB, | Performed by: PHYSICAL MEDICINE & REHABILITATION

## 2022-03-07 PROCEDURE — 77063 BREAST TOMOSYNTHESIS BI: CPT | Mod: TC,HCNC,PO

## 2022-03-07 PROCEDURE — 1160F PR REVIEW ALL MEDS BY PRESCRIBER/CLIN PHARMACIST DOCUMENTED: ICD-10-PCS | Mod: HCNC,CPTII,S$GLB, | Performed by: PHYSICAL MEDICINE & REHABILITATION

## 2022-03-07 PROCEDURE — 77080 DXA BONE DENSITY AXIAL: CPT | Mod: 26,HCNC,, | Performed by: RADIOLOGY

## 2022-03-07 PROCEDURE — 1160F RVW MEDS BY RX/DR IN RCRD: CPT | Mod: HCNC,CPTII,S$GLB, | Performed by: PHYSICAL MEDICINE & REHABILITATION

## 2022-03-07 PROCEDURE — 1159F PR MEDICATION LIST DOCUMENTED IN MEDICAL RECORD: ICD-10-PCS | Mod: HCNC,CPTII,S$GLB, | Performed by: PHYSICAL MEDICINE & REHABILITATION

## 2022-03-07 PROCEDURE — 73562 XR KNEE ORTHO LEFT WITH FLEXION: ICD-10-PCS | Mod: 26,HCNC,RT, | Performed by: RADIOLOGY

## 2022-03-07 PROCEDURE — 99999 PR PBB SHADOW E&M-EST. PATIENT-LVL V: ICD-10-PCS | Mod: PBBFAC,HCNC,, | Performed by: PHYSICAL MEDICINE & REHABILITATION

## 2022-03-07 PROCEDURE — 1125F PR PAIN SEVERITY QUANTIFIED, PAIN PRESENT: ICD-10-PCS | Mod: HCNC,CPTII,S$GLB, | Performed by: PHYSICAL MEDICINE & REHABILITATION

## 2022-03-07 PROCEDURE — 73562 X-RAY EXAM OF KNEE 3: CPT | Mod: 59,TC,HCNC,FY,PO,RT

## 2022-03-07 PROCEDURE — 73564 X-RAY EXAM KNEE 4 OR MORE: CPT | Mod: 26,HCNC,LT, | Performed by: RADIOLOGY

## 2022-03-07 PROCEDURE — 77080 DEXA BONE DENSITY SPINE HIP: ICD-10-PCS | Mod: 26,HCNC,, | Performed by: RADIOLOGY

## 2022-03-07 PROCEDURE — 77063 BREAST TOMOSYNTHESIS BI: CPT | Mod: 26,HCNC,, | Performed by: RADIOLOGY

## 2022-03-07 PROCEDURE — 99204 OFFICE O/P NEW MOD 45 MIN: CPT | Mod: HCNC,S$GLB,, | Performed by: PHYSICAL MEDICINE & REHABILITATION

## 2022-03-07 PROCEDURE — 77067 MAMMO DIGITAL SCREENING BILAT WITH TOMO: ICD-10-PCS | Mod: 26,HCNC,, | Performed by: RADIOLOGY

## 2022-03-07 PROCEDURE — 99999 PR PBB SHADOW E&M-EST. PATIENT-LVL V: CPT | Mod: PBBFAC,HCNC,, | Performed by: PHYSICAL MEDICINE & REHABILITATION

## 2022-03-07 PROCEDURE — 73564 XR KNEE ORTHO LEFT WITH FLEXION: ICD-10-PCS | Mod: 26,HCNC,LT, | Performed by: RADIOLOGY

## 2022-03-07 PROCEDURE — 73562 X-RAY EXAM OF KNEE 3: CPT | Mod: 26,HCNC,RT, | Performed by: RADIOLOGY

## 2022-03-07 PROCEDURE — 77080 DXA BONE DENSITY AXIAL: CPT | Mod: TC,HCNC,PO

## 2022-03-07 NOTE — PROGRESS NOTES
[facility]    Consulting Provider: Dr. Darian Arango    CHIEF COMPLAINT:   Chief Complaint   Patient presents with    Knee Pain     Left knee pain      HISTORY OF PRESENT ILLNESS: Madison Long is a 76 y.o. female who presents to me for the 1st time for evaluation and treatment of left knee pain.  She has had chronic pain in the left knee for multiple years but increasing over the past 1 year without trauma or injury.  She rates her pain as a 2 on a scale of 1-10.  She denies any swelling of the left knee.  She notes occasional sensations of the knee giving out.  She has had no prior injections of the left knee.  She seems to have consistently increased pain at nighttime.  No prior surgeries to the left knee.    Review of Systems   Constitutional: Negative for fever.   HENT: Negative for drooling.    Eyes: Negative for discharge.   Respiratory: Negative for choking.    Cardiovascular: Negative for chest pain.   Genitourinary: Negative for flank pain.   Skin: Negative for wound.   Allergic/Immunologic: Negative for immunocompromised state.   Neurological: Negative for tremors and syncope.   Psychiatric/Behavioral: Negative for behavioral problems.     Past Medical History:   Past Medical History:   Diagnosis Date    Allergy     Asthma     Basal cell carcinoma 2013     right cheek (cutaneous lateral lower eyelid) MOHS Dr Hooper     BCC (basal cell carcinoma)     COPD (chronic obstructive pulmonary disease)     GERD (gastroesophageal reflux disease)     Hyperlipidemia     Hypertension     Retinal detachment        Past Surgical History:   Past Surgical History:   Procedure Laterality Date    BASAL CELL CARCINOMA EXCISION      BREAST CYST EXCISION Left     long time ago, ben[place]    CATARACT EXTRACTION      COLONOSCOPY  2013    COLONOSCOPY N/A 8/1/2018    Procedure: COLONOSCOPY;  Surgeon: Dung Durant MD;  Location: [facility];  Service: Endoscopy;  Laterality: N/A;    EYE  SURGERY      cataracts & retinea detachment    HYSTERECTOMY      REPAIR OF RETINAL DETACHMENT WITH VITRECTOMY Right 3/27/2019    Procedure: REPAIR, RETINAL DETACHMENT, WITH VITRECTOMY;  Surgeon: JANEL Chaves MD;  Location: [facility] OR [address] [place] [address];  Service: Ophthalmology;  Laterality: Right;  LMA  60 min, Endo laser     UPPER GASTROINTESTINAL ENDOSCOPY         Family History:   Family History   Problem Relation Age of Onset    Asthma Mother     Stroke Father 77        cva    Colon cancer Neg Hx     Colon polyps Neg Hx     Celiac disease Neg Hx     Esophageal cancer Neg Hx     Stomach cancer Neg Hx     Irritable bowel syndrome Neg Hx     Inflammatory bowel disease Neg Hx        Medications:   Current Outpatient Medications on File Prior to Visit   Medication Sig Dispense Refill    acetaminophen (TYLENOL) 500 MG tablet Take 500 mg by mouth every 6 (six) hours as needed for Pain.      albuterol (PROVENTIL/VENTOLIN HFA) 90 mcg/actuation inhaler Inhale 2 puffs into the lungs every 6 (six) hours as needed. Rescue 18 g 3    aspirin 81 MG Chew Take 81 mg by mouth daily as needed. Usually takes about every 2 weeks.      betamethasone dipropionate (DIPROLENE) 0.05 % cream Apply topically 2 (two) times daily. Use for no more than 2 weeks 45 g 0    budesonide-formoterol 160-4.5 mcg (SYMBICORT) 160-4.5 mcg/actuation HFAA INHALE BY MOUTH 2 PUFFS INTO THE LUNGS TWICE DAILY 30.6 g 2    cetirizine (ZYRTEC) 10 MG tablet Take 1 tablet (10 mg total) by mouth once daily. 7 tablet 0    clindamycin (CLEOCIN) 300 MG capsule       dorzolamide-timolol, PF, (COSOPT PF) 2-0.5 % Dpet ophthalmic solution       econazole nitrate 1 % cream Apply topically 2 (two) times daily as needed. Twice a day  PRN      estradioL (ESTRACE) 0.01 % (0.1 mg/gram) vaginal cream Place 1 g vaginally daily as needed. 42.5 g 3    famotidine (PEPCID) 40 MG tablet Take 1 tablet (40 mg total) by mouth once daily. 7 tablet 0    fish oil-omega-3  fatty acids 300-1,000 mg capsule Take 1 g by mouth every morning.       fluconazole (DIFLUCAN) 150 MG Tab 1 po daily for 3 days 3 tablet 1    fluorouracil (EFUDEX) 5 % cream Apply topically 2 (two) times daily. 40 g 3    fluorouraciL (EFUDEX) 5 % cream Apply topically 2 (two) times daily. 40 g 4    FLUoxetine 10 MG capsule TAKE 1 CAPSULE(10 MG) BY MOUTH EVERY DAY 90 capsule 3    fluticasone (FLONASE) 50 mcg/actuation nasal spray SPRAY 2 SPRAYS IN EACH NOSTRIL EVERY DAY 1 Bottle 6    guaifenesin (MUCINEX) 600 mg 12 hr tablet Take 1,200 mg by mouth 2 (two) times daily as needed.       ibuprofen/diphenhydramine cit (ADVIL PM ORAL) Take by mouth.      losartan (COZAAR) 50 MG tablet TAKE 1 TABLET(50 MG) BY MOUTH EVERY MORNING 90 tablet 3    multivit,stress formula-zinc Tab Take 1 tablet by mouth every morning. Uses Alive      mupirocin (BACTROBAN) 2 % ointment Apply topically 2 (two) times daily. 22 g 0    pseudoephedrine (SUDAFED) 30 MG tablet Take 30 mg by mouth every 4 (four) hours as needed for Congestion.      rosuvastatin (CRESTOR) 40 MG Tab Take 1 tablet (40 mg total) by mouth once daily.      sodium chloride (OCEAN) 0.65 % nasal spray 1 spray by Nasal route as needed for Congestion.      tiZANidine (ZANAFLEX) 4 MG tablet Take 1 tablet (4 mg total) by mouth nightly as needed (muscle spasms/ pain). 40 tablet 0    traZODone (DESYREL) 50 MG tablet TAKE 1 TABLET(50 MG) BY MOUTH EVERY NIGHT AS NEEDED FOR INSOMNIA. MAY TAKE UP TO 2 TABLET EVERY NIGHT AS NEEDED 180 tablet 3    triamcinolone acetonide 0.1% (KENALOG) 0.1 % cream Apply topically 2 (two) times daily. 80 g 0    zinc gluconate (COLD-EEZE ORAL) Take 1 lozenge by mouth daily as needed.      azelastine (ASTELIN) 137 mcg (0.1 %) nasal spray 1 spray (137 mcg total) by Nasal route 2 (two) times daily. for 14 days 90 mL 3    dorzolamide-timolol 2-0.5% (COSOPT) 22.3-6.8 mg/mL ophthalmic solution Place 1 drop into the right eye 2 (two) times daily.  "(Patient not taking: Reported on 2/15/2022) 10 mL 12    omeprazole (PRILOSEC OTC) 20 MG tablet Take 1 tablet (20 mg total) by mouth every morning. Take 30 minutes before your first protein containing meal. 30 tablet 11     No current facility-administered medications on file prior to visit.       Allergies:   Review of patient's allergies indicates:   Allergen Reactions    Penicillins Hives    Penicillin Hives    Ciprofloxacin      Muscle weakness and pain    Doxycycline Other (See Comments)     unknown    Oxycodone Anxiety       Social History:   Social History     Socioeconomic History    Marital status:     Number of children: 0   Occupational History     Employer: [employer]   Tobacco Use    Smoking status: Never Smoker    Smokeless tobacco: Never Used   Substance and Sexual Activity    Alcohol use: Yes     Alcohol/week: 2.0 standard drinks     Types: 2 Glasses of wine per week     Comment: socially    Drug use: No     Madison is retired.    PHYSICAL EXAMINATION:   General    Vitals:    03/07/22 1051   Resp: 16   Weight: 78 kg (172 lb)   Height: 5' 6" (1.676 m)     Constitutional: Oriented to person, place, and time. No apparent distress. Pleasant.  HENT:   Head: Normocephalic and atraumatic.   Eyes: Right eye exhibits no discharge. Left eye exhibits no discharge. No scleral icterus.   Pulmonary/Chest: Effort normal. No respiratory distress.   Abdominal: There is no guarding.   Neurological: Alert and oriented to person, place, and time.   Psychiatric: Behavior is normal.   Left Knee Exam     Tenderness   The patient is experiencing tenderness in the lateral joint line.    Range of Motion   Extension: 0   Flexion: 130     Tests   Varus: negative Valgus: negative  Lachman:  Anterior - negative      Patellar apprehension: negative    Other   Erythema: absent  Scars: absent  Sensation: normal  Pulse: present  Swelling: none  Effusion: no effusion present        INSPECTION: There is no swelling, " "ecchymoses, erythema or gross deformity of the left knee.  GAIT/DYNAMIC: Preserved.    Imaging  Xray knees: Both knees demonstrate severe lateral compartment joint space loss upon flexion, along with bulky marginal osteophyte formation.  No left knee joint effusion.    Data Reviewed: X-ray    Supportive Actions: Independent visualization of images or test specimens    ASSESSMENT:   1. Chronic pain of left knee    2. Primary osteoarthritis of left knee      PLAN:     1. Time was spent reviewing the above diagnosis in depth with Madison today, including acute management and rehabilitation.     2. We discussed her knee pain is secondary to advanced degenerative arthrosis of the knees, worse on the left.  She has not tried any conservative options for her issue we discussed several.  We covered injection of corticosteroid, hyaluronic acid, platelet rich plasma, genicular radiofrequency ablation, and stem-cell.  After discussion she wishes to try injection of hyaluronic acid in order to improve intra-articular health, reduce inflammation, and improve her overall function.    3. She was issued a home exercise program for her knees today.      4. RTC in 2 weeks for injection of hyaluronic acid to left knee after insurance prior authorization.    This is a consult from Dr. Darian Arango. Please see the "Communications" section of Epic to see how the consulting physician received the report of today's findings and recommendations. If it's an [facility] physician, it will be forwarded to his/her "in basket".    The above note was completed, in part, with the aid of Dragon dictation software/hardware. Translation errors may be present.      "

## 2022-03-10 DIAGNOSIS — H91.90 HEARING DIFFICULTY, UNSPECIFIED LATERALITY: Primary | ICD-10-CM

## 2022-03-11 ENCOUNTER — OFFICE VISIT (OUTPATIENT)
Dept: OBSTETRICS AND GYNECOLOGY | Facility: CLINIC | Age: 77
End: 2022-03-11
Payer: MEDICARE

## 2022-03-11 VITALS
BODY MASS INDEX: 27.28 KG/M2 | WEIGHT: 169.75 LBS | HEIGHT: 66 IN | DIASTOLIC BLOOD PRESSURE: 88 MMHG | SYSTOLIC BLOOD PRESSURE: 122 MMHG

## 2022-03-11 DIAGNOSIS — N95.2 VAGINAL ATROPHY: Primary | ICD-10-CM

## 2022-03-11 DIAGNOSIS — M81.0 AGE-RELATED OSTEOPOROSIS WITHOUT CURRENT PATHOLOGICAL FRACTURE: Primary | ICD-10-CM

## 2022-03-11 DIAGNOSIS — N89.8 VAGINA ITCHING: ICD-10-CM

## 2022-03-11 PROCEDURE — 1159F PR MEDICATION LIST DOCUMENTED IN MEDICAL RECORD: ICD-10-PCS | Mod: HCNC,CPTII,S$GLB, | Performed by: OBSTETRICS & GYNECOLOGY

## 2022-03-11 PROCEDURE — 3074F SYST BP LT 130 MM HG: CPT | Mod: HCNC,CPTII,S$GLB, | Performed by: OBSTETRICS & GYNECOLOGY

## 2022-03-11 PROCEDURE — 1126F PR PAIN SEVERITY QUANTIFIED, NO PAIN PRESENT: ICD-10-PCS | Mod: HCNC,CPTII,S$GLB, | Performed by: OBSTETRICS & GYNECOLOGY

## 2022-03-11 PROCEDURE — 1157F ADVNC CARE PLAN IN RCRD: CPT | Mod: HCNC,CPTII,S$GLB, | Performed by: OBSTETRICS & GYNECOLOGY

## 2022-03-11 PROCEDURE — 1159F MED LIST DOCD IN RCRD: CPT | Mod: HCNC,CPTII,S$GLB, | Performed by: OBSTETRICS & GYNECOLOGY

## 2022-03-11 PROCEDURE — 3079F PR MOST RECENT DIASTOLIC BLOOD PRESSURE 80-89 MM HG: ICD-10-PCS | Mod: HCNC,CPTII,S$GLB, | Performed by: OBSTETRICS & GYNECOLOGY

## 2022-03-11 PROCEDURE — 3079F DIAST BP 80-89 MM HG: CPT | Mod: HCNC,CPTII,S$GLB, | Performed by: OBSTETRICS & GYNECOLOGY

## 2022-03-11 PROCEDURE — 3288F PR FALLS RISK ASSESSMENT DOCUMENTED: ICD-10-PCS | Mod: HCNC,CPTII,S$GLB, | Performed by: OBSTETRICS & GYNECOLOGY

## 2022-03-11 PROCEDURE — 99999 PR PBB SHADOW E&M-EST. PATIENT-LVL IV: CPT | Mod: PBBFAC,HCNC,, | Performed by: OBSTETRICS & GYNECOLOGY

## 2022-03-11 PROCEDURE — 1157F PR ADVANCE CARE PLAN OR EQUIV PRESENT IN MEDICAL RECORD: ICD-10-PCS | Mod: HCNC,CPTII,S$GLB, | Performed by: OBSTETRICS & GYNECOLOGY

## 2022-03-11 PROCEDURE — 99203 OFFICE O/P NEW LOW 30 MIN: CPT | Mod: HCNC,S$GLB,, | Performed by: OBSTETRICS & GYNECOLOGY

## 2022-03-11 PROCEDURE — 99999 PR PBB SHADOW E&M-EST. PATIENT-LVL IV: ICD-10-PCS | Mod: PBBFAC,HCNC,, | Performed by: OBSTETRICS & GYNECOLOGY

## 2022-03-11 PROCEDURE — 3288F FALL RISK ASSESSMENT DOCD: CPT | Mod: HCNC,CPTII,S$GLB, | Performed by: OBSTETRICS & GYNECOLOGY

## 2022-03-11 PROCEDURE — 87481 CANDIDA DNA AMP PROBE: CPT | Mod: 59 | Performed by: OBSTETRICS & GYNECOLOGY

## 2022-03-11 PROCEDURE — 1126F AMNT PAIN NOTED NONE PRSNT: CPT | Mod: HCNC,CPTII,S$GLB, | Performed by: OBSTETRICS & GYNECOLOGY

## 2022-03-11 PROCEDURE — 1101F PR PT FALLS ASSESS DOC 0-1 FALLS W/OUT INJ PAST YR: ICD-10-PCS | Mod: HCNC,CPTII,S$GLB, | Performed by: OBSTETRICS & GYNECOLOGY

## 2022-03-11 PROCEDURE — 1101F PT FALLS ASSESS-DOCD LE1/YR: CPT | Mod: HCNC,CPTII,S$GLB, | Performed by: OBSTETRICS & GYNECOLOGY

## 2022-03-11 PROCEDURE — 3074F PR MOST RECENT SYSTOLIC BLOOD PRESSURE < 130 MM HG: ICD-10-PCS | Mod: HCNC,CPTII,S$GLB, | Performed by: OBSTETRICS & GYNECOLOGY

## 2022-03-11 PROCEDURE — 99203 PR OFFICE/OUTPT VISIT, NEW, LEVL III, 30-44 MIN: ICD-10-PCS | Mod: HCNC,S$GLB,, | Performed by: OBSTETRICS & GYNECOLOGY

## 2022-03-11 RX ORDER — ALENDRONATE SODIUM 70 MG/1
70 TABLET ORAL
Qty: 4 TABLET | Refills: 11 | Status: SHIPPED | OUTPATIENT
Start: 2022-03-11 | End: 2022-06-30

## 2022-03-11 RX ORDER — ESTRADIOL 0.1 MG/G
CREAM VAGINAL
Qty: 42.5 G | Refills: 1 | Status: SHIPPED | OUTPATIENT
Start: 2022-03-11 | End: 2022-06-30

## 2022-03-11 NOTE — PROGRESS NOTES
"Chief Complaint   Patient presents with    Recurrent yeast infections     3 within a year, currently have one    Vulvar Itch       History of Present Illness   76 y.o. F patient presents today for recurrent yeast infections.     C/o recurrent yeast infection  Symptoms: vaginal itching & burning  No discharge  Denies testing to confirm a yeast infection  About 5yr ago she was getting a yeast infection about once a year  In the last year, she thinks she has had three yeast infections  Using Vagicaine, Monistat, Replens vaginal OTC    C/o dry skin in the past  Previously using estrace cream, haven't used in a year due to cost    Not sexually active  Denies diabetes    Past medical and surgical history reviewed.   I have reviewed the patient's medical history in detail and updated the computerized patient record.  Review of patient's allergies indicates:   Allergen Reactions    Penicillins Hives    Penicillin Hives    Ciprofloxacin      Muscle weakness and pain    Doxycycline Other (See Comments)     unknown    Oxycodone Anxiety     Review of Systems  Constitutional: negative for chills and fatigue  Gastrointestinal: negative for abdominal pain, constipation, diarrhea, nausea and vomiting  Genitourinary:negative for abnormal menstrual periods, and vaginal discharge, dysuria, frequency and hematuria    Physical Examination:  /88   Ht 5' 6" (1.676 m)   Wt 77 kg (169 lb 12.1 oz)   BMI 27.40 kg/m²    Physical Exam:   Constitutional: She appears well-developed and well-nourished. No distress.             Abdominal: Soft. There is no abdominal tenderness.     Genitourinary:    Inguinal canal, right adnexa and left adnexa normal.   The external female genitalia was normal.   Labial bartholins normal.Vaginal atrophy noted. Cervix is absent.Uterus is absent.                     Assessment/Plan:  Vaginal atrophy  -     estradioL (ESTRACE) 0.01 % (0.1 mg/gram) vaginal cream; Place 2 g vaginally every evening for 14 " days, THEN 1 g every evening for 14 days, THEN 1 g twice a week for 14 days.  Dispense: 42.5 g; Refill: 1    Vagina itching  -     Ambulatory referral/consult to Gynecology  -     Vaginosis Screen by DNA Probe      Consider steroid or Biopsy if no improvement.   Fu 3 months.

## 2022-03-14 ENCOUNTER — CLINICAL SUPPORT (OUTPATIENT)
Dept: AUDIOLOGY | Facility: CLINIC | Age: 77
End: 2022-03-14
Payer: MEDICARE

## 2022-03-14 ENCOUNTER — OFFICE VISIT (OUTPATIENT)
Dept: OTOLARYNGOLOGY | Facility: CLINIC | Age: 77
End: 2022-03-14
Payer: MEDICARE

## 2022-03-14 VITALS
DIASTOLIC BLOOD PRESSURE: 81 MMHG | WEIGHT: 169.75 LBS | TEMPERATURE: 99 F | SYSTOLIC BLOOD PRESSURE: 139 MMHG | HEIGHT: 66 IN | BODY MASS INDEX: 27.28 KG/M2

## 2022-03-14 DIAGNOSIS — H61.23 BILATERAL IMPACTED CERUMEN: ICD-10-CM

## 2022-03-14 DIAGNOSIS — H81.12 BENIGN PAROXYSMAL POSITIONAL VERTIGO, LEFT: ICD-10-CM

## 2022-03-14 DIAGNOSIS — H91.90 HEARING DIFFICULTY, UNSPECIFIED LATERALITY: ICD-10-CM

## 2022-03-14 DIAGNOSIS — H90.3 BILATERAL SENSORINEURAL HEARING LOSS: Primary | ICD-10-CM

## 2022-03-14 DIAGNOSIS — R42 DIZZINESS: ICD-10-CM

## 2022-03-14 DIAGNOSIS — H91.90 MILD HEARING LOSS: Primary | ICD-10-CM

## 2022-03-14 PROCEDURE — 92567 PR TYMPA2METRY: ICD-10-PCS | Mod: S$GLB,,, | Performed by: AUDIOLOGIST

## 2022-03-14 PROCEDURE — 99999 PR PBB SHADOW E&M-EST. PATIENT-LVL IV: CPT | Mod: PBBFAC,,, | Performed by: NURSE PRACTITIONER

## 2022-03-14 PROCEDURE — 92557 COMPREHENSIVE HEARING TEST: CPT | Mod: S$GLB,,, | Performed by: AUDIOLOGIST

## 2022-03-14 PROCEDURE — 99213 OFFICE O/P EST LOW 20 MIN: CPT | Mod: 25,S$GLB,, | Performed by: NURSE PRACTITIONER

## 2022-03-14 PROCEDURE — 1157F PR ADVANCE CARE PLAN OR EQUIV PRESENT IN MEDICAL RECORD: ICD-10-PCS | Mod: CPTII,S$GLB,, | Performed by: NURSE PRACTITIONER

## 2022-03-14 PROCEDURE — 3079F PR MOST RECENT DIASTOLIC BLOOD PRESSURE 80-89 MM HG: ICD-10-PCS | Mod: CPTII,S$GLB,, | Performed by: NURSE PRACTITIONER

## 2022-03-14 PROCEDURE — 3288F PR FALLS RISK ASSESSMENT DOCUMENTED: ICD-10-PCS | Mod: CPTII,S$GLB,, | Performed by: NURSE PRACTITIONER

## 2022-03-14 PROCEDURE — 3079F DIAST BP 80-89 MM HG: CPT | Mod: CPTII,S$GLB,, | Performed by: NURSE PRACTITIONER

## 2022-03-14 PROCEDURE — 99999 PR PBB SHADOW E&M-EST. PATIENT-LVL II: ICD-10-PCS | Mod: PBBFAC,,,

## 2022-03-14 PROCEDURE — 69210 REMOVE IMPACTED EAR WAX UNI: CPT | Mod: S$GLB,,, | Performed by: NURSE PRACTITIONER

## 2022-03-14 PROCEDURE — 92567 TYMPANOMETRY: CPT | Mod: S$GLB,,, | Performed by: AUDIOLOGIST

## 2022-03-14 PROCEDURE — 92557 PR COMPREHENSIVE HEARING TEST: ICD-10-PCS | Mod: S$GLB,,, | Performed by: AUDIOLOGIST

## 2022-03-14 PROCEDURE — 1101F PT FALLS ASSESS-DOCD LE1/YR: CPT | Mod: CPTII,S$GLB,, | Performed by: NURSE PRACTITIONER

## 2022-03-14 PROCEDURE — 99999 PR PBB SHADOW E&M-EST. PATIENT-LVL II: CPT | Mod: PBBFAC,,,

## 2022-03-14 PROCEDURE — 95992 PR CANALITH REPOSITIONING PROCEDURE, PER DAY: ICD-10-PCS | Mod: S$GLB,,, | Performed by: NURSE PRACTITIONER

## 2022-03-14 PROCEDURE — 3288F FALL RISK ASSESSMENT DOCD: CPT | Mod: CPTII,S$GLB,, | Performed by: NURSE PRACTITIONER

## 2022-03-14 PROCEDURE — 1101F PR PT FALLS ASSESS DOC 0-1 FALLS W/OUT INJ PAST YR: ICD-10-PCS | Mod: CPTII,S$GLB,, | Performed by: NURSE PRACTITIONER

## 2022-03-14 PROCEDURE — 69210 PR REMOVAL IMPACTED CERUMEN REQUIRING INSTRUMENTATION, UNILATERAL: ICD-10-PCS | Mod: S$GLB,,, | Performed by: NURSE PRACTITIONER

## 2022-03-14 PROCEDURE — 3075F PR MOST RECENT SYSTOLIC BLOOD PRESS GE 130-139MM HG: ICD-10-PCS | Mod: CPTII,S$GLB,, | Performed by: NURSE PRACTITIONER

## 2022-03-14 PROCEDURE — 1159F PR MEDICATION LIST DOCUMENTED IN MEDICAL RECORD: ICD-10-PCS | Mod: CPTII,S$GLB,, | Performed by: NURSE PRACTITIONER

## 2022-03-14 PROCEDURE — 95992 CANALITH REPOSITIONING PROC: CPT | Mod: S$GLB,,, | Performed by: NURSE PRACTITIONER

## 2022-03-14 PROCEDURE — 3075F SYST BP GE 130 - 139MM HG: CPT | Mod: CPTII,S$GLB,, | Performed by: NURSE PRACTITIONER

## 2022-03-14 PROCEDURE — 1159F MED LIST DOCD IN RCRD: CPT | Mod: CPTII,S$GLB,, | Performed by: NURSE PRACTITIONER

## 2022-03-14 PROCEDURE — 99999 PR PBB SHADOW E&M-EST. PATIENT-LVL IV: ICD-10-PCS | Mod: PBBFAC,,, | Performed by: NURSE PRACTITIONER

## 2022-03-14 PROCEDURE — 1157F ADVNC CARE PLAN IN RCRD: CPT | Mod: CPTII,S$GLB,, | Performed by: NURSE PRACTITIONER

## 2022-03-14 PROCEDURE — 99213 PR OFFICE/OUTPT VISIT, EST, LEVL III, 20-29 MIN: ICD-10-PCS | Mod: 25,S$GLB,, | Performed by: NURSE PRACTITIONER

## 2022-03-14 NOTE — PROGRESS NOTES
"Subjective:       Patient ID: Madison Long is a 76 y.o. female.    Chief Complaint: Dizziness    HPI   Patient is new to ENT, referred by Dr. Arango for consultation for dizziness. Patient reports she loses her balance easily, has almost fallen d/t imbalance. Feels dizziness during exertion and with doing housework/yardwork. This has been intermittent X 2 years. She endorses a "shifting" feeling in her head upon getting up in the morning that she describes as sandpaper.     Review of Systems   Constitutional: Negative.    HENT: Negative.    Eyes: Negative.    Respiratory: Negative.    Cardiovascular: Negative.    Gastrointestinal: Negative.    Musculoskeletal: Negative.    Integumentary:  Negative.   Neurological: Positive for dizziness.   Hematological: Negative.    Psychiatric/Behavioral: Negative.          Objective:      Physical Exam  Vitals and nursing note reviewed.   Constitutional:       General: She is not in acute distress.     Appearance: She is well-developed. She is not ill-appearing or diaphoretic.   HENT:      Head: Normocephalic and atraumatic.      Right Ear: Hearing, tympanic membrane, ear canal and external ear normal. No middle ear effusion. Tympanic membrane is not erythematous.      Left Ear: Hearing, tympanic membrane, ear canal and external ear normal.  No middle ear effusion. Tympanic membrane is not erythematous.      Nose: Nose normal.      Mouth/Throat:      Pharynx: Uvula midline.   Eyes:      General: Lids are normal. No scleral icterus.        Right eye: No discharge.         Left eye: No discharge.   Neck:      Trachea: Trachea normal. No tracheal deviation.   Cardiovascular:      Rate and Rhythm: Normal rate.   Pulmonary:      Effort: Pulmonary effort is normal. No respiratory distress.      Breath sounds: No stridor. No wheezing.   Musculoskeletal:         General: Normal range of motion.      Cervical back: Normal range of motion and neck supple.   Skin:     General: Skin is " warm and dry.      Coloration: Skin is not pale.   Neurological:      Mental Status: She is alert and oriented to person, place, and time.      Coordination: Coordination normal.      Gait: Gait normal.   Psychiatric:         Speech: Speech normal.         Behavior: Behavior normal. Behavior is cooperative.         Thought Content: Thought content normal.         Judgment: Judgment normal.       SEPARATE PROCEDURE IN OFFICE:   Procedure: Removal of impacted cerumen, bilateral   Pre Procedure Diagnosis: Cerumen Impaction   Post Procedure Diagnosis: Cerumen Impaction   Verbal informed consent in regards to risk of trauma to ear canal, ear drum or hearing, discomfort during procedure and/or inability to remove cerumen impaction in one session or unforeseen events or complications.   No anesthesia.     Procedure in detail:   Ear canal visualized bilateral with appropriate size ear speculum utilizing Operating Head Binocular Otomicroscope   Utilizing the following:   delicate alligator forceps used AD,  suction cannula was used AS. The impacted cerumen of the ear canals was removed atraumatically. The TM and EAC were then inspected and found to be clear of wax. See description of TMs/EACs in PE above.   Complications: No   Condition: Improved/Good        Mildly positive Inchelium-Hallpike HHL  Negative Inchelium-Hallpike HHR     Assessment:       Problem List Items Addressed This Visit    None     Visit Diagnoses     Mild hearing loss    -  Primary    Benign paroxysmal positional vertigo, left        Bilateral impacted cerumen              Plan:     Pt needs BPPV f/u in one week with HARJEET Davis    Patient encouraged to return to clinic if symptoms worsen/persist and as needed for further ENT symptoms or concerns.

## 2022-03-15 ENCOUNTER — TELEPHONE (OUTPATIENT)
Dept: AUDIOLOGY | Facility: CLINIC | Age: 77
End: 2022-03-15
Payer: MEDICARE

## 2022-03-15 LAB
BACTERIAL VAGINOSIS DNA: NEGATIVE
CANDIDA GLABRATA DNA: NEGATIVE
CANDIDA KRUSEI DNA: NEGATIVE
CANDIDA RRNA VAG QL PROBE: NEGATIVE
T VAGINALIS RRNA GENITAL QL PROBE: NEGATIVE

## 2022-03-15 NOTE — TELEPHONE ENCOUNTER
----- Message from Trinh Mendez NP sent at 3/14/2022 11:41 AM CDT -----  Pt needs BPPV f/u in one week please

## 2022-03-15 NOTE — TELEPHONE ENCOUNTER
Scheduled pt for BPPV f/u on 3/29/22. Confirmed date, time and location of appts. Understanding voiced.

## 2022-03-23 ENCOUNTER — OFFICE VISIT (OUTPATIENT)
Dept: PHYSICAL MEDICINE AND REHAB | Facility: CLINIC | Age: 77
End: 2022-03-23
Payer: MEDICARE

## 2022-03-23 VITALS — WEIGHT: 169 LBS | RESPIRATION RATE: 16 BRPM | HEIGHT: 66 IN | BODY MASS INDEX: 27.16 KG/M2

## 2022-03-23 DIAGNOSIS — M17.12 PRIMARY OSTEOARTHRITIS OF LEFT KNEE: Primary | ICD-10-CM

## 2022-03-23 PROCEDURE — 20611 DRAIN/INJ JOINT/BURSA W/US: CPT | Mod: LT,S$GLB,, | Performed by: PHYSICAL MEDICINE & REHABILITATION

## 2022-03-23 PROCEDURE — 1157F ADVNC CARE PLAN IN RCRD: CPT | Mod: CPTII,S$GLB,, | Performed by: PHYSICAL MEDICINE & REHABILITATION

## 2022-03-23 PROCEDURE — 3288F PR FALLS RISK ASSESSMENT DOCUMENTED: ICD-10-PCS | Mod: CPTII,S$GLB,, | Performed by: PHYSICAL MEDICINE & REHABILITATION

## 2022-03-23 PROCEDURE — 99999 PR PBB SHADOW E&M-EST. PATIENT-LVL IV: ICD-10-PCS | Mod: PBBFAC,,, | Performed by: PHYSICAL MEDICINE & REHABILITATION

## 2022-03-23 PROCEDURE — 1159F PR MEDICATION LIST DOCUMENTED IN MEDICAL RECORD: ICD-10-PCS | Mod: CPTII,S$GLB,, | Performed by: PHYSICAL MEDICINE & REHABILITATION

## 2022-03-23 PROCEDURE — 1157F PR ADVANCE CARE PLAN OR EQUIV PRESENT IN MEDICAL RECORD: ICD-10-PCS | Mod: CPTII,S$GLB,, | Performed by: PHYSICAL MEDICINE & REHABILITATION

## 2022-03-23 PROCEDURE — 1160F PR REVIEW ALL MEDS BY PRESCRIBER/CLIN PHARMACIST DOCUMENTED: ICD-10-PCS | Mod: CPTII,S$GLB,, | Performed by: PHYSICAL MEDICINE & REHABILITATION

## 2022-03-23 PROCEDURE — 99999 PR PBB SHADOW E&M-EST. PATIENT-LVL IV: CPT | Mod: PBBFAC,,, | Performed by: PHYSICAL MEDICINE & REHABILITATION

## 2022-03-23 PROCEDURE — 99499 NO LOS: ICD-10-PCS | Mod: S$GLB,,, | Performed by: PHYSICAL MEDICINE & REHABILITATION

## 2022-03-23 PROCEDURE — 3288F FALL RISK ASSESSMENT DOCD: CPT | Mod: CPTII,S$GLB,, | Performed by: PHYSICAL MEDICINE & REHABILITATION

## 2022-03-23 PROCEDURE — 1125F AMNT PAIN NOTED PAIN PRSNT: CPT | Mod: CPTII,S$GLB,, | Performed by: PHYSICAL MEDICINE & REHABILITATION

## 2022-03-23 PROCEDURE — 1160F RVW MEDS BY RX/DR IN RCRD: CPT | Mod: CPTII,S$GLB,, | Performed by: PHYSICAL MEDICINE & REHABILITATION

## 2022-03-23 PROCEDURE — 1125F PR PAIN SEVERITY QUANTIFIED, PAIN PRESENT: ICD-10-PCS | Mod: CPTII,S$GLB,, | Performed by: PHYSICAL MEDICINE & REHABILITATION

## 2022-03-23 PROCEDURE — 1101F PR PT FALLS ASSESS DOC 0-1 FALLS W/OUT INJ PAST YR: ICD-10-PCS | Mod: CPTII,S$GLB,, | Performed by: PHYSICAL MEDICINE & REHABILITATION

## 2022-03-23 PROCEDURE — 20611 LARGE JOINT ASPIRATION/INJECTION: L KNEE: ICD-10-PCS | Mod: LT,S$GLB,, | Performed by: PHYSICAL MEDICINE & REHABILITATION

## 2022-03-23 PROCEDURE — 1101F PT FALLS ASSESS-DOCD LE1/YR: CPT | Mod: CPTII,S$GLB,, | Performed by: PHYSICAL MEDICINE & REHABILITATION

## 2022-03-23 PROCEDURE — 1159F MED LIST DOCD IN RCRD: CPT | Mod: CPTII,S$GLB,, | Performed by: PHYSICAL MEDICINE & REHABILITATION

## 2022-03-23 PROCEDURE — 99499 UNLISTED E&M SERVICE: CPT | Mod: S$GLB,,, | Performed by: PHYSICAL MEDICINE & REHABILITATION

## 2022-03-23 NOTE — PROGRESS NOTES
[facility]    CHIEF COMPLAINT:   Chief Complaint   Patient presents with    Knee Pain     Left knee monovisc      HISTORY OF PRESENT ILLNESS: Madison Long is a 76 y.o. female who presents to me for scheduled injection of hyaluronic acid to the left knee.  She reports persisting knee pain since her last clinic visit.    Prior HPI  She has had chronic pain in the left knee for multiple years but increasing over the past 1 year without trauma or injury.  She rates her pain as a 2 on a scale of 1-10.  She denies any swelling of the left knee.  She notes occasional sensations of the knee giving out.  She has had no prior injections of the left knee.  She seems to have consistently increased pain at nighttime.  No prior surgeries to the left knee.    Review of Systems   Constitutional: Negative for fever.   HENT: Negative for drooling.    Eyes: Negative for discharge.   Respiratory: Negative for choking.    Cardiovascular: Negative for chest pain.   Genitourinary: Negative for flank pain.   Skin: Negative for wound.   Allergic/Immunologic: Negative for immunocompromised state.   Neurological: Negative for tremors and syncope.   Psychiatric/Behavioral: Negative for behavioral problems.     Past Medical History:   Past Medical History:   Diagnosis Date    Allergy     Asthma     Basal cell carcinoma 2013     right cheek (cutaneous lateral lower eyelid) MOHS Dr Hooper     BCC (basal cell carcinoma)     COPD (chronic obstructive pulmonary disease)     GERD (gastroesophageal reflux disease)     Hyperlipidemia     Hypertension     Retinal detachment        Past Surgical History:   Past Surgical History:   Procedure Laterality Date    BASAL CELL CARCINOMA EXCISION      BREAST CYST EXCISION Left     long time ago, bening    CATARACT EXTRACTION      COLONOSCOPY  2013    COLONOSCOPY N/A 8/1/2018    Procedure: COLONOSCOPY;  Surgeon: Dung Durant MD;  Location: [facility];  Service:  Endoscopy;  Laterality: N/A;    EYE SURGERY      cataracts & retinea detachment    HYSTERECTOMY      REPAIR OF RETINAL DETACHMENT WITH VITRECTOMY Right 3/27/2019    Procedure: REPAIR, RETINAL DETACHMENT, WITH VITRECTOMY;  Surgeon: JANEL Chaves MD;  Location: [facility] OR [address] [place] [address];  Service: Ophthalmology;  Laterality: Right;  LMA  60 min, Endo laser     UPPER GASTROINTESTINAL ENDOSCOPY         Family History:   Family History   Problem Relation Age of Onset    Asthma Mother     Stroke Father 77        cva    Colon cancer Neg Hx     Colon polyps Neg Hx     Celiac disease Neg Hx     Esophageal cancer Neg Hx     Stomach cancer Neg Hx     Irritable bowel syndrome Neg Hx     Inflammatory bowel disease Neg Hx        Medications:   Current Outpatient Medications on File Prior to Visit   Medication Sig Dispense Refill    acetaminophen (TYLENOL) 500 MG tablet Take 500 mg by mouth every 6 (six) hours as needed for Pain.      albuterol (PROVENTIL/VENTOLIN HFA) 90 mcg/actuation inhaler Inhale 2 puffs into the lungs every 6 (six) hours as needed. Rescue 18 g 3    alendronate (FOSAMAX) 70 MG tablet Take 1 tablet (70 mg total) by mouth every 7 days. 4 tablet 11    aspirin 81 MG Chew Take 81 mg by mouth daily as needed. Usually takes about every 2 weeks.      betamethasone dipropionate (DIPROLENE) 0.05 % cream Apply topically 2 (two) times daily. Use for no more than 2 weeks 45 g 0    budesonide-formoterol 160-4.5 mcg (SYMBICORT) 160-4.5 mcg/actuation HFAA INHALE BY MOUTH 2 PUFFS INTO THE LUNGS TWICE DAILY 30.6 g 2    cetirizine (ZYRTEC) 10 MG tablet Take 1 tablet (10 mg total) by mouth once daily. 7 tablet 0    clindamycin (CLEOCIN) 300 MG capsule       dorzolamide-timolol, PF, (COSOPT PF) 2-0.5 % Dpet ophthalmic solution       econazole nitrate 1 % cream Apply topically 2 (two) times daily as needed. Twice a day  PRN      estradioL (ESTRACE) 0.01 % (0.1 mg/gram) vaginal cream Place 2 g vaginally every  evening for 14 days, THEN 1 g every evening for 14 days, THEN 1 g twice a week for 14 days. 42.5 g 1    famotidine (PEPCID) 40 MG tablet Take 1 tablet (40 mg total) by mouth once daily. 7 tablet 0    fish oil-omega-3 fatty acids 300-1,000 mg capsule Take 1 g by mouth every morning.       fluconazole (DIFLUCAN) 150 MG Tab 1 po daily for 3 days 3 tablet 1    fluorouracil (EFUDEX) 5 % cream Apply topically 2 (two) times daily. 40 g 3    fluorouraciL (EFUDEX) 5 % cream Apply topically 2 (two) times daily. 40 g 4    FLUoxetine 10 MG capsule TAKE 1 CAPSULE(10 MG) BY MOUTH EVERY DAY 90 capsule 3    fluticasone (FLONASE) 50 mcg/actuation nasal spray SPRAY 2 SPRAYS IN EACH NOSTRIL EVERY DAY 1 Bottle 6    guaifenesin (MUCINEX) 600 mg 12 hr tablet Take 1,200 mg by mouth 2 (two) times daily as needed.       ibuprofen/diphenhydramine cit (ADVIL PM ORAL) Take by mouth.      losartan (COZAAR) 50 MG tablet TAKE 1 TABLET(50 MG) BY MOUTH EVERY MORNING 90 tablet 3    multivit,stress formula-zinc Tab Take 1 tablet by mouth every morning. Uses Alive      mupirocin (BACTROBAN) 2 % ointment Apply topically 2 (two) times daily. 22 g 0    pseudoephedrine (SUDAFED) 30 MG tablet Take 30 mg by mouth every 4 (four) hours as needed for Congestion.      rosuvastatin (CRESTOR) 40 MG Tab Take 1 tablet (40 mg total) by mouth once daily.      sodium chloride (OCEAN) 0.65 % nasal spray 1 spray by Nasal route as needed for Congestion.      tiZANidine (ZANAFLEX) 4 MG tablet Take 1 tablet (4 mg total) by mouth nightly as needed (muscle spasms/ pain). 40 tablet 0    traZODone (DESYREL) 50 MG tablet TAKE 1 TABLET(50 MG) BY MOUTH EVERY NIGHT AS NEEDED FOR INSOMNIA. MAY TAKE UP TO 2 TABLET EVERY NIGHT AS NEEDED 180 tablet 3    triamcinolone acetonide 0.1% (KENALOG) 0.1 % cream Apply topically 2 (two) times daily. 80 g 0    zinc gluconate (COLD-EEZE ORAL) Take 1 lozenge by mouth daily as needed.      azelastine (ASTELIN) 137 mcg (0.1 %)  "nasal spray 1 spray (137 mcg total) by Nasal route 2 (two) times daily. for 14 days 90 mL 3    dorzolamide-timolol 2-0.5% (COSOPT) 22.3-6.8 mg/mL ophthalmic solution Place 1 drop into the right eye 2 (two) times daily. (Patient not taking: Reported on 2/15/2022) 10 mL 12    omeprazole (PRILOSEC OTC) 20 MG tablet Take 1 tablet (20 mg total) by mouth every morning. Take 30 minutes before your first protein containing meal. 30 tablet 11     No current facility-administered medications on file prior to visit.       Allergies:   Review of patient's allergies indicates:   Allergen Reactions    Penicillins Hives    Penicillin Hives    Ciprofloxacin      Muscle weakness and pain    Doxycycline Other (See Comments)     unknown    Oxycodone Anxiety       Social History:   Social History     Socioeconomic History    Marital status:     Number of children: 0   Occupational History     Employer: [employer]   Tobacco Use    Smoking status: Never Smoker    Smokeless tobacco: Never Used   Substance and Sexual Activity    Alcohol use: Yes     Alcohol/week: 2.0 standard drinks     Types: 2 Glasses of wine per week     Comment: socially    Drug use: No    Sexual activity: Not Currently     Madison is retired.    PHYSICAL EXAMINATION:   General    Vitals:    03/23/22 1037   Resp: 16   Weight: 76.7 kg (169 lb)   Height: 5' 6" (1.676 m)     Constitutional: Oriented to person, place, and time. No apparent distress. Pleasant.  HENT:   Head: Normocephalic and atraumatic.   Eyes: Right eye exhibits no discharge. Left eye exhibits no discharge. No scleral icterus.   Pulmonary/Chest: Effort normal. No respiratory distress.   Abdominal: There is no guarding.   Neurological: Alert and oriented to person, place, and time.   Psychiatric: Behavior is normal.   Left Knee Exam     Tenderness   The patient is experiencing tenderness in the lateral joint line.    Range of Motion   Extension: 0   Flexion: 130     Tests   Varus: " negative Valgus: negative  Lachman:  Anterior - negative      Patellar apprehension: negative    Other   Erythema: absent  Scars: absent  Sensation: normal  Pulse: present  Swelling: none  Effusion: no effusion present        INSPECTION: There is no swelling, ecchymoses, erythema or gross deformity of the left knee.  GAIT/DYNAMIC: Preserved.    Imaging  Xray knees: Both knees demonstrate severe lateral compartment joint space loss upon flexion, along with bulky marginal osteophyte formation.  No left knee joint effusion.    Data Reviewed: X-ray    Supportive Actions: Independent visualization of images or test specimens    ASSESSMENT:   1. Primary osteoarthritis of left knee      PLAN:     1. See separate procedure note for ultrasound-guided injection of Monovisc to the left knee.    2. Continue home exercise program.    3. Return to clinic as needed.  I encouraged her to reach out if any questions or issues arise.    The above note was completed, in part, with the aid of Dragon dictation software/hardware. Translation errors may be present.

## 2022-03-23 NOTE — PROCEDURES
Large Joint Aspiration/Injection: L knee    Date/Time: 3/23/2022 10:30 AM  Performed by: Tera Torres MD  Authorized by: Tera Torres MD     Consent Done?:  Yes (Verbal)  Indications:  Arthritis and pain  Site marked: the procedure site was marked    Timeout: prior to procedure the correct patient, procedure, and site was verified    Prep: patient was prepped and draped in usual sterile fashion    Local anesthesia used?: No      Details:  Needle Size:  22 G  Ultrasonic Guidance for needle placement?: Yes    Images are saved and documented.  Approach: in plane, lat to med.  Location:  Knee  Site:  L knee  Medications:  88 mg hyaluronate sodium, stabilized 88 mg/4 mL  Patient tolerance:  Patient tolerated the procedure well with no immediate complications     Ultrasound guidance was used for correct needle placement, the images were saved will be uploaded to EMR.

## 2022-04-26 ENCOUNTER — OFFICE VISIT (OUTPATIENT)
Dept: DERMATOLOGY | Facility: CLINIC | Age: 77
End: 2022-04-26
Payer: MEDICARE

## 2022-04-26 VITALS — HEIGHT: 66 IN | WEIGHT: 169 LBS | BODY MASS INDEX: 27.16 KG/M2

## 2022-04-26 DIAGNOSIS — L85.3 XEROSIS CUTIS: ICD-10-CM

## 2022-04-26 DIAGNOSIS — L57.0 ACTINIC KERATOSES: Primary | ICD-10-CM

## 2022-04-26 DIAGNOSIS — L82.1 SEBORRHEIC KERATOSES: ICD-10-CM

## 2022-04-26 DIAGNOSIS — D18.01 CHERRY ANGIOMA: ICD-10-CM

## 2022-04-26 DIAGNOSIS — L56.5: ICD-10-CM

## 2022-04-26 PROCEDURE — 17003 DESTRUCTION, PREMALIGNANT LESIONS; SECOND THROUGH 14 LESIONS: ICD-10-PCS | Mod: S$GLB,,, | Performed by: DERMATOLOGY

## 2022-04-26 PROCEDURE — 99999 PR PBB SHADOW E&M-EST. PATIENT-LVL V: CPT | Mod: PBBFAC,,, | Performed by: DERMATOLOGY

## 2022-04-26 PROCEDURE — 1101F PT FALLS ASSESS-DOCD LE1/YR: CPT | Mod: CPTII,S$GLB,, | Performed by: DERMATOLOGY

## 2022-04-26 PROCEDURE — 1126F AMNT PAIN NOTED NONE PRSNT: CPT | Mod: CPTII,S$GLB,, | Performed by: DERMATOLOGY

## 2022-04-26 PROCEDURE — 99213 PR OFFICE/OUTPT VISIT, EST, LEVL III, 20-29 MIN: ICD-10-PCS | Mod: 25,S$GLB,, | Performed by: DERMATOLOGY

## 2022-04-26 PROCEDURE — 3288F PR FALLS RISK ASSESSMENT DOCUMENTED: ICD-10-PCS | Mod: CPTII,S$GLB,, | Performed by: DERMATOLOGY

## 2022-04-26 PROCEDURE — 1159F MED LIST DOCD IN RCRD: CPT | Mod: CPTII,S$GLB,, | Performed by: DERMATOLOGY

## 2022-04-26 PROCEDURE — 99213 OFFICE O/P EST LOW 20 MIN: CPT | Mod: 25,S$GLB,, | Performed by: DERMATOLOGY

## 2022-04-26 PROCEDURE — 1101F PR PT FALLS ASSESS DOC 0-1 FALLS W/OUT INJ PAST YR: ICD-10-PCS | Mod: CPTII,S$GLB,, | Performed by: DERMATOLOGY

## 2022-04-26 PROCEDURE — 1126F PR PAIN SEVERITY QUANTIFIED, NO PAIN PRESENT: ICD-10-PCS | Mod: CPTII,S$GLB,, | Performed by: DERMATOLOGY

## 2022-04-26 PROCEDURE — 3288F FALL RISK ASSESSMENT DOCD: CPT | Mod: CPTII,S$GLB,, | Performed by: DERMATOLOGY

## 2022-04-26 PROCEDURE — 17000 DESTRUCT PREMALG LESION: CPT | Mod: S$GLB,,, | Performed by: DERMATOLOGY

## 2022-04-26 PROCEDURE — 99999 PR PBB SHADOW E&M-EST. PATIENT-LVL V: ICD-10-PCS | Mod: PBBFAC,,, | Performed by: DERMATOLOGY

## 2022-04-26 PROCEDURE — 1160F PR REVIEW ALL MEDS BY PRESCRIBER/CLIN PHARMACIST DOCUMENTED: ICD-10-PCS | Mod: CPTII,S$GLB,, | Performed by: DERMATOLOGY

## 2022-04-26 PROCEDURE — 17003 DESTRUCT PREMALG LES 2-14: CPT | Mod: S$GLB,,, | Performed by: DERMATOLOGY

## 2022-04-26 PROCEDURE — 1159F PR MEDICATION LIST DOCUMENTED IN MEDICAL RECORD: ICD-10-PCS | Mod: CPTII,S$GLB,, | Performed by: DERMATOLOGY

## 2022-04-26 PROCEDURE — 1157F ADVNC CARE PLAN IN RCRD: CPT | Mod: CPTII,S$GLB,, | Performed by: DERMATOLOGY

## 2022-04-26 PROCEDURE — 1157F PR ADVANCE CARE PLAN OR EQUIV PRESENT IN MEDICAL RECORD: ICD-10-PCS | Mod: CPTII,S$GLB,, | Performed by: DERMATOLOGY

## 2022-04-26 PROCEDURE — 17000 PR DESTRUCTION(LASER SURGERY,CRYOSURGERY,CHEMOSURGERY),PREMALIGNANT LESIONS,FIRST LESION: ICD-10-PCS | Mod: S$GLB,,, | Performed by: DERMATOLOGY

## 2022-04-26 PROCEDURE — 1160F RVW MEDS BY RX/DR IN RCRD: CPT | Mod: CPTII,S$GLB,, | Performed by: DERMATOLOGY

## 2022-04-26 RX ORDER — AMMONIUM LACTATE 12 G/100G
LOTION TOPICAL
Qty: 396 G | Refills: 5 | Status: SHIPPED | OUTPATIENT
Start: 2022-04-26

## 2022-04-26 NOTE — PROGRESS NOTES
Subjective:       Patient ID:  Madison Long is a 77 y.o. 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  +BCC on right cheek (cutaneous lateral lower eyelid) treated by Mohs (Dr Hooper 2013)  Has fhx of MM-sister      Current Outpatient Medications:     acetaminophen (TYLENOL) 500 MG tablet, Take 500 mg by mouth every 6 (six) hours as needed for Pain., Disp: , Rfl:     albuterol (PROVENTIL/VENTOLIN HFA) 90 mcg/actuation inhaler, Inhale 2 puffs into the lungs every 6 (six) hours as needed. Rescue, Disp: 18 g, Rfl: 3    alendronate (FOSAMAX) 70 MG tablet, Take 1 tablet (70 mg total) by mouth every 7 days., Disp: 4 tablet, Rfl: 11    aspirin 81 MG Chew, Take 81 mg by mouth daily as needed. Usually takes about every 2 weeks., Disp: , Rfl:     budesonide-formoterol 160-4.5 mcg (SYMBICORT) 160-4.5 mcg/actuation HFAA, INHALE BY MOUTH 2 PUFFS INTO THE LUNGS TWICE DAILY, Disp: 30.6 g, Rfl: 2    cetirizine (ZYRTEC) 10 MG tablet, Take 1 tablet (10 mg total) by mouth once daily., Disp: 7 tablet, Rfl: 0    dorzolamide-timolol, PF, (COSOPT PF) 2-0.5 % Dpet ophthalmic solution, , Disp: , Rfl:     econazole nitrate 1 % cream, Apply topically 2 (two) times daily as needed. Twice a day  PRN, Disp: , Rfl:     famotidine (PEPCID) 40 MG tablet, Take 1 tablet (40 mg total) by mouth once daily., Disp: 7 tablet, Rfl: 0    fish oil-omega-3 fatty acids 300-1,000 mg capsule, Take 1 g by mouth every morning. , Disp: , Rfl:     fluorouracil (EFUDEX) 5 % cream, Apply topically 2 (two) times daily., Disp: 40 g, Rfl: 3    FLUoxetine 10 MG capsule, TAKE 1 CAPSULE(10 MG) BY MOUTH EVERY DAY, Disp: 90 capsule, Rfl: 3    fluticasone (FLONASE) 50 mcg/actuation nasal spray, SPRAY 2 SPRAYS IN EACH NOSTRIL EVERY DAY, Disp: 1  Bottle, Rfl: 6    guaifenesin (MUCINEX) 600 mg 12 hr tablet, Take 1,200 mg by mouth 2 (two) times daily as needed. , Disp: , Rfl:     ibuprofen/diphenhydramine cit (ADVIL PM ORAL), Take by mouth., Disp: , Rfl:     losartan (COZAAR) 50 MG tablet, TAKE 1 TABLET(50 MG) BY MOUTH EVERY MORNING, Disp: 90 tablet, Rfl: 3    multivit,stress formula-zinc Tab, Take 1 tablet by mouth every morning. Uses Alive, Disp: , Rfl:     mupirocin (BACTROBAN) 2 % ointment, Apply topically 2 (two) times daily., Disp: 22 g, Rfl: 0    pseudoephedrine (SUDAFED) 30 MG tablet, Take 30 mg by mouth every 4 (four) hours as needed for Congestion., Disp: , Rfl:     rosuvastatin (CRESTOR) 40 MG Tab, Take 1 tablet (40 mg total) by mouth once daily., Disp: , Rfl:     traZODone (DESYREL) 50 MG tablet, TAKE 1 TABLET(50 MG) BY MOUTH EVERY NIGHT AS NEEDED FOR INSOMNIA. MAY TAKE UP TO 2 TABLET EVERY NIGHT AS NEEDED, Disp: 180 tablet, Rfl: 3    triamcinolone acetonide 0.1% (KENALOG) 0.1 % cream, Apply topically 2 (two) times daily., Disp: 80 g, Rfl: 0    azelastine (ASTELIN) 137 mcg (0.1 %) nasal spray, 1 spray (137 mcg total) by Nasal route 2 (two) times daily. for 14 days, Disp: 90 mL, Rfl: 3    betamethasone dipropionate (DIPROLENE) 0.05 % cream, Apply topically 2 (two) times daily. Use for no more than 2 weeks (Patient not taking: Reported on 4/26/2022), Disp: 45 g, Rfl: 0    clindamycin (CLEOCIN) 300 MG capsule, , Disp: , Rfl:     dorzolamide-timolol 2-0.5% (COSOPT) 22.3-6.8 mg/mL ophthalmic solution, Place 1 drop into the right eye 2 (two) times daily. (Patient not taking: Reported on 2/15/2022), Disp: 10 mL, Rfl: 12    estradioL (ESTRACE) 0.01 % (0.1 mg/gram) vaginal cream, Place 2 g vaginally every evening for 14 days, THEN 1 g every evening for 14 days, THEN 1 g twice a week for 14 days., Disp: 42.5 g, Rfl: 1    fluconazole (DIFLUCAN) 150 MG Tab, 1 po daily for 3 days (Patient not taking: Reported on 4/26/2022), Disp: 3 tablet, Rfl:  1    fluorouraciL (EFUDEX) 5 % cream, Apply topically 2 (two) times daily. (Patient not taking: Reported on 4/26/2022), Disp: 40 g, Rfl: 4    omeprazole (PRILOSEC OTC) 20 MG tablet, Take 1 tablet (20 mg total) by mouth every morning. Take 30 minutes before your first protein containing meal., Disp: 30 tablet, Rfl: 11    sodium chloride (OCEAN) 0.65 % nasal spray, 1 spray by Nasal route as needed for Congestion., Disp: , Rfl:     tiZANidine (ZANAFLEX) 4 MG tablet, Take 1 tablet (4 mg total) by mouth nightly as needed (muscle spasms/ pain). (Patient not taking: Reported on 4/26/2022), Disp: 40 tablet, Rfl: 0    zinc gluconate (COLD-EEZE ORAL), Take 1 lozenge by mouth daily as needed., Disp: , Rfl:         Review of Systems   Constitutional: Negative for fever and chills.   HENT: Negative for congestion and sore throat.    Respiratory: Negative for cough and shortness of breath.    Skin: Positive for rash, activity-related sunscreen use and sensitivity to antibiotic ointment. Negative for itching, daily sunscreen use and sensitivity to bandage adhesive.   Hematologic/Lymphatic: Bruises/bleeds easily.        Objective:    Physical Exam   Constitutional: She appears well-developed and well-nourished. No distress.   Neurological: She is alert and oriented to person, place, and time. She is not disoriented.   Psychiatric: She has a normal mood and affect.   Skin:   Areas Examined (abnormalities noted in diagram):   Scalp / Hair Palpated and Inspected  Head / Face Inspection Performed  Neck Inspection Performed  Chest / Axilla Inspection Performed  Abdomen Inspection Performed  Genitals / Buttocks / Groin Inspection Performed  Back Inspection Performed  RUE Inspected  LUE Inspection Performed  RLE Inspected  LLE Inspection Performed                       Diagram Legend     Erythematous scaling macule/papule c/w actinic keratosis       Vascular papule c/w angioma      Pigmented verrucoid papule/plaque c/w seborrheic  keratosis      Yellow umbilicated papule c/w sebaceous hyperplasia      Irregularly shaped tan macule c/w lentigo     1-2 mm smooth white papules consistent with Milia      Movable subcutaneous cyst with punctum c/w epidermal inclusion cyst      Subcutaneous movable cyst c/w pilar cyst      Firm pink to brown papule c/w dermatofibroma      Pedunculated fleshy papule(s) c/w skin tag(s)      Evenly pigmented macule c/w junctional nevus     Mildly variegated pigmented, slightly irregular-bordered macule c/w mildly atypical nevus      Flesh colored to evenly pigmented papule c/w intradermal nevus       Pink pearly papule/plaque c/w basal cell carcinoma      Erythematous hyperkeratotic cursted plaque c/w SCC      Surgical scar with no sign of skin cancer recurrence      Open and closed comedones      Inflammatory papules and pustules      Verrucoid papule consistent consistent with wart     Erythematous eczematous patches and plaques     Dystrophic onycholytic nail with subungual debris c/w onychomycosis     Umbilicated papule    Erythematous-base heme-crusted tan verrucoid plaque consistent with inflamed seborrheic keratosis     Erythematous Silvery Scaling Plaque c/w Psoriasis     See annotation      Assessment / Plan:        Actinic keratoses  -     ammonium lactate (LAC-HYDRIN) 12 % lotion; Apply to dry skin daily to BID  Dispense: 396 g; Refill: 5  Cryosurgery Procedure Note    Verbal consent from the patient is obtained and the patient is aware of the precancerous quality and need for treatment of these lesions. Liquid nitrogen cryosurgery is applied to the 9 actinic keratoses, as detailed in the physical exam, to produce a freeze injury. The patient is aware that blisters may form and is instructed on wound care with gentle cleansing and use of vaseline ointment to keep moist until healed. The patient is supplied a handout on cryosurgery and is instructed to call if lesions do not completely  resolve.    Porokeratosis, superficial disseminated eruptive type, multiple lesions lower leg  Declined cholesterol-statin compounded cream    Seborrheic keratoses  These are benign inherited growths without a malignant potential. Reassurance given to patient. No treatment is necessary.     Cherry angioma  This is a benign vascular lesion. Reassurance given. No treatment required.     Xerosis cutis  -     ammonium lactate (LAC-HYDRIN) 12 % lotion; Apply to dry skin daily to BID  Dispense: 396 g; Refill: 5    Patient instructed in importance in daily broad spectrum sun protection of at least spf 30. Mineral sunscreen ingredients preferred (Zinc +/- Titanium) and can be found OTC.   Recommend Elta MD for daily use on face and neck.  Patient encouraged to wear hat for all outdoor exposure.   Also discussed sun avoidance and use of protective clothing.             Follow up in about 6 months (around 10/26/2022), or if symptoms worsen or fail to improve.

## 2022-04-26 NOTE — PATIENT INSTRUCTIONS

## 2022-04-28 ENCOUNTER — CLINICAL SUPPORT (OUTPATIENT)
Dept: AUDIOLOGY | Facility: CLINIC | Age: 77
End: 2022-04-28
Payer: MEDICARE

## 2022-04-28 DIAGNOSIS — H81.12 BPPV (BENIGN PAROXYSMAL POSITIONAL VERTIGO), LEFT: Primary | ICD-10-CM

## 2022-04-28 PROCEDURE — 92542 PR POSITIONAL NYSTAGMUS TEST: ICD-10-PCS | Mod: S$GLB,,, | Performed by: AUDIOLOGIST

## 2022-04-28 PROCEDURE — 92542 POSITIONAL NYSTAGMUS TEST: CPT | Mod: S$GLB,,, | Performed by: AUDIOLOGIST

## 2022-04-28 NOTE — PROGRESS NOTES
Madison Long was seen 4/28/22 for a BPPV evaluation.    Patient reported that she has been feeling much better though she still has to go slow getting out of bed in the morning.      VAT was negative right and negative left     Head Right Positional was negative - 3 d/s LB nystagmus with fixation  Head Left Positional was negative - 3 d/s LB nystagmus with fixation   Hallpike Right was negative  Hallpike Left was positive - 6 d/s RB nystagmus with 10 d/s UB torsional nystagmus with fixation     A 5-position Epley maneuver was performed for the left ear.  Patient tolerated the maneuver well and was asymptomatic upon discharge.  Post maneuver instructions were given to the patient both verbally and written.  Understanding was voiced.    A follow-up appointment (Uriel) has been scheduled for one week to ensure that BPPV has resolved.    Diagnosis:  PSCC BPPV left

## 2022-05-19 ENCOUNTER — TELEPHONE (OUTPATIENT)
Dept: FAMILY MEDICINE | Facility: CLINIC | Age: 77
End: 2022-05-19
Payer: MEDICARE

## 2022-05-19 DIAGNOSIS — I10 ESSENTIAL HYPERTENSION: ICD-10-CM

## 2022-05-19 RX ORDER — LOSARTAN POTASSIUM 50 MG/1
TABLET ORAL
Qty: 90 TABLET | Refills: 2 | Status: SHIPPED | OUTPATIENT
Start: 2022-05-19 | End: 2023-04-17

## 2022-05-19 NOTE — TELEPHONE ENCOUNTER
Refill Authorization Note   Madison Long  is requesting a refill authorization.  Brief Assessment and Rationale for Refill:  Approve     Medication Therapy Plan:       Medication Reconciliation Completed: No   Comments:     No Care Gaps recommended.     Note composed:12:20 PM 05/19/2022

## 2022-05-19 NOTE — TELEPHONE ENCOUNTER
No new care gaps identified.  [facility] Embedded Care Gaps. Reference number: [id number]. 5/19/2022   12:18:09 PM CDT

## 2022-05-19 NOTE — TELEPHONE ENCOUNTER
----- Message from Dina Hardin sent at 5/19/2022 11:20 AM CDT -----  Type:  RX Refill Request  Who Called: Patient  Refill or New Rx: refill  RX Name and Strength: losartan (COZAAR) 50 MG tablet 90 tablet   How is the patient currently taking it? (ex. 1XDay):    Is this a 30 day or 90 day RX:    Preferred Pharm[facility] with phone number:  [id number] - [facility] [place] - [address] AT [place] [address]   Phone:  [phone]  Fax:  [phone]  Local or Mail Order:  Local  Ordering Provider:  Darian Arango MD  Best Call Back Number:  [phone]  Additional Information: Pt requesting refill on Rx losartan (COZAAR) 50 MG tablet 90 tablet

## 2022-05-29 DIAGNOSIS — E78.5 DYSLIPIDEMIA: ICD-10-CM

## 2022-05-29 RX ORDER — ROSUVASTATIN CALCIUM 20 MG/1
TABLET, COATED ORAL
Qty: 90 TABLET | Refills: 3 | OUTPATIENT
Start: 2022-05-29

## 2022-05-29 NOTE — TELEPHONE ENCOUNTER
No new care gaps identified.  [facility] Embedded Care Gaps. Reference number: [id number]. 5/29/2022   9:42:28 AM GUNNERT

## 2022-05-30 NOTE — TELEPHONE ENCOUNTER
Quick DC. Inappropriate Request    Refill Authorization Note   Madison Long  is requesting a refill authorization.  Brief Assessment and Rationale for Refill:  Quick Discontinue  Medication Therapy Plan:  Rosuvastatin dose increased from 20mg to 40mg on 2/15/2022    Medication Reconciliation Completed:  No      Comments: Pt no longer taking this dosage    Note composed:7:05 PM 05/29/2022

## 2022-06-01 DIAGNOSIS — E78.5 DYSLIPIDEMIA: ICD-10-CM

## 2022-06-01 DIAGNOSIS — I65.23 ATHEROSCLEROSIS OF BOTH CAROTID ARTERIES: ICD-10-CM

## 2022-06-01 RX ORDER — ROSUVASTATIN CALCIUM 40 MG/1
40 TABLET, COATED ORAL DAILY
Refills: 3
Start: 2022-06-01 | End: 2022-06-01 | Stop reason: SDUPTHER

## 2022-06-01 RX ORDER — ROSUVASTATIN CALCIUM 40 MG/1
40 TABLET, COATED ORAL DAILY
Qty: 90 TABLET | Refills: 3 | Status: SHIPPED | OUTPATIENT
Start: 2022-06-01 | End: 2023-07-24

## 2022-06-01 NOTE — TELEPHONE ENCOUNTER
No new care gaps identified.  [facility] Embedded Care Gaps. Reference number: [id number]. 6/01/2022   3:53:54 PM CDT

## 2022-06-01 NOTE — TELEPHONE ENCOUNTER
----- Message from Cinthia Mc sent at 6/1/2022  1:58 PM CDT -----  Type: RX Refill Request    Who Called:JEISON POP [[id number]]    RX Name and Strength:rosuvastatin 20mg    Pref[facility] Pharmacy with phone number:[id number] - [place] - [address] AT [facility] [address] [facility] [address]    Would the patient rather a call back or a response via [facility]?call back    Best Call Back Number:[phone]    Additional Information:Pt would like an call back in regards to rosuvastatin (CRESTOR) 40 MG Tab. Pt would like to know which one to refill. Please advise

## 2022-06-29 ENCOUNTER — TELEPHONE (OUTPATIENT)
Dept: ADMINISTRATIVE | Facility: CLINIC | Age: 77
End: 2022-06-29
Payer: MEDICARE

## 2022-06-30 ENCOUNTER — OFFICE VISIT (OUTPATIENT)
Dept: FAMILY MEDICINE | Facility: CLINIC | Age: 77
End: 2022-06-30
Payer: MEDICARE

## 2022-06-30 VITALS
OXYGEN SATURATION: 96 % | DIASTOLIC BLOOD PRESSURE: 84 MMHG | SYSTOLIC BLOOD PRESSURE: 142 MMHG | BODY MASS INDEX: 26.19 KG/M2 | HEART RATE: 93 BPM | HEIGHT: 66 IN | WEIGHT: 162.94 LBS

## 2022-06-30 DIAGNOSIS — Z00.00 ENCOUNTER FOR PREVENTIVE HEALTH EXAMINATION: Primary | ICD-10-CM

## 2022-06-30 DIAGNOSIS — E78.5 HYPERLIPIDEMIA, UNSPECIFIED HYPERLIPIDEMIA TYPE: Chronic | ICD-10-CM

## 2022-06-30 DIAGNOSIS — I10 HYPERTENSION, UNSPECIFIED TYPE: Chronic | ICD-10-CM

## 2022-06-30 DIAGNOSIS — J44.9 CHRONIC OBSTRUCTIVE PULMONARY DISEASE, UNSPECIFIED COPD TYPE: ICD-10-CM

## 2022-06-30 DIAGNOSIS — B83.9 WORMS IN STOOL: ICD-10-CM

## 2022-06-30 DIAGNOSIS — R10.32 LLQ PAIN: ICD-10-CM

## 2022-06-30 DIAGNOSIS — R22.32 LOCALIZED SWELLING, MASS AND LUMP, LEFT UPPER LIMB: ICD-10-CM

## 2022-06-30 DIAGNOSIS — I65.23 ATHEROSCLEROSIS OF BOTH CAROTID ARTERIES: ICD-10-CM

## 2022-06-30 DIAGNOSIS — K21.9 GASTROESOPHAGEAL REFLUX DISEASE WITHOUT ESOPHAGITIS: ICD-10-CM

## 2022-06-30 DIAGNOSIS — R22.9 LUMP OF SKIN: ICD-10-CM

## 2022-06-30 PROCEDURE — 3079F PR MOST RECENT DIASTOLIC BLOOD PRESSURE 80-89 MM HG: ICD-10-PCS | Mod: CPTII,S$GLB,, | Performed by: NURSE PRACTITIONER

## 2022-06-30 PROCEDURE — 3077F PR MOST RECENT SYSTOLIC BLOOD PRESSURE >= 140 MM HG: ICD-10-PCS | Mod: CPTII,S$GLB,, | Performed by: NURSE PRACTITIONER

## 2022-06-30 PROCEDURE — 1170F PR FUNCTIONAL STATUS ASSESSED: ICD-10-PCS | Mod: CPTII,S$GLB,, | Performed by: NURSE PRACTITIONER

## 2022-06-30 PROCEDURE — G0439 PR MEDICARE ANNUAL WELLNESS SUBSEQUENT VISIT: ICD-10-PCS | Mod: S$GLB,,, | Performed by: NURSE PRACTITIONER

## 2022-06-30 PROCEDURE — 1170F FXNL STATUS ASSESSED: CPT | Mod: CPTII,S$GLB,, | Performed by: NURSE PRACTITIONER

## 2022-06-30 PROCEDURE — 3288F PR FALLS RISK ASSESSMENT DOCUMENTED: ICD-10-PCS | Mod: CPTII,S$GLB,, | Performed by: NURSE PRACTITIONER

## 2022-06-30 PROCEDURE — 1126F PR PAIN SEVERITY QUANTIFIED, NO PAIN PRESENT: ICD-10-PCS | Mod: CPTII,S$GLB,, | Performed by: NURSE PRACTITIONER

## 2022-06-30 PROCEDURE — G0439 PPPS, SUBSEQ VISIT: HCPCS | Mod: S$GLB,,, | Performed by: NURSE PRACTITIONER

## 2022-06-30 PROCEDURE — 3288F FALL RISK ASSESSMENT DOCD: CPT | Mod: CPTII,S$GLB,, | Performed by: NURSE PRACTITIONER

## 2022-06-30 PROCEDURE — 1160F PR REVIEW ALL MEDS BY PRESCRIBER/CLIN PHARMACIST DOCUMENTED: ICD-10-PCS | Mod: CPTII,S$GLB,, | Performed by: NURSE PRACTITIONER

## 2022-06-30 PROCEDURE — 1101F PR PT FALLS ASSESS DOC 0-1 FALLS W/OUT INJ PAST YR: ICD-10-PCS | Mod: CPTII,S$GLB,, | Performed by: NURSE PRACTITIONER

## 2022-06-30 PROCEDURE — 1157F PR ADVANCE CARE PLAN OR EQUIV PRESENT IN MEDICAL RECORD: ICD-10-PCS | Mod: CPTII,S$GLB,, | Performed by: NURSE PRACTITIONER

## 2022-06-30 PROCEDURE — 99999 PR PBB SHADOW E&M-EST. PATIENT-LVL V: CPT | Mod: PBBFAC,,, | Performed by: NURSE PRACTITIONER

## 2022-06-30 PROCEDURE — 1159F MED LIST DOCD IN RCRD: CPT | Mod: CPTII,S$GLB,, | Performed by: NURSE PRACTITIONER

## 2022-06-30 PROCEDURE — 99999 PR PBB SHADOW E&M-EST. PATIENT-LVL V: ICD-10-PCS | Mod: PBBFAC,,, | Performed by: NURSE PRACTITIONER

## 2022-06-30 PROCEDURE — 3077F SYST BP >= 140 MM HG: CPT | Mod: CPTII,S$GLB,, | Performed by: NURSE PRACTITIONER

## 2022-06-30 PROCEDURE — 1157F ADVNC CARE PLAN IN RCRD: CPT | Mod: CPTII,S$GLB,, | Performed by: NURSE PRACTITIONER

## 2022-06-30 PROCEDURE — 1126F AMNT PAIN NOTED NONE PRSNT: CPT | Mod: CPTII,S$GLB,, | Performed by: NURSE PRACTITIONER

## 2022-06-30 PROCEDURE — 3079F DIAST BP 80-89 MM HG: CPT | Mod: CPTII,S$GLB,, | Performed by: NURSE PRACTITIONER

## 2022-06-30 PROCEDURE — 1160F RVW MEDS BY RX/DR IN RCRD: CPT | Mod: CPTII,S$GLB,, | Performed by: NURSE PRACTITIONER

## 2022-06-30 PROCEDURE — 1101F PT FALLS ASSESS-DOCD LE1/YR: CPT | Mod: CPTII,S$GLB,, | Performed by: NURSE PRACTITIONER

## 2022-06-30 PROCEDURE — 1159F PR MEDICATION LIST DOCUMENTED IN MEDICAL RECORD: ICD-10-PCS | Mod: CPTII,S$GLB,, | Performed by: NURSE PRACTITIONER

## 2022-06-30 RX ORDER — OMEPRAZOLE 20 MG/1
20 CAPSULE, DELAYED RELEASE ORAL DAILY
Qty: 30 CAPSULE | Refills: 1 | Status: SHIPPED | OUTPATIENT
Start: 2022-06-30 | End: 2022-06-30

## 2022-06-30 NOTE — PATIENT INSTRUCTIONS
Counseling and Referral of Other Preventative  (Italic type indicates deductible and co-insurance are waived)    Patient Name: Madison Long  Today's Date: 6/30/2022    Health Maintenance       Date Due Completion Date    COVID-19 Vaccine (3 - Booster for Pfizer series) 08/02/2021 3/2/2021    Mammogram 03/07/2023 3/7/2022    Aspirin/Antiplatelet Therapy 04/26/2023 4/26/2022    DEXA Scan 03/07/2025 3/7/2022    Lipid Panel 02/08/2027 2/8/2022    TETANUS VACCINE 01/23/2028 1/23/2018        No orders of the defined types were placed in this encounter.    The following information is provided to all patients.  This information is to help you find resources for any of the problems found today that may be affecting your health:                Living healthy guide: www.[facility].[place].gov      Understanding Diabetes: www.diabetes.org      Eating healthy: www.cdc.gov/healthyweight      CDC home safety checklist: www.cdc.gov/steadi/patient.html      Agency on Aging: www.goea.[place].[facility]      Alcoholics anonymous (AA): www.aa.org      Physical Activity: www.michele.nih.gov/dt0qvqt      Tobacco use: www.quitwithusla.org

## 2022-06-30 NOTE — PROGRESS NOTES
"Madison Long presented for a  Medicare AWV and comprehensive Health Risk Assessment today. The following components were reviewed and updated:    · Medical history  · Family History  · Social history  · Allergies and Current Medications  · Health Risk Assessment  · Health Maintenance  · Care Team     ** See Completed Assessments for Annual Wellness Visit within the encounter summary.**     The following assessments were completed:  · Living Situation  · CAGE  · Depression Screening  · Timed Get Up and Go  · Whisper Test  · Cognitive Function Screening    · Nutrition Screening  · ADL Screening  · PAQ Screening    Vitals:    06/30/22 1043   BP: (!) 142/84   BP Location: Left arm   Patient Position: Sitting   BP Method: Medium (Manual)   Pulse: 93   SpO2: 96%   Weight: 73.9 kg (162 lb 14.7 oz)   Height: 5' 6" (1.676 m)     Body mass index is 26.3 kg/m².  Physical Exam  Vitals reviewed.   Constitutional:       Appearance: Normal appearance.   Cardiovascular:      Rate and Rhythm: Normal rate and regular rhythm.      Pulses: Normal pulses.      Heart sounds: Normal heart sounds.   Pulmonary:      Effort: Pulmonary effort is normal.      Breath sounds: Normal breath sounds.   Abdominal:      General: Bowel sounds are normal.      Palpations: Abdomen is soft.      Tenderness: There is abdominal tenderness in the left lower quadrant. There is no right CVA tenderness, left CVA tenderness, guarding or rebound. Negative signs include Smith's sign, Rovsing's sign, McBurney's sign, psoas sign and obturator sign.      Hernia: No hernia is present. There is no hernia in the umbilical area, ventral area, left inguinal area or right inguinal area.       Musculoskeletal:      Right forearm: Deformity (pea size mobile cyst palapated) present. No swelling, edema, lacerations, tenderness or bony tenderness.        Arms:    Skin:     General: Skin is warm and dry.   Neurological:      Mental Status: She is alert and oriented to " person, place, and time.       Diagnoses and health risks identified today and associated recommendations/orders:    1. Encounter for preventive health examination  Reviewed and discussed health maintenance.    - omeprazole (PRILOSEC) 20 MG capsule; Take 1 capsule (20 mg total) by mouth once daily.  Dispense: 30 capsule; Refill: 1    2. Hypertension, unspecified type  Elevated today. Monitor home BP and follow up with pcp as scheduled    3. Hyperlipidemia, unspecified hyperlipidemia type  Stable- continue current treatment and follow up routinely with PCP     4. Atherosclerosis of both carotid arteries  Stable- continue current treatment and follow up routinely with PCP     5. Chronic obstructive pulmonary disease, unspecified COPD type  Stable- continue current treatment and follow up routinely with PCP     6. Gastroesophageal reflux disease without esophagitis  Stable- continue current treatment and follow up routinely with PCP   - omeprazole (PRILOSEC) 20 MG capsule; Take 1 capsule (20 mg total) by mouth once daily.  Dispense: 30 capsule; Refill: 1  2-4 weeks of Prilosec then stop. If symptoms return, consider EGD    7. LLQ pain  - US Abdomen Limited; Future  - H. pylori antigen, stool; Future  - Fecal fat, qualitative; Future  - Occult blood x 1, stool; Future  - WBC, Stool; Future  - Rotavirus antigen, stool; Future  - Adenovirus Antigen EIA, Stool; Future  - Calprotectin, Stool; Future  - Giardia / Cryptosporidum, EIA; Future  - Stool Exam-Ova,Cysts,Parasites; Future  - Stool culture; Future  - Adenovirus Antigen EIA, Stool    8. Lump of skin  - US Extremity Non Vascular Complete Right; Future    9. Localized swelling, mass and lump, left upper limb   - US Extremity Non Vascular Complete Right; Future    10. Worms in stool  - H. pylori antigen, stool; Future  - Fecal fat, qualitative; Future  - Occult blood x 1, stool; Future  - WBC, Stool; Future  - Rotavirus antigen, stool; Future  - Adenovirus Antigen EIA,  Stool; Future  - Calprotectin, Stool; Future  - Giardia / Cryptosporidum, EIA; Future  - Stool Exam-Ova,Cysts,Parasites; Future  - Stool culture; Future  - Adenovirus Antigen EIA, Stool    Provided Madison with a 5-10 year written screening schedule and personal prevention plan. Recommendations were developed using the USPSTF age appropriate recommendations. Education, counseling, and referrals were provided as needed. After Visit Summary printed and given to patient which includes a list of additional screenings\tests needed.    I offered to discuss advanced care planning, including how to pick a person who would make decisions for you if you were unable to make them for yourself, called a health care power of , and what kind of decisions you might make such as use of life sustaining treatments such as ventilators and tube feeding when faced with a life limiting illness recorded on a living will that they will need to know. (How you want to be cared for as you near the end of your natural life)     X Patient is interested in learning more about how to make advanced directives.  I provided them paperwork and offered to discuss this with them.

## 2022-07-08 ENCOUNTER — HOSPITAL ENCOUNTER (OUTPATIENT)
Dept: RADIOLOGY | Facility: HOSPITAL | Age: 77
Discharge: HOME | End: 2022-07-08
Attending: NURSE PRACTITIONER
Payer: MEDICARE

## 2022-07-08 DIAGNOSIS — R22.32 LOCALIZED SWELLING, MASS AND LUMP, LEFT UPPER LIMB: ICD-10-CM

## 2022-07-08 DIAGNOSIS — R22.9 LUMP OF SKIN: ICD-10-CM

## 2022-07-08 DIAGNOSIS — R10.32 LLQ PAIN: ICD-10-CM

## 2022-07-08 PROCEDURE — 76705 US ABDOMEN LIMITED_HERNIA: ICD-10-PCS | Mod: 26,,, | Performed by: RADIOLOGY

## 2022-07-08 PROCEDURE — 76705 ECHO EXAM OF ABDOMEN: CPT | Mod: TC,PO

## 2022-07-08 PROCEDURE — 76882 US SOFT TISSUE, UPPER EXTREMITY, RIGHT: ICD-10-PCS | Mod: 26,RT,, | Performed by: RADIOLOGY

## 2022-07-08 PROCEDURE — 76705 ECHO EXAM OF ABDOMEN: CPT | Mod: 26,,, | Performed by: RADIOLOGY

## 2022-07-08 PROCEDURE — 76882 US LMTD JT/FCL EVL NVASC XTR: CPT | Mod: TC,PO,RT

## 2022-07-08 PROCEDURE — 76882 US LMTD JT/FCL EVL NVASC XTR: CPT | Mod: 26,RT,, | Performed by: RADIOLOGY

## 2022-07-12 ENCOUNTER — TELEPHONE (OUTPATIENT)
Dept: FAMILY MEDICINE | Facility: CLINIC | Age: 77
End: 2022-07-12
Payer: MEDICARE

## 2022-07-12 NOTE — TELEPHONE ENCOUNTER
Please call patient and let her know the followin.Ultrasound of arm revealed a lipoma. This is a benign fat collection.  2.Ultrasound of abdomen is negative for hernia. Follow up with pcp if discomfort continues.     Stool cultures are still pending. Will let her know when its finalized.

## 2022-07-18 ENCOUNTER — TELEPHONE (OUTPATIENT)
Dept: FAMILY MEDICINE | Facility: CLINIC | Age: 77
End: 2022-07-18
Payer: MEDICARE

## 2022-07-18 NOTE — TELEPHONE ENCOUNTER
----- Message from Valencia Frank NP sent at 7/18/2022  9:42 AM CDT -----  Stool is negative for infection and/or parasites. Follow up with pcp as planned

## 2022-07-25 ENCOUNTER — TELEPHONE (OUTPATIENT)
Dept: FAMILY MEDICINE | Facility: CLINIC | Age: 77
End: 2022-07-25
Payer: MEDICARE

## 2022-08-01 NOTE — PROGRESS NOTES
Madison Long was seen 3/14/22 for an audiological evaluation.     Pt here for hearing concerns and dizziness.      Otoscopy revealed bilateral cerumen impactions that were removed by ENT prior to audiogram.     Audiogram results revealed a mild sensorineural hearing loss bilaterally.  Speech Reception Thresholds were  25 dBHL for the right ear and 10 dBHL for the left ear.    Word recognition scores were excellent for the right ear and excellent for the left ear.   Tympanograms were Type A for the right ear and Type A for the left ear.    Audiogram results were reviewed in detail with patient and all questions were answered. Results will be reviewed by ENT at the completion of this note.     Recommend binaural amplification pending medical clearance, hearing protection for all loud sounds, annual audiogram to monitor hearing loss and ENT evaluation for dizziness.

## 2022-08-02 ENCOUNTER — PATIENT OUTREACH (OUTPATIENT)
Dept: ADMINISTRATIVE | Facility: HOSPITAL | Age: 77
End: 2022-08-02
Payer: MEDICARE

## 2022-08-08 ENCOUNTER — LAB VISIT (OUTPATIENT)
Dept: LAB | Facility: HOSPITAL | Age: 77
End: 2022-08-08
Attending: INTERNAL MEDICINE
Payer: MEDICARE

## 2022-08-08 DIAGNOSIS — I10 ESSENTIAL HYPERTENSION: ICD-10-CM

## 2022-08-08 DIAGNOSIS — E78.5 DYSLIPIDEMIA: ICD-10-CM

## 2022-08-08 DIAGNOSIS — Z79.899 ENCOUNTER FOR LONG-TERM (CURRENT) USE OF MEDICATIONS: ICD-10-CM

## 2022-08-08 DIAGNOSIS — I65.23 ATHEROSCLEROSIS OF BOTH CAROTID ARTERIES: ICD-10-CM

## 2022-08-08 LAB
ALBUMIN SERPL BCP-MCNC: 4.3 G/DL (ref 3.5–5.2)
ALP SERPL-CCNC: 65 U/L (ref 55–135)
ALT SERPL W/O P-5'-P-CCNC: 23 U/L (ref 10–44)
ANION GAP SERPL CALC-SCNC: 9 MMOL/L (ref 8–16)
AST SERPL-CCNC: 27 U/L (ref 10–40)
BASOPHILS # BLD AUTO: 0.05 K/UL (ref 0–0.2)
BASOPHILS NFR BLD: 0.7 % (ref 0–1.9)
BILIRUB SERPL-MCNC: 0.7 MG/DL (ref 0.1–1)
BUN SERPL-MCNC: 16 MG/DL (ref 8–23)
CALCIUM SERPL-MCNC: 9.7 MG/DL (ref 8.7–10.5)
CHLORIDE SERPL-SCNC: 102 MMOL/L (ref 95–110)
CHOLEST SERPL-MCNC: 177 MG/DL (ref 120–199)
CHOLEST/HDLC SERPL: 2.5 {RATIO} (ref 2–5)
CO2 SERPL-SCNC: 25 MMOL/L (ref 23–29)
CREAT SERPL-MCNC: 0.8 MG/DL (ref 0.5–1.4)
DIFFERENTIAL METHOD: ABNORMAL
EOSINOPHIL # BLD AUTO: 0.2 K/UL (ref 0–0.5)
EOSINOPHIL NFR BLD: 2.1 % (ref 0–8)
ERYTHROCYTE [DISTWIDTH] IN BLOOD BY AUTOMATED COUNT: 12.8 % (ref 11.5–14.5)
EST. GFR  (NO RACE VARIABLE): >60 ML/MIN/1.73 M^2
GLUCOSE SERPL-MCNC: 99 MG/DL (ref 70–110)
HCT VFR BLD AUTO: 44.8 % (ref 37–48.5)
HDLC SERPL-MCNC: 70 MG/DL (ref 40–75)
HDLC SERPL: 39.5 % (ref 20–50)
HGB BLD-MCNC: 15.3 G/DL (ref 12–16)
IMM GRANULOCYTES # BLD AUTO: 0.03 K/UL (ref 0–0.04)
IMM GRANULOCYTES NFR BLD AUTO: 0.4 % (ref 0–0.5)
LDLC SERPL CALC-MCNC: 92 MG/DL (ref 63–159)
LYMPHOCYTES # BLD AUTO: 2 K/UL (ref 1–4.8)
LYMPHOCYTES NFR BLD: 26.7 % (ref 18–48)
MCH RBC QN AUTO: 32.1 PG (ref 27–31)
MCHC RBC AUTO-ENTMCNC: 34.2 G/DL (ref 32–36)
MCV RBC AUTO: 94 FL (ref 82–98)
MONOCYTES # BLD AUTO: 0.5 K/UL (ref 0.3–1)
MONOCYTES NFR BLD: 6.4 % (ref 4–15)
NEUTROPHILS # BLD AUTO: 4.8 K/UL (ref 1.8–7.7)
NEUTROPHILS NFR BLD: 63.7 % (ref 38–73)
NONHDLC SERPL-MCNC: 107 MG/DL
NRBC BLD-RTO: 0 /100 WBC
PLATELET # BLD AUTO: 276 K/UL (ref 150–450)
PMV BLD AUTO: 10.9 FL (ref 9.2–12.9)
POTASSIUM SERPL-SCNC: 3.9 MMOL/L (ref 3.5–5.1)
PROT SERPL-MCNC: 7.4 G/DL (ref 6–8.4)
RBC # BLD AUTO: 4.77 M/UL (ref 4–5.4)
SODIUM SERPL-SCNC: 136 MMOL/L (ref 136–145)
TRIGL SERPL-MCNC: 75 MG/DL (ref 30–150)
WBC # BLD AUTO: 7.52 K/UL (ref 3.9–12.7)

## 2022-08-08 PROCEDURE — 36415 COLL VENOUS BLD VENIPUNCTURE: CPT | Mod: PO | Performed by: INTERNAL MEDICINE

## 2022-08-08 PROCEDURE — 80061 LIPID PANEL: CPT | Performed by: INTERNAL MEDICINE

## 2022-08-08 PROCEDURE — 85025 COMPLETE CBC W/AUTO DIFF WBC: CPT | Performed by: INTERNAL MEDICINE

## 2022-08-08 PROCEDURE — 80053 COMPREHEN METABOLIC PANEL: CPT | Performed by: INTERNAL MEDICINE

## 2022-08-16 ENCOUNTER — HOSPITAL ENCOUNTER (OUTPATIENT)
Dept: RADIOLOGY | Facility: HOSPITAL | Age: 77
Discharge: HOME | End: 2022-08-16
Attending: INTERNAL MEDICINE
Payer: MEDICARE

## 2022-08-16 ENCOUNTER — OFFICE VISIT (OUTPATIENT)
Dept: FAMILY MEDICINE | Facility: CLINIC | Age: 77
End: 2022-08-16
Payer: MEDICARE

## 2022-08-16 VITALS
TEMPERATURE: 98 F | OXYGEN SATURATION: 96 % | DIASTOLIC BLOOD PRESSURE: 84 MMHG | WEIGHT: 166.88 LBS | BODY MASS INDEX: 26.82 KG/M2 | HEIGHT: 66 IN | SYSTOLIC BLOOD PRESSURE: 122 MMHG | HEART RATE: 86 BPM

## 2022-08-16 DIAGNOSIS — J45.20 MILD INTERMITTENT ASTHMA WITHOUT COMPLICATION: ICD-10-CM

## 2022-08-16 DIAGNOSIS — I10 ESSENTIAL HYPERTENSION: Primary | ICD-10-CM

## 2022-08-16 DIAGNOSIS — J43.2 CENTRILOBULAR EMPHYSEMA: ICD-10-CM

## 2022-08-16 DIAGNOSIS — E78.5 DYSLIPIDEMIA: ICD-10-CM

## 2022-08-16 PROCEDURE — 1157F ADVNC CARE PLAN IN RCRD: CPT | Mod: CPTII,S$GLB,, | Performed by: INTERNAL MEDICINE

## 2022-08-16 PROCEDURE — 1160F PR REVIEW ALL MEDS BY PRESCRIBER/CLIN PHARMACIST DOCUMENTED: ICD-10-PCS | Mod: CPTII,S$GLB,, | Performed by: INTERNAL MEDICINE

## 2022-08-16 PROCEDURE — 99999 PR PBB SHADOW E&M-EST. PATIENT-LVL V: ICD-10-PCS | Mod: PBBFAC,,, | Performed by: INTERNAL MEDICINE

## 2022-08-16 PROCEDURE — 1126F AMNT PAIN NOTED NONE PRSNT: CPT | Mod: CPTII,S$GLB,, | Performed by: INTERNAL MEDICINE

## 2022-08-16 PROCEDURE — 1160F RVW MEDS BY RX/DR IN RCRD: CPT | Mod: CPTII,S$GLB,, | Performed by: INTERNAL MEDICINE

## 2022-08-16 PROCEDURE — 1126F PR PAIN SEVERITY QUANTIFIED, NO PAIN PRESENT: ICD-10-PCS | Mod: CPTII,S$GLB,, | Performed by: INTERNAL MEDICINE

## 2022-08-16 PROCEDURE — 1159F MED LIST DOCD IN RCRD: CPT | Mod: CPTII,S$GLB,, | Performed by: INTERNAL MEDICINE

## 2022-08-16 PROCEDURE — 3079F PR MOST RECENT DIASTOLIC BLOOD PRESSURE 80-89 MM HG: ICD-10-PCS | Mod: CPTII,S$GLB,, | Performed by: INTERNAL MEDICINE

## 2022-08-16 PROCEDURE — 1159F PR MEDICATION LIST DOCUMENTED IN MEDICAL RECORD: ICD-10-PCS | Mod: CPTII,S$GLB,, | Performed by: INTERNAL MEDICINE

## 2022-08-16 PROCEDURE — 3079F DIAST BP 80-89 MM HG: CPT | Mod: CPTII,S$GLB,, | Performed by: INTERNAL MEDICINE

## 2022-08-16 PROCEDURE — 3074F SYST BP LT 130 MM HG: CPT | Mod: CPTII,S$GLB,, | Performed by: INTERNAL MEDICINE

## 2022-08-16 PROCEDURE — 71046 X-RAY EXAM CHEST 2 VIEWS: CPT | Mod: 26,,, | Performed by: RADIOLOGY

## 2022-08-16 PROCEDURE — 71046 X-RAY EXAM CHEST 2 VIEWS: CPT | Mod: TC,FY,PO

## 2022-08-16 PROCEDURE — 99214 OFFICE O/P EST MOD 30 MIN: CPT | Mod: S$GLB,,, | Performed by: INTERNAL MEDICINE

## 2022-08-16 PROCEDURE — 1157F PR ADVANCE CARE PLAN OR EQUIV PRESENT IN MEDICAL RECORD: ICD-10-PCS | Mod: CPTII,S$GLB,, | Performed by: INTERNAL MEDICINE

## 2022-08-16 PROCEDURE — 3074F PR MOST RECENT SYSTOLIC BLOOD PRESSURE < 130 MM HG: ICD-10-PCS | Mod: CPTII,S$GLB,, | Performed by: INTERNAL MEDICINE

## 2022-08-16 PROCEDURE — 99214 PR OFFICE/OUTPT VISIT, EST, LEVL IV, 30-39 MIN: ICD-10-PCS | Mod: S$GLB,,, | Performed by: INTERNAL MEDICINE

## 2022-08-16 PROCEDURE — 99999 PR PBB SHADOW E&M-EST. PATIENT-LVL V: CPT | Mod: PBBFAC,,, | Performed by: INTERNAL MEDICINE

## 2022-08-16 PROCEDURE — 71046 XR CHEST PA AND LATERAL: ICD-10-PCS | Mod: 26,,, | Performed by: RADIOLOGY

## 2022-08-16 NOTE — PROGRESS NOTES
Subjective:       Patient ID: Madison Logn is a 77 y.o. female.    Chief Complaint: Hypertension      Had < 40% blockage of b/l carotid artery 2017    HLD - uncontrolled; LDL goal < 70, htn; taking Crestor 10 mg qd  Recall: had severe LE cramps.  Stopped Lipitor 1 month ago and cramps resolved after 5-6 days.      GERD - mostly when drinks port wine    Asthma - controlled with daily Symbicort    4 eye surgeries in past. Dr Sotelo  HTN - controlled    COPD - no sob;      Insomnia - 1- 2 trazodone works most nights; stopped her Ambien      ALEX - controlled      Atrophic vaginitis - estrogen cream used for 1-2 weeks and then will take a break.  due for mammogram 3/23/19; rx originally by urology Dr Hughes.     Keratosis - hereditary.  Wants to change to our derm.     Osteopenia - on vit D and calcium     Review of Systems   Constitutional: Negative for appetite change and fever.   HENT: Negative for nosebleeds and trouble swallowing.    Eyes: Negative for discharge and visual disturbance.   Respiratory: Negative for choking and shortness of breath.    Cardiovascular: Negative for chest pain and palpitations.   Gastrointestinal: Negative for abdominal pain, nausea and vomiting.   Musculoskeletal: Negative for arthralgias and joint swelling.   Skin: Negative for rash and wound.   Neurological: Negative for dizziness and syncope.   Psychiatric/Behavioral: Negative for confusion and dysphoric mood.       Objective:      Vitals:    08/16/22 1042   BP: 122/84   Pulse: 86   Temp: 98.3 °F (36.8 °C)     Physical Exam  Vitals reviewed.   Eyes:      Conjunctiva/sclera: Conjunctivae normal.   Cardiovascular:      Rate and Rhythm: Normal rate and regular rhythm.   Pulmonary:      Effort: Pulmonary effort is normal.      Breath sounds: Normal breath sounds.   Musculoskeletal:      Cervical back: Normal range of motion.      Comments: Normal ROM bilateral    Skin:     General: Skin is warm and dry.   Neurological:      Cranial  Nerves: No cranial nerve deficit (grossly intact).   Psychiatric:      Comments: Alert and orientated           Assessment:       1. Essential hypertension    2. Mild intermittent asthma without complication    3. Dyslipidemia    4. Centrilobular emphysema        Plan:       Essential hypertension  -     Comprehensive Metabolic Panel; Future; Expected date: 02/12/2023    Mild intermittent asthma without complication    Dyslipidemia  -     Comprehensive Metabolic Panel; Future; Expected date: 02/12/2023  -     Lipid Panel; Future; Expected date: 02/12/2023    Centrilobular emphysema  -     X-Ray Chest PA And Lateral; Future; Expected date: 08/16/2022            Medication List with Changes/Refills   Current Medications    ACETAMINOPHEN (TYLENOL) 500 MG TABLET    Take 500 mg by mouth every 6 (six) hours as needed for Pain.    ALBUTEROL (PROVENTIL/VENTOLIN HFA) 90 MCG/ACTUATION INHALER    Inhale 2 puffs into the lungs every 6 (six) hours as needed. Rescue    AMMONIUM LACTATE (LAC-HYDRIN) 12 % LOTION    Apply to dry skin daily to BID    ASPIRIN 81 MG CHEW    Take 81 mg by mouth daily as needed. Usually takes about every 2 weeks.    AZELASTINE (ASTELIN) 137 MCG (0.1 %) NASAL SPRAY    1 spray (137 mcg total) by Nasal route 2 (two) times daily. for 14 days    BUDESONIDE-FORMOTEROL 160-4.5 MCG (SYMBICORT) 160-4.5 MCG/ACTUATION HFAA    INHALE BY MOUTH 2 PUFFS INTO THE LUNGS TWICE DAILY    CETIRIZINE (ZYRTEC) 10 MG TABLET    Take 1 tablet (10 mg total) by mouth once daily.    CLINDAMYCIN (CLEOCIN) 300 MG CAPSULE        DORZOLAMIDE-TIMOLOL, PF, (COSOPT PF) 2-0.5 % DPET OPHTHALMIC SOLUTION        ECONAZOLE NITRATE 1 % CREAM    Apply topically 2 (two) times daily as needed. Twice a day  PRN    FAMOTIDINE (PEPCID) 40 MG TABLET    Take 1 tablet (40 mg total) by mouth once daily.    FISH OIL-OMEGA-3 FATTY ACIDS 300-1,000 MG CAPSULE    Take 1 g by mouth every morning.     FLUTICASONE (FLONASE) 50 MCG/ACTUATION NASAL SPRAY    SPRAY 2  SPRAYS IN EACH NOSTRIL EVERY DAY    GUAIFENESIN (MUCINEX) 600 MG 12 HR TABLET    Take 1,200 mg by mouth 2 (two) times daily as needed.     IBUPROFEN/DIPHENHYDRAMINE CIT (ADVIL PM ORAL)    Take by mouth.    LOSARTAN (COZAAR) 50 MG TABLET    TAKE 1 TABLET(50 MG) BY MOUTH EVERY MORNING    MULTIVIT,STRESS FORMULA-ZINC TAB    Take 1 tablet by mouth every morning. Uses Alive    OMEPRAZOLE (PRILOSEC) 20 MG CAPSULE    TAKE 1 CAPSULE(20 MG) BY MOUTH EVERY DAY    PSEUDOEPHEDRINE (SUDAFED) 30 MG TABLET    Take 30 mg by mouth every 4 (four) hours as needed for Congestion.    ROSUVASTATIN (CRESTOR) 40 MG TAB    Take 1 tablet (40 mg total) by mouth once daily.    SODIUM CHLORIDE (OCEAN) 0.65 % NASAL SPRAY    1 spray by Nasal route as needed for Congestion.    TRAZODONE (DESYREL) 50 MG TABLET    TAKE 1 TABLET(50 MG) BY MOUTH EVERY NIGHT AS NEEDED FOR INSOMNIA. MAY TAKE UP TO 2 TABLET EVERY NIGHT AS NEEDED    ZINC GLUCONATE (COLD-EEZE ORAL)    Take 1 lozenge by mouth daily as needed.       Continue current management and monitor.    Counseled on regular exercise, maintenance of a healthy weight, balanced diet rich in fruits/vegetables and lean protein, and avoidance of unhealthy habits like smoking and excessive alcohol intake.   Also, counseled on importance of being compliant with medication, health appointments, diet and exercise.     Follow up in about 6 months (around 2/16/2023).

## 2022-08-17 ENCOUNTER — TELEPHONE (OUTPATIENT)
Dept: FAMILY MEDICINE | Facility: CLINIC | Age: 77
End: 2022-08-17
Payer: MEDICARE

## 2022-10-04 ENCOUNTER — OFFICE VISIT (OUTPATIENT)
Dept: OPTOMETRY | Facility: CLINIC | Age: 77
End: 2022-10-04
Payer: MEDICARE

## 2022-10-04 DIAGNOSIS — Z86.69 H/O DETACHED RETINA REPAIR: ICD-10-CM

## 2022-10-04 DIAGNOSIS — Z98.890 H/O VITRECTOMY: ICD-10-CM

## 2022-10-04 DIAGNOSIS — Z98.890 H/O DETACHED RETINA REPAIR: ICD-10-CM

## 2022-10-04 DIAGNOSIS — H43.812 POSTERIOR VITREOUS DETACHMENT, LEFT: ICD-10-CM

## 2022-10-04 DIAGNOSIS — H40.051 OCULAR HYPERTENSION, RIGHT: Primary | ICD-10-CM

## 2022-10-04 DIAGNOSIS — H54.511A LOW VISION OF RIGHT EYE: ICD-10-CM

## 2022-10-04 DIAGNOSIS — Z96.1 BILATERAL PSEUDOPHAKIA: ICD-10-CM

## 2022-10-04 PROCEDURE — 92014 COMPRE OPH EXAM EST PT 1/>: CPT | Mod: S$GLB,,, | Performed by: OPTOMETRIST

## 2022-10-04 PROCEDURE — 1126F AMNT PAIN NOTED NONE PRSNT: CPT | Mod: CPTII,S$GLB,, | Performed by: OPTOMETRIST

## 2022-10-04 PROCEDURE — 1101F PR PT FALLS ASSESS DOC 0-1 FALLS W/OUT INJ PAST YR: ICD-10-PCS | Mod: CPTII,S$GLB,, | Performed by: OPTOMETRIST

## 2022-10-04 PROCEDURE — 1159F PR MEDICATION LIST DOCUMENTED IN MEDICAL RECORD: ICD-10-PCS | Mod: CPTII,S$GLB,, | Performed by: OPTOMETRIST

## 2022-10-04 PROCEDURE — 1159F MED LIST DOCD IN RCRD: CPT | Mod: CPTII,S$GLB,, | Performed by: OPTOMETRIST

## 2022-10-04 PROCEDURE — 99999 PR PBB SHADOW E&M-EST. PATIENT-LVL IV: ICD-10-PCS | Mod: PBBFAC,,, | Performed by: OPTOMETRIST

## 2022-10-04 PROCEDURE — 3288F FALL RISK ASSESSMENT DOCD: CPT | Mod: CPTII,S$GLB,, | Performed by: OPTOMETRIST

## 2022-10-04 PROCEDURE — 1157F PR ADVANCE CARE PLAN OR EQUIV PRESENT IN MEDICAL RECORD: ICD-10-PCS | Mod: CPTII,S$GLB,, | Performed by: OPTOMETRIST

## 2022-10-04 PROCEDURE — 1101F PT FALLS ASSESS-DOCD LE1/YR: CPT | Mod: CPTII,S$GLB,, | Performed by: OPTOMETRIST

## 2022-10-04 PROCEDURE — 1157F ADVNC CARE PLAN IN RCRD: CPT | Mod: CPTII,S$GLB,, | Performed by: OPTOMETRIST

## 2022-10-04 PROCEDURE — 3288F PR FALLS RISK ASSESSMENT DOCUMENTED: ICD-10-PCS | Mod: CPTII,S$GLB,, | Performed by: OPTOMETRIST

## 2022-10-04 PROCEDURE — 99999 PR PBB SHADOW E&M-EST. PATIENT-LVL IV: CPT | Mod: PBBFAC,,, | Performed by: OPTOMETRIST

## 2022-10-04 PROCEDURE — 1126F PR PAIN SEVERITY QUANTIFIED, NO PAIN PRESENT: ICD-10-PCS | Mod: CPTII,S$GLB,, | Performed by: OPTOMETRIST

## 2022-10-04 PROCEDURE — 92014 PR EYE EXAM, EST PATIENT,COMPREHESV: ICD-10-PCS | Mod: S$GLB,,, | Performed by: OPTOMETRIST

## 2022-10-04 NOTE — PROGRESS NOTES
HPI    DLS:  1/31/22 Dr. Chaves  S/P 25G PPV/EL/SF6 OD FOR RRD                                                 S/P 25G PPV/1000CS SO    REMOVAL/EL/AFX/C3F8 OD 3/19            1/14/21 Dr. Noriega    Pt states: wants to get the eye pressure cked in OD since told by Dr. Chaves to stop the Cosopt 1/31/22.  She was having breathing probs so he   stopped it.  Returning to him in Jan 23.  Has a stye forming RLL. Sat RLL   started getting sore and Sunday a bump popped out. Using otc stye meds x   twice and compresses used.  Wants glasses RX cked today also.  Wants to be   cked for glaucoma and macula degen since has people she knows that wear   glasses has been getting it.  Request dilated exam.    Last edited by Terri Jackson on 10/4/2022  3:18 PM.        ROS    Positive for: Eyes  Negative for: Constitutional, Gastrointestinal, Neurological, Skin,   Genitourinary, Musculoskeletal, HENT, Endocrine, Cardiovascular,   Respiratory, Psychiatric, Allergic/Imm, Heme/Lymph  Last edited by ASHANTI Noriega, OD on 10/4/2022  3:29 PM.        Assessment /Plan     For exam results, see Encounter Report.    Ocular hypertension, right    Posterior vitreous detachment, left    H/O vitrectomy    H/O detached retina repair    Bilateral pseudophakia    Low vision of right eye      Resolved following a course of cosopt tx ---will continue to monitor--no drops OU  RD precautions given and reviewed. Patient knows to call/ message if any further changes in symptoms occur.  3,4. Long h/o w/ multiple sx intevention ---stable as of Jan 2022    RRD OD   Macula involving x 4  Days     S/p  25g PPV/EL/SF6 OD for RRD 9/6/17  IOP improved     10/30/17 - Recurrent IT RD with early PVR and small break     s/p 25g PPV/membrane stripping/inferior retinopexy/C3F8 OD for RRD/PVR OD 11/1/17 12/4/17 - recurrent inferior RD - small holes posterior to laser     s/p 25g PPV/EL/AFx/1000cs Silicone Oil OD for Recurrent RRD with PVR 12/6/17     Doing  well, good IOP     1/9/18 increasing inferior fluid collection beneath oil into macula - needs SB - multiple small break     s/p /270, 25g PPV/SO removal/membrane stripping/AFx/EL/1000cs Silicone Oil OD for RRD/PVR 1/10/18     Doing well, good IOP  Had ST scleral dehiscence - closed with 5-0 mersilene     Side sleep or stomach     Inferior PVR OD with shallow submacular fluid   IOP low - stop Cosopt  Will likely need inferior retinectomy     2/18 - some progression     s/p 25 PPV/SO removal/membrane stripping/inferior retinectomy/posterior capsulectomy/EL/Leonora oil OD for RRD with PVR and PCO OD 4/11/18     Stable inferonasal RD under oil  IOP increased  - continue COsopt BID  Continue PF Q day      Will need heavier inferonasal laser during oil removal 3-6 months (around 4/19)     S/p 25g PPV/1000cs SO removal/EL/AFx/C3F8 OD 3/27/19     Doing well, good IOP  Flat!    5. Stable IOL OU  6. Stable from previous ---always protect OS     No active hordeolum at visit   Lid hygiene, warm compress--message if worsening     Gave copy of specs Rx, ok continue with last Rx     F/u per Dr Chaves Jan 2023, prn if issues

## 2022-10-04 NOTE — PATIENT INSTRUCTIONS
"DRY EYES -- BURNING OR LASHAY SYMPTOMS:  Use Over The Counter artificial tears as needed for dry eye symptoms.   Some common brands include:  Systane, Optive, Refresh, and Thera-Tears.  These drops can be used as frequently as desired, but may be most helpful use during long periods of concentrated work.  For example, reading / working at the computer. Start with 3-4x per day.     Nighttime Ophthalmic gel or ointments are available: Refresh PM, Genteal, and Lacrilube.    Avoid drops that "get redness out" (Visine, Murine, Clear Eyes), as these may contain medication that could further irritate the eyes, especially with chronic use.    ALLERGY EYES -- ITCHING SYMPTOMS:  Over the counter medications include--Pataday, Zaditor, and Alaway.  Use as directed 1-2 drops daily for symptoms of itching / watering eyes.  These drops will not help for dry eye or exposure symptoms.    REDNESS RELIEF:  Lumify---is a good redness reliever that will not cause irritation if used chronically.        FLASHES / FLOATERS / POSTERIOR VITREOUS DETACHMENT    Call the clinic if you have any further changes in symptoms.  Including:  Increased numbers of floaters or flashing lights, dimness or darkness that moves through or stays constant in your vision, or any pain in the eye (s).    You may sometimes see small specks or clouds moving in your field of vision.  They are called FLOATERS.  You can often see them when looking at a plain background, like a blank wall or blue marni.  Floaters are actually tiny clumps of gel or cells inside the VITREOUS, the clear jelly-like fluid that fills the inside of your eye.    While these objects look like they are in front of your eye, they are actually floating inside.  What you see are the shadows they cast on the RETINA, the nerve layer at the back of the eye that senses light and allows you to see.      POSTERIOR VITREOUS DETACHMENT    The appearance of new floaters may be alarming.  If you suddenly " develop new floaters, you should contact your eye care professional  right away.    The retina can tear if the shrinking vitreous pulls away from the wall of the eye.  This sometimes causes a small amount of bleeding in the eye that may appear as new floaters.    A torn retina is always a serious problem, since it can lead to a retinal detachment.  You should see your eye care professional as soon as possible if:    even one new floater appears suddenly;  you see sudden flashes of light;  you notice other symptoms, like the loss of side vision, or a curtain closes down in your vision        POSTERIOR VITREOUS DETACHMENT is more common for people who:    are nearsighted;  have had cataract surgery;  have had YAG laser surgery of the eye;  have had inflammation inside the eye;  are over age 60.      While some floaters may remain visible, many of them will fade over time and become less noticeable.  Even if you've had some floaters for years, you should have your eyes checked as soon as possible if you notice new ones.    FLASHING LIGHTS    When the vitreous gel rubs or pulls on the retina, you may see what look like flashing lights or lightning streaks.  These flashes can appear off and on for several weeks or months.      Some people experience flashes of light that appear as jagged lines or heat waves in both eyes, lasting 10-20 minutes.  These flashes are caused by a spasm of blood vessels in the brain, which is called a migraine.    If a headache follows these flashes, it's called a migraine headache.  If   no headache occurs, these flashes are called Ophthalmic or Ocular Migraine.           Using the Amsler Grid  If you are at risk for vision loss, you may be told to check your eyesight regularly using the Amsler grid. Below is the grid and instructions for using it.         The Amsler grid helps you track changes in your vision.    How to Use the Amsler Grid  Use the grid in a well-lighted area.  Wear  glasses or contact lenses if you usually wear them.  Hold the grid at your normal reading distance (about 16 inches).  Cover your left eye.  With your right eye, look at the dot in the center of the grid.  While looking at the dot, notice if any of the lines look wavy, if any lines disappear, or if the boxes change shape.  Write down on a piece of paper any vision changes from the last time you used the grid.  Now repeat the exercise, this time covering your right eye.  Call your doctor right away if you notice any vision changes.  How Often Should I Check My Vision?  Use the Amsler grid as often as your eye doctor suggests. Keep the grid where youll remember to use it. Call your eye doctor right away if you notice any changes with your eyesight. This includes if your vision improves.  © 9246-9761 [employer], [address] [place] [address], [place] [address]. All rights reserved. This information is not intended as a substitute for professional medical care. Always follow your healthcare professional's instructions.

## 2022-10-09 ENCOUNTER — OFFICE VISIT (OUTPATIENT)
Dept: URGENT CARE | Facility: CLINIC | Age: 77
End: 2022-10-09
Payer: MEDICARE

## 2022-10-09 VITALS
OXYGEN SATURATION: 98 % | WEIGHT: 166 LBS | BODY MASS INDEX: 26.68 KG/M2 | SYSTOLIC BLOOD PRESSURE: 145 MMHG | TEMPERATURE: 98 F | HEIGHT: 66 IN | HEART RATE: 87 BPM | DIASTOLIC BLOOD PRESSURE: 95 MMHG | RESPIRATION RATE: 18 BRPM

## 2022-10-09 DIAGNOSIS — H00.012 HORDEOLUM EXTERNUM OF RIGHT LOWER EYELID: Primary | ICD-10-CM

## 2022-10-09 PROCEDURE — 99213 OFFICE O/P EST LOW 20 MIN: CPT | Mod: S$GLB,,, | Performed by: PHYSICIAN ASSISTANT

## 2022-10-09 PROCEDURE — 1157F ADVNC CARE PLAN IN RCRD: CPT | Mod: CPTII,S$GLB,, | Performed by: PHYSICIAN ASSISTANT

## 2022-10-09 PROCEDURE — 1125F AMNT PAIN NOTED PAIN PRSNT: CPT | Mod: CPTII,S$GLB,, | Performed by: PHYSICIAN ASSISTANT

## 2022-10-09 PROCEDURE — 3080F PR MOST RECENT DIASTOLIC BLOOD PRESSURE >= 90 MM HG: ICD-10-PCS | Mod: CPTII,S$GLB,, | Performed by: PHYSICIAN ASSISTANT

## 2022-10-09 PROCEDURE — 1157F PR ADVANCE CARE PLAN OR EQUIV PRESENT IN MEDICAL RECORD: ICD-10-PCS | Mod: CPTII,S$GLB,, | Performed by: PHYSICIAN ASSISTANT

## 2022-10-09 PROCEDURE — 1159F PR MEDICATION LIST DOCUMENTED IN MEDICAL RECORD: ICD-10-PCS | Mod: CPTII,S$GLB,, | Performed by: PHYSICIAN ASSISTANT

## 2022-10-09 PROCEDURE — 1159F MED LIST DOCD IN RCRD: CPT | Mod: CPTII,S$GLB,, | Performed by: PHYSICIAN ASSISTANT

## 2022-10-09 PROCEDURE — 3077F PR MOST RECENT SYSTOLIC BLOOD PRESSURE >= 140 MM HG: ICD-10-PCS | Mod: CPTII,S$GLB,, | Performed by: PHYSICIAN ASSISTANT

## 2022-10-09 PROCEDURE — 1160F RVW MEDS BY RX/DR IN RCRD: CPT | Mod: CPTII,S$GLB,, | Performed by: PHYSICIAN ASSISTANT

## 2022-10-09 PROCEDURE — 3077F SYST BP >= 140 MM HG: CPT | Mod: CPTII,S$GLB,, | Performed by: PHYSICIAN ASSISTANT

## 2022-10-09 PROCEDURE — 1160F PR REVIEW ALL MEDS BY PRESCRIBER/CLIN PHARMACIST DOCUMENTED: ICD-10-PCS | Mod: CPTII,S$GLB,, | Performed by: PHYSICIAN ASSISTANT

## 2022-10-09 PROCEDURE — 1125F PR PAIN SEVERITY QUANTIFIED, PAIN PRESENT: ICD-10-PCS | Mod: CPTII,S$GLB,, | Performed by: PHYSICIAN ASSISTANT

## 2022-10-09 PROCEDURE — 3080F DIAST BP >= 90 MM HG: CPT | Mod: CPTII,S$GLB,, | Performed by: PHYSICIAN ASSISTANT

## 2022-10-09 PROCEDURE — 99213 PR OFFICE/OUTPT VISIT, EST, LEVL III, 20-29 MIN: ICD-10-PCS | Mod: S$GLB,,, | Performed by: PHYSICIAN ASSISTANT

## 2022-10-09 RX ORDER — ERYTHROMYCIN 5 MG/G
OINTMENT OPHTHALMIC 2 TIMES DAILY
Qty: 3.5 G | Refills: 0 | Status: SHIPPED | OUTPATIENT
Start: 2022-10-09 | End: 2022-10-13 | Stop reason: ALTCHOICE

## 2022-10-09 NOTE — PROGRESS NOTES
"Subjective:       Patient ID: Madison Long is a 77 y.o. female.    Vitals:  height is 5' 6" (1.676 m) and weight is 75.3 kg (166 lb). Her oral temperature is 98.3 °F (36.8 °C). Her blood pressure is 145/95 (abnormal) and her pulse is 87. Her respiration is 18 and oxygen saturation is 98%.     Chief Complaint: Stye    Pt presents today with stye x's 5 days. Pt has been seen by optometrist x's 5 days and recommended OTC stye eye drops and heat compresses. Pt is complaining of redness and discharge from stye.     Eye Problem   The right eye is affected. This is a new problem. The current episode started in the past 7 days. The problem occurs constantly. The problem has been unchanged. There was no injury mechanism. The pain is at a severity of 2/10. The pain is mild. There is No known exposure to pink eye. She Does not wear contacts. Associated symptoms include an eye discharge and eye redness (lid). Pertinent negatives include no blurred vision or double vision. She has tried eye drops for the symptoms. The treatment provided mild relief.     Eyes:  Positive for eye discharge and eye redness (lid). Negative for double vision and blurred vision.     Objective:      Physical Exam   Constitutional: She does not appear ill. No distress.   HENT:   Head: Normocephalic and atraumatic.   Ears:   Right Ear: External ear normal.   Left Ear: External ear normal.   Eyes: Conjunctivae are normal. Pupils are equal, round, and reactive to light. Right eye exhibits hordeolum (lower lid). Extraocular movement intact   Pulmonary/Chest: Effort normal. No respiratory distress.   Abdominal: Normal appearance.   Neurological: no focal deficit. She is alert.   Skin: Skin is warm, dry and not pale. jaundice  Psychiatric: Her behavior is normal. Mood, judgment and thought content normal.   Nursing note and vitals reviewed.      Assessment:       1. Hordeolum externum of right lower eyelid          Plan:         Hordeolum externum of " right lower eyelid    Other orders  -     erythromycin (ROMYCIN) ophthalmic ointment; Place into the right eye 2 (two) times a day.  Dispense: 3.5 g; Refill: 0     Appears as if it is trying to drain from inner lid.  Patient states has gotten some drainage from outer side of lid.  Will use erythromycin ointment.  If symptoms do not improve in 2 days follow back up with Optometry.       Stye Discharge Instructions   About this topic   A stye is a bump on the eyelid near your eyelashes that is red and hurts. The bump is caused by a small gland that gets irritated and often infected. You can have a stye on the outside or inside of your eyelid. The stye can cause your eye to:  Be watery  Feel scratchy  Have drainage  Form a crust  Be sensitive to light  What care is needed at home?   Ask your doctor what you need to do when you go home. Make sure you ask questions if you do not understand everything the doctor says.  Use a warm compress to the eye for 15 minutes 4 times a day to help drainage and ease discomfort. The compress should be as warm as you can tolerate comfortably. Be careful not to burn your eye.  Do not squeeze or try to drain or pop a stye. This can infect the whole eyelid or other parts of the eye.  Be sure to wash your hands before and after touching your eye.  Wear glasses instead of contact lens while you have a stye. Ask your doctor if you need to sterilize your contact lens or get new ones after you have a stye.  Dont wear eye makeup when you have a stye.  What follow-up care is needed?   Your doctor may ask you to come back to the office to check on your progress. Be sure to keep these visits.  What drugs may be needed?   Most of the time, drugs are not needed. In some cases, the doctor may order drugs to:  Help healing  Prevent or heal an infection  What problems could happen?   More severe infection  Surgery if the problem does not go away  What can be done to prevent this health problem?   Use a  no tears shampoo and warm water to wash your eyelids with care.  Replace old make up. Throw away mascara, liquid eyeliner, and eye shadow 3 months after first using them.  Remove makeup before sleep each night.  Do not share towels or washcloths.  Wash hands before putting in and taking out contact lenses  When do I need to call the doctor?   Signs of infection. These include a fever of 100.4°F (38°C) or higher; chills.  Swelling and redness around one or both eyes  More eye drainage  Hard to see or move one or both eyes  Pain and discomfort around the eyes  Increased tears for more than a week  Teach Back: Helping You Understand   The Teach Back Method helps you understand the information we are giving you. After you talk with the staff, tell them in your own words what you learned. This helps to make sure the staff has described each thing clearly. It also helps to explain things that may have been confusing. Before going home, make sure you can do these:  I can tell you about my condition  I can tell you how to care for my eye.  I can tell you what I will do if I have swelling or redness around my eye, more eye drainage, or I cant see clearly.  Where can I learn more?   [employer]  https://familydoctor.org/condition/sty/   [facility] Choices  https://www.nhs.uk/conditions/stye/   Last Reviewed Date   2020-04-30  Consumer Information Use and Disclaimer   This information is not specific medical advice and does not replace information you receive from your health care provider. This is only a brief summary of general information. It does NOT include all information about conditions, illnesses, injuries, tests, procedures, treatments, therapies, discharge instructions or life-style choices that may apply to you. You must talk with your health care provider for complete information about your health and treatment options. This information should not be used to decide whether or not to accept your  health care providers advice, instructions or recommendations. Only your health care provider has the knowledge and training to provide advice that is right for you.  Copyright   Copyright © 2021 [employer] and its affiliates and/or licensors. All rights reserved.

## 2022-10-11 ENCOUNTER — TELEPHONE (OUTPATIENT)
Dept: OPHTHALMOLOGY | Facility: CLINIC | Age: 77
End: 2022-10-11
Payer: MEDICARE

## 2022-10-11 NOTE — TELEPHONE ENCOUNTER
----- Message from Thuy Lucas sent at 10/11/2022  1:44 PM CDT -----  Contact: Patient  Type:  Needs Medical Advice    Who Called: Patient     Symptoms (please be specific): stye in eye      How long has patient had these symptoms:   several days     Would the patient rather a call back or a response via [facility]? Call     Best Call Back Number: [phone] (home)      Additional Information: Patient would like to speak with nurse about the stye. Patient stated that she went UC in [place] Sunday. Patient thinks that she needs to be prescribed antibiotics. Patient was prescribed Erythromycin on Sunday but not getting any better.     Please call to advise

## 2022-10-13 DIAGNOSIS — H00.022 HORDEOLUM INTERNUM OF RIGHT LOWER EYELID: Primary | ICD-10-CM

## 2022-10-13 RX ORDER — NEOMYCIN SULFATE, POLYMYXIN B SULFATE AND DEXAMETHASONE 3.5; 10000; 1 MG/ML; [USP'U]/ML; MG/ML
1 SUSPENSION/ DROPS OPHTHALMIC 3 TIMES DAILY
Qty: 5 ML | Refills: 0 | Status: SHIPPED | OUTPATIENT
Start: 2022-10-13 | End: 2022-10-20

## 2022-10-27 ENCOUNTER — OFFICE VISIT (OUTPATIENT)
Dept: DERMATOLOGY | Facility: CLINIC | Age: 77
End: 2022-10-27
Payer: MEDICARE

## 2022-10-27 ENCOUNTER — TELEPHONE (OUTPATIENT)
Dept: OPHTHALMOLOGY | Facility: CLINIC | Age: 77
End: 2022-10-27
Payer: MEDICARE

## 2022-10-27 VITALS — BODY MASS INDEX: 26.68 KG/M2 | HEIGHT: 66 IN | WEIGHT: 166 LBS

## 2022-10-27 DIAGNOSIS — H00.015 HORDEOLUM EXTERNUM OF LEFT LOWER EYELID: ICD-10-CM

## 2022-10-27 DIAGNOSIS — L56.5: ICD-10-CM

## 2022-10-27 DIAGNOSIS — B35.1 ONYCHOMYCOSIS: ICD-10-CM

## 2022-10-27 DIAGNOSIS — D48.5 NEOPLASM OF UNCERTAIN BEHAVIOR OF SKIN: Primary | ICD-10-CM

## 2022-10-27 DIAGNOSIS — L57.0 ACTINIC KERATOSES: ICD-10-CM

## 2022-10-27 DIAGNOSIS — L82.1 SEBORRHEIC KERATOSES: ICD-10-CM

## 2022-10-27 PROCEDURE — 99214 OFFICE O/P EST MOD 30 MIN: CPT | Mod: 25,S$GLB,, | Performed by: DERMATOLOGY

## 2022-10-27 PROCEDURE — 88305 TISSUE EXAM BY PATHOLOGIST: ICD-10-PCS | Mod: 26,,, | Performed by: PATHOLOGY

## 2022-10-27 PROCEDURE — 88305 TISSUE EXAM BY PATHOLOGIST: CPT | Mod: 26,,, | Performed by: PATHOLOGY

## 2022-10-27 PROCEDURE — 1157F ADVNC CARE PLAN IN RCRD: CPT | Mod: CPTII,S$GLB,, | Performed by: DERMATOLOGY

## 2022-10-27 PROCEDURE — 17003 DESTRUCTION, PREMALIGNANT LESIONS; SECOND THROUGH 14 LESIONS: ICD-10-PCS | Mod: S$GLB,,, | Performed by: DERMATOLOGY

## 2022-10-27 PROCEDURE — 1126F PR PAIN SEVERITY QUANTIFIED, NO PAIN PRESENT: ICD-10-PCS | Mod: CPTII,S$GLB,, | Performed by: DERMATOLOGY

## 2022-10-27 PROCEDURE — 88305 TISSUE EXAM BY PATHOLOGIST: CPT | Performed by: PATHOLOGY

## 2022-10-27 PROCEDURE — 11102 PR TANGENTIAL BIOPSY, SKIN, SINGLE LESION: ICD-10-PCS | Mod: S$GLB,,, | Performed by: DERMATOLOGY

## 2022-10-27 PROCEDURE — 1159F MED LIST DOCD IN RCRD: CPT | Mod: CPTII,S$GLB,, | Performed by: DERMATOLOGY

## 2022-10-27 PROCEDURE — 1160F RVW MEDS BY RX/DR IN RCRD: CPT | Mod: CPTII,S$GLB,, | Performed by: DERMATOLOGY

## 2022-10-27 PROCEDURE — 3288F PR FALLS RISK ASSESSMENT DOCUMENTED: ICD-10-PCS | Mod: CPTII,S$GLB,, | Performed by: DERMATOLOGY

## 2022-10-27 PROCEDURE — 17003 DESTRUCT PREMALG LES 2-14: CPT | Mod: S$GLB,,, | Performed by: DERMATOLOGY

## 2022-10-27 PROCEDURE — 99214 PR OFFICE/OUTPT VISIT, EST, LEVL IV, 30-39 MIN: ICD-10-PCS | Mod: 25,S$GLB,, | Performed by: DERMATOLOGY

## 2022-10-27 PROCEDURE — 99999 PR PBB SHADOW E&M-EST. PATIENT-LVL V: CPT | Mod: PBBFAC,,, | Performed by: DERMATOLOGY

## 2022-10-27 PROCEDURE — 1101F PT FALLS ASSESS-DOCD LE1/YR: CPT | Mod: CPTII,S$GLB,, | Performed by: DERMATOLOGY

## 2022-10-27 PROCEDURE — 1160F PR REVIEW ALL MEDS BY PRESCRIBER/CLIN PHARMACIST DOCUMENTED: ICD-10-PCS | Mod: CPTII,S$GLB,, | Performed by: DERMATOLOGY

## 2022-10-27 PROCEDURE — 1157F PR ADVANCE CARE PLAN OR EQUIV PRESENT IN MEDICAL RECORD: ICD-10-PCS | Mod: CPTII,S$GLB,, | Performed by: DERMATOLOGY

## 2022-10-27 PROCEDURE — 17000 PR DESTRUCTION(LASER SURGERY,CRYOSURGERY,CHEMOSURGERY),PREMALIGNANT LESIONS,FIRST LESION: ICD-10-PCS | Mod: XS,S$GLB,, | Performed by: DERMATOLOGY

## 2022-10-27 PROCEDURE — 3288F FALL RISK ASSESSMENT DOCD: CPT | Mod: CPTII,S$GLB,, | Performed by: DERMATOLOGY

## 2022-10-27 PROCEDURE — 17000 DESTRUCT PREMALG LESION: CPT | Mod: XS,S$GLB,, | Performed by: DERMATOLOGY

## 2022-10-27 PROCEDURE — 11102 TANGNTL BX SKIN SINGLE LES: CPT | Mod: S$GLB,,, | Performed by: DERMATOLOGY

## 2022-10-27 PROCEDURE — 99999 PR PBB SHADOW E&M-EST. PATIENT-LVL V: ICD-10-PCS | Mod: PBBFAC,,, | Performed by: DERMATOLOGY

## 2022-10-27 PROCEDURE — 1101F PR PT FALLS ASSESS DOC 0-1 FALLS W/OUT INJ PAST YR: ICD-10-PCS | Mod: CPTII,S$GLB,, | Performed by: DERMATOLOGY

## 2022-10-27 PROCEDURE — 1159F PR MEDICATION LIST DOCUMENTED IN MEDICAL RECORD: ICD-10-PCS | Mod: CPTII,S$GLB,, | Performed by: DERMATOLOGY

## 2022-10-27 PROCEDURE — 1126F AMNT PAIN NOTED NONE PRSNT: CPT | Mod: CPTII,S$GLB,, | Performed by: DERMATOLOGY

## 2022-10-27 RX ORDER — FLUCONAZOLE 200 MG/1
TABLET ORAL
Qty: 12 TABLET | Refills: 1 | Status: SHIPPED | OUTPATIENT
Start: 2022-10-27 | End: 2023-08-14

## 2022-10-27 NOTE — PROGRESS NOTES
Subjective:       Patient ID:  Madison Long is a 77 y.o. female who presents for   Chief Complaint   Patient presents with    Skin Check     TBSC    Spot     Right eye     LOV 04/26/22- AK, Porokeratosis, Superficial disseminated eruptive type, Sk, Angioma, Xerosis Cutis    Patient here today for TBSC  C/O spot to right eye. Seen by OD Dr Noriega, then urgent care [facility]  Using erythromycin ointment and Antoni/Poly/Dex drops to eye- some resolution  Has erythromycin ointment as well  Uses warm compresses    C/O fungus to right foot. Using OTC fungi nail-some improvement    Derm Hx:  yes Phx of NMSC.  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 Rescue, Disp: 18 g, Rfl: 3  ·  ammonium lactate (LAC-HYDRIN) 12 % lotion, Apply to dry skin daily to BID, Disp: 396 g, Rfl: 5  ·  aspirin 81 MG Chew, Take 81 mg by mouth daily as needed. Usually takes about every 2 weeks., Disp: , Rfl:   ·  budesonide-formoterol 160-4.5 mcg (SYMBICORT) 160-4.5 mcg/actuation HFAA, INHALE BY MOUTH 2 PUFFS INTO THE LUNGS TWICE DAILY, Disp: 30.6 g, Rfl: 2  ·  cetirizine (ZYRTEC) 10 MG tablet, Take 1 tablet (10 mg total) by mouth once daily., Disp: 7 tablet, Rfl: 0  ·  clindamycin (CLEOCIN) 300 MG capsule, , Disp: , Rfl:   ·  dorzolamide-timolol, PF, (COSOPT PF) 2-0.5 % Dpet ophthalmic solution, , Disp: , Rfl:   ·  econazole nitrate 1 % cream, Apply topically 2 (two) times daily as needed. Twice a day  PRN, Disp: , Rfl:   ·  famotidine (PEPCID) 40 MG tablet, Take 1 tablet (40 mg total) by mouth once daily., Disp: 7 tablet, Rfl: 0  ·  fish oil-omega-3 fatty acids 300-1,000 mg capsule, Take 1 g by mouth  every morning. , Disp: , Rfl:   ·  fluticasone (FLONASE) 50 mcg/actuation nasal spray, SPRAY 2 SPRAYS IN EACH NOSTRIL EVERY DAY, Disp: 1 Bottle, Rfl: 6  ·  guaifenesin (MUCINEX) 600 mg 12 hr tablet, Take 1,200 mg by mouth 2 (two) times daily as needed. , Disp: , Rfl:   ·  ibuprofen/diphenhydramine cit (ADVIL PM ORAL), Take by mouth., Disp: , Rfl:   ·  losartan (COZAAR) 50 MG tablet, TAKE 1 TABLET(50 MG) BY MOUTH EVERY MORNING, Disp: 90 tablet, Rfl: 2  ·  multivit,stress formula-zinc Tab, Take 1 tablet by mouth every morning. Uses Alive, Disp: , Rfl:   ·  omeprazole (PRILOSEC) 20 MG capsule, TAKE 1 CAPSULE(20 MG) BY MOUTH EVERY DAY, Disp: 90 capsule, Rfl: 0  ·  pseudoephedrine (SUDAFED) 30 MG tablet, Take 30 mg by mouth every 4 (four) hours as needed for Congestion., Disp: , Rfl:   ·  rosuvastatin (CRESTOR) 40 MG Tab, Take 1 tablet (40 mg total) by mouth once daily., Disp: 90 tablet, Rfl: 3  ·  sodium chloride (OCEAN) 0.65 % nasal spray, 1 spray by Nasal route as needed for Congestion., Disp: , Rfl:   ·  traZODone (DESYREL) 50 MG tablet, TAKE 1 TABLET BY MOUTH EVERY NIGHT AS NEEDED FOR INSOMNIA, MAY TAKE UP TO 2 TABLETS EVERY NIGHT AS NEEDED, Disp: 180 tablet, Rfl: 3  ·  zinc gluconate (COLD-EEZE ORAL), Take 1 lozenge by mouth daily as needed., Disp: , Rfl:   ·  azelastine (ASTELIN) 137 mcg (0.1 %) nasal spray, 1 spray (137 mcg total) by Nasal route 2 (two) times daily. for 14 days, Disp: 90 mL, Rfl: 3        Review of Systems   Constitutional:  Negative for fever and chills.   HENT:  Negative for congestion and sore throat.    Respiratory:  Negative for cough and shortness of breath.    Skin:  Positive for rash, activity-related sunscreen use and sensitivity to antibiotic ointment. Negative for itching, daily sunscreen use and sensitivity to bandage adhesive.   Hematologic/Lymphatic: Bruises/bleeds easily.      Objective:    Physical Exam   Constitutional: She appears well-developed and well-nourished. No distress.    Neurological: She is alert and oriented to person, place, and time. She is not disoriented.   Psychiatric: She has a normal mood and affect.   Skin:   Areas Examined (abnormalities noted in diagram):   Scalp / Hair Palpated and Inspected  Head / Face Inspection Performed  Neck Inspection Performed  Chest / Axilla Inspection Performed  Abdomen Inspection Performed  Genitals / Buttocks / Groin Inspection Performed  Back Inspection Performed  RUE Inspected  LUE Inspection Performed  RLE Inspected  LLE Inspection Performed                     Diagram Legend     Erythematous scaling macule/papule c/w actinic keratosis       Vascular papule c/w angioma      Pigmented verrucoid papule/plaque c/w seborrheic keratosis      Yellow umbilicated papule c/w sebaceous hyperplasia      Irregularly shaped tan macule c/w lentigo     1-2 mm smooth white papules consistent with Milia      Movable subcutaneous cyst with punctum c/w epidermal inclusion cyst      Subcutaneous movable cyst c/w pilar cyst      Firm pink to brown papule c/w dermatofibroma      Pedunculated fleshy papule(s) c/w skin tag(s)      Evenly pigmented macule c/w junctional nevus     Mildly variegated pigmented, slightly irregular-bordered macule c/w mildly atypical nevus      Flesh colored to evenly pigmented papule c/w intradermal nevus       Pink pearly papule/plaque c/w basal cell carcinoma      Erythematous hyperkeratotic cursted plaque c/w SCC      Surgical scar with no sign of skin cancer recurrence      Open and closed comedones      Inflammatory papules and pustules      Verrucoid papule consistent consistent with wart     Erythematous eczematous patches and plaques     Dystrophic onycholytic nail with subungual debris c/w onychomycosis     Umbilicated papule    Erythematous-base heme-crusted tan verrucoid plaque consistent with inflamed seborrheic keratosis     Erythematous Silvery Scaling Plaque c/w Psoriasis     See annotation                          Latest Reference Range & Units 08/08/22 10:21   Alkaline Phosphatase 55 - 135 U/L 65   PROTEIN TOTAL 6.0 - 8.4 g/dL 7.4   Albumin 3.5 - 5.2 g/dL 4.3   BILIRUBIN TOTAL 0.1 - 1.0 mg/dL 0.7   AST 10 - 40 U/L 27   ALT 10 - 44 U/L 23     Assessment / Plan:      Pathology Orders:       Normal Orders This Visit    Specimen to Pathology, Dermatology     Comments:    Number of Specimens:->1  ------------------------->-------------------------  Spec 1 Procedure:->Biopsy  Spec 1 Clinical Impression:->bcc vs other  Spec 1 Source:->left paramedian mid back    Questions:    Procedure Type: Dermatology and skin neoplasms    Number of Specimens: 1    ------------------------: -------------------------    Spec 1 Procedure: Biopsy    Spec 1 Clinical Impression: bcc vs other    Spec 1 Source: left paramedian mid back    Release to patient:           Neoplasm of uncertain behavior of skin  -     Specimen to Pathology, Dermatology  Shave biopsy procedure note:    Shave biopsy performed after verbal consent including risk of infection, scar, recurrence, need for additional treatment of site. Area prepped with alcohol, anesthetized with approximately 1.0cc of 1% lidocaine with epinephrine. Lesional tissue shaved with razor blade. Hemostasis achieved with application of aluminum chloride followed by hyfrecation. No complications. Dressing applied. Wound care explained.    Actinic keratoses  Cryosurgery Procedure Note    Verbal consent from the patient is obtained and the patient is aware of the precancerous quality and need for treatment of these lesions. Liquid nitrogen cryosurgery is applied to the 8 actinic keratoses, as detailed in the physical exam, to produce a freeze injury. The patient is aware that blisters may form and is instructed on wound care with gentle cleansing and use of vaseline ointment to keep moist until healed. The patient is supplied a handout on cryosurgery and is instructed to call if lesions do not completely  resolve.    Onychomycosis  -     fluconazole (DIFLUCAN) 200 MG Tab; Take 1 tab PO qweek.  Dispense: 12 tablet; Refill: 1  B great toes  Very distressing to patient  Discussed tx options  Weekly fluconazole not FDA approved for this indication but often efficacious, no interacting meds (not taking trazadone)    Hordeolum externum of left lower eyelid  Messaged Dr Rodríguez alvarez    Hi Dr Arreguin  This lady has been trying to resolve what I believe is a stye on lower lid for over a month.(Pic in note) Multiple OD visits and one UC visit with rx of drops, she is doing warm compresses regularly with no improvement. Though it might have to be drained/operated on?   Thank you!  Mia Delatorre    Seborrheic keratoses  These are benign inherited growths without a malignant potential. Reassurance given to patient. No treatment is necessary.     Porokeratosis, superficial disseminated eruptive type  Stable    Patient instructed in importance in daily broad spectrum sun protection of at least spf 30. Mineral sunscreen ingredients preferred (Zinc +/- Titanium) and can be found OTC.   Recommend Elta MD for daily use on face and neck.  Patient encouraged to wear hat for all outdoor exposure.   Also discussed sun avoidance and use of protective clothing.                 Follow up if symptoms worsen or fail to improve.

## 2022-10-27 NOTE — Clinical Note
Hi Dr Arreguin This lady has been trying to resolve what I believe is a stye on lower lid for over a month.(Pic in note) Multiple OD visits and one UC visit with rx of drops, she is doing warm compresses regularly with no improvement. Though it might have to be drained/operated on?  Thank you! Mia Delatorre

## 2022-10-27 NOTE — PATIENT INSTRUCTIONS
Shave Biopsy Wound Care    Your doctor has performed a shave biopsy today.  A band aid and vaseline ointment has been placed over the site.  This should remain in place for 24 hours.  It is recommended that you keep the area dry for the first 24 hours.  After 24 hours, you may remove the band aid and wash the area with warm soap and water and apply Vaseline jelly.  Many patients prefer to use Neosporin or Bacitracin ointment.  This is acceptable; however, know that you can develop an allergy to this medication even if you have used it safely for years.  It is important to keep the area moist.  Letting it dry out and get air slows healing time, and will worsen the scar.  Band aid is optional after first 24 hours.      If you notice increasing redness, tenderness, pain, or yellow drainage at the biopsy site, please notify your doctor.  These are signs of an infection.    If your biopsy site is bleeding, apply firm pressure for 15 minutes straight.  Repeat for another 15 minutes, if it is still bleeding.   If the surgical site continues to bleed, then please contact your doctor.       [facility] DERMATOLOGY  [address] [place] [address], [address] [address]  Dept: [phone]      CRYOSURGERY      Your doctor has used a method called cryosurgery to treat your skin condition. Cryosurgery refers to the use of very cold substances to treat a variety of skin conditions such as warts, pre-skin cancers, molluscum contagiosum, sun spots, and several benign growths. The substance we use in cryosurgery is liquid nitrogen and is so cold (-195 degrees Celsius) that is burns when administered.     Following treatment in the office, the skin may immediately burn and become red. You may find the area around the lesion is affected as well. It is sometimes necessary to treat not only the lesion, but a small area of the surrounding normal skin to achieve a good response.     A blister, and even a blood  filled blister, may form after treatment.   This is a normal response. If the blister is painful, it is acceptable to sterilize a needle and with rubbing alcohol and gently pop the blister. It is important that you gently wash the area with soap and warm water as the blister fluid may contain wart virus if a wart was treated. Do no remove the roof of the blister.     The area treated can take anywhere from 1-3 weeks to heal. Healing time depends on the kind skin lesion treated, the location, and how aggressively the lesion was treated. It is recommended that the areas treated are covered with Vaseline or bacitracin ointment and a band-aid. If a band-aid is not practical, just ointment applied several times per day will do. Keeping these areas moist will speed the healing time.    Treatment with liquid nitrogen can leave a scar. In dark skin, it may be a light or dark scar, in light skin it may be a white or pink scar. These will generally fade with time, but may never go away completely.     If you have any concerns after your treatment, please feel free to call the office.         [facility] - DERMATOLOGY  [address] [place] [id number]  Dept: [phone]

## 2022-10-27 NOTE — TELEPHONE ENCOUNTER
----- Message from Elvin Arreguin MD sent at 10/27/2022  2:47 PM CDT -----  Hi    We will help coordinate to have patient be seen by Dr. Webber in [place].    Best,    Dr Arreguin     ----- Message -----  From: Mia Delatorre MD  Sent: 10/27/2022   1:54 PM CDT  To: Elvin Arreguin MD, [facility] Staff    Hi Dr Arreguin  This lady has been trying to resolve what I believe is a stye on lower lid for over a month.(Pic in note) Multiple OD visits and one UC visit with rx of drops, she is doing warm compresses regularly with no improvement. Though it might have to be drained/operated on?   Thank you!  Mia Delatorre

## 2022-11-01 LAB
FINAL PATHOLOGIC DIAGNOSIS: NORMAL
GROSS: NORMAL
Lab: NORMAL
MICROSCOPIC EXAM: NORMAL

## 2022-11-10 DIAGNOSIS — F32.0 CURRENT MILD EPISODE OF MAJOR DEPRESSIVE DISORDER WITHOUT PRIOR EPISODE: Primary | ICD-10-CM

## 2022-11-10 RX ORDER — FLUOXETINE 10 MG/1
10 TABLET ORAL DAILY
Qty: 30 TABLET | Refills: 11 | Status: SHIPPED | OUTPATIENT
Start: 2022-11-10 | End: 2023-12-03

## 2022-11-10 NOTE — TELEPHONE ENCOUNTER
The patient is noted to have stopped taking this.   She believes she may need to resume.   LOV 8/2022.     Does she need to schwedule?

## 2022-11-10 NOTE — TELEPHONE ENCOUNTER
No new care gaps identified.  [facility] Embedded Care Gaps. Reference number: [id number]. 11/10/2022   2:04:30 PM CST

## 2022-11-10 NOTE — TELEPHONE ENCOUNTER
----- Message from Staci Florez sent at 11/10/2022  1:44 PM CST -----  Regarding: medication rewnewal  Name of Who is Calling:  JEISON POP [[id number]]        What is the request in detail: FLUoxetine 10 MG capsule pt think she need to start taking this medication again please advise           Can the clinic reply by [facility]: no          What Number to Call Back if not in [facility]: [phone] (home)

## 2022-11-30 DIAGNOSIS — B34.9 VIRAL SYNDROME: ICD-10-CM

## 2022-11-30 RX ORDER — AZELASTINE 1 MG/ML
1 SPRAY, METERED NASAL 2 TIMES DAILY
Qty: 90 ML | Refills: 3 | Status: SHIPPED | OUTPATIENT
Start: 2022-11-30 | End: 2024-02-19

## 2022-11-30 NOTE — TELEPHONE ENCOUNTER
----- Message from Gila Velazquez sent at 2022  3:37 PM CST -----  Regarding: refill  Can the clinic reply in [facility]: no       Please refill the medication(s) listed below. Please call the patient when the prescription(s) is ready for  at this phone number  JEISON POP [[id number]] pt script , needs a new one sent over to the pharmacy.         Medication #1 azelastine (ASTELIN) 137 mcg (0.1 %) nasal spray    Medication #2      Preferred Pharmacy:   [id number] - [place] - [address] AT [facility] & [place] [address]  [address] [address]  Phone: [phone] Fax: [phone]

## 2022-11-30 NOTE — TELEPHONE ENCOUNTER
No new care gaps identified.  [facility] Embedded Care Gaps. Reference number: [id number]. 11/30/2022   3:47:45 PM CST

## 2023-01-25 ENCOUNTER — PES CALL (OUTPATIENT)
Dept: ADMINISTRATIVE | Facility: CLINIC | Age: 78
End: 2023-01-25
Payer: MEDICARE

## 2023-02-07 ENCOUNTER — LAB VISIT (OUTPATIENT)
Dept: LAB | Facility: HOSPITAL | Age: 78
End: 2023-02-07
Attending: INTERNAL MEDICINE
Payer: MEDICARE

## 2023-02-07 DIAGNOSIS — E78.5 DYSLIPIDEMIA: ICD-10-CM

## 2023-02-07 DIAGNOSIS — I10 ESSENTIAL HYPERTENSION: ICD-10-CM

## 2023-02-07 DIAGNOSIS — J44.9 CHRONIC OBSTRUCTIVE PULMONARY DISEASE, UNSPECIFIED COPD TYPE: ICD-10-CM

## 2023-02-07 LAB
ALBUMIN SERPL BCP-MCNC: 4.3 G/DL (ref 3.5–5.2)
ALP SERPL-CCNC: 55 U/L (ref 55–135)
ALT SERPL W/O P-5'-P-CCNC: 21 U/L (ref 10–44)
ANION GAP SERPL CALC-SCNC: 11 MMOL/L (ref 8–16)
AST SERPL-CCNC: 27 U/L (ref 10–40)
BILIRUB SERPL-MCNC: 0.6 MG/DL (ref 0.1–1)
BUN SERPL-MCNC: 14 MG/DL (ref 8–23)
CALCIUM SERPL-MCNC: 9.9 MG/DL (ref 8.7–10.5)
CHLORIDE SERPL-SCNC: 104 MMOL/L (ref 95–110)
CHOLEST SERPL-MCNC: 155 MG/DL (ref 120–199)
CHOLEST/HDLC SERPL: 2.5 {RATIO} (ref 2–5)
CO2 SERPL-SCNC: 23 MMOL/L (ref 23–29)
CREAT SERPL-MCNC: 0.9 MG/DL (ref 0.5–1.4)
EST. GFR  (NO RACE VARIABLE): >60 ML/MIN/1.73 M^2
GLUCOSE SERPL-MCNC: 100 MG/DL (ref 70–110)
HDLC SERPL-MCNC: 63 MG/DL (ref 40–75)
HDLC SERPL: 40.6 % (ref 20–50)
LDLC SERPL CALC-MCNC: 80.8 MG/DL (ref 63–159)
NONHDLC SERPL-MCNC: 92 MG/DL
POTASSIUM SERPL-SCNC: 4.6 MMOL/L (ref 3.5–5.1)
PROT SERPL-MCNC: 6.9 G/DL (ref 6–8.4)
SODIUM SERPL-SCNC: 138 MMOL/L (ref 136–145)
TRIGL SERPL-MCNC: 56 MG/DL (ref 30–150)

## 2023-02-07 PROCEDURE — 80061 LIPID PANEL: CPT | Mod: HCNC | Performed by: INTERNAL MEDICINE

## 2023-02-07 PROCEDURE — 80053 COMPREHEN METABOLIC PANEL: CPT | Mod: HCNC | Performed by: INTERNAL MEDICINE

## 2023-02-07 PROCEDURE — 36415 COLL VENOUS BLD VENIPUNCTURE: CPT | Mod: HCNC,PO | Performed by: INTERNAL MEDICINE

## 2023-02-07 RX ORDER — BUDESONIDE AND FORMOTEROL FUMARATE DIHYDRATE 160; 4.5 UG/1; UG/1
AEROSOL RESPIRATORY (INHALATION)
Qty: 30.6 G | Refills: 1 | Status: SHIPPED | OUTPATIENT
Start: 2023-02-07 | End: 2023-09-15 | Stop reason: SDUPTHER

## 2023-02-07 NOTE — TELEPHONE ENCOUNTER
No new care gaps identified.  [facility] Embedded Care Gaps. Reference number: [id number]. 2/07/2023   3:37:15 AM CST

## 2023-02-07 NOTE — TELEPHONE ENCOUNTER
Refill Decision Note   Madison Long  is requesting a refill authorization.  Brief Assessment and Rationale for Refill:  Approve     Medication Therapy Plan:       Medication Reconciliation Completed: No   Comments:     No Care Gaps recommended.     Note composed:10:30 AM 02/07/2023

## 2023-02-14 ENCOUNTER — OFFICE VISIT (OUTPATIENT)
Dept: FAMILY MEDICINE | Facility: CLINIC | Age: 78
End: 2023-02-14
Payer: MEDICARE

## 2023-02-14 VITALS
TEMPERATURE: 99 F | SYSTOLIC BLOOD PRESSURE: 136 MMHG | OXYGEN SATURATION: 96 % | HEIGHT: 66 IN | DIASTOLIC BLOOD PRESSURE: 86 MMHG | WEIGHT: 163.25 LBS | HEART RATE: 87 BPM | BODY MASS INDEX: 26.24 KG/M2

## 2023-02-14 DIAGNOSIS — Z12.31 ENCOUNTER FOR SCREENING MAMMOGRAM FOR BREAST CANCER: ICD-10-CM

## 2023-02-14 DIAGNOSIS — Z00.00 ROUTINE PHYSICAL EXAMINATION: Primary | ICD-10-CM

## 2023-02-14 DIAGNOSIS — I10 ESSENTIAL HYPERTENSION: ICD-10-CM

## 2023-02-14 DIAGNOSIS — E78.5 DYSLIPIDEMIA: ICD-10-CM

## 2023-02-14 DIAGNOSIS — J43.2 CENTRILOBULAR EMPHYSEMA: ICD-10-CM

## 2023-02-14 PROCEDURE — 99397 PR PREVENTIVE VISIT,EST,65 & OVER: ICD-10-PCS | Mod: HCNC,S$GLB,, | Performed by: INTERNAL MEDICINE

## 2023-02-14 PROCEDURE — 1160F PR REVIEW ALL MEDS BY PRESCRIBER/CLIN PHARMACIST DOCUMENTED: ICD-10-PCS | Mod: HCNC,CPTII,S$GLB, | Performed by: INTERNAL MEDICINE

## 2023-02-14 PROCEDURE — 3079F PR MOST RECENT DIASTOLIC BLOOD PRESSURE 80-89 MM HG: ICD-10-PCS | Mod: HCNC,CPTII,S$GLB, | Performed by: INTERNAL MEDICINE

## 2023-02-14 PROCEDURE — 3075F SYST BP GE 130 - 139MM HG: CPT | Mod: HCNC,CPTII,S$GLB, | Performed by: INTERNAL MEDICINE

## 2023-02-14 PROCEDURE — 1126F PR PAIN SEVERITY QUANTIFIED, NO PAIN PRESENT: ICD-10-PCS | Mod: HCNC,CPTII,S$GLB, | Performed by: INTERNAL MEDICINE

## 2023-02-14 PROCEDURE — 99999 PR PBB SHADOW E&M-EST. PATIENT-LVL IV: CPT | Mod: PBBFAC,HCNC,, | Performed by: INTERNAL MEDICINE

## 2023-02-14 PROCEDURE — 3079F DIAST BP 80-89 MM HG: CPT | Mod: HCNC,CPTII,S$GLB, | Performed by: INTERNAL MEDICINE

## 2023-02-14 PROCEDURE — 3288F FALL RISK ASSESSMENT DOCD: CPT | Mod: HCNC,CPTII,S$GLB, | Performed by: INTERNAL MEDICINE

## 2023-02-14 PROCEDURE — 1160F RVW MEDS BY RX/DR IN RCRD: CPT | Mod: HCNC,CPTII,S$GLB, | Performed by: INTERNAL MEDICINE

## 2023-02-14 PROCEDURE — 1159F PR MEDICATION LIST DOCUMENTED IN MEDICAL RECORD: ICD-10-PCS | Mod: HCNC,CPTII,S$GLB, | Performed by: INTERNAL MEDICINE

## 2023-02-14 PROCEDURE — 1157F PR ADVANCE CARE PLAN OR EQUIV PRESENT IN MEDICAL RECORD: ICD-10-PCS | Mod: HCNC,CPTII,S$GLB, | Performed by: INTERNAL MEDICINE

## 2023-02-14 PROCEDURE — 1101F PR PT FALLS ASSESS DOC 0-1 FALLS W/OUT INJ PAST YR: ICD-10-PCS | Mod: HCNC,CPTII,S$GLB, | Performed by: INTERNAL MEDICINE

## 2023-02-14 PROCEDURE — 99999 PR PBB SHADOW E&M-EST. PATIENT-LVL IV: ICD-10-PCS | Mod: PBBFAC,HCNC,, | Performed by: INTERNAL MEDICINE

## 2023-02-14 PROCEDURE — 3075F PR MOST RECENT SYSTOLIC BLOOD PRESS GE 130-139MM HG: ICD-10-PCS | Mod: HCNC,CPTII,S$GLB, | Performed by: INTERNAL MEDICINE

## 2023-02-14 PROCEDURE — 99397 PER PM REEVAL EST PAT 65+ YR: CPT | Mod: HCNC,S$GLB,, | Performed by: INTERNAL MEDICINE

## 2023-02-14 PROCEDURE — 1126F AMNT PAIN NOTED NONE PRSNT: CPT | Mod: HCNC,CPTII,S$GLB, | Performed by: INTERNAL MEDICINE

## 2023-02-14 PROCEDURE — 1159F MED LIST DOCD IN RCRD: CPT | Mod: HCNC,CPTII,S$GLB, | Performed by: INTERNAL MEDICINE

## 2023-02-14 PROCEDURE — 1101F PT FALLS ASSESS-DOCD LE1/YR: CPT | Mod: HCNC,CPTII,S$GLB, | Performed by: INTERNAL MEDICINE

## 2023-02-14 PROCEDURE — 3288F PR FALLS RISK ASSESSMENT DOCUMENTED: ICD-10-PCS | Mod: HCNC,CPTII,S$GLB, | Performed by: INTERNAL MEDICINE

## 2023-02-14 PROCEDURE — 1157F ADVNC CARE PLAN IN RCRD: CPT | Mod: HCNC,CPTII,S$GLB, | Performed by: INTERNAL MEDICINE

## 2023-03-01 ENCOUNTER — OFFICE VISIT (OUTPATIENT)
Dept: FAMILY MEDICINE | Facility: CLINIC | Age: 78
End: 2023-03-01
Payer: MEDICARE

## 2023-03-01 VITALS
RESPIRATION RATE: 16 BRPM | OXYGEN SATURATION: 98 % | SYSTOLIC BLOOD PRESSURE: 140 MMHG | DIASTOLIC BLOOD PRESSURE: 80 MMHG | WEIGHT: 163.13 LBS | HEIGHT: 66 IN | TEMPERATURE: 98 F | BODY MASS INDEX: 26.22 KG/M2 | HEART RATE: 88 BPM

## 2023-03-01 DIAGNOSIS — L01.00 IMPETIGO: Primary | ICD-10-CM

## 2023-03-01 PROCEDURE — 99213 PR OFFICE/OUTPT VISIT, EST, LEVL III, 20-29 MIN: ICD-10-PCS | Mod: HCNC,S$GLB,, | Performed by: FAMILY MEDICINE

## 2023-03-01 PROCEDURE — 1159F MED LIST DOCD IN RCRD: CPT | Mod: HCNC,CPTII,S$GLB, | Performed by: FAMILY MEDICINE

## 2023-03-01 PROCEDURE — 99999 PR PBB SHADOW E&M-EST. PATIENT-LVL V: ICD-10-PCS | Mod: PBBFAC,HCNC,, | Performed by: FAMILY MEDICINE

## 2023-03-01 PROCEDURE — 99213 OFFICE O/P EST LOW 20 MIN: CPT | Mod: HCNC,S$GLB,, | Performed by: FAMILY MEDICINE

## 2023-03-01 PROCEDURE — 1157F PR ADVANCE CARE PLAN OR EQUIV PRESENT IN MEDICAL RECORD: ICD-10-PCS | Mod: HCNC,CPTII,S$GLB, | Performed by: FAMILY MEDICINE

## 2023-03-01 PROCEDURE — 3079F PR MOST RECENT DIASTOLIC BLOOD PRESSURE 80-89 MM HG: ICD-10-PCS | Mod: HCNC,CPTII,S$GLB, | Performed by: FAMILY MEDICINE

## 2023-03-01 PROCEDURE — 3079F DIAST BP 80-89 MM HG: CPT | Mod: HCNC,CPTII,S$GLB, | Performed by: FAMILY MEDICINE

## 2023-03-01 PROCEDURE — 1157F ADVNC CARE PLAN IN RCRD: CPT | Mod: HCNC,CPTII,S$GLB, | Performed by: FAMILY MEDICINE

## 2023-03-01 PROCEDURE — 99999 PR PBB SHADOW E&M-EST. PATIENT-LVL V: CPT | Mod: PBBFAC,HCNC,, | Performed by: FAMILY MEDICINE

## 2023-03-01 PROCEDURE — 1160F PR REVIEW ALL MEDS BY PRESCRIBER/CLIN PHARMACIST DOCUMENTED: ICD-10-PCS | Mod: HCNC,CPTII,S$GLB, | Performed by: FAMILY MEDICINE

## 2023-03-01 PROCEDURE — 1126F AMNT PAIN NOTED NONE PRSNT: CPT | Mod: HCNC,CPTII,S$GLB, | Performed by: FAMILY MEDICINE

## 2023-03-01 PROCEDURE — 3077F PR MOST RECENT SYSTOLIC BLOOD PRESSURE >= 140 MM HG: ICD-10-PCS | Mod: HCNC,CPTII,S$GLB, | Performed by: FAMILY MEDICINE

## 2023-03-01 PROCEDURE — 3288F PR FALLS RISK ASSESSMENT DOCUMENTED: ICD-10-PCS | Mod: HCNC,CPTII,S$GLB, | Performed by: FAMILY MEDICINE

## 2023-03-01 PROCEDURE — 1101F PR PT FALLS ASSESS DOC 0-1 FALLS W/OUT INJ PAST YR: ICD-10-PCS | Mod: HCNC,CPTII,S$GLB, | Performed by: FAMILY MEDICINE

## 2023-03-01 PROCEDURE — 3288F FALL RISK ASSESSMENT DOCD: CPT | Mod: HCNC,CPTII,S$GLB, | Performed by: FAMILY MEDICINE

## 2023-03-01 PROCEDURE — 1126F PR PAIN SEVERITY QUANTIFIED, NO PAIN PRESENT: ICD-10-PCS | Mod: HCNC,CPTII,S$GLB, | Performed by: FAMILY MEDICINE

## 2023-03-01 PROCEDURE — 1160F RVW MEDS BY RX/DR IN RCRD: CPT | Mod: HCNC,CPTII,S$GLB, | Performed by: FAMILY MEDICINE

## 2023-03-01 PROCEDURE — 3077F SYST BP >= 140 MM HG: CPT | Mod: HCNC,CPTII,S$GLB, | Performed by: FAMILY MEDICINE

## 2023-03-01 PROCEDURE — 1101F PT FALLS ASSESS-DOCD LE1/YR: CPT | Mod: HCNC,CPTII,S$GLB, | Performed by: FAMILY MEDICINE

## 2023-03-01 PROCEDURE — 1159F PR MEDICATION LIST DOCUMENTED IN MEDICAL RECORD: ICD-10-PCS | Mod: HCNC,CPTII,S$GLB, | Performed by: FAMILY MEDICINE

## 2023-03-01 RX ORDER — MUPIROCIN 20 MG/G
OINTMENT TOPICAL 3 TIMES DAILY
Qty: 15 G | Refills: 1 | Status: SHIPPED | OUTPATIENT
Start: 2023-03-01

## 2023-03-01 NOTE — PATIENT INSTRUCTIONS
Three x a day - wash soap and water/compress.  Allow to dry well then apply the Rx ointment.    Contact me or your PCP if any worsening or for any new concerns as we discussed.

## 2023-03-01 NOTE — PROGRESS NOTES
Subjective:       Patient ID: Madison Long is a 77 y.o. female.    Chief Complaint: No chief complaint on file.    New to me patient here for UC visit.  CC- infection on chin.  Got sunburned on face/neck 8 days ago; used OTC Cortizone 10; improving overall.  4 days ago had a red bump on chin - appeared to be a bug bite and now has spread; more red and sore; using OTC Equate abx ointment.  No fever, chills, nausea or weakness.      Review of Systems   Constitutional:  Negative for fever.   Respiratory:  Negative for shortness of breath.    Cardiovascular:  Negative for chest pain.   Gastrointestinal:  Negative for abdominal pain and nausea.   Skin:  Positive for rash and wound.   All other systems reviewed and are negative.    Objective:      Physical Exam  Vitals reviewed.   Constitutional:       General: She is not in acute distress.     Appearance: She is well-developed. She is not ill-appearing.   HENT:      Head:        Right Ear: Tympanic membrane normal.      Left Ear: Tympanic membrane normal.      Nose: Nose normal.      Mouth/Throat:      Mouth: Mucous membranes are moist.      Pharynx: Oropharynx is clear. No posterior oropharyngeal erythema.   Cardiovascular:      Rate and Rhythm: Normal rate and regular rhythm.      Heart sounds: No murmur heard.  Pulmonary:      Effort: Pulmonary effort is normal.      Breath sounds: Normal breath sounds.   Musculoskeletal:      Cervical back: Neck supple.   Lymphadenopathy:      Cervical: No cervical adenopathy.   Neurological:      Mental Status: She is alert.       Assessment:       1. Impetigo          Plan:       Impetigo  -     mupirocin (BACTROBAN) 2 % ointment; Apply topically 3 (three) times daily.  Dispense: 15 g; Refill: 1      Patient Instructions   Three x a day - wash soap and water/compress.  Allow to dry well then apply the Rx ointment.    Contact me or your PCP if any worsening or for any new concerns as we discussed.

## 2023-03-07 ENCOUNTER — TELEPHONE (OUTPATIENT)
Dept: FAMILY MEDICINE | Facility: CLINIC | Age: 78
End: 2023-03-07
Payer: MEDICARE

## 2023-03-07 NOTE — TELEPHONE ENCOUNTER
----- Message from Clarissa Dixon sent at 3/7/2023 11:13 AM CST -----  Contact: DONNPARAGJEISON [phone]  Type: Needs Medical Advice    Who Called:  PATIENT    Best Call Back Number: [phone] (home)     Pharmacy:   [id number] - [place], [place] - [address] AT [place] [address]  [address] [place] [address]  Phone: [phone] Fax: [phone]      Additional Information: Patient is calling office to speak with nurse/MA regarding insect bite.  Patient was seen by Dr. Landa on 03-01 and prescribed mupirocin (BACTROBAN) 2 % ointment. Patient is concern that the mediation is not working and requesting if an antibiotic an be called to pharmacy.   Please call back and advise. Thanks

## 2023-03-08 DIAGNOSIS — L03.211 CELLULITIS OF CHIN: Primary | ICD-10-CM

## 2023-03-08 RX ORDER — SULFAMETHOXAZOLE AND TRIMETHOPRIM 800; 160 MG/1; MG/1
1 TABLET ORAL 2 TIMES DAILY
Qty: 20 TABLET | Refills: 0 | Status: SHIPPED | OUTPATIENT
Start: 2023-03-08 | End: 2023-03-18

## 2023-03-16 ENCOUNTER — OFFICE VISIT (OUTPATIENT)
Dept: OPHTHALMOLOGY | Facility: CLINIC | Age: 78
End: 2023-03-16
Payer: MEDICARE

## 2023-03-16 DIAGNOSIS — H33.021 RETINAL DETACHMENT OF RIGHT EYE WITH MULTIPLE BREAKS: Primary | ICD-10-CM

## 2023-03-16 DIAGNOSIS — H43.811 POSTERIOR VITREOUS DETACHMENT, RIGHT EYE: ICD-10-CM

## 2023-03-16 DIAGNOSIS — H35.21 PROLIFERATIVE VITREORETINOPATHY, RIGHT: ICD-10-CM

## 2023-03-16 PROCEDURE — 92134 POSTERIOR SEGMENT OCT RETINA (OCULAR COHERENCE TOMOGRAPHY)-BOTH EYES: ICD-10-PCS | Mod: HCNC,S$GLB,, | Performed by: OPHTHALMOLOGY

## 2023-03-16 PROCEDURE — 99999 PR PBB SHADOW E&M-EST. PATIENT-LVL IV: ICD-10-PCS | Mod: PBBFAC,HCNC,, | Performed by: OPHTHALMOLOGY

## 2023-03-16 PROCEDURE — 92201 PR OPHTHALMOSCOPY, EXT, W/RET DRAW/SCLERAL DEPR, I&R, UNI/BI: ICD-10-PCS | Mod: HCNC,S$GLB,, | Performed by: OPHTHALMOLOGY

## 2023-03-16 PROCEDURE — 99999 PR PBB SHADOW E&M-EST. PATIENT-LVL IV: CPT | Mod: PBBFAC,HCNC,, | Performed by: OPHTHALMOLOGY

## 2023-03-16 PROCEDURE — 1101F PT FALLS ASSESS-DOCD LE1/YR: CPT | Mod: HCNC,CPTII,S$GLB, | Performed by: OPHTHALMOLOGY

## 2023-03-16 PROCEDURE — 1160F PR REVIEW ALL MEDS BY PRESCRIBER/CLIN PHARMACIST DOCUMENTED: ICD-10-PCS | Mod: HCNC,CPTII,S$GLB, | Performed by: OPHTHALMOLOGY

## 2023-03-16 PROCEDURE — 1160F RVW MEDS BY RX/DR IN RCRD: CPT | Mod: HCNC,CPTII,S$GLB, | Performed by: OPHTHALMOLOGY

## 2023-03-16 PROCEDURE — 1157F ADVNC CARE PLAN IN RCRD: CPT | Mod: HCNC,CPTII,S$GLB, | Performed by: OPHTHALMOLOGY

## 2023-03-16 PROCEDURE — 3288F FALL RISK ASSESSMENT DOCD: CPT | Mod: HCNC,CPTII,S$GLB, | Performed by: OPHTHALMOLOGY

## 2023-03-16 PROCEDURE — 3288F PR FALLS RISK ASSESSMENT DOCUMENTED: ICD-10-PCS | Mod: HCNC,CPTII,S$GLB, | Performed by: OPHTHALMOLOGY

## 2023-03-16 PROCEDURE — 92014 COMPRE OPH EXAM EST PT 1/>: CPT | Mod: HCNC,S$GLB,, | Performed by: OPHTHALMOLOGY

## 2023-03-16 PROCEDURE — 92134 CPTRZ OPH DX IMG PST SGM RTA: CPT | Mod: HCNC,S$GLB,, | Performed by: OPHTHALMOLOGY

## 2023-03-16 PROCEDURE — 1126F PR PAIN SEVERITY QUANTIFIED, NO PAIN PRESENT: ICD-10-PCS | Mod: HCNC,CPTII,S$GLB, | Performed by: OPHTHALMOLOGY

## 2023-03-16 PROCEDURE — 1159F PR MEDICATION LIST DOCUMENTED IN MEDICAL RECORD: ICD-10-PCS | Mod: HCNC,CPTII,S$GLB, | Performed by: OPHTHALMOLOGY

## 2023-03-16 PROCEDURE — 1159F MED LIST DOCD IN RCRD: CPT | Mod: HCNC,CPTII,S$GLB, | Performed by: OPHTHALMOLOGY

## 2023-03-16 PROCEDURE — 92014 PR EYE EXAM, EST PATIENT,COMPREHESV: ICD-10-PCS | Mod: HCNC,S$GLB,, | Performed by: OPHTHALMOLOGY

## 2023-03-16 PROCEDURE — 1101F PR PT FALLS ASSESS DOC 0-1 FALLS W/OUT INJ PAST YR: ICD-10-PCS | Mod: HCNC,CPTII,S$GLB, | Performed by: OPHTHALMOLOGY

## 2023-03-16 PROCEDURE — 1126F AMNT PAIN NOTED NONE PRSNT: CPT | Mod: HCNC,CPTII,S$GLB, | Performed by: OPHTHALMOLOGY

## 2023-03-16 PROCEDURE — 1157F PR ADVANCE CARE PLAN OR EQUIV PRESENT IN MEDICAL RECORD: ICD-10-PCS | Mod: HCNC,CPTII,S$GLB, | Performed by: OPHTHALMOLOGY

## 2023-03-16 PROCEDURE — 92201 OPSCPY EXTND RTA DRAW UNI/BI: CPT | Mod: HCNC,S$GLB,, | Performed by: OPHTHALMOLOGY

## 2023-03-16 NOTE — PROGRESS NOTES
HPI    1 yr check up  DLS - 1/31/2022 Dr. Chaves    GTTS: none    Pt states that there has been no change in vision. Pt denies eye pain. Pt   does see flashes and floaters occasionally.  Last edited by Vandana Magana MA on 3/16/2023 11:19 AM.            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 RRD OD   Macula involving x 4  Days    S/p  25g PPV/EL/SF6 OD for RRD 9/6/17  IOP improved    10/30/17 - Recurrent IT RD with early PVR and small break    s/p 25g PPV/membrane stripping/inferior retinopexy/C3F8 OD for RRD/PVR OD 11/1/17 12/4/17 - recurrent inferior RD - small holes posterior to laser    s/p 25g PPV/EL/AFx/1000cs Silicone Oil OD for Recurrent RRD with PVR 12/6/17    Doing well, good IOP    1/9/18 increasing inferior fluid collection beneath oil into macula - needs SB - multiple small break    s/p /270, 25g PPV/SO removal/membrane stripping/AFx/EL/1000cs Silicone Oil OD for RRD/PVR 1/10/18    Doing well, good IOP  Had ST scleral dehiscence - closed with 5-0 mersilene    Side sleep or stomach    Inferior PVR OD with shallow submacular fluid   IOP low - stop Cosopt  Will likely need inferior retinectomy    2/18 - some progression    s/p 25 PPV/SO removal/membrane stripping/inferior retinectomy/posterior capsulectomy/EL/Leonora oil OD for RRD with PVR and PCO OD 4/11/18    Stable inferonasal RD under oil  IOP increased  - continue COsopt BID  Continue PF Q day     Will need heavier inferonasal laser during oil removal 3-6 months (around 4/19)    S/p 25g PPV/1000cs SO removal/EL/AFx/C3F8 OD 3/27/19    Doing well, good IOP  Flat!    IOP good off cosopt     2. PCIOL OU    3. HTN   Good BP control    4. PVD OS  No breaks OS        1 year OCT

## 2023-03-21 ENCOUNTER — TELEPHONE (OUTPATIENT)
Dept: FAMILY MEDICINE | Facility: CLINIC | Age: 78
End: 2023-03-21
Payer: MEDICARE

## 2023-03-21 NOTE — TELEPHONE ENCOUNTER
Kylee Campos Staff  Caller: Unspecified (Today, 11:50 AM)    ----- Message from Kylee Siu sent at 3/21/2023 11:50 AM CDT -----  Type:  Needs Medical Advice    Who Called: pt  Symptoms (please be specific): rash on face   How long has patient had these symptoms:    Pharmacy name and phone #:    Would the patient rather a call back or a response via [facility]? Call back  Best Call Back Number: [phone]     Additional Information: Pt sts she has used the cream and the antibiotics but the rash is still there. Not sure but thinks she should see Dr.   Please advise -- thank you.

## 2023-03-21 NOTE — TELEPHONE ENCOUNTER
Patient was seen on 3-1-23 related to Impetigo. Patient reports she has completed antibiotic therapy and has used cream that was prescribed but rash is still present. Patient states rash is not as red. States rash has not improved and has not gotten any worse either. Requesting to know if Dr. Landa would like for her to schedule another appointment with him to perform a culture of rash or if there are any other recommendations.  Please advise. Thank you.

## 2023-03-21 NOTE — TELEPHONE ENCOUNTER
Andres Landa MD  You 1 minute ago (12:58 PM)       I'd recommend seeing her PCP to get his opinion and may need Derm referral based on that and the failed treatment tried tus far.        Call placed to patient for notification. Patient verbalized understanding.

## 2023-03-30 ENCOUNTER — OFFICE VISIT (OUTPATIENT)
Dept: FAMILY MEDICINE | Facility: CLINIC | Age: 78
End: 2023-03-30
Payer: MEDICARE

## 2023-03-30 VITALS
OXYGEN SATURATION: 97 % | HEART RATE: 74 BPM | BODY MASS INDEX: 26.5 KG/M2 | TEMPERATURE: 98 F | WEIGHT: 164.88 LBS | SYSTOLIC BLOOD PRESSURE: 138 MMHG | DIASTOLIC BLOOD PRESSURE: 86 MMHG | HEIGHT: 66 IN

## 2023-03-30 DIAGNOSIS — R21 RASH: Primary | ICD-10-CM

## 2023-03-30 PROCEDURE — 1157F ADVNC CARE PLAN IN RCRD: CPT | Mod: HCNC,CPTII,S$GLB, | Performed by: INTERNAL MEDICINE

## 2023-03-30 PROCEDURE — 3079F DIAST BP 80-89 MM HG: CPT | Mod: HCNC,CPTII,S$GLB, | Performed by: INTERNAL MEDICINE

## 2023-03-30 PROCEDURE — 1101F PT FALLS ASSESS-DOCD LE1/YR: CPT | Mod: HCNC,CPTII,S$GLB, | Performed by: INTERNAL MEDICINE

## 2023-03-30 PROCEDURE — 3288F PR FALLS RISK ASSESSMENT DOCUMENTED: ICD-10-PCS | Mod: HCNC,CPTII,S$GLB, | Performed by: INTERNAL MEDICINE

## 2023-03-30 PROCEDURE — 3075F SYST BP GE 130 - 139MM HG: CPT | Mod: HCNC,CPTII,S$GLB, | Performed by: INTERNAL MEDICINE

## 2023-03-30 PROCEDURE — 99213 PR OFFICE/OUTPT VISIT, EST, LEVL III, 20-29 MIN: ICD-10-PCS | Mod: HCNC,S$GLB,, | Performed by: INTERNAL MEDICINE

## 2023-03-30 PROCEDURE — 99999 PR PBB SHADOW E&M-EST. PATIENT-LVL III: ICD-10-PCS | Mod: PBBFAC,HCNC,, | Performed by: INTERNAL MEDICINE

## 2023-03-30 PROCEDURE — 99999 PR PBB SHADOW E&M-EST. PATIENT-LVL III: CPT | Mod: PBBFAC,HCNC,, | Performed by: INTERNAL MEDICINE

## 2023-03-30 PROCEDURE — 1101F PR PT FALLS ASSESS DOC 0-1 FALLS W/OUT INJ PAST YR: ICD-10-PCS | Mod: HCNC,CPTII,S$GLB, | Performed by: INTERNAL MEDICINE

## 2023-03-30 PROCEDURE — 99213 OFFICE O/P EST LOW 20 MIN: CPT | Mod: HCNC,S$GLB,, | Performed by: INTERNAL MEDICINE

## 2023-03-30 PROCEDURE — 3079F PR MOST RECENT DIASTOLIC BLOOD PRESSURE 80-89 MM HG: ICD-10-PCS | Mod: HCNC,CPTII,S$GLB, | Performed by: INTERNAL MEDICINE

## 2023-03-30 PROCEDURE — 1157F PR ADVANCE CARE PLAN OR EQUIV PRESENT IN MEDICAL RECORD: ICD-10-PCS | Mod: HCNC,CPTII,S$GLB, | Performed by: INTERNAL MEDICINE

## 2023-03-30 PROCEDURE — 87070 CULTURE OTHR SPECIMN AEROBIC: CPT | Mod: HCNC | Performed by: INTERNAL MEDICINE

## 2023-03-30 PROCEDURE — 3075F PR MOST RECENT SYSTOLIC BLOOD PRESS GE 130-139MM HG: ICD-10-PCS | Mod: HCNC,CPTII,S$GLB, | Performed by: INTERNAL MEDICINE

## 2023-03-30 PROCEDURE — 3288F FALL RISK ASSESSMENT DOCD: CPT | Mod: HCNC,CPTII,S$GLB, | Performed by: INTERNAL MEDICINE

## 2023-03-30 RX ORDER — HYDROCORTISONE 25 MG/G
CREAM TOPICAL 2 TIMES DAILY
Qty: 20 G | Refills: 0 | Status: SHIPPED | OUTPATIENT
Start: 2023-03-30 | End: 2024-01-04

## 2023-03-30 RX ORDER — CLINDAMYCIN HYDROCHLORIDE 300 MG/1
300 CAPSULE ORAL 3 TIMES DAILY
Qty: 30 CAPSULE | Refills: 0 | Status: SHIPPED | OUTPATIENT
Start: 2023-03-30 | End: 2023-04-09

## 2023-03-30 NOTE — PROGRESS NOTES
Subjective:       Patient ID: Madison Long is a 77 y.o. female.  Chief Complaint: Hypertension     HPI    Complains of red spot on face for 1 month.  Never hurt and was never warm.  No itch or blisters.   Urgent care gave antibiotic cream.  Nw, so gave 10 d of Bactrim.  Nw.  Not worse, but no change.     Assessment:       1. Rash          Plan:       Rash  -     clindamycin (CLEOCIN) 300 MG capsule; Take 1 capsule (300 mg total) by mouth 3 (three) times daily. for 10 days  Dispense: 30 capsule; Refill: 0  -     hydrocortisone 2.5 % cream; Apply topically 2 (two) times daily. for 10 days  Dispense: 20 g; Refill: 0  -     CULTURE, AEROBIC  (SPECIFY SOURCE)            Nw, contact her dermatologist for sooner apt.    No follow-ups on file.      Medication List with Changes/Refills   New Medications    CLINDAMYCIN (CLEOCIN) 300 MG CAPSULE    Take 1 capsule (300 mg total) by mouth 3 (three) times daily. for 10 days    HYDROCORTISONE 2.5 % CREAM    Apply topically 2 (two) times daily. for 10 days   Current Medications    ACETAMINOPHEN (TYLENOL) 500 MG TABLET    Take 500 mg by mouth every 6 (six) hours as needed for Pain.    ALBUTEROL (PROVENTIL/VENTOLIN HFA) 90 MCG/ACTUATION INHALER    Inhale 2 puffs into the lungs every 6 (six) hours as needed. Rescue    AMMONIUM LACTATE (LAC-HYDRIN) 12 % LOTION    Apply to dry skin daily to BID    ASPIRIN 81 MG CHEW    Take 81 mg by mouth daily as needed. Usually takes about every 2 weeks.    AZELASTINE (ASTELIN) 137 MCG (0.1 %) NASAL SPRAY    1 spray (137 mcg total) by Nasal route 2 (two) times daily. for 14 days    CETIRIZINE (ZYRTEC) 10 MG TABLET    Take 1 tablet (10 mg total) by mouth once daily.    DORZOLAMIDE-TIMOLOL, PF, (COSOPT PF) 2-0.5 % DPET OPHTHALMIC SOLUTION        ECONAZOLE NITRATE 1 % CREAM    Apply topically 2 (two) times daily as needed. Twice a day  PRN    FAMOTIDINE (PEPCID) 40 MG TABLET    Take 1 tablet (40 mg total) by mouth once daily.    FISH OIL-OMEGA-3  FATTY ACIDS 300-1,000 MG CAPSULE    Take 1 g by mouth every morning.     FLUCONAZOLE (DIFLUCAN) 200 MG TAB    Take 1 tab PO qweek.    FLUOXETINE 10 MG TAB    Take 1 tablet (10 mg total) by mouth once daily.    FLUTICASONE (FLONASE) 50 MCG/ACTUATION NASAL SPRAY    SPRAY 2 SPRAYS IN EACH NOSTRIL EVERY DAY    GUAIFENESIN (MUCINEX) 600 MG 12 HR TABLET    Take 1,200 mg by mouth 2 (two) times daily as needed.     IBUPROFEN/DIPHENHYDRAMINE CIT (ADVIL PM ORAL)    Take by mouth.    LOSARTAN (COZAAR) 50 MG TABLET    TAKE 1 TABLET(50 MG) BY MOUTH EVERY MORNING    MULTIVIT,STRESS FORMULA-ZINC TAB    Take 1 tablet by mouth every morning. Uses Alive    MUPIROCIN (BACTROBAN) 2 % OINTMENT    Apply topically 3 (three) times daily.    OMEPRAZOLE (PRILOSEC) 20 MG CAPSULE    TAKE 1 CAPSULE(20 MG) BY MOUTH EVERY DAY    PSEUDOEPHEDRINE (SUDAFED) 30 MG TABLET    Take 30 mg by mouth every 4 (four) hours as needed for Congestion.    ROSUVASTATIN (CRESTOR) 40 MG TAB    Take 1 tablet (40 mg total) by mouth once daily.    SODIUM CHLORIDE (OCEAN) 0.65 % NASAL SPRAY    1 spray by Nasal route as needed for Congestion.    SYMBICORT 160-4.5 MCG/ACTUATION HFAA    INHALE 2 PUFFS BY MOUTH TWICE DAILY    TRAZODONE (DESYREL) 50 MG TABLET    TAKE 1 TABLET BY MOUTH EVERY NIGHT AS NEEDED FOR INSOMNIA, MAY TAKE UP TO 2 TABLETS EVERY NIGHT AS NEEDED    ZINC GLUCONATE (COLD-EEZE ORAL)    Take 1 lozenge by mouth daily as needed.       BP Readings from Last 3 Encounters:   03/30/23 138/86   03/01/23 (!) 140/80   02/14/23 136/86     No results found for: HGBA1C  Lab Results   Component Value Date    TSH 1.766 01/10/2018     Lab Results   Component Value Date    LDLCALC 80.8 02/07/2023    LDLCALC 92.0 08/08/2022    LDLCALC 91.4 02/08/2022     Lab Results   Component Value Date    TRIG 56 02/07/2023    TRIG 75 08/08/2022    TRIG 63 02/08/2022     Wt Readings from Last 3 Encounters:   03/30/23 74.8 kg (164 lb 14.5 oz)   03/01/23 74 kg (163 lb 2.3 oz)   02/14/23 74  kg (163 lb 4 oz)     Lab Results   Component Value Date    HGB 15.3 08/08/2022    HCT 44.8 08/08/2022    WBC 7.52 08/08/2022    ALT 21 02/07/2023    AST 27 02/07/2023     02/07/2023    K 4.6 02/07/2023    CREATININE 0.9 02/07/2023           Review of Systems        Objective:      Vitals:    03/30/23 1252   BP: 138/86   Pulse: 74   Temp: 97.6 °F (36.4 °C)     Physical Exam

## 2023-04-03 ENCOUNTER — TELEPHONE (OUTPATIENT)
Dept: FAMILY MEDICINE | Facility: CLINIC | Age: 78
End: 2023-04-03
Payer: MEDICARE

## 2023-04-03 LAB — BACTERIA SPEC AEROBE CULT: NORMAL

## 2023-04-03 NOTE — TELEPHONE ENCOUNTER
----- Message from Darian Arango MD sent at 3/31/2023 12:20 PM CDT -----  Please call patient and inform of normal results

## 2023-05-12 ENCOUNTER — TELEPHONE (OUTPATIENT)
Dept: DERMATOLOGY | Facility: CLINIC | Age: 78
End: 2023-05-12
Payer: MEDICARE

## 2023-05-12 NOTE — TELEPHONE ENCOUNTER
----- Message from Ainsley Ruiz sent at 5/12/2023  3:41 PM CDT -----  Regarding: appointment  Contact: patient  Type:  Sooner Appointment Request    Caller is requesting a sooner appointment.  Caller declined first available appointment listed below.  Caller will not accept being placed on the waitlist and is requesting a message be sent to doctor.    Name of Caller:  patient  When is the first available appointment?  11/2023  Symptoms:  skin check moles  Best Call Back Number:  [phone] (home)     Additional Information:  Please call patient to schedule.  Thanks!

## 2023-06-23 ENCOUNTER — OFFICE VISIT (OUTPATIENT)
Dept: DERMATOLOGY | Facility: CLINIC | Age: 78
End: 2023-06-23
Payer: MEDICARE

## 2023-06-23 VITALS — WEIGHT: 164 LBS | BODY MASS INDEX: 26.36 KG/M2 | HEIGHT: 66 IN

## 2023-06-23 DIAGNOSIS — L81.4 SOLAR LENTIGO: ICD-10-CM

## 2023-06-23 DIAGNOSIS — Z85.828 HISTORY OF NONMELANOMA SKIN CANCER: ICD-10-CM

## 2023-06-23 DIAGNOSIS — L82.1 SEBORRHEIC KERATOSES: ICD-10-CM

## 2023-06-23 DIAGNOSIS — D18.01 CHERRY ANGIOMA: ICD-10-CM

## 2023-06-23 DIAGNOSIS — L82.0 INFLAMED SEBORRHEIC KERATOSIS: ICD-10-CM

## 2023-06-23 DIAGNOSIS — L56.5: ICD-10-CM

## 2023-06-23 DIAGNOSIS — L57.0 ACTINIC KERATOSES: Primary | ICD-10-CM

## 2023-06-23 PROCEDURE — 1101F PR PT FALLS ASSESS DOC 0-1 FALLS W/OUT INJ PAST YR: ICD-10-PCS | Mod: CPTII,S$GLB,, | Performed by: DERMATOLOGY

## 2023-06-23 PROCEDURE — 1101F PT FALLS ASSESS-DOCD LE1/YR: CPT | Mod: CPTII,S$GLB,, | Performed by: DERMATOLOGY

## 2023-06-23 PROCEDURE — 99213 PR OFFICE/OUTPT VISIT, EST, LEVL III, 20-29 MIN: ICD-10-PCS | Mod: 25,S$GLB,, | Performed by: DERMATOLOGY

## 2023-06-23 PROCEDURE — 17000 DESTRUCT PREMALG LESION: CPT | Mod: XS,S$GLB,, | Performed by: DERMATOLOGY

## 2023-06-23 PROCEDURE — 1160F PR REVIEW ALL MEDS BY PRESCRIBER/CLIN PHARMACIST DOCUMENTED: ICD-10-PCS | Mod: CPTII,S$GLB,, | Performed by: DERMATOLOGY

## 2023-06-23 PROCEDURE — 1157F ADVNC CARE PLAN IN RCRD: CPT | Mod: CPTII,S$GLB,, | Performed by: DERMATOLOGY

## 2023-06-23 PROCEDURE — 1159F MED LIST DOCD IN RCRD: CPT | Mod: CPTII,S$GLB,, | Performed by: DERMATOLOGY

## 2023-06-23 PROCEDURE — 17003 DESTRUCT PREMALG LES 2-14: CPT | Mod: XS,S$GLB,, | Performed by: DERMATOLOGY

## 2023-06-23 PROCEDURE — 17110 PR DESTRUCTION BENIGN LESIONS UP TO 14: ICD-10-PCS | Mod: S$GLB,,, | Performed by: DERMATOLOGY

## 2023-06-23 PROCEDURE — 1160F RVW MEDS BY RX/DR IN RCRD: CPT | Mod: CPTII,S$GLB,, | Performed by: DERMATOLOGY

## 2023-06-23 PROCEDURE — 17003 DESTRUCTION, PREMALIGNANT LESIONS; SECOND THROUGH 14 LESIONS: ICD-10-PCS | Mod: XS,S$GLB,, | Performed by: DERMATOLOGY

## 2023-06-23 PROCEDURE — 17110 DESTRUCTION B9 LES UP TO 14: CPT | Mod: S$GLB,,, | Performed by: DERMATOLOGY

## 2023-06-23 PROCEDURE — 3288F PR FALLS RISK ASSESSMENT DOCUMENTED: ICD-10-PCS | Mod: CPTII,S$GLB,, | Performed by: DERMATOLOGY

## 2023-06-23 PROCEDURE — 99213 OFFICE O/P EST LOW 20 MIN: CPT | Mod: 25,S$GLB,, | Performed by: DERMATOLOGY

## 2023-06-23 PROCEDURE — 17000 PR DESTRUCTION(LASER SURGERY,CRYOSURGERY,CHEMOSURGERY),PREMALIGNANT LESIONS,FIRST LESION: ICD-10-PCS | Mod: XS,S$GLB,, | Performed by: DERMATOLOGY

## 2023-06-23 PROCEDURE — 1157F PR ADVANCE CARE PLAN OR EQUIV PRESENT IN MEDICAL RECORD: ICD-10-PCS | Mod: CPTII,S$GLB,, | Performed by: DERMATOLOGY

## 2023-06-23 PROCEDURE — 1159F PR MEDICATION LIST DOCUMENTED IN MEDICAL RECORD: ICD-10-PCS | Mod: CPTII,S$GLB,, | Performed by: DERMATOLOGY

## 2023-06-23 PROCEDURE — 3288F FALL RISK ASSESSMENT DOCD: CPT | Mod: CPTII,S$GLB,, | Performed by: DERMATOLOGY

## 2023-06-23 NOTE — PROGRESS NOTES
"  Subjective:      Patient ID:  Madison Long is a 78 y.o. female who presents for   Chief Complaint   Patient presents with    Skin Check     [facility] [place] [place]    Skin, left paramedian mid back, shave biopsy:   -LICHENOID KERATOSIS, see comment   COMMENT: The finding of a lichenoid dermatitis pattern in a solitary lesion   is characteristic of a lichenoid keratosis.  Some of these lesions are   secondary changes in seborrheic keratoses, solar lentigos, verrucae, and   other conditions.     Patient here today for TBSC  C/O spot to left chest x " a while" no symptoms.  Pt would like to discuss removal of spot to left chest.     Derm Hx:  yes Phx of NMSC.  +BCC on right cheek (cutaneous lateral lower eyelid) treated by Mohs (Dr Hooper 2013)  Has fhx of MM-sister      [facility] Medications:   ·  acetaminophen (TYLENOL) 500 MG tablet, Take 500 mg by mouth every 6 (six) hours as needed for Pain., Disp: , Rfl:   ·  albuterol (PROVENTIL/VENTOLIN HFA) 90 mcg/actuation inhaler, Inhale 2 puffs into the lungs every 6 (six) hours as needed. Rescue, Disp: 18 g, Rfl: 3  ·  ammonium lactate (LAC-HYDRIN) 12 % lotion, Apply to dry skin daily to BID, Disp: 396 g, Rfl: 5  ·  aspirin 81 MG Chew, Take 81 mg by mouth daily as needed. Usually takes about every 2 weeks., Disp: , Rfl:   ·  dorzolamide-timolol, PF, (COSOPT PF) 2-0.5 % Dpet ophthalmic solution, , Disp: , Rfl:   ·  econazole nitrate 1 % cream, Apply topically 2 (two) times daily as needed. Twice a day  PRN, Disp: , Rfl:   ·  fish oil-omega-3 fatty acids 300-1,000 mg capsule, Take 1 g by mouth every morning. , Disp: , Rfl:   ·  fluconazole (DIFLUCAN) 200 MG Tab, Take 1 tab PO qweek., Disp: 12 tablet, Rfl: 1  ·  FLUoxetine 10 MG Tab, Take 1 tablet (10 mg total) by mouth once daily., Disp: 30 tablet, Rfl: 11  ·  fluticasone (FLONASE) 50 mcg/actuation nasal spray, SPRAY 2 SPRAYS IN EACH NOSTRIL EVERY DAY, Disp: 1 Bottle, Rfl: 6  ·  guaifenesin (MUCINEX) 600 " mg 12 hr tablet, Take 1,200 mg by mouth 2 (two) times daily as needed. , Disp: , Rfl:   ·  ibuprofen/diphenhydramine cit (ADVIL PM ORAL), Take by mouth., Disp: , Rfl:   ·  losartan (COZAAR) 50 MG tablet, TAKE 1 TABLET(50 MG) BY MOUTH EVERY MORNING, Disp: 90 tablet, Rfl: 3  ·  multivit,stress formula-zinc Tab, Take 1 tablet by mouth every morning. Uses Alive, Disp: , Rfl:   ·  mupirocin (BACTROBAN) 2 % ointment, Apply topically 3 (three) times daily., Disp: 15 g, Rfl: 1  ·  omeprazole (PRILOSEC) 20 MG capsule, TAKE 1 CAPSULE(20 MG) BY MOUTH EVERY DAY, Disp: 90 capsule, Rfl: 0  ·  pseudoephedrine (SUDAFED) 30 MG tablet, Take 30 mg by mouth every 4 (four) hours as needed for Congestion., Disp: , Rfl:   ·  rosuvastatin (CRESTOR) 40 MG Tab, Take 1 tablet (40 mg total) by mouth once daily., Disp: 90 tablet, Rfl: 3  ·  sodium chloride (OCEAN) 0.65 % nasal spray, 1 spray by Nasal route as needed for Congestion., Disp: , Rfl:   ·  SYMBICORT 160-4.5 mcg/actuation HFAA, INHALE 2 PUFFS BY MOUTH TWICE DAILY, Disp: 30.6 g, Rfl: 1  ·  traZODone (DESYREL) 50 MG tablet, TAKE 1 TABLET BY MOUTH EVERY NIGHT AS NEEDED FOR INSOMNIA, MAY TAKE UP TO 2 TABLETS EVERY NIGHT AS NEEDED, Disp: 180 tablet, Rfl: 3  ·  zinc gluconate (COLD-EEZE ORAL), Take 1 lozenge by mouth daily as needed., Disp: , Rfl:   ·  azelastine (ASTELIN) 137 mcg (0.1 %) nasal spray, 1 spray (137 mcg total) by Nasal route 2 (two) times daily. for 14 days, Disp: 90 mL, Rfl: 3  ·  cetirizine (ZYRTEC) 10 MG tablet, Take 1 tablet (10 mg total) by mouth once daily., Disp: 7 tablet, Rfl: 0  ·  famotidine (PEPCID) 40 MG tablet, Take 1 tablet (40 mg total) by mouth once daily., Disp: 7 tablet, Rfl: 0  ·  hydrocortisone 2.5 % cream, Apply topically 2 (two) times daily. for 10 days, Disp: 20 g, Rfl: 0           Review of Systems   Constitutional:  Negative for fever and chills.   HENT:  Negative for congestion and sore throat.    Respiratory:  Negative for cough and shortness of  breath.    Skin:  Positive for activity-related sunscreen use and sensitivity to antibiotic ointment. Negative for itching, daily sunscreen use and sensitivity to bandage adhesive.   Hematologic/Lymphatic: Bruises/bleeds easily.     Objective:   Physical Exam   Constitutional: She appears well-developed and well-nourished. No distress.   Neurological: She is alert and oriented to person, place, and time. She is not disoriented.   Psychiatric: She has a normal mood and affect.   Skin:   Areas Examined (abnormalities noted in diagram):   Scalp / Hair Palpated and Inspected  Head / Face Inspection Performed  Neck Inspection Performed  Chest / Axilla Inspection Performed  Abdomen Inspection Performed  Genitals / Buttocks / Groin Inspection Performed  Back Inspection Performed  RUE Inspected  LUE Inspection Performed  RLE Inspected  LLE Inspection Performed               Diagram Legend     Erythematous scaling macule/papule c/w actinic keratosis       Vascular papule c/w angioma      Pigmented verrucoid papule/plaque c/w seborrheic keratosis      Yellow umbilicated papule c/w sebaceous hyperplasia      Irregularly shaped tan macule c/w lentigo     1-2 mm smooth white papules consistent with Milia      Movable subcutaneous cyst with punctum c/w epidermal inclusion cyst      Subcutaneous movable cyst c/w pilar cyst      Firm pink to brown papule c/w dermatofibroma      Pedunculated fleshy papule(s) c/w skin tag(s)      Evenly pigmented macule c/w junctional nevus     Mildly variegated pigmented, slightly irregular-bordered macule c/w mildly atypical nevus      Flesh colored to evenly pigmented papule c/w intradermal nevus       Pink pearly papule/plaque c/w basal cell carcinoma      Erythematous hyperkeratotic cursted plaque c/w SCC      Surgical scar with no sign of skin cancer recurrence      Open and closed comedones      Inflammatory papules and pustules      Verrucoid papule consistent consistent with wart      Erythematous eczematous patches and plaques     Dystrophic onycholytic nail with subungual debris c/w onychomycosis     Umbilicated papule    Erythematous-base heme-crusted tan verrucoid plaque consistent with inflamed seborrheic keratosis     Erythematous Silvery Scaling Plaque c/w Psoriasis     See annotation      Assessment / Plan:        Actinic keratoses  Cryosurgery Procedure Note    Verbal consent from the patient is obtained and the patient is aware of the precancerous quality and need for treatment of these lesions. Liquid nitrogen cryosurgery is applied to the 13 actinic keratoses, as detailed in the physical exam, to produce a freeze injury. The patient is aware that blisters may form and is instructed on wound care with gentle cleansing and use of vaseline ointment to keep moist until healed. The patient is supplied a handout on cryosurgery and is instructed to call if lesions do not completely resolve.    Seborrheic keratoses  These are benign inherited growths without a malignant potential. Reassurance given to patient. No treatment is necessary.     Inflamed seborrheic keratosis  Verbal consent obtained. 1 lesion(s) destroyed with electrodesiccation after anesthesia with 1% lidocaine with epinephrine. No complications.    Porokeratosis, superficial disseminated eruptive type  Reassurance given to patient. No treatment is necessary.   Treatment of benign, asymptomatic lesions may be considered cosmetic.    Solar lentigo  This is a benign hyperpigmented sun induced lesion. Daily sun protection will reduce the number of new lesions. Treatment of these benign lesions are considered cosmetic.    Cherry angioma  This is a benign vascular lesion. Reassurance given. No treatment required.     History of nonmelanoma skin cancer  Area of previous NMSC (R cheek, nasal dorsum) examined. Sites well healed with no signs of recurrence.    Total body skin examination performed today including at least 12 points as  noted in physical examination. No lesions suspicious for malignancy noted.    Patient instructed in importance in daily broad spectrum sun protection of at least spf 30. Mineral sunscreen ingredients preferred (Zinc +/- Titanium) and can be found OTC.   Recommend Elta MD for daily use on face and neck.  Patient encouraged to wear hat for all outdoor exposure.   Also discussed sun avoidance and use of protective clothing.           Follow up in about 6 months (around 12/23/2023).   ,DirectAddress_Unknown ,DirectAddress_Unknown,[email or web address] ,DirectAddress_Unknown,[email or web address],DirectAddress_Unknown,DirectAddress_Unknown

## 2023-06-23 NOTE — PATIENT INSTRUCTIONS

## 2023-07-05 ENCOUNTER — TELEPHONE (OUTPATIENT)
Dept: FAMILY MEDICINE | Facility: CLINIC | Age: 78
End: 2023-07-05
Payer: MEDICARE

## 2023-07-05 NOTE — TELEPHONE ENCOUNTER
----- Message from Tsering Jones sent at 7/5/2023  8:29 AM CDT -----  Caller is requesting a sooner/ same day appointment. Caller declined first available appointment listed below. Caller will not accept being placed on the waitlist and is requesting a message be sent to doctor.        Name of Caller:  pt       When is the first available appointment?  n/a      Symptoms:  bronchitis      Best Call Back Number:  [phone]      Additional Information:

## 2023-07-06 ENCOUNTER — OFFICE VISIT (OUTPATIENT)
Dept: FAMILY MEDICINE | Facility: CLINIC | Age: 78
End: 2023-07-06
Payer: MEDICARE

## 2023-07-06 VITALS
DIASTOLIC BLOOD PRESSURE: 80 MMHG | OXYGEN SATURATION: 96 % | HEIGHT: 64 IN | WEIGHT: 168.44 LBS | RESPIRATION RATE: 18 BRPM | HEART RATE: 85 BPM | SYSTOLIC BLOOD PRESSURE: 130 MMHG | BODY MASS INDEX: 28.76 KG/M2 | TEMPERATURE: 99 F

## 2023-07-06 DIAGNOSIS — J40 BRONCHITIS: ICD-10-CM

## 2023-07-06 DIAGNOSIS — J45.31 MILD PERSISTENT ASTHMA WITH EXACERBATION: Primary | ICD-10-CM

## 2023-07-06 PROCEDURE — 3079F PR MOST RECENT DIASTOLIC BLOOD PRESSURE 80-89 MM HG: ICD-10-PCS | Mod: CPTII,S$GLB,, | Performed by: INTERNAL MEDICINE

## 2023-07-06 PROCEDURE — 1160F PR REVIEW ALL MEDS BY PRESCRIBER/CLIN PHARMACIST DOCUMENTED: ICD-10-PCS | Mod: CPTII,S$GLB,, | Performed by: INTERNAL MEDICINE

## 2023-07-06 PROCEDURE — 1160F RVW MEDS BY RX/DR IN RCRD: CPT | Mod: CPTII,S$GLB,, | Performed by: INTERNAL MEDICINE

## 2023-07-06 PROCEDURE — 1101F PT FALLS ASSESS-DOCD LE1/YR: CPT | Mod: CPTII,S$GLB,, | Performed by: INTERNAL MEDICINE

## 2023-07-06 PROCEDURE — 3075F PR MOST RECENT SYSTOLIC BLOOD PRESS GE 130-139MM HG: ICD-10-PCS | Mod: CPTII,S$GLB,, | Performed by: INTERNAL MEDICINE

## 2023-07-06 PROCEDURE — 99214 OFFICE O/P EST MOD 30 MIN: CPT | Mod: S$GLB,,, | Performed by: INTERNAL MEDICINE

## 2023-07-06 PROCEDURE — 1157F PR ADVANCE CARE PLAN OR EQUIV PRESENT IN MEDICAL RECORD: ICD-10-PCS | Mod: CPTII,S$GLB,, | Performed by: INTERNAL MEDICINE

## 2023-07-06 PROCEDURE — 1126F AMNT PAIN NOTED NONE PRSNT: CPT | Mod: CPTII,S$GLB,, | Performed by: INTERNAL MEDICINE

## 2023-07-06 PROCEDURE — 3075F SYST BP GE 130 - 139MM HG: CPT | Mod: CPTII,S$GLB,, | Performed by: INTERNAL MEDICINE

## 2023-07-06 PROCEDURE — 3079F DIAST BP 80-89 MM HG: CPT | Mod: CPTII,S$GLB,, | Performed by: INTERNAL MEDICINE

## 2023-07-06 PROCEDURE — 3288F FALL RISK ASSESSMENT DOCD: CPT | Mod: CPTII,S$GLB,, | Performed by: INTERNAL MEDICINE

## 2023-07-06 PROCEDURE — 1159F PR MEDICATION LIST DOCUMENTED IN MEDICAL RECORD: ICD-10-PCS | Mod: CPTII,S$GLB,, | Performed by: INTERNAL MEDICINE

## 2023-07-06 PROCEDURE — 99999 PR PBB SHADOW E&M-EST. PATIENT-LVL V: ICD-10-PCS | Mod: PBBFAC,,, | Performed by: INTERNAL MEDICINE

## 2023-07-06 PROCEDURE — 1157F ADVNC CARE PLAN IN RCRD: CPT | Mod: CPTII,S$GLB,, | Performed by: INTERNAL MEDICINE

## 2023-07-06 PROCEDURE — 99999 PR PBB SHADOW E&M-EST. PATIENT-LVL V: CPT | Mod: PBBFAC,,, | Performed by: INTERNAL MEDICINE

## 2023-07-06 PROCEDURE — 99214 PR OFFICE/OUTPT VISIT, EST, LEVL IV, 30-39 MIN: ICD-10-PCS | Mod: S$GLB,,, | Performed by: INTERNAL MEDICINE

## 2023-07-06 PROCEDURE — 1159F MED LIST DOCD IN RCRD: CPT | Mod: CPTII,S$GLB,, | Performed by: INTERNAL MEDICINE

## 2023-07-06 PROCEDURE — 1101F PR PT FALLS ASSESS DOC 0-1 FALLS W/OUT INJ PAST YR: ICD-10-PCS | Mod: CPTII,S$GLB,, | Performed by: INTERNAL MEDICINE

## 2023-07-06 PROCEDURE — 1126F PR PAIN SEVERITY QUANTIFIED, NO PAIN PRESENT: ICD-10-PCS | Mod: CPTII,S$GLB,, | Performed by: INTERNAL MEDICINE

## 2023-07-06 PROCEDURE — 3288F PR FALLS RISK ASSESSMENT DOCUMENTED: ICD-10-PCS | Mod: CPTII,S$GLB,, | Performed by: INTERNAL MEDICINE

## 2023-07-06 RX ORDER — PREDNISONE 10 MG/1
TABLET ORAL
Qty: 20 TABLET | Refills: 0 | Status: SHIPPED | OUTPATIENT
Start: 2023-07-06 | End: 2023-08-14

## 2023-07-06 RX ORDER — BENZONATATE 100 MG/1
CAPSULE ORAL
Qty: 45 CAPSULE | Refills: 0 | Status: SHIPPED | OUTPATIENT
Start: 2023-07-06 | End: 2024-01-04

## 2023-07-06 RX ORDER — AZITHROMYCIN 250 MG/1
TABLET, FILM COATED ORAL
Qty: 6 TABLET | Refills: 0 | Status: SHIPPED | OUTPATIENT
Start: 2023-07-06 | End: 2023-08-14

## 2023-07-06 NOTE — PROGRESS NOTES
Subjective:       Patient ID: Madison Long is a 78 y.o. female.  Chief Complaint: Sinusitis (Sinus stuff started Saturday after working in the yard)     HPI    Complains of cough for 7 days.  + green sputum and frontal HA.  No fever or chills.     Asthma - + SOB, mild     Assessment:       1. Mild persistent asthma with exacerbation    2. Bronchitis        Plan:       Mild persistent asthma with exacerbation  -     predniSONE (DELTASONE) 10 MG tablet; How to take your 20 prednisone pills (10 mg each): 4 tabs daily for 2 days then, 3 tabs daily for 2 days then, 2 tabs daily for 2 days then, 1 tab daily for 2 days then stop  Dispense: 20 tablet; Refill: 0  -     azithromycin (ZITHROMAX Z-KISHORE) 250 MG tablet; Take 2 po day 1, then 1 po day 2 - 5  Dispense: 6 tablet; Refill: 0  -     benzonatate (TESSALON) 100 MG capsule; 1 - 2 po every 6 hours prn cough  Dispense: 45 capsule; Refill: 0    Bronchitis  -     predniSONE (DELTASONE) 10 MG tablet; How to take your 20 prednisone pills (10 mg each): 4 tabs daily for 2 days then, 3 tabs daily for 2 days then, 2 tabs daily for 2 days then, 1 tab daily for 2 days then stop  Dispense: 20 tablet; Refill: 0  -     azithromycin (ZITHROMAX Z-KISHORE) 250 MG tablet; Take 2 po day 1, then 1 po day 2 - 5  Dispense: 6 tablet; Refill: 0  -     benzonatate (TESSALON) 100 MG capsule; 1 - 2 po every 6 hours prn cough  Dispense: 45 capsule; Refill: 0            Continue current management and monitor.  Other diagnoses were reviewed and found stable and will continue to monitor.  Counseled on regular exercise, maintenance of a healthy weight, balanced diet rich in fruits/vegetables and lean protein, and avoidance of unhealthy habits like smoking and excessive alcohol intake.   Also, counseled on importance of being compliant with medication, health appointments, diet and exercise.     No follow-ups on file.      Medication List with Changes/Refills   New Medications    AZITHROMYCIN (ZITHROMAX  Z-KISHORE) 250 MG TABLET    Take 2 po day 1, then 1 po day 2 - 5    BENZONATATE (TESSALON) 100 MG CAPSULE    1 - 2 po every 6 hours prn cough    PREDNISONE (DELTASONE) 10 MG TABLET    How to take your 20 prednisone pills (10 mg each): 4 tabs daily for 2 days then, 3 tabs daily for 2 days then, 2 tabs daily for 2 days then, 1 tab daily for 2 days then stop   Current Medications    ACETAMINOPHEN (TYLENOL) 500 MG TABLET    Take 500 mg by mouth every 6 (six) hours as needed for Pain.    ALBUTEROL (PROVENTIL/VENTOLIN HFA) 90 MCG/ACTUATION INHALER    Inhale 2 puffs into the lungs every 6 (six) hours as needed. Rescue    AMMONIUM LACTATE (LAC-HYDRIN) 12 % LOTION    Apply to dry skin daily to BID    ASPIRIN 81 MG CHEW    Take 81 mg by mouth daily as needed. Usually takes about every 2 weeks.    AZELASTINE (ASTELIN) 137 MCG (0.1 %) NASAL SPRAY    1 spray (137 mcg total) by Nasal route 2 (two) times daily. for 14 days    CETIRIZINE (ZYRTEC) 10 MG TABLET    Take 1 tablet (10 mg total) by mouth once daily.    DORZOLAMIDE-TIMOLOL, PF, (COSOPT PF) 2-0.5 % DPET OPHTHALMIC SOLUTION        ECONAZOLE NITRATE 1 % CREAM    Apply topically 2 (two) times daily as needed. Twice a day  PRN    FAMOTIDINE (PEPCID) 40 MG TABLET    Take 1 tablet (40 mg total) by mouth once daily.    FISH OIL-OMEGA-3 FATTY ACIDS 300-1,000 MG CAPSULE    Take 1 g by mouth every morning.     FLUCONAZOLE (DIFLUCAN) 200 MG TAB    Take 1 tab PO qweek.    FLUOXETINE 10 MG TAB    Take 1 tablet (10 mg total) by mouth once daily.    FLUTICASONE (FLONASE) 50 MCG/ACTUATION NASAL SPRAY    SPRAY 2 SPRAYS IN EACH NOSTRIL EVERY DAY    GUAIFENESIN (MUCINEX) 600 MG 12 HR TABLET    Take 1,200 mg by mouth 2 (two) times daily as needed.     HYDROCORTISONE 2.5 % CREAM    Apply topically 2 (two) times daily. for 10 days    IBUPROFEN/DIPHENHYDRAMINE CIT (ADVIL PM ORAL)    Take by mouth.    LOSARTAN (COZAAR) 50 MG TABLET    TAKE 1 TABLET(50 MG) BY MOUTH EVERY MORNING    MULTIVIT,STRESS  FORMULA-ZINC TAB    Take 1 tablet by mouth every morning. Uses Alive    MUPIROCIN (BACTROBAN) 2 % OINTMENT    Apply topically 3 (three) times daily.    OMEPRAZOLE (PRILOSEC) 20 MG CAPSULE    TAKE 1 CAPSULE(20 MG) BY MOUTH EVERY DAY    PSEUDOEPHEDRINE (SUDAFED) 30 MG TABLET    Take 30 mg by mouth every 4 (four) hours as needed for Congestion.    ROSUVASTATIN (CRESTOR) 40 MG TAB    Take 1 tablet (40 mg total) by mouth once daily.    SODIUM CHLORIDE (OCEAN) 0.65 % NASAL SPRAY    1 spray by Nasal route as needed for Congestion.    SYMBICORT 160-4.5 MCG/ACTUATION HFAA    INHALE 2 PUFFS BY MOUTH TWICE DAILY    TRAZODONE (DESYREL) 50 MG TABLET    TAKE 1 TABLET BY MOUTH EVERY NIGHT AS NEEDED FOR INSOMNIA, MAY TAKE UP TO 2 TABLETS EVERY NIGHT AS NEEDED    ZINC GLUCONATE (COLD-EEZE ORAL)    Take 1 lozenge by mouth daily as needed.       BP Readings from Last 3 Encounters:   07/06/23 130/80   03/30/23 138/86   03/01/23 (!) 140/80     No results found for: HGBA1C  Lab Results   Component Value Date    TSH 1.766 01/10/2018     Lab Results   Component Value Date    LDLCALC 80.8 02/07/2023    LDLCALC 92.0 08/08/2022    LDLCALC 91.4 02/08/2022     Lab Results   Component Value Date    TRIG 56 02/07/2023    TRIG 75 08/08/2022    TRIG 63 02/08/2022     Wt Readings from Last 3 Encounters:   07/06/23 76.4 kg (168 lb 6.9 oz)   06/23/23 74.4 kg (164 lb)   03/30/23 74.8 kg (164 lb 14.5 oz)     Lab Results   Component Value Date    HGB 15.3 08/08/2022    HCT 44.8 08/08/2022    WBC 7.52 08/08/2022    ALT 21 02/07/2023    AST 27 02/07/2023     02/07/2023    K 4.6 02/07/2023    CREATININE 0.9 02/07/2023           Review of Systems        Objective:      Vitals:    07/06/23 1038   BP: 130/80   Pulse: 85   Resp: 18   Temp: 98.7 °F (37.1 °C)     Physical Exam  Cardiovascular:      Rate and Rhythm: Normal rate.   Pulmonary:      Breath sounds: Wheezing present.

## 2023-07-13 ENCOUNTER — TELEPHONE (OUTPATIENT)
Dept: FAMILY MEDICINE | Facility: CLINIC | Age: 78
End: 2023-07-13
Payer: MEDICARE

## 2023-07-13 DIAGNOSIS — J45.21 MILD INTERMITTENT ASTHMA WITH EXACERBATION: Primary | ICD-10-CM

## 2023-07-13 NOTE — TELEPHONE ENCOUNTER
----- Message from Carlota Doug sent at 7/13/2023  9:51 AM CDT -----  Contact: pt  Type:  Needs Medical Advice    Who Called: Pt   Would the patient rather a call back or a response via [facility]? call  Best Call Back Number: [phone]  Additional Information: Pt states that she need a callback as soon as possible. States that she finished the Rx but feels like she need something else. States that she is still feeling a little bad and is still coughing up thick white stuff but not as much. States that she feel like she still have some infection in her. Please advise thank you

## 2023-07-13 NOTE — TELEPHONE ENCOUNTER
You just saw pt 7/6. States she is still the same. Can you call her in something else? Or another round of medication?

## 2023-07-14 RX ORDER — METHYLPREDNISOLONE 4 MG/1
TABLET ORAL
Qty: 21 TABLET | Refills: 0 | Status: SHIPPED | OUTPATIENT
Start: 2023-07-14 | End: 2023-08-14

## 2023-07-14 RX ORDER — CEFUROXIME AXETIL 500 MG/1
500 TABLET ORAL 2 TIMES DAILY
Qty: 28 TABLET | Refills: 0 | Status: SHIPPED | OUTPATIENT
Start: 2023-07-14 | End: 2023-07-28

## 2023-07-14 NOTE — TELEPHONE ENCOUNTER
----- Message from Miranda Lion sent at 7/14/2023  8:59 AM CDT -----  Regarding: Needs return call regarding medication request  Type: Needs Medical Advice  Who Called:  Madison Singleton Call Back Number: [phone]    Additional Information: Pt said she sent in message yesterday about her medication and never heard back from anyone please contact patient today

## 2023-07-14 NOTE — TELEPHONE ENCOUNTER
I have sent in medication.  If she doesn't get better this time she'll need to come back and be re-evaluated.  Have her explain to the pharmacy that she can take the Ceftin (Abx) as there is only a small cross over for penicillin allergy patients.  She, like many other patients with a PCN allergy, should be fine.

## 2023-07-24 DIAGNOSIS — E78.5 DYSLIPIDEMIA: ICD-10-CM

## 2023-07-24 DIAGNOSIS — I65.23 ATHEROSCLEROSIS OF BOTH CAROTID ARTERIES: ICD-10-CM

## 2023-07-24 RX ORDER — ROSUVASTATIN CALCIUM 40 MG/1
TABLET, COATED ORAL
Qty: 90 TABLET | Refills: 1 | Status: SHIPPED | OUTPATIENT
Start: 2023-07-24

## 2023-07-24 NOTE — TELEPHONE ENCOUNTER
No care due was identified.  [facility] Embedded Care Due Messages. Reference number: [id number].   7/24/2023 9:08:51 AM CDT

## 2023-07-24 NOTE — TELEPHONE ENCOUNTER
Refill Decision Note   Madison Long  is requesting a refill authorization.  Brief Assessment and Rationale for Refill:  Approve     Medication Therapy Plan:       Medication Reconciliation Completed: No   Comments:     No Care Gaps recommended.     Note composed:9:20 AM 07/24/2023

## 2023-08-07 ENCOUNTER — LAB VISIT (OUTPATIENT)
Dept: LAB | Facility: HOSPITAL | Age: 78
End: 2023-08-07
Attending: INTERNAL MEDICINE
Payer: MEDICARE

## 2023-08-07 DIAGNOSIS — E78.5 DYSLIPIDEMIA: ICD-10-CM

## 2023-08-07 DIAGNOSIS — J43.2 CENTRILOBULAR EMPHYSEMA: ICD-10-CM

## 2023-08-07 DIAGNOSIS — I10 ESSENTIAL HYPERTENSION: ICD-10-CM

## 2023-08-07 LAB
ALBUMIN SERPL BCP-MCNC: 3.8 G/DL (ref 3.5–5.2)
ALP SERPL-CCNC: 54 U/L (ref 55–135)
ALT SERPL W/O P-5'-P-CCNC: 16 U/L (ref 10–44)
ANION GAP SERPL CALC-SCNC: 11 MMOL/L (ref 8–16)
AST SERPL-CCNC: 25 U/L (ref 10–40)
BASOPHILS # BLD AUTO: 0.05 K/UL (ref 0–0.2)
BASOPHILS NFR BLD: 0.8 % (ref 0–1.9)
BILIRUB SERPL-MCNC: 0.8 MG/DL (ref 0.1–1)
BUN SERPL-MCNC: 10 MG/DL (ref 8–23)
CALCIUM SERPL-MCNC: 9.4 MG/DL (ref 8.7–10.5)
CHLORIDE SERPL-SCNC: 101 MMOL/L (ref 95–110)
CHOLEST SERPL-MCNC: 176 MG/DL (ref 120–199)
CHOLEST/HDLC SERPL: 2.8 {RATIO} (ref 2–5)
CO2 SERPL-SCNC: 26 MMOL/L (ref 23–29)
CREAT SERPL-MCNC: 0.8 MG/DL (ref 0.5–1.4)
DIFFERENTIAL METHOD: ABNORMAL
EOSINOPHIL # BLD AUTO: 0.2 K/UL (ref 0–0.5)
EOSINOPHIL NFR BLD: 2.5 % (ref 0–8)
ERYTHROCYTE [DISTWIDTH] IN BLOOD BY AUTOMATED COUNT: 12.5 % (ref 11.5–14.5)
EST. GFR  (NO RACE VARIABLE): >60 ML/MIN/1.73 M^2
GLUCOSE SERPL-MCNC: 104 MG/DL (ref 70–110)
HCT VFR BLD AUTO: 43.5 % (ref 37–48.5)
HDLC SERPL-MCNC: 63 MG/DL (ref 40–75)
HDLC SERPL: 35.8 % (ref 20–50)
HGB BLD-MCNC: 14.7 G/DL (ref 12–16)
IMM GRANULOCYTES # BLD AUTO: 0.02 K/UL (ref 0–0.04)
IMM GRANULOCYTES NFR BLD AUTO: 0.3 % (ref 0–0.5)
LDLC SERPL CALC-MCNC: 89.6 MG/DL (ref 63–159)
LYMPHOCYTES # BLD AUTO: 1.9 K/UL (ref 1–4.8)
LYMPHOCYTES NFR BLD: 29.1 % (ref 18–48)
MCH RBC QN AUTO: 31.7 PG (ref 27–31)
MCHC RBC AUTO-ENTMCNC: 33.8 G/DL (ref 32–36)
MCV RBC AUTO: 94 FL (ref 82–98)
MONOCYTES # BLD AUTO: 0.5 K/UL (ref 0.3–1)
MONOCYTES NFR BLD: 6.9 % (ref 4–15)
NEUTROPHILS # BLD AUTO: 3.9 K/UL (ref 1.8–7.7)
NEUTROPHILS NFR BLD: 60.4 % (ref 38–73)
NONHDLC SERPL-MCNC: 113 MG/DL
NRBC BLD-RTO: 0 /100 WBC
PLATELET # BLD AUTO: 264 K/UL (ref 150–450)
PMV BLD AUTO: 11.1 FL (ref 9.2–12.9)
POTASSIUM SERPL-SCNC: 4.3 MMOL/L (ref 3.5–5.1)
PROT SERPL-MCNC: 6.6 G/DL (ref 6–8.4)
RBC # BLD AUTO: 4.63 M/UL (ref 4–5.4)
SODIUM SERPL-SCNC: 138 MMOL/L (ref 136–145)
TRIGL SERPL-MCNC: 117 MG/DL (ref 30–150)
WBC # BLD AUTO: 6.52 K/UL (ref 3.9–12.7)

## 2023-08-07 PROCEDURE — 36415 COLL VENOUS BLD VENIPUNCTURE: CPT | Mod: HCNC,PO | Performed by: INTERNAL MEDICINE

## 2023-08-07 PROCEDURE — 80061 LIPID PANEL: CPT | Mod: HCNC | Performed by: INTERNAL MEDICINE

## 2023-08-07 PROCEDURE — 80053 COMPREHEN METABOLIC PANEL: CPT | Mod: HCNC | Performed by: INTERNAL MEDICINE

## 2023-08-07 PROCEDURE — 85025 COMPLETE CBC W/AUTO DIFF WBC: CPT | Mod: HCNC | Performed by: INTERNAL MEDICINE

## 2023-08-14 ENCOUNTER — OFFICE VISIT (OUTPATIENT)
Dept: FAMILY MEDICINE | Facility: CLINIC | Age: 78
End: 2023-08-14
Payer: MEDICARE

## 2023-08-14 VITALS
WEIGHT: 171.31 LBS | HEIGHT: 64 IN | OXYGEN SATURATION: 97 % | HEART RATE: 78 BPM | TEMPERATURE: 98 F | BODY MASS INDEX: 29.24 KG/M2 | DIASTOLIC BLOOD PRESSURE: 84 MMHG | SYSTOLIC BLOOD PRESSURE: 126 MMHG

## 2023-08-14 DIAGNOSIS — Z12.31 ENCOUNTER FOR SCREENING MAMMOGRAM FOR BREAST CANCER: ICD-10-CM

## 2023-08-14 DIAGNOSIS — I10 ESSENTIAL HYPERTENSION: Primary | ICD-10-CM

## 2023-08-14 DIAGNOSIS — E78.5 DYSLIPIDEMIA: ICD-10-CM

## 2023-08-14 PROCEDURE — 3074F SYST BP LT 130 MM HG: CPT | Mod: HCNC,CPTII,S$GLB, | Performed by: INTERNAL MEDICINE

## 2023-08-14 PROCEDURE — 99999 PR PBB SHADOW E&M-EST. PATIENT-LVL V: ICD-10-PCS | Mod: PBBFAC,HCNC,, | Performed by: INTERNAL MEDICINE

## 2023-08-14 PROCEDURE — 99214 PR OFFICE/OUTPT VISIT, EST, LEVL IV, 30-39 MIN: ICD-10-PCS | Mod: HCNC,S$GLB,, | Performed by: INTERNAL MEDICINE

## 2023-08-14 PROCEDURE — 1157F ADVNC CARE PLAN IN RCRD: CPT | Mod: HCNC,CPTII,S$GLB, | Performed by: INTERNAL MEDICINE

## 2023-08-14 PROCEDURE — 1157F PR ADVANCE CARE PLAN OR EQUIV PRESENT IN MEDICAL RECORD: ICD-10-PCS | Mod: HCNC,CPTII,S$GLB, | Performed by: INTERNAL MEDICINE

## 2023-08-14 PROCEDURE — 3079F DIAST BP 80-89 MM HG: CPT | Mod: HCNC,CPTII,S$GLB, | Performed by: INTERNAL MEDICINE

## 2023-08-14 PROCEDURE — 1126F AMNT PAIN NOTED NONE PRSNT: CPT | Mod: HCNC,CPTII,S$GLB, | Performed by: INTERNAL MEDICINE

## 2023-08-14 PROCEDURE — 3079F PR MOST RECENT DIASTOLIC BLOOD PRESSURE 80-89 MM HG: ICD-10-PCS | Mod: HCNC,CPTII,S$GLB, | Performed by: INTERNAL MEDICINE

## 2023-08-14 PROCEDURE — 1159F PR MEDICATION LIST DOCUMENTED IN MEDICAL RECORD: ICD-10-PCS | Mod: HCNC,CPTII,S$GLB, | Performed by: INTERNAL MEDICINE

## 2023-08-14 PROCEDURE — 1126F PR PAIN SEVERITY QUANTIFIED, NO PAIN PRESENT: ICD-10-PCS | Mod: HCNC,CPTII,S$GLB, | Performed by: INTERNAL MEDICINE

## 2023-08-14 PROCEDURE — 1160F PR REVIEW ALL MEDS BY PRESCRIBER/CLIN PHARMACIST DOCUMENTED: ICD-10-PCS | Mod: HCNC,CPTII,S$GLB, | Performed by: INTERNAL MEDICINE

## 2023-08-14 PROCEDURE — 1160F RVW MEDS BY RX/DR IN RCRD: CPT | Mod: HCNC,CPTII,S$GLB, | Performed by: INTERNAL MEDICINE

## 2023-08-14 PROCEDURE — 99214 OFFICE O/P EST MOD 30 MIN: CPT | Mod: HCNC,S$GLB,, | Performed by: INTERNAL MEDICINE

## 2023-08-14 PROCEDURE — 3074F PR MOST RECENT SYSTOLIC BLOOD PRESSURE < 130 MM HG: ICD-10-PCS | Mod: HCNC,CPTII,S$GLB, | Performed by: INTERNAL MEDICINE

## 2023-08-14 PROCEDURE — 99999 PR PBB SHADOW E&M-EST. PATIENT-LVL V: CPT | Mod: PBBFAC,HCNC,, | Performed by: INTERNAL MEDICINE

## 2023-08-14 PROCEDURE — 1159F MED LIST DOCD IN RCRD: CPT | Mod: HCNC,CPTII,S$GLB, | Performed by: INTERNAL MEDICINE

## 2023-08-14 NOTE — PROGRESS NOTES
Subjective:       Patient ID: Madison Long is a 78 y.o. female.  Chief Complaint: Hypertension     HPI    Had < 40% blockage of b/l carotid artery 2017     HLD - uncontrolled; LDL goal < 70, htn; taking Crestor qd  Recall: had severe LE cramps.  Stopped Lipitor 1 month ago and cramps resolved after 5-6 days.       GERD - mostly when drinks port wine     Asthma - controlled with daily Symbicort     4 eye surgeries in past. Dr Sotelo  HTN - controlled     COPD - no sob;      Insomnia - 1- 2 trazodone works most nights; stopped her Ambien      ALEX - controlled      Atrophic vaginitis - estrogen cream used for 1-2 weeks and then will take a break.  due for mammogram 3/23/19; rx originally by urology Dr Hughes.      Keratosis - hereditary.  Wants to change to our derm.      Osteopenia - on vit D and calcium     Assessment:       1. Essential hypertension    2. Dyslipidemia    3. Encounter for screening mammogram for breast cancer        Plan:       Essential hypertension  -     Comprehensive Metabolic Panel; Future; Expected date: 02/10/2024    Dyslipidemia  -     Lipid Panel; Future; Expected date: 02/10/2024  -     Comprehensive Metabolic Panel; Future; Expected date: 02/10/2024    Encounter for screening mammogram for breast cancer  -     Mammo Digital Screening Bilat w/ Valentín; Future; Expected date: 08/14/2023            Continue current management and monitor.  Other diagnoses were reviewed and found stable and will continue to monitor.  Counseled on regular exercise, maintenance of a healthy weight, balanced diet rich in fruits/vegetables and lean protein, and avoidance of unhealthy habits like smoking and excessive alcohol intake.   Also, counseled on importance of being compliant with medication, health appointments, diet and exercise.     Follow up in about 6 months (around 2/14/2024).      Medication List with Changes/Refills   Current Medications    ACETAMINOPHEN (TYLENOL) 500 MG TABLET    Take 500 mg by  mouth every 6 (six) hours as needed for Pain.    ALBUTEROL (PROVENTIL/VENTOLIN HFA) 90 MCG/ACTUATION INHALER    Inhale 2 puffs into the lungs every 6 (six) hours as needed. Rescue    AMMONIUM LACTATE (LAC-HYDRIN) 12 % LOTION    Apply to dry skin daily to BID    ASPIRIN 81 MG CHEW    Take 81 mg by mouth daily as needed. Usually takes about every 2 weeks.    AZELASTINE (ASTELIN) 137 MCG (0.1 %) NASAL SPRAY    1 spray (137 mcg total) by Nasal route 2 (two) times daily. for 14 days    BENZONATATE (TESSALON) 100 MG CAPSULE    1 - 2 po every 6 hours prn cough    CETIRIZINE (ZYRTEC) 10 MG TABLET    Take 1 tablet (10 mg total) by mouth once daily.    ECONAZOLE NITRATE 1 % CREAM    Apply topically 2 (two) times daily as needed. Twice a day  PRN    FAMOTIDINE (PEPCID) 40 MG TABLET    Take 1 tablet (40 mg total) by mouth once daily.    FISH OIL-OMEGA-3 FATTY ACIDS 300-1,000 MG CAPSULE    Take 1 g by mouth every morning.     FLUOXETINE 10 MG TAB    Take 1 tablet (10 mg total) by mouth once daily.    FLUTICASONE (FLONASE) 50 MCG/ACTUATION NASAL SPRAY    SPRAY 2 SPRAYS IN EACH NOSTRIL EVERY DAY    GUAIFENESIN (MUCINEX) 600 MG 12 HR TABLET    Take 1,200 mg by mouth 2 (two) times daily as needed.     HYDROCORTISONE 2.5 % CREAM    Apply topically 2 (two) times daily. for 10 days    IBUPROFEN/DIPHENHYDRAMINE CIT (ADVIL PM ORAL)    Take by mouth.    LOSARTAN (COZAAR) 50 MG TABLET    TAKE 1 TABLET(50 MG) BY MOUTH EVERY MORNING    MULTIVIT,STRESS FORMULA-ZINC TAB    Take 1 tablet by mouth every morning. Uses Alive    MUPIROCIN (BACTROBAN) 2 % OINTMENT    Apply topically 3 (three) times daily.    OMEPRAZOLE (PRILOSEC) 20 MG CAPSULE    TAKE 1 CAPSULE(20 MG) BY MOUTH EVERY DAY    PSEUDOEPHEDRINE (SUDAFED) 30 MG TABLET    Take 30 mg by mouth every 4 (four) hours as needed for Congestion.    ROSUVASTATIN (CRESTOR) 40 MG TAB    TAKE 1 TABLET(40 MG) BY MOUTH EVERY DAY    SODIUM CHLORIDE (OCEAN) 0.65 % NASAL SPRAY    1 spray by Nasal route as  "needed for Congestion.    SYMBICORT 160-4.5 MCG/ACTUATION HFAA    INHALE 2 PUFFS BY MOUTH TWICE DAILY    TRAZODONE (DESYREL) 50 MG TABLET    TAKE 1 TABLET BY MOUTH EVERY NIGHT AS NEEDED FOR INSOMNIA, MAY TAKE UP TO 2 TABLETS EVERY NIGHT AS NEEDED    ZINC GLUCONATE (COLD-EEZE ORAL)    Take 1 lozenge by mouth daily as needed.   Discontinued Medications    AZITHROMYCIN (ZITHROMAX Z-KISHORE) 250 MG TABLET    Take 2 po day 1, then 1 po day 2 - 5    DORZOLAMIDE-TIMOLOL, PF, (COSOPT PF) 2-0.5 % DPET OPHTHALMIC SOLUTION        FLUCONAZOLE (DIFLUCAN) 200 MG TAB    Take 1 tab PO qweek.    METHYLPREDNISOLONE (MEDROL DOSEPACK) 4 MG TABLET    Take as directed    PREDNISONE (DELTASONE) 10 MG TABLET    How to take your 20 prednisone pills (10 mg each): 4 tabs daily for 2 days then, 3 tabs daily for 2 days then, 2 tabs daily for 2 days then, 1 tab daily for 2 days then stop       BP Readings from Last 3 Encounters:   08/14/23 126/84   07/06/23 130/80   03/30/23 138/86     No results found for: "HGBA1C"  Lab Results   Component Value Date    TSH 1.766 01/10/2018     Lab Results   Component Value Date    LDLCALC 89.6 08/07/2023    LDLCALC 80.8 02/07/2023    LDLCALC 92.0 08/08/2022     Lab Results   Component Value Date    TRIG 117 08/07/2023    TRIG 56 02/07/2023    TRIG 75 08/08/2022     Wt Readings from Last 3 Encounters:   08/14/23 77.7 kg (171 lb 4.8 oz)   07/06/23 76.4 kg (168 lb 6.9 oz)   06/23/23 74.4 kg (164 lb)     Lab Results   Component Value Date    HGB 14.7 08/07/2023    HCT 43.5 08/07/2023    WBC 6.52 08/07/2023    ALT 16 08/07/2023    AST 25 08/07/2023     08/07/2023    K 4.3 08/07/2023    CREATININE 0.8 08/07/2023           Review of Systems        Objective:      Vitals:    08/14/23 1342   BP: 126/84   Pulse: 78   Temp: 98.2 °F (36.8 °C)     Physical Exam  Vitals reviewed.   Constitutional:       Appearance: Normal appearance.   Eyes:      Conjunctiva/sclera: Conjunctivae normal.   Cardiovascular:      Rate and " Rhythm: Normal rate.   Pulmonary:      Effort: Pulmonary effort is normal.      Breath sounds: Normal breath sounds.   Musculoskeletal:      Cervical back: Normal range of motion.      Comments: Normal ROM bilateral    Skin:     General: Skin is warm and dry.   Neurological:      Mental Status: She is alert.      Cranial Nerves: Cranial nerve deficit: grossly intact.   Psychiatric:      Comments: Alert and orientated

## 2023-09-15 DIAGNOSIS — J44.9 CHRONIC OBSTRUCTIVE PULMONARY DISEASE, UNSPECIFIED COPD TYPE: ICD-10-CM

## 2023-09-15 NOTE — TELEPHONE ENCOUNTER
----- Message from Abby Silva sent at 9/15/2023  1:34 PM CDT -----  Type:  RX Refill Request    Who Called:  pt  Refill or New Rx:  NEW Rx  RX Name and Strength:  SYMBICORT 160-4.5 mcg/actuation HFAA  How is the patient currently taking it? (ex. 1XDay):  as directed  Is this a 30 day or 90 day RX:  30  Preferred Pharmacy with phone number:    [id number] - [place] - [address] [facility] [address]  [address] [address]  Phone: [phone] Fax: [phone]  Local or Mail Order:  local  Ordering Provider:  Nba Singleton Call Back Number:  [phone]  Additional Information:  Please call back to advise Thanks!

## 2023-09-15 NOTE — TELEPHONE ENCOUNTER
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

## 2023-09-15 NOTE — TELEPHONE ENCOUNTER
No care due was identified.  [facility] Embedded Care Due Messages. Reference number: [id number].   9/15/2023 2:43:40 PM CDT

## 2023-09-18 RX ORDER — BUDESONIDE AND FORMOTEROL FUMARATE DIHYDRATE 160; 4.5 UG/1; UG/1
2 AEROSOL RESPIRATORY (INHALATION) 2 TIMES DAILY
Qty: 30.6 G | Refills: 3 | Status: SHIPPED | OUTPATIENT
Start: 2023-09-18 | End: 2023-10-25

## 2023-10-09 DIAGNOSIS — J44.9 CHRONIC OBSTRUCTIVE PULMONARY DISEASE, UNSPECIFIED COPD TYPE: ICD-10-CM

## 2023-10-09 RX ORDER — ALBUTEROL SULFATE 90 UG/1
2 AEROSOL, METERED RESPIRATORY (INHALATION) EVERY 6 HOURS PRN
Qty: 54 G | Refills: 3 | Status: SHIPPED | OUTPATIENT
Start: 2023-10-09

## 2023-10-09 NOTE — TELEPHONE ENCOUNTER
No care due was identified.  [facility] Embedded Care Due Messages. Reference number: [id number].   10/09/2023 2:39:37 PM CDT

## 2023-10-09 NOTE — TELEPHONE ENCOUNTER
----- Message from Thuy Lucas sent at 10/9/2023  1:38 PM CDT -----  Contact: patient  Type:  Needs Medical Advice    Who Called: patient     Would the patient rather a call back or a response via [facility]? Call     Best Call Back Number: [phone] (home)      Additional Information: Patient would like to speak with the nurse in regards to medication.     Please call to advise

## 2023-10-10 NOTE — TELEPHONE ENCOUNTER
Refill Decision Note   Madison Mercedes  is requesting a refill authorization.  Brief Assessment and Rationale for Refill:  Approve     Medication Therapy Plan:         Comments:     Note composed:8:39 PM 10/09/2023

## 2023-10-25 DIAGNOSIS — J44.9 CHRONIC OBSTRUCTIVE PULMONARY DISEASE, UNSPECIFIED COPD TYPE: ICD-10-CM

## 2023-10-25 RX ORDER — BUDESONIDE AND FORMOTEROL FUMARATE DIHYDRATE 160; 4.5 UG/1; UG/1
2 AEROSOL RESPIRATORY (INHALATION) 2 TIMES DAILY
Qty: 30.6 G | Refills: 3 | Status: SHIPPED | OUTPATIENT
Start: 2023-10-25

## 2023-10-25 NOTE — TELEPHONE ENCOUNTER
No care due was identified.  [facility] Embedded Care Due Messages. Reference number: [id number].   10/25/2023 9:16:21 AM CDT

## 2023-10-25 NOTE — TELEPHONE ENCOUNTER
Refill Decision Note   Madison Long  is requesting a refill authorization.  Brief Assessment and Rationale for Refill:  Approve     Medication Therapy Plan:       Medication Reconciliation Completed: No   Comments:     No Care Gaps recommended.     Note composed:12:27 PM 10/25/2023

## 2023-12-02 DIAGNOSIS — F32.0 CURRENT MILD EPISODE OF MAJOR DEPRESSIVE DISORDER WITHOUT PRIOR EPISODE: ICD-10-CM

## 2023-12-02 NOTE — TELEPHONE ENCOUNTER
No care due was identified.  [facility] Embedded Care Due Messages. Reference number: [id number].   12/02/2023 11:27:07 AM CST

## 2023-12-03 RX ORDER — FLUOXETINE 10 MG/1
10 CAPSULE ORAL
Qty: 90 CAPSULE | Refills: 2 | Status: SHIPPED | OUTPATIENT
Start: 2023-12-03

## 2023-12-19 ENCOUNTER — OFFICE VISIT (OUTPATIENT)
Dept: DERMATOLOGY | Facility: CLINIC | Age: 78
End: 2023-12-19
Payer: MEDICARE

## 2023-12-19 VITALS — HEIGHT: 64 IN | BODY MASS INDEX: 29.19 KG/M2 | WEIGHT: 171 LBS

## 2023-12-19 DIAGNOSIS — L82.1 SEBORRHEIC KERATOSES: ICD-10-CM

## 2023-12-19 DIAGNOSIS — D18.01 CHERRY ANGIOMA: ICD-10-CM

## 2023-12-19 DIAGNOSIS — Z08 ENCOUNTER FOR FOLLOW-UP SURVEILLANCE OF SKIN CANCER: ICD-10-CM

## 2023-12-19 DIAGNOSIS — Z85.828 ENCOUNTER FOR FOLLOW-UP SURVEILLANCE OF SKIN CANCER: ICD-10-CM

## 2023-12-19 DIAGNOSIS — L56.5: ICD-10-CM

## 2023-12-19 DIAGNOSIS — L57.0 ACTINIC KERATOSES: ICD-10-CM

## 2023-12-19 DIAGNOSIS — D48.5 NEOPLASM OF UNCERTAIN BEHAVIOR OF SKIN: Primary | ICD-10-CM

## 2023-12-19 DIAGNOSIS — L81.4 SOLAR LENTIGO: ICD-10-CM

## 2023-12-19 PROCEDURE — 1101F PT FALLS ASSESS-DOCD LE1/YR: CPT | Mod: CPTII,S$GLB,, | Performed by: DERMATOLOGY

## 2023-12-19 PROCEDURE — 11102 PR TANGENTIAL BIOPSY, SKIN, SINGLE LESION: ICD-10-PCS | Mod: XS,S$GLB,, | Performed by: DERMATOLOGY

## 2023-12-19 PROCEDURE — 17004 PR DESTRUCTION, PREMALIGNANT LESIONS; 15 OR MORE LESIONS: ICD-10-PCS | Mod: S$GLB,,, | Performed by: DERMATOLOGY

## 2023-12-19 PROCEDURE — 99214 PR OFFICE/OUTPT VISIT, EST, LEVL IV, 30-39 MIN: ICD-10-PCS | Mod: 25,S$GLB,, | Performed by: DERMATOLOGY

## 2023-12-19 PROCEDURE — 1160F PR REVIEW ALL MEDS BY PRESCRIBER/CLIN PHARMACIST DOCUMENTED: ICD-10-PCS | Mod: CPTII,S$GLB,, | Performed by: DERMATOLOGY

## 2023-12-19 PROCEDURE — 1159F PR MEDICATION LIST DOCUMENTED IN MEDICAL RECORD: ICD-10-PCS | Mod: CPTII,S$GLB,, | Performed by: DERMATOLOGY

## 2023-12-19 PROCEDURE — 88305 TISSUE EXAM BY PATHOLOGIST: ICD-10-PCS | Mod: 26,,, | Performed by: PATHOLOGY

## 2023-12-19 PROCEDURE — 88305 TISSUE EXAM BY PATHOLOGIST: CPT | Mod: 26,,, | Performed by: PATHOLOGY

## 2023-12-19 PROCEDURE — 1157F ADVNC CARE PLAN IN RCRD: CPT | Mod: CPTII,S$GLB,, | Performed by: DERMATOLOGY

## 2023-12-19 PROCEDURE — 1101F PR PT FALLS ASSESS DOC 0-1 FALLS W/OUT INJ PAST YR: ICD-10-PCS | Mod: CPTII,S$GLB,, | Performed by: DERMATOLOGY

## 2023-12-19 PROCEDURE — 17004 DESTROY PREMAL LESIONS 15/>: CPT | Mod: S$GLB,,, | Performed by: DERMATOLOGY

## 2023-12-19 PROCEDURE — 1159F MED LIST DOCD IN RCRD: CPT | Mod: CPTII,S$GLB,, | Performed by: DERMATOLOGY

## 2023-12-19 PROCEDURE — 11102 TANGNTL BX SKIN SINGLE LES: CPT | Mod: XS,S$GLB,, | Performed by: DERMATOLOGY

## 2023-12-19 PROCEDURE — 3288F PR FALLS RISK ASSESSMENT DOCUMENTED: ICD-10-PCS | Mod: CPTII,S$GLB,, | Performed by: DERMATOLOGY

## 2023-12-19 PROCEDURE — 1157F PR ADVANCE CARE PLAN OR EQUIV PRESENT IN MEDICAL RECORD: ICD-10-PCS | Mod: CPTII,S$GLB,, | Performed by: DERMATOLOGY

## 2023-12-19 PROCEDURE — 88305 TISSUE EXAM BY PATHOLOGIST: CPT | Mod: HCNC | Performed by: PATHOLOGY

## 2023-12-19 PROCEDURE — 99214 OFFICE O/P EST MOD 30 MIN: CPT | Mod: 25,S$GLB,, | Performed by: DERMATOLOGY

## 2023-12-19 PROCEDURE — 1160F RVW MEDS BY RX/DR IN RCRD: CPT | Mod: CPTII,S$GLB,, | Performed by: DERMATOLOGY

## 2023-12-19 PROCEDURE — 3288F FALL RISK ASSESSMENT DOCD: CPT | Mod: CPTII,S$GLB,, | Performed by: DERMATOLOGY

## 2023-12-19 NOTE — PROGRESS NOTES
Subjective:      Patient ID:  Madison Long is a 78 y.o. female who presents for   Chief Complaint   Patient presents with    Skin Check     TBSC      LOV 6/23/23 AK, SK, [place]    Patient here today for TBSC  C/O spot to lower legs, no symptoms.     Derm Hx:  yes Phx of NMSC.  +BCC on right cheek (lateral lower eyelid) treated by Mohs (Dr Hooper 2013)  Has fhx of MM-sister     Current Outpatient Medications:   ·  acetaminophen (TYLENOL) 500 MG tablet, Take 500 mg by mouth every 6 (six) hours as needed for Pain., Disp: , Rfl:   ·  albuterol (PROVENTIL/VENTOLIN HFA) 90 mcg/actuation inhaler, Inhale 2 puffs into the lungs every 6 (six) hours as needed. Rescue, Disp: 54 g, Rfl: 3  ·  ammonium lactate (LAC-HYDRIN) 12 % lotion, Apply to dry skin daily to BID, Disp: 396 g, Rfl: 5  ·  aspirin 81 MG Chew, Take 81 mg by mouth daily as needed. Usually takes about every 2 weeks., Disp: , Rfl:   ·  benzonatate (TESSALON) 100 MG capsule, 1 - 2 po every 6 hours prn cough, Disp: 45 capsule, Rfl: 0  ·  econazole nitrate 1 % cream, Apply topically 2 (two) times daily as needed. Twice a day  PRN, Disp: , Rfl:   ·  fish oil-omega-3 fatty acids 300-1,000 mg capsule, Take 1 g by mouth every morning. , Disp: , Rfl:   ·  FLUoxetine 10 MG capsule, TAKE 1 CAPSULE(10 MG) BY MOUTH EVERY DAY, Disp: 90 capsule, Rfl: 2  ·  fluticasone (FLONASE) 50 mcg/actuation nasal spray, SPRAY 2 SPRAYS IN EACH NOSTRIL EVERY DAY, Disp: 1 Bottle, Rfl: 6  ·  guaifenesin (MUCINEX) 600 mg 12 hr tablet, Take 1,200 mg by mouth 2 (two) times daily as needed. , Disp: , Rfl:   ·  ibuprofen/diphenhydramine cit (ADVIL PM ORAL), Take by mouth., Disp: , Rfl:   ·  losartan (COZAAR) 50 MG tablet, TAKE 1 TABLET(50 MG) BY MOUTH EVERY MORNING, Disp: 90 tablet, Rfl: 3  ·  multivit,stress formula-zinc Tab, Take 1 tablet by mouth every morning. Uses Alive, Disp: , Rfl:   ·  mupirocin (BACTROBAN) 2 % ointment, Apply topically 3 (three) times daily., Disp: 15 g, Rfl: 1  ·   omeprazole (PRILOSEC) 20 MG capsule, TAKE 1 CAPSULE(20 MG) BY MOUTH EVERY DAY, Disp: 90 capsule, Rfl: 0  ·  pseudoephedrine (SUDAFED) 30 MG tablet, Take 30 mg by mouth every 4 (four) hours as needed for Congestion., Disp: , Rfl:   ·  rosuvastatin (CRESTOR) 40 MG Tab, TAKE 1 TABLET(40 MG) BY MOUTH EVERY DAY, Disp: 90 tablet, Rfl: 1  ·  sodium chloride (OCEAN) 0.65 % nasal spray, 1 spray by Nasal route as needed for Congestion., Disp: , Rfl:   ·  SYMBICORT 160-4.5 mcg/actuation HFAA, INHALE 2 PUFFS BY MOUTH TWICE DAILY, Disp: 30.6 g, Rfl: 3  ·  traZODone (DESYREL) 50 MG tablet, TAKE 1 TABLET BY MOUTH EVERY NIGHT AS NEEDED FOR INSOMNIA, MAY TAKE UP TO 2 TABLETS EVERY NIGHT AS NEEDED, Disp: 180 tablet, Rfl: 3  ·  zinc gluconate (COLD-EEZE ORAL), Take 1 lozenge by mouth daily as needed., Disp: , Rfl:   ·  azelastine (ASTELIN) 137 mcg (0.1 %) nasal spray, 1 spray (137 mcg total) by Nasal route 2 (two) times daily. for 14 days, Disp: 90 mL, Rfl: 3  ·  cetirizine (ZYRTEC) 10 MG tablet, Take 1 tablet (10 mg total) by mouth once daily., Disp: 7 tablet, Rfl: 0  ·  famotidine (PEPCID) 40 MG tablet, Take 1 tablet (40 mg total) by mouth once daily., Disp: 7 tablet, Rfl: 0  ·  hydrocortisone 2.5 % cream, Apply topically 2 (two) times daily. for 10 days, Disp: 20 g, Rfl: 0          Review of Systems   Constitutional:  Negative for fever and chills.   HENT:  Negative for congestion and sore throat.    Respiratory:  Negative for cough and shortness of breath.    Skin:  Positive for activity-related sunscreen use and sensitivity to antibiotic ointment. Negative for itching, daily sunscreen use and sensitivity to bandage adhesive.   Hematologic/Lymphatic: Bruises/bleeds easily.       Objective:   Physical Exam   Constitutional: She appears well-developed and well-nourished. No distress.   Neurological: She is alert and oriented to person, place, and time. She is not disoriented.   Psychiatric: She has a normal mood and affect.   Skin:    Areas Examined (abnormalities noted in diagram):   Scalp / Hair Palpated and Inspected  Head / Face Inspection Performed  Neck Inspection Performed  Chest / Axilla Inspection Performed  Abdomen Inspection Performed  Genitals / Buttocks / Groin Inspection Performed  Back Inspection Performed  RUE Inspected  LUE Inspection Performed  RLE Inspected  LLE Inspection Performed                 Diagram Legend     Erythematous scaling macule/papule c/w actinic keratosis       Vascular papule c/w angioma      Pigmented verrucoid papule/plaque c/w seborrheic keratosis      Yellow umbilicated papule c/w sebaceous hyperplasia      Irregularly shaped tan macule c/w lentigo     1-2 mm smooth white papules consistent with Milia      Movable subcutaneous cyst with punctum c/w epidermal inclusion cyst      Subcutaneous movable cyst c/w pilar cyst      Firm pink to brown papule c/w dermatofibroma      Pedunculated fleshy papule(s) c/w skin tag(s)      Evenly pigmented macule c/w junctional nevus     Mildly variegated pigmented, slightly irregular-bordered macule c/w mildly atypical nevus      Flesh colored to evenly pigmented papule c/w intradermal nevus       Pink pearly papule/plaque c/w basal cell carcinoma      Erythematous hyperkeratotic cursted plaque c/w SCC      Surgical scar with no sign of skin cancer recurrence      Open and closed comedones      Inflammatory papules and pustules      Verrucoid papule consistent consistent with wart     Erythematous eczematous patches and plaques     Dystrophic onycholytic nail with subungual debris c/w onychomycosis     Umbilicated papule    Erythematous-base heme-crusted tan verrucoid plaque consistent with inflamed seborrheic keratosis     Erythematous Silvery Scaling Plaque c/w Psoriasis     See annotation      Assessment / Plan:      Pathology Orders:       Normal Orders This Visit    Specimen to Pathology, Dermatology     Comments:    Number of  Specimens:->1  ------------------------->-------------------------  Spec 1 Procedure:->Biopsy  Spec 1 Clinical Impression:->bcc vs other  Spec 1 Source:->r upper back    Questions:    Procedure Type: Dermatology and skin neoplasms    Number of Specimens: 1    ------------------------: -------------------------    Spec 1 Procedure: Biopsy    Spec 1 Clinical Impression: bcc vs other    Spec 1 Source: r upper back    Release to patient:           Neoplasm of uncertain behavior of skin  -     Specimen to Pathology, Dermatology  Shave biopsy procedure note:    Shave biopsy performed after verbal consent including risk of infection, scar, recurrence, need for additional treatment of site. Area prepped with alcohol, anesthetized with approximately 1.0cc of 1% lidocaine with epinephrine. Lesional tissue shaved with razor blade. Hemostasis achieved with application of aluminum chloride followed by hyfrecation. No complications. Dressing applied. Wound care explained.    Actinic keratoses  Cryosurgery Procedure Note    Verbal consent from the patient is obtained and the patient is aware of the precancerous quality and need for treatment of these lesions. Liquid nitrogen cryosurgery is applied to the 23 actinic keratoses, as detailed in the physical exam, to produce a freeze injury. The patient is aware that blisters may form and is instructed on wound care with gentle cleansing and use of vaseline ointment to keep moist until healed. The patient is supplied a handout on cryosurgery and is instructed to call if lesions do not completely resolve.    Encounter for follow-up surveillance of skin cancer  Area of previous BCC examined. Site well healed with no signs of recurrence.    Total body skin examination performed today including at least 12 points as noted in physical examination. No lesions suspicious for malignancy noted.    Seborrheic keratoses  These are benign inherited growths without a malignant potential.  Reassurance given to patient. No treatment is necessary.     Porokeratosis, superficial disseminated eruptive type  Discussed lovastatin/cholesterol compounded cream  $75 for 240g jar  BID until resolved. May take several weeks to months.  Very safe, potential irritation and pruritus during treatment.    Cherry angioma  This is a benign vascular lesion. Reassurance given. No treatment required.     Solar lentigo  This is a benign hyperpigmented sun induced lesion. Daily sun protection will reduce the number of new lesions. Treatment of these benign lesions are considered cosmetic.    Patient instructed in importance in daily broad spectrum sun protection of at least spf 30. Mineral sunscreen ingredients preferred (Zinc +/- Titanium) and can be found OTC.   Recommend Elta MD for daily use on face and neck.  Patient encouraged to wear hat for all outdoor exposure.   Also discussed sun avoidance and use of protective clothing.           Follow up in about 6 months (around 6/19/2024).

## 2023-12-19 NOTE — PATIENT INSTRUCTIONS
Shave Biopsy Wound Care    Your doctor has performed a shave biopsy today.  A band aid and vaseline ointment has been placed over the site.  This should remain in place for 24 hours.  It is recommended that you keep the area dry for the first 24 hours.  After 24 hours, you may remove the band aid and wash the area with warm soap and water and apply Vaseline jelly.  Many patients prefer to use Neosporin or Bacitracin ointment.  This is acceptable; however, know that you can develop an allergy to this medication even if you have used it safely for years.  It is important to keep the area moist.  Letting it dry out and get air slows healing time, and will worsen the scar.  Band aid is optional after first 24 hours.      If you notice increasing redness, tenderness, pain, or yellow drainage at the biopsy site, please notify your doctor.  These are signs of an infection.    If your biopsy site is bleeding, apply firm pressure for 15 minutes straight.  Repeat for another 15 minutes, if it is still bleeding.   If the surgical site continues to bleed, then please contact your doctor.       [facility] - DERMATOLOGY  [address], [address]  [facility] [place] [address]  Dept: [phone]  Dept Fax: [phone]      CRYOSURGERY      Your doctor has used a method called cryosurgery to treat your skin condition. Cryosurgery refers to the use of very cold substances to treat a variety of skin conditions such as warts, pre-skin cancers, molluscum contagiosum, sun spots, and several benign growths. The substance we use in cryosurgery is liquid nitrogen and is so cold (-195 degrees Celsius) that is burns when administered.     Following treatment in the office, the skin may immediately burn and become red. You may find the area around the lesion is affected as well. It is sometimes necessary to treat not only the lesion, but a small area of the surrounding normal skin to achieve a good response.     A  blister, and even a blood filled blister, may form after treatment.   This is a normal response. If the blister is painful, it is acceptable to sterilize a needle and with rubbing alcohol and gently pop the blister. It is important that you gently wash the area with soap and warm water as the blister fluid may contain wart virus if a wart was treated. Do no remove the roof of the blister.     The area treated can take anywhere from 1-3 weeks to heal. Healing time depends on the kind skin lesion treated, the location, and how aggressively the lesion was treated. It is recommended that the areas treated are covered with Vaseline or bacitracin ointment and a band-aid. If a band-aid is not practical, just ointment applied several times per day will do. Keeping these areas moist will speed the healing time.    Treatment with liquid nitrogen can leave a scar. In dark skin, it may be a light or dark scar, in light skin it may be a white or pink scar. These will generally fade with time, but may never go away completely.     If you have any concerns after your treatment, please feel free to call the office.         [facility] - DERMATOLOGY  [address], [address]  [place] [address]  Dept: [phone]  Dept Fax: [phone]

## 2023-12-22 LAB
FINAL PATHOLOGIC DIAGNOSIS: NORMAL
GROSS: NORMAL
Lab: NORMAL
MICROSCOPIC EXAM: NORMAL

## 2023-12-27 ENCOUNTER — TELEPHONE (OUTPATIENT)
Dept: DERMATOLOGY | Facility: CLINIC | Age: 78
End: 2023-12-27
Payer: MEDICARE

## 2023-12-27 NOTE — TELEPHONE ENCOUNTER
Returned call to patient.    Results reviewed, patient scheduled for E&S 1/25 at 1 pm    ----- Message from Vikas Oliver sent at 12/27/2023  2:31 PM CST -----  Regarding: Returning call  Type:  Patient Returning Call    Who Called:PT    Who Left Message for Patient:Dulce    Does the patient know what this is regarding?:test results    Would the patient rather a call back or a response via [facility]? Call back    Best Call Back Number:[phone]      Additional Information:   Please advise -- Thank you

## 2024-01-02 ENCOUNTER — TELEPHONE (OUTPATIENT)
Dept: FAMILY MEDICINE | Facility: CLINIC | Age: 79
End: 2024-01-02
Payer: MEDICARE

## 2024-01-02 NOTE — TELEPHONE ENCOUNTER
----- Message from Ainsley Ruiz sent at 1/2/2024 10:24 AM CST -----  Regarding: appointment  Contact: patient  Type:  Sooner Appointment Request    Caller is requesting a sooner appointment.  Caller declined first available appointment listed below.  Caller will not accept being placed on the waitlist and is requesting a message be sent to doctor.    Name of Caller:  patient  When is the first available appointment?  01/17/24  Symptoms:  cold, congestion  Would the patient rather a call back or a response via [facility]? call  Best Call Back Number:  [phone] (home)     Additional Information:  Patient would like to be seen tomorrow.  Please call patient to advise.  Thanks!

## 2024-01-03 ENCOUNTER — TELEPHONE (OUTPATIENT)
Dept: OPTOMETRY | Facility: CLINIC | Age: 79
End: 2024-01-03
Payer: MEDICARE

## 2024-01-03 NOTE — TELEPHONE ENCOUNTER
----- Message from Idalmis Astorga sent at 1/3/2024  1:14 PM CST -----  Regarding: todays appt  Contact: pt  Type:  Needs Medical Advice    Who Called: pt  Would the patient rather a call back or a response via [facility]? Call back  Best Call Back Number: [phone]      Additional Information: sts she needs to speak to someone about her appt today

## 2024-01-04 ENCOUNTER — OFFICE VISIT (OUTPATIENT)
Dept: FAMILY MEDICINE | Facility: CLINIC | Age: 79
End: 2024-01-04
Payer: MEDICARE

## 2024-01-04 ENCOUNTER — OFFICE VISIT (OUTPATIENT)
Dept: OPTOMETRY | Facility: CLINIC | Age: 79
End: 2024-01-04
Payer: MEDICARE

## 2024-01-04 VITALS
OXYGEN SATURATION: 95 % | HEART RATE: 100 BPM | DIASTOLIC BLOOD PRESSURE: 84 MMHG | WEIGHT: 173.31 LBS | SYSTOLIC BLOOD PRESSURE: 136 MMHG | TEMPERATURE: 98 F | BODY MASS INDEX: 29.59 KG/M2 | HEIGHT: 64 IN

## 2024-01-04 DIAGNOSIS — H52.03 HYPEROPIA OF BOTH EYES WITH ASTIGMATISM: ICD-10-CM

## 2024-01-04 DIAGNOSIS — J20.9 ACUTE BRONCHITIS, UNSPECIFIED ORGANISM: Primary | ICD-10-CM

## 2024-01-04 DIAGNOSIS — Z96.1 BILATERAL PSEUDOPHAKIA: Primary | ICD-10-CM

## 2024-01-04 DIAGNOSIS — J43.2 CENTRILOBULAR EMPHYSEMA: ICD-10-CM

## 2024-01-04 DIAGNOSIS — H52.203 HYPEROPIA OF BOTH EYES WITH ASTIGMATISM: ICD-10-CM

## 2024-01-04 PROCEDURE — 1101F PT FALLS ASSESS-DOCD LE1/YR: CPT | Mod: HCNC,CPTII,S$GLB, | Performed by: OPTOMETRIST

## 2024-01-04 PROCEDURE — 92015 DETERMINE REFRACTIVE STATE: CPT | Mod: HCNC,S$GLB,, | Performed by: OPTOMETRIST

## 2024-01-04 PROCEDURE — 99999 PR PBB SHADOW E&M-EST. PATIENT-LVL III: CPT | Mod: PBBFAC,HCNC,, | Performed by: OPTOMETRIST

## 2024-01-04 PROCEDURE — 1159F MED LIST DOCD IN RCRD: CPT | Mod: HCNC,CPTII,S$GLB, | Performed by: OPTOMETRIST

## 2024-01-04 PROCEDURE — 1126F AMNT PAIN NOTED NONE PRSNT: CPT | Mod: HCNC,CPTII,S$GLB, | Performed by: FAMILY MEDICINE

## 2024-01-04 PROCEDURE — 1101F PT FALLS ASSESS-DOCD LE1/YR: CPT | Mod: HCNC,CPTII,S$GLB, | Performed by: FAMILY MEDICINE

## 2024-01-04 PROCEDURE — 3288F FALL RISK ASSESSMENT DOCD: CPT | Mod: HCNC,CPTII,S$GLB, | Performed by: FAMILY MEDICINE

## 2024-01-04 PROCEDURE — 1126F AMNT PAIN NOTED NONE PRSNT: CPT | Mod: HCNC,CPTII,S$GLB, | Performed by: OPTOMETRIST

## 2024-01-04 PROCEDURE — 1157F ADVNC CARE PLAN IN RCRD: CPT | Mod: HCNC,CPTII,S$GLB, | Performed by: FAMILY MEDICINE

## 2024-01-04 PROCEDURE — 3079F DIAST BP 80-89 MM HG: CPT | Mod: HCNC,CPTII,S$GLB, | Performed by: FAMILY MEDICINE

## 2024-01-04 PROCEDURE — 99999 PR PBB SHADOW E&M-EST. PATIENT-LVL IV: CPT | Mod: PBBFAC,HCNC,, | Performed by: FAMILY MEDICINE

## 2024-01-04 PROCEDURE — 3288F FALL RISK ASSESSMENT DOCD: CPT | Mod: HCNC,CPTII,S$GLB, | Performed by: OPTOMETRIST

## 2024-01-04 PROCEDURE — 99213 OFFICE O/P EST LOW 20 MIN: CPT | Mod: HCNC,S$GLB,, | Performed by: FAMILY MEDICINE

## 2024-01-04 PROCEDURE — 1157F ADVNC CARE PLAN IN RCRD: CPT | Mod: HCNC,CPTII,S$GLB, | Performed by: OPTOMETRIST

## 2024-01-04 PROCEDURE — 3075F SYST BP GE 130 - 139MM HG: CPT | Mod: HCNC,CPTII,S$GLB, | Performed by: FAMILY MEDICINE

## 2024-01-04 PROCEDURE — 1159F MED LIST DOCD IN RCRD: CPT | Mod: HCNC,CPTII,S$GLB, | Performed by: FAMILY MEDICINE

## 2024-01-04 NOTE — PROGRESS NOTES
Subjective:       Patient ID: Madison Long is a 78 y.o. female.    Chief Complaint: Cough    Here for acute visit as new pt to me.  Taking symbicort routine since summer and albuterol prn.  Sick x 5 days but better over last few days.  No fever.      Cough  This is a new problem. The current episode started in the past 7 days. Pertinent negatives include no chest pain, chills, fever or shortness of breath.     Review of Systems   Constitutional:  Negative for chills and fever.   Respiratory:  Positive for cough. Negative for chest tightness and shortness of breath.    Cardiovascular:  Negative for chest pain, palpitations and leg swelling.   Endocrine: Negative for cold intolerance and heat intolerance.   Psychiatric/Behavioral:  Negative for decreased concentration. The patient is not nervous/anxious.        Objective:      Physical Exam  Vitals and nursing note reviewed.   Constitutional:       Appearance: She is well-developed.   HENT:      Head: Normocephalic and atraumatic.   Cardiovascular:      Rate and Rhythm: Normal rate and regular rhythm.      Heart sounds: Normal heart sounds.   Pulmonary:      Effort: Pulmonary effort is normal.      Breath sounds: Normal breath sounds.   Psychiatric:         Mood and Affect: Mood normal.         Behavior: Behavior normal.         Assessment:       1. Acute bronchitis, unspecified organism    2. Centrilobular emphysema        Plan:       Acute bronchitis, unspecified organism    Centrilobular emphysema      Supportive care with return precautions given  Hold abx and monitor  Has inh meds with refills already    Follow up if symptoms worsen or fail to improve.

## 2024-01-04 NOTE — PROGRESS NOTES
HPI    Wrx-scheduled to see Dr. Chaves for yearly and oct 1/18    Pt feels va changed a little since last visit. Feels eyes need to focus in   the am. Denies any new flashes or floaters. States she hit curb making   right turns a few times.  Last edited by Kerrie Gamboa on 1/4/2024 10:43 AM.            Assessment /Plan     For exam results, see Encounter Report.    Bilateral pseudophakia    Hyperopia of both eyes with astigmatism      Refraction only today   Gave copy, advised continue with current specs     Keep appt with Dr Chaves 1/18/24

## 2024-01-25 ENCOUNTER — TELEPHONE (OUTPATIENT)
Dept: DERMATOLOGY | Facility: CLINIC | Age: 79
End: 2024-01-25

## 2024-01-25 NOTE — TELEPHONE ENCOUNTER
----- Message from Carrol Abreu sent at 1/25/2024  8:17 AM CST -----  Regarding: Sooner Appointment Reschedule Request  Contact: patient at [phone]  Type:  Sooner Appointment Reschedule Request    Name of Caller:  patient at [phone]    Additional Information:  needed to cancel procedure today due to weather and needs to reschedule. Please call and advise. Thank you

## 2024-02-07 ENCOUNTER — LAB VISIT (OUTPATIENT)
Dept: LAB | Facility: HOSPITAL | Age: 79
End: 2024-02-07
Attending: INTERNAL MEDICINE
Payer: MEDICARE

## 2024-02-07 DIAGNOSIS — E78.5 DYSLIPIDEMIA: ICD-10-CM

## 2024-02-07 DIAGNOSIS — I10 ESSENTIAL HYPERTENSION: ICD-10-CM

## 2024-02-07 LAB
ALBUMIN SERPL BCP-MCNC: 4.1 G/DL (ref 3.5–5.2)
ALP SERPL-CCNC: 54 U/L (ref 55–135)
ALT SERPL W/O P-5'-P-CCNC: 16 U/L (ref 10–44)
ANION GAP SERPL CALC-SCNC: 7 MMOL/L (ref 8–16)
AST SERPL-CCNC: 28 U/L (ref 10–40)
BILIRUB SERPL-MCNC: 0.6 MG/DL (ref 0.1–1)
BUN SERPL-MCNC: 14 MG/DL (ref 8–23)
CALCIUM SERPL-MCNC: 9.6 MG/DL (ref 8.7–10.5)
CHLORIDE SERPL-SCNC: 106 MMOL/L (ref 95–110)
CHOLEST SERPL-MCNC: 140 MG/DL (ref 120–199)
CHOLEST/HDLC SERPL: 2.2 {RATIO} (ref 2–5)
CO2 SERPL-SCNC: 24 MMOL/L (ref 23–29)
CREAT SERPL-MCNC: 0.8 MG/DL (ref 0.5–1.4)
EST. GFR  (NO RACE VARIABLE): >60 ML/MIN/1.73 M^2
GLUCOSE SERPL-MCNC: 99 MG/DL (ref 70–110)
HDLC SERPL-MCNC: 63 MG/DL (ref 40–75)
HDLC SERPL: 45 % (ref 20–50)
LDLC SERPL CALC-MCNC: 67 MG/DL (ref 63–159)
NONHDLC SERPL-MCNC: 77 MG/DL
POTASSIUM SERPL-SCNC: 4.5 MMOL/L (ref 3.5–5.1)
PROT SERPL-MCNC: 6.7 G/DL (ref 6–8.4)
SODIUM SERPL-SCNC: 137 MMOL/L (ref 136–145)
TRIGL SERPL-MCNC: 50 MG/DL (ref 30–150)

## 2024-02-07 PROCEDURE — 80053 COMPREHEN METABOLIC PANEL: CPT | Mod: HCNC | Performed by: INTERNAL MEDICINE

## 2024-02-07 PROCEDURE — 36415 COLL VENOUS BLD VENIPUNCTURE: CPT | Mod: HCNC,PO | Performed by: INTERNAL MEDICINE

## 2024-02-07 PROCEDURE — 80061 LIPID PANEL: CPT | Mod: HCNC | Performed by: INTERNAL MEDICINE

## 2024-02-15 ENCOUNTER — PROCEDURE VISIT (OUTPATIENT)
Dept: DERMATOLOGY | Facility: CLINIC | Age: 79
End: 2024-02-15
Payer: MEDICARE

## 2024-02-15 DIAGNOSIS — C44.519 BASAL CELL CARCINOMA (BCC) OF UPPER BACK: Primary | ICD-10-CM

## 2024-02-15 PROCEDURE — 99499 UNLISTED E&M SERVICE: CPT | Mod: S$GLB,,, | Performed by: DERMATOLOGY

## 2024-02-15 PROCEDURE — 11602 EXC TR-EXT MAL+MARG 1.1-2 CM: CPT | Mod: S$GLB,,, | Performed by: DERMATOLOGY

## 2024-02-15 PROCEDURE — 88305 TISSUE EXAM BY PATHOLOGIST: CPT | Mod: HCNC | Performed by: PATHOLOGY

## 2024-02-15 PROCEDURE — 12032 INTMD RPR S/A/T/EXT 2.6-7.5: CPT | Mod: 51,S$GLB,, | Performed by: DERMATOLOGY

## 2024-02-15 PROCEDURE — 88305 TISSUE EXAM BY PATHOLOGIST: CPT | Mod: 26,,, | Performed by: PATHOLOGY

## 2024-02-15 NOTE — PATIENT INSTRUCTIONS
Surgery Wound Care    Your doctor has performed an excision today.  A bandage and vaseline ointment has been placed over the site.  This should remain in place for 24 hours.  It is recommended that you keep the area dry for the first 24 hours.  After 24 hours, you may remove the band aid and wash the area with warm soap and water and apply Vaseline jelly or aquaphore.  Many patients prefer to use Neosporin or Bacitracin ointment.  This is acceptable; however know that you can develop an allergy to this medication even if you have used it safely for years.  It is important to keep the area moist.  Letting it dry out and get air slows healing time, will worsen the scar, and make it more difficult to remove the stitches if they were placed.        If you notice increasing redness, tenderness, pain, or yellow drainage at the biopsy or surgical site, please notify your doctor.  These are signs of an infection.    If your biopsy/surgical site is bleeding, apply firm pressure for 15 minutes straight.  Repeat for another 15 minutes, if it is still bleeding.   If the surgical site continues to bleed, then please contact your doctor.    [facility] - DERMATOLOGY  [address] [place] [address] [address]  [facility] [address]  Dept: [phone]

## 2024-02-15 NOTE — PROGRESS NOTES
Subjective:      Patient ID:  Madison Long is a 78 y.o. female who presents for   Chief Complaint   Patient presents with    Basal Cell Carcinoma     Right upper back     Pt here for definitive excision of BCC of right upper back.  Feeling well today.   Denies pacemaker, defibrillator.  No blood thinners.   No additional cutaneous complaints.     Skin, right upper back, shave biopsy:   -BASAL CELL CARCINOMA, NODULAR TYPE, EXCISED IN THE PLANES OF SECTION EXAMINED         Review of Systems    Objective:   Physical Exam     Diagram Legend     Erythematous scaling macule/papule c/w actinic keratosis       Vascular papule c/w angioma      Pigmented verrucoid papule/plaque c/w seborrheic keratosis      Yellow umbilicated papule c/w sebaceous hyperplasia      Irregularly shaped tan macule c/w lentigo     1-2 mm smooth white papules consistent with Milia      Movable subcutaneous cyst with punctum c/w epidermal inclusion cyst      Subcutaneous movable cyst c/w pilar cyst      Firm pink to brown papule c/w dermatofibroma      Pedunculated fleshy papule(s) c/w skin tag(s)      Evenly pigmented macule c/w junctional nevus     Mildly variegated pigmented, slightly irregular-bordered macule c/w mildly atypical nevus      Flesh colored to evenly pigmented papule c/w intradermal nevus       Pink pearly papule/plaque c/w basal cell carcinoma      Erythematous hyperkeratotic cursted plaque c/w SCC      Surgical scar with no sign of skin cancer recurrence      Open and closed comedones      Inflammatory papules and pustules      Verrucoid papule consistent consistent with wart     Erythematous eczematous patches and plaques     Dystrophic onycholytic nail with subungual debris c/w onychomycosis     Umbilicated papule    Erythematous-base heme-crusted tan verrucoid plaque consistent with inflamed seborrheic keratosis     Erythematous Silvery Scaling Plaque c/w Psoriasis     See annotation      Assessment / Plan:      Pathology  Orders:       Normal Orders This Visit    Specimen to Pathology, Dermatology     Questions:    Procedure Type: Dermatology and skin neoplasms    Number of Specimens: 1    ------------------------: -------------------------    Spec 1 Procedure: Excision >2cm    Spec 1 Clinical Impression: BCC reexcision, check margins    Spec 1 Source: R upper back    Release to patient:           Basal cell carcinoma (BCC) of upper back  -     Specimen to Pathology, Dermatology    PROCEDURE: Elliptical excision with intermediate layered repair in order to decrease dead space, decrease tension, and close large gap.    ANESTHETIC: 7 cc 1% Xylocaine with Epinephrine 1:100,000, buffered    SURGEON: Mia Delatorre MD  ASSISTANTS: Dulce Jasmine RN,  Madyson Allan MA, Ana Glover MA    PREOPERATIVE DIAGNOSIS:  Biopsy-proven Basal Cell Carcinoma    POSTOPERATIVE DIAGNOSIS:  Same as preoperative diagnosis    PATHOLOGIC DIAGNOSIS: Pending    LOCATION: R upper back    INITIAL LESION SIZE: 0.8 cm    EXCISED DIAMETER: 1.6 cm    PREPARATION: The diagnosis, procedure, alternatives, benefits and risks, including but not limited to: infection, bleeding/bruising, drug reactions, pain, scar or cosmetic defect, local sensation disturbances, wound dehiscence (separation of wound edges after sutures removed) and/or recurrence of present condition were explained to the patient. The patient elected to proceed.  Patient's identity was verified using 2 patient identifiers and the side and site was verified.  Time out period with surgeon, assistant and patient in surgical suite was taken.    PROCEDURE: The location noted above was prepped and draped in the usual sterile fashion. The area was anesthetized with intradermal buffered xylocaine. Lesional tissue was carefully marked with at least 4 mm margins of clinically normal skin in all directions. A fusiform elliptical excision was done with #15 blade carried down completely through the dermis  into the subcutaneous tissues to the level of the subcutaneous fat, and dissection was carried out in that plane.  Electrocoagulation was used to obtain hemostasis. Blood loss was minimal. The wound was then approximated in a layered fashion with subcutaneous and intradermal sutures of 4.0 Monocryl, approximately 5 in number, and the wound was then superficially closed with simple interrupted sutures of 4.0 Prolene.    The patient tolerated the procedure well.    The area was cleaned and dressed appropriately and the patient was given wound care instructions, as well as an appointment for follow-up evaluation.    LENGTH OF REPAIR: 3.5 cm            Follow up in about 2 weeks (around 2/29/2024) for suture removal.

## 2024-02-19 ENCOUNTER — OFFICE VISIT (OUTPATIENT)
Dept: FAMILY MEDICINE | Facility: CLINIC | Age: 79
End: 2024-02-19
Payer: MEDICARE

## 2024-02-19 ENCOUNTER — HOSPITAL ENCOUNTER (OUTPATIENT)
Dept: RADIOLOGY | Facility: HOSPITAL | Age: 79
Discharge: HOME | End: 2024-02-19
Attending: INTERNAL MEDICINE
Payer: MEDICARE

## 2024-02-19 VITALS
TEMPERATURE: 99 F | HEART RATE: 88 BPM | DIASTOLIC BLOOD PRESSURE: 86 MMHG | BODY MASS INDEX: 29.13 KG/M2 | WEIGHT: 170.63 LBS | OXYGEN SATURATION: 97 % | HEIGHT: 64 IN | SYSTOLIC BLOOD PRESSURE: 128 MMHG

## 2024-02-19 DIAGNOSIS — Z12.31 ENCOUNTER FOR SCREENING MAMMOGRAM FOR BREAST CANCER: ICD-10-CM

## 2024-02-19 DIAGNOSIS — J20.9 ACUTE BRONCHITIS, UNSPECIFIED ORGANISM: ICD-10-CM

## 2024-02-19 DIAGNOSIS — U07.1 COVID-19 VIRUS INFECTION: ICD-10-CM

## 2024-02-19 DIAGNOSIS — Z00.00 ROUTINE PHYSICAL EXAMINATION: Primary | ICD-10-CM

## 2024-02-19 DIAGNOSIS — I10 ESSENTIAL HYPERTENSION: ICD-10-CM

## 2024-02-19 DIAGNOSIS — U07.1 COVID-19 VIRUS DETECTED: ICD-10-CM

## 2024-02-19 DIAGNOSIS — J43.2 CENTRILOBULAR EMPHYSEMA: ICD-10-CM

## 2024-02-19 DIAGNOSIS — E78.2 MIXED HYPERLIPIDEMIA: ICD-10-CM

## 2024-02-19 LAB
CTP QC/QA: YES
CTP QC/QA: YES
POC MOLECULAR INFLUENZA A AGN: NEGATIVE
POC MOLECULAR INFLUENZA B AGN: NEGATIVE
SARS-COV-2 RDRP RESP QL NAA+PROBE: POSITIVE

## 2024-02-19 PROCEDURE — 3079F DIAST BP 80-89 MM HG: CPT | Mod: HCNC,CPTII,S$GLB, | Performed by: INTERNAL MEDICINE

## 2024-02-19 PROCEDURE — 1160F RVW MEDS BY RX/DR IN RCRD: CPT | Mod: HCNC,CPTII,S$GLB, | Performed by: INTERNAL MEDICINE

## 2024-02-19 PROCEDURE — 87635 SARS-COV-2 COVID-19 AMP PRB: CPT | Mod: QW,HCNC,S$GLB, | Performed by: INTERNAL MEDICINE

## 2024-02-19 PROCEDURE — 1159F MED LIST DOCD IN RCRD: CPT | Mod: HCNC,CPTII,S$GLB, | Performed by: INTERNAL MEDICINE

## 2024-02-19 PROCEDURE — 71046 X-RAY EXAM CHEST 2 VIEWS: CPT | Mod: 26,HCNC,, | Performed by: RADIOLOGY

## 2024-02-19 PROCEDURE — 3288F FALL RISK ASSESSMENT DOCD: CPT | Mod: HCNC,CPTII,S$GLB, | Performed by: INTERNAL MEDICINE

## 2024-02-19 PROCEDURE — 3074F SYST BP LT 130 MM HG: CPT | Mod: HCNC,CPTII,S$GLB, | Performed by: INTERNAL MEDICINE

## 2024-02-19 PROCEDURE — 99397 PER PM REEVAL EST PAT 65+ YR: CPT | Mod: HCNC,S$GLB,, | Performed by: INTERNAL MEDICINE

## 2024-02-19 PROCEDURE — 1157F ADVNC CARE PLAN IN RCRD: CPT | Mod: HCNC,CPTII,S$GLB, | Performed by: INTERNAL MEDICINE

## 2024-02-19 PROCEDURE — 1126F AMNT PAIN NOTED NONE PRSNT: CPT | Mod: HCNC,CPTII,S$GLB, | Performed by: INTERNAL MEDICINE

## 2024-02-19 PROCEDURE — 99214 OFFICE O/P EST MOD 30 MIN: CPT | Mod: HCNC,25,S$GLB, | Performed by: INTERNAL MEDICINE

## 2024-02-19 PROCEDURE — 87502 INFLUENZA DNA AMP PROBE: CPT | Mod: QW,HCNC,S$GLB, | Performed by: INTERNAL MEDICINE

## 2024-02-19 PROCEDURE — 1101F PT FALLS ASSESS-DOCD LE1/YR: CPT | Mod: HCNC,CPTII,S$GLB, | Performed by: INTERNAL MEDICINE

## 2024-02-19 PROCEDURE — 99999 PR PBB SHADOW E&M-EST. PATIENT-LVL V: CPT | Mod: PBBFAC,HCNC,, | Performed by: INTERNAL MEDICINE

## 2024-02-19 PROCEDURE — 71046 X-RAY EXAM CHEST 2 VIEWS: CPT | Mod: TC,HCNC,FY,PO

## 2024-02-19 RX ORDER — AZITHROMYCIN 250 MG/1
TABLET, FILM COATED ORAL
Qty: 6 TABLET | Refills: 0 | Status: SHIPPED | OUTPATIENT
Start: 2024-02-19

## 2024-02-19 RX ORDER — BENZONATATE 100 MG/1
CAPSULE ORAL
Qty: 45 CAPSULE | Refills: 0 | Status: SHIPPED | OUTPATIENT
Start: 2024-02-19

## 2024-02-19 NOTE — PROGRESS NOTES
Subjective:       Patient ID: Madison Long is a 78 y.o. female.  Chief Complaint: Annual Exam     HPI    Here for routine health maintenance.    Refuses most vaccines      Complains of cough with mild amounts of thick yellow sputum.  She has baseline allergies that get worse in season - recent tree bloom.  This improved before amara gras.  She got worse 4 days ago.  She was around family with new + covid dx 3 days prior to worsening cough.    No f/c.  No fatigue or body aches.    Had < 40% blockage of b/l carotid artery 2017     HLD - controlled; LDL goal < 70, htn; taking Crestor qd  Recall: had severe LE cramps.  Stopped Lipitor 1 month ago and cramps resolved after 5-6 days.       GERD - mostly when drinks port wine     Asthma - controlled with daily Symbicort     4 eye surgeries in past. Dr Sotelo  HTN - controlled     COPD - no sob;      Insomnia - 1- 2 trazodone works most nights; stopped her Ambien      ALEX - controlled      Atrophic vaginitis - estrogen cream used for 1-2 weeks and then will take a break.  due for mammogram 3/23/19; rx originally by urology Dr Hughes.      Keratosis - hereditary.  Wants to change to our derm.      Osteopenia - on vit D and calcium           Assessment:       1. Routine physical examination    2. Acute bronchitis, unspecified organism    3. Encounter for screening mammogram for breast cancer    4. Essential hypertension    5. Mixed hyperlipidemia    6. Centrilobular emphysema    7. COVID-19 virus infection        Plan:       Routine physical examination    Acute bronchitis, unspecified organism  -     POCT COVID-19 Rapid Screening  -     POCT Influenza A/B Molecular  -     benzonatate (TESSALON) 100 MG capsule; 1 - 2 po every 6 hours prn cough  Dispense: 45 capsule; Refill: 0  -     X-Ray Chest PA And Lateral; Future; Expected date: 02/19/2024  -     CBC Auto Differential; Future; Expected date: 08/17/2024  -     azithromycin (ZITHROMAX Z-KISHORE) 250 MG tablet; Take 2 po day  1, then 1 po day 2 - 5  Dispense: 6 tablet; Refill: 0    Encounter for screening mammogram for breast cancer  -     Mammo Digital Screening Bilat w/ Valentín; Future; Expected date: 02/19/2024    Essential hypertension  -     Comprehensive Metabolic Panel; Future; Expected date: 08/17/2024    Mixed hyperlipidemia  -     Lipid Panel; Future; Expected date: 08/17/2024    Centrilobular emphysema    COVID-19 virus infection  -     molnupiravir 200 mg capsule (EUA); Take 4 capsules (800 mg total) by mouth every 12 (twelve) hours. for 5 days  Dispense: 40 capsule; Refill: 0          Wellness reviewed        If pneumonia, monitor on sx.  Doesn't feel bad currently.    If symptoms worsen, please go to Emergency Room.    Continue current management and monitor.  Other diagnoses were reviewed and found stable and will continue to monitor.  Counseled on regular exercise, maintenance of a healthy weight, balanced diet rich in fruits/vegetables and lean protein, and avoidance of unhealthy habits like smoking and excessive alcohol intake.   Also, counseled on importance of being compliant with medication, health appointments, diet and exercise.     Follow up in about 6 months (around 8/19/2024).      Medication List with Changes/Refills   New Medications    BENZONATATE (TESSALON) 100 MG CAPSULE    1 - 2 po every 6 hours prn cough   Current Medications    ACETAMINOPHEN (TYLENOL) 500 MG TABLET    Take 500 mg by mouth every 6 (six) hours as needed for Pain.    ALBUTEROL (PROVENTIL/VENTOLIN HFA) 90 MCG/ACTUATION INHALER    Inhale 2 puffs into the lungs every 6 (six) hours as needed. Rescue    AMMONIUM LACTATE (LAC-HYDRIN) 12 % LOTION    Apply to dry skin daily to BID    ASPIRIN 81 MG CHEW    Take 81 mg by mouth daily as needed. Usually takes about every 2 weeks.    AZELASTINE (ASTELIN) 137 MCG (0.1 %) NASAL SPRAY    1 spray (137 mcg total) by Nasal route 2 (two) times daily. for 14 days    ECONAZOLE NITRATE 1 % CREAM    Apply topically 2  "(two) times daily as needed. Twice a day  PRN    FAMOTIDINE (PEPCID) 40 MG TABLET    Take 1 tablet (40 mg total) by mouth once daily.    FISH OIL-OMEGA-3 FATTY ACIDS 300-1,000 MG CAPSULE    Take 1 g by mouth every morning.     FLUOXETINE 10 MG CAPSULE    TAKE 1 CAPSULE(10 MG) BY MOUTH EVERY DAY    FLUTICASONE (FLONASE) 50 MCG/ACTUATION NASAL SPRAY    SPRAY 2 SPRAYS IN EACH NOSTRIL EVERY DAY    GUAIFENESIN (MUCINEX) 600 MG 12 HR TABLET    Take 1,200 mg by mouth 2 (two) times daily as needed.     HYDROCORTISONE 2.5 % CREAM    Apply topically 2 (two) times daily. for 10 days    IBUPROFEN/DIPHENHYDRAMINE CIT (ADVIL PM ORAL)    Take by mouth.    LOSARTAN (COZAAR) 50 MG TABLET    TAKE 1 TABLET(50 MG) BY MOUTH EVERY MORNING    MULTIVIT,STRESS FORMULA-ZINC TAB    Take 1 tablet by mouth every morning. Uses Alive    MUPIROCIN (BACTROBAN) 2 % OINTMENT    Apply topically 3 (three) times daily.    OMEPRAZOLE (PRILOSEC) 20 MG CAPSULE    TAKE 1 CAPSULE(20 MG) BY MOUTH EVERY DAY    PSEUDOEPHEDRINE (SUDAFED) 30 MG TABLET    Take 30 mg by mouth every 4 (four) hours as needed for Congestion.    ROSUVASTATIN (CRESTOR) 40 MG TAB    TAKE 1 TABLET(40 MG) BY MOUTH EVERY DAY    SODIUM CHLORIDE (OCEAN) 0.65 % NASAL SPRAY    1 spray by Nasal route as needed for Congestion.    SYMBICORT 160-4.5 MCG/ACTUATION HFAA    INHALE 2 PUFFS BY MOUTH TWICE DAILY    TRAZODONE (DESYREL) 50 MG TABLET    TAKE 1 TABLET BY MOUTH EVERY NIGHT AS NEEDED FOR INSOMNIA, MAY TAKE UP TO 2 TABLETS EVERY NIGHT AS NEEDED    ZINC GLUCONATE (COLD-EEZE ORAL)    Take 1 lozenge by mouth daily as needed.       BP Readings from Last 3 Encounters:   02/19/24 128/86   01/04/24 136/84   08/14/23 126/84     No results found for: "HGBA1C"  Lab Results   Component Value Date    TSH 1.766 01/10/2018     Lab Results   Component Value Date    LDLCALC 67.0 02/07/2024    LDLCALC 89.6 08/07/2023    LDLCALC 80.8 02/07/2023     Lab Results   Component Value Date    TRIG 50 02/07/2024    TRIG " 117 08/07/2023    TRIG 56 02/07/2023     Wt Readings from Last 3 Encounters:   02/19/24 77.4 kg (170 lb 10.2 oz)   01/04/24 78.6 kg (173 lb 4.5 oz)   12/19/23 77.6 kg (171 lb)     Lab Results   Component Value Date    HGB 14.7 08/07/2023    HCT 43.5 08/07/2023    WBC 6.52 08/07/2023    ALT 16 02/07/2024    AST 28 02/07/2024     02/07/2024    K 4.5 02/07/2024    CREATININE 0.8 02/07/2024           Review of Systems   Constitutional:  Negative for diaphoresis and fever.   HENT:  Negative for drooling and nosebleeds.    Eyes:  Negative for discharge and redness.   Respiratory:  Positive for cough. Negative for apnea and choking.    Cardiovascular:  Negative for chest pain and palpitations.   Gastrointestinal:  Negative for abdominal pain and nausea.   Skin:  Negative for color change.   Neurological:  Negative for seizures and syncope.   Psychiatric/Behavioral:  Negative for behavioral problems.            Objective:      Vitals:    02/19/24 1032   BP: 128/86   Pulse: 88   Temp: 98.6 °F (37 °C)     Physical Exam  Vitals reviewed.   Eyes:      Conjunctiva/sclera: Conjunctivae normal.   Neck:      Thyroid: No thyromegaly.      Trachea: Trachea normal.   Cardiovascular:      Rate and Rhythm: Normal rate and regular rhythm.      Comments: Edema negative  Pulmonary:      Effort: Pulmonary effort is normal.      Breath sounds: Wheezing and rhonchi present.   Abdominal:      General: Bowel sounds are normal.      Palpations: Abdomen is soft. There is no hepatomegaly.   Musculoskeletal:      Cervical back: Normal range of motion.      Comments: ROM normal bilateral  Strength normal bilateral   Skin:     General: Skin is warm and dry.   Neurological:      Deep Tendon Reflexes: Reflexes are normal and symmetric.   Psychiatric:      Comments: Alert and Oriented

## 2024-02-20 ENCOUNTER — TELEPHONE (OUTPATIENT)
Dept: FAMILY MEDICINE | Facility: CLINIC | Age: 79
End: 2024-02-20
Payer: MEDICARE

## 2024-02-20 LAB
FINAL PATHOLOGIC DIAGNOSIS: NORMAL
GROSS: NORMAL
Lab: NORMAL
MICROSCOPIC EXAM: NORMAL

## 2024-02-20 NOTE — TELEPHONE ENCOUNTER
----- Message from Kerline Livingston, Patient Care Assistant sent at 2/20/2024 12:36 PM CST -----  Regarding: advice  Contact: pt  Type: Needs Medical Advice    Who Called:  pt     Best Call Back Number: [phone] (home)     Additional Information: pt states she would like a callback regarding an unknown medication for covid. Please call to advise. Thanks!

## 2024-02-20 NOTE — TELEPHONE ENCOUNTER
Patient is not able to get the medication today.     Can you send it to the [facility] pharmacy. Today    If it is not to late to start taking.    Patient states she is feeling better and not getting worse. She not sure if she should start this medication.     Please advise

## 2024-02-20 NOTE — TELEPHONE ENCOUNTER
----- Message from Darian Arango MD sent at 2/19/2024  9:23 PM CST -----  Please, call and inform patient:   The images we took were normal.   If you have any further questions regarding these results, please contact me.

## 2024-02-20 NOTE — TELEPHONE ENCOUNTER
Medication does not require an authorization. I called pharmacy back and they stated they only have the brand and not the generic. They are stating the patients plan will only cover generic and not brand.

## 2024-02-21 NOTE — TELEPHONE ENCOUNTER
Advised patient if she is feeling better, she does not need to get this medication as she is near the end of it being able to help.     PVU and states she is doing better going to continue with the regimen she is on.    Will call if needs anything else

## 2024-02-23 ENCOUNTER — TELEPHONE (OUTPATIENT)
Dept: FAMILY MEDICINE | Facility: CLINIC | Age: 79
End: 2024-02-23
Payer: MEDICARE

## 2024-02-23 NOTE — TELEPHONE ENCOUNTER
----- Message from Daria Mcfarland sent at 2/23/2024 10:32 AM CST -----  Type: Needs Medical Advice  Who Called:  pt  Best Call Back Number: [phone]  Additional Information: pt is requesting for more meds to be sent to the pharmacy since she is on her last pill and is still not 100% better, pl timbo bk to advise thanks        [id number] - [place] - [address] AT [facility] [address] [address]  [address] [address]  Phone: [phone] Fax: [phone]

## 2024-02-23 NOTE — TELEPHONE ENCOUNTER
LVM for patient to be evaluated at the urgent care if need be. Before Monday if symptoms get worse

## 2024-02-26 ENCOUNTER — TELEPHONE (OUTPATIENT)
Dept: FAMILY MEDICINE | Facility: CLINIC | Age: 79
End: 2024-02-26
Payer: MEDICARE

## 2024-02-26 NOTE — TELEPHONE ENCOUNTER
----- Message from Steffany Noel sent at 2/23/2024  3:54 PM CST -----  Contact: self  Patient called this in this morning the msg was not clear.  Type:  RX Refill Request    Who Called:  patient  Refill or New Rx:  refill  RX Name and Strength:  azithromycin (ZITHROMAX Z-KISHORE) 250 MG tablet  How is the patient currently taking it? (ex. 1XDay):  as directed  Is this a 30 day or 90 day RX:  30  Preferred Pharmacy with phone number:   [id number] - [place], [place] - [address] AT [facility] [address]  [address] [address]  Phone: [phone] Fax: [phone]  Local or Mail Order:  local  Ordering Provider:  Dr Nba Singleton Call Back Number:  [phone]  Additional Information:  She is finishing her last pill today but is concerned because she is still coughing up mucus.  Call back to advise if he wants to order the same or something different. Thanks

## 2024-02-26 NOTE — TELEPHONE ENCOUNTER
Please, call and inform patient:   She has COVID.  The antibiotics will not help.  It will have to run its course.  Let us know if it gets worse.

## 2024-02-27 ENCOUNTER — CLINICAL SUPPORT (OUTPATIENT)
Dept: DERMATOLOGY | Facility: CLINIC | Age: 79
End: 2024-02-27
Payer: MEDICARE

## 2024-02-27 DIAGNOSIS — Z48.02 VISIT FOR SUTURE REMOVAL: Primary | ICD-10-CM

## 2024-02-27 PROCEDURE — 99024 POSTOP FOLLOW-UP VISIT: CPT | Mod: S$GLB,,, | Performed by: DERMATOLOGY

## 2024-04-09 DIAGNOSIS — J44.9 CHRONIC OBSTRUCTIVE PULMONARY DISEASE, UNSPECIFIED COPD TYPE: ICD-10-CM

## 2024-04-09 NOTE — TELEPHONE ENCOUNTER
No care due was identified.  [facility] Embedded Care Due Messages. Reference number: [id number].   4/09/2024 12:05:44 PM CDT

## 2024-04-09 NOTE — TELEPHONE ENCOUNTER
----- Message from Wilianjigar Ariasandrea Jenkins sent at 4/9/2024 10:39 AM CDT -----  Type:  RX Refill Request    Who Called:  pt   Refill or New Rx:  refill  RX Name and Strength:  SYMBICORT 160-4.5 mcg/actuation HFAA  How is the patient currently taking it? (ex. 1XDay):  as directed   Preferred Pharmacy with phone number:    [id number] - [place], [place] - [address] AT [facility] [address]  [address] [address]  Phone: [phone] Fax: [phone]  Local or Mail Order:  local  Ordering Provider:  Nba Singleton Call Back Number:  [phone]  Additional Information:  pt stated she would like a refill sent to listed pharm for listed above rx asap please advise asap thanks!

## 2024-04-10 RX ORDER — BUDESONIDE AND FORMOTEROL FUMARATE DIHYDRATE 160; 4.5 UG/1; UG/1
2 AEROSOL RESPIRATORY (INHALATION) 2 TIMES DAILY
Qty: 30.6 G | Refills: 3 | Status: SHIPPED | OUTPATIENT
Start: 2024-04-10

## 2024-04-10 NOTE — TELEPHONE ENCOUNTER
Refill Routing Note   Medication(s) are not appropriate for processing by [facility] for the following reason(s):        Drug-disease interaction: budesonide-formoterol 160-4.5 mcg and Ocular hypertension, right     [facility] action(s):  Defer             Pharmacist review requested: Yes     Appointments  past 12m or future 3m with PCP    Date Provider   Last Visit   2/19/2024 Darian Arango MD   Next Visit   8/19/2024 Darian Arango MD   ED visits in past 90 days: 0        Note composed:12:21 PM 04/10/2024

## 2024-04-10 NOTE — TELEPHONE ENCOUNTER
Refill Decision Note   Madison Thompsonnida  is requesting a refill authorization.  Brief Assessment and Rationale for Refill:  Approve     Medication Therapy Plan:        Pharmacist review requested: Yes   Extended chart review required: Yes   Comments:     Note composed:1:45 PM 04/10/2024

## 2024-04-12 DIAGNOSIS — E78.5 DYSLIPIDEMIA: ICD-10-CM

## 2024-04-12 DIAGNOSIS — I65.23 ATHEROSCLEROSIS OF BOTH CAROTID ARTERIES: ICD-10-CM

## 2024-04-12 NOTE — TELEPHONE ENCOUNTER
No care due was identified.  [facility] Embedded Care Due Messages. Reference number: [id number].   4/12/2024 10:14:44 AM CDT

## 2024-04-13 RX ORDER — ROSUVASTATIN CALCIUM 40 MG/1
TABLET, COATED ORAL
Qty: 90 TABLET | Refills: 3 | Status: SHIPPED | OUTPATIENT
Start: 2024-04-13

## 2024-04-13 NOTE — TELEPHONE ENCOUNTER
Refill Decision Note   Madison Mercedes  is requesting a refill authorization.  Brief Assessment and Rationale for Refill:  Approve     Medication Therapy Plan:         Comments:     Note composed:1:29 PM 04/13/2024

## 2024-05-06 ENCOUNTER — HOSPITAL ENCOUNTER (OUTPATIENT)
Dept: RADIOLOGY | Facility: HOSPITAL | Age: 79
Discharge: HOME | End: 2024-05-06
Attending: INTERNAL MEDICINE
Payer: MEDICARE

## 2024-05-06 ENCOUNTER — OFFICE VISIT (OUTPATIENT)
Dept: OPHTHALMOLOGY | Facility: CLINIC | Age: 79
End: 2024-05-06
Payer: MEDICARE

## 2024-05-06 DIAGNOSIS — Z12.31 ENCOUNTER FOR SCREENING MAMMOGRAM FOR BREAST CANCER: ICD-10-CM

## 2024-05-06 DIAGNOSIS — H33.021 RETINAL DETACHMENT OF RIGHT EYE WITH MULTIPLE BREAKS: Primary | ICD-10-CM

## 2024-05-06 PROCEDURE — 77067 SCR MAMMO BI INCL CAD: CPT | Mod: 26,HCNC,, | Performed by: RADIOLOGY

## 2024-05-06 PROCEDURE — 99999 PR PBB SHADOW E&M-EST. PATIENT-LVL IV: CPT | Mod: PBBFAC,HCNC,, | Performed by: OPHTHALMOLOGY

## 2024-05-06 PROCEDURE — 1160F RVW MEDS BY RX/DR IN RCRD: CPT | Mod: HCNC,CPTII,S$GLB, | Performed by: OPHTHALMOLOGY

## 2024-05-06 PROCEDURE — 1157F ADVNC CARE PLAN IN RCRD: CPT | Mod: HCNC,CPTII,S$GLB, | Performed by: OPHTHALMOLOGY

## 2024-05-06 PROCEDURE — 1159F MED LIST DOCD IN RCRD: CPT | Mod: HCNC,CPTII,S$GLB, | Performed by: OPHTHALMOLOGY

## 2024-05-06 PROCEDURE — 92134 CPTRZ OPH DX IMG PST SGM RTA: CPT | Mod: HCNC,S$GLB,, | Performed by: OPHTHALMOLOGY

## 2024-05-06 PROCEDURE — 92014 COMPRE OPH EXAM EST PT 1/>: CPT | Mod: HCNC,S$GLB,, | Performed by: OPHTHALMOLOGY

## 2024-05-06 PROCEDURE — 77067 SCR MAMMO BI INCL CAD: CPT | Mod: TC,HCNC,PO

## 2024-05-06 PROCEDURE — 1126F AMNT PAIN NOTED NONE PRSNT: CPT | Mod: HCNC,CPTII,S$GLB, | Performed by: OPHTHALMOLOGY

## 2024-05-06 PROCEDURE — 3288F FALL RISK ASSESSMENT DOCD: CPT | Mod: HCNC,CPTII,S$GLB, | Performed by: OPHTHALMOLOGY

## 2024-05-06 PROCEDURE — 92201 OPSCPY EXTND RTA DRAW UNI/BI: CPT | Mod: HCNC,S$GLB,, | Performed by: OPHTHALMOLOGY

## 2024-05-06 PROCEDURE — 77063 BREAST TOMOSYNTHESIS BI: CPT | Mod: 26,HCNC,, | Performed by: RADIOLOGY

## 2024-05-06 PROCEDURE — 1101F PT FALLS ASSESS-DOCD LE1/YR: CPT | Mod: HCNC,CPTII,S$GLB, | Performed by: OPHTHALMOLOGY

## 2024-05-07 ENCOUNTER — TELEPHONE (OUTPATIENT)
Dept: FAMILY MEDICINE | Facility: CLINIC | Age: 79
End: 2024-05-07
Payer: MEDICARE

## 2024-05-08 ENCOUNTER — DOCUMENTATION ONLY (OUTPATIENT)
Dept: FAMILY MEDICINE | Facility: CLINIC | Age: 79
End: 2024-05-08
Payer: MEDICARE

## 2024-06-25 ENCOUNTER — OFFICE VISIT (OUTPATIENT)
Dept: DERMATOLOGY | Facility: CLINIC | Age: 79
End: 2024-06-25
Payer: MEDICARE

## 2024-06-25 VITALS — WEIGHT: 170.63 LBS | HEIGHT: 64 IN | BODY MASS INDEX: 29.13 KG/M2

## 2024-06-25 DIAGNOSIS — L57.8 OTHER SKIN CHANGES DUE TO CHRONIC EXPOSURE TO NONIONIZING RADIATION: ICD-10-CM

## 2024-06-25 DIAGNOSIS — L57.0 ACTINIC KERATOSES: Primary | ICD-10-CM

## 2024-06-25 DIAGNOSIS — Z08 ENCOUNTER FOR FOLLOW-UP SURVEILLANCE OF SKIN CANCER: ICD-10-CM

## 2024-06-25 DIAGNOSIS — D18.01 CHERRY ANGIOMA: ICD-10-CM

## 2024-06-25 DIAGNOSIS — Z85.828 ENCOUNTER FOR FOLLOW-UP SURVEILLANCE OF SKIN CANCER: ICD-10-CM

## 2024-06-25 DIAGNOSIS — L56.5: ICD-10-CM

## 2024-06-25 DIAGNOSIS — L81.4 SOLAR LENTIGO: ICD-10-CM

## 2024-06-25 DIAGNOSIS — L82.1 SEBORRHEIC KERATOSES: ICD-10-CM

## 2024-06-25 PROCEDURE — 17000 DESTRUCT PREMALG LESION: CPT | Mod: S$GLB,,, | Performed by: DERMATOLOGY

## 2024-06-25 PROCEDURE — 1101F PT FALLS ASSESS-DOCD LE1/YR: CPT | Mod: CPTII,S$GLB,, | Performed by: DERMATOLOGY

## 2024-06-25 PROCEDURE — 3288F FALL RISK ASSESSMENT DOCD: CPT | Mod: CPTII,S$GLB,, | Performed by: DERMATOLOGY

## 2024-06-25 PROCEDURE — 1160F RVW MEDS BY RX/DR IN RCRD: CPT | Mod: CPTII,S$GLB,, | Performed by: DERMATOLOGY

## 2024-06-25 PROCEDURE — 1159F MED LIST DOCD IN RCRD: CPT | Mod: CPTII,S$GLB,, | Performed by: DERMATOLOGY

## 2024-06-25 PROCEDURE — 1126F AMNT PAIN NOTED NONE PRSNT: CPT | Mod: CPTII,S$GLB,, | Performed by: DERMATOLOGY

## 2024-06-25 PROCEDURE — 99213 OFFICE O/P EST LOW 20 MIN: CPT | Mod: 25,S$GLB,, | Performed by: DERMATOLOGY

## 2024-06-25 PROCEDURE — 1157F ADVNC CARE PLAN IN RCRD: CPT | Mod: CPTII,S$GLB,, | Performed by: DERMATOLOGY

## 2024-06-25 PROCEDURE — 17003 DESTRUCT PREMALG LES 2-14: CPT | Mod: S$GLB,,, | Performed by: DERMATOLOGY

## 2024-06-25 NOTE — PROGRESS NOTES
Subjective:      Patient ID:  Madison Long is a 79 y.o. female who presents for   Chief Complaint   Patient presents with    Skin Check     TBSE     LOV: 12/19/23 NUB, AK, SK, porokeratosis, angioma, solar lentigo    Patient here for TBSE    Derm Hx:  yes Phx of NMSC.  +BCC on right cheek (lateral lower eyelid) treated by Mohs (Dr Hooper 2013)  Has fhx of MM-sister    Current Outpatient Medications:   ·  acetaminophen (TYLENOL) 500 MG tablet, Take 500 mg by mouth every 6 (six) hours as needed for Pain., Disp: , Rfl:   ·  albuterol (PROVENTIL/VENTOLIN HFA) 90 mcg/actuation inhaler, Inhale 2 puffs into the lungs every 6 (six) hours as needed. Rescue, Disp: 54 g, Rfl: 3  ·  ammonium lactate (LAC-HYDRIN) 12 % lotion, Apply to dry skin daily to BID, Disp: 396 g, Rfl: 5  ·  aspirin 81 MG Chew, Take 81 mg by mouth daily as needed. Usually takes about every 2 weeks., Disp: , Rfl:   ·  azithromycin (ZITHROMAX Z-KISHORE) 250 MG tablet, Take 2 po day 1, then 1 po day 2 - 5, Disp: 6 tablet, Rfl: 0  ·  benzonatate (TESSALON) 100 MG capsule, 1 - 2 po every 6 hours prn cough, Disp: 45 capsule, Rfl: 0  ·  budesonide-formoterol 160-4.5 mcg (SYMBICORT) 160-4.5 mcg/actuation HFAA, Inhale 2 puffs into the lungs 2 (two) times daily., Disp: 30.6 g, Rfl: 3  ·  econazole nitrate 1 % cream, Apply topically 2 (two) times daily as needed. Twice a day  PRN, Disp: , Rfl:   ·  fish oil-omega-3 fatty acids 300-1,000 mg capsule, Take 1 g by mouth every morning. , Disp: , Rfl:   ·  FLUoxetine 10 MG capsule, TAKE 1 CAPSULE(10 MG) BY MOUTH EVERY DAY, Disp: 90 capsule, Rfl: 2  ·  fluticasone (FLONASE) 50 mcg/actuation nasal spray, SPRAY 2 SPRAYS IN EACH NOSTRIL EVERY DAY, Disp: 1 Bottle, Rfl: 6  ·  guaifenesin (MUCINEX) 600 mg 12 hr tablet, Take 1,200 mg by mouth 2 (two) times daily as needed. , Disp: , Rfl:   ·  ibuprofen/diphenhydramine cit (ADVIL PM ORAL), Take by mouth., Disp: , Rfl:   ·  losartan (COZAAR) 50 MG tablet, TAKE 1 TABLET(50 MG) BY  MOUTH EVERY MORNING, Disp: 90 tablet, Rfl: 3  ·  multivit,stress formula-zinc Tab, Take 1 tablet by mouth every morning. Uses Alive, Disp: , Rfl:   ·  mupirocin (BACTROBAN) 2 % ointment, Apply topically 3 (three) times daily., Disp: 15 g, Rfl: 1  ·  omeprazole (PRILOSEC) 20 MG capsule, TAKE 1 CAPSULE(20 MG) BY MOUTH EVERY DAY, Disp: 90 capsule, Rfl: 0  ·  pseudoephedrine (SUDAFED) 30 MG tablet, Take 30 mg by mouth every 4 (four) hours as needed for Congestion., Disp: , Rfl:   ·  rosuvastatin (CRESTOR) 40 MG Tab, TAKE 1 TABLET(40 MG) BY MOUTH EVERY DAY, Disp: 90 tablet, Rfl: 3  ·  sodium chloride (OCEAN) 0.65 % nasal spray, 1 spray by Nasal route as needed for Congestion., Disp: , Rfl:   ·  traZODone (DESYREL) 50 MG tablet, TAKE 1 TABLET BY MOUTH EVERY NIGHT AS NEEDED FOR INSOMNIA, MAY TAKE UP TO 2 TABLETS EVERY NIGHT AS NEEDED, Disp: 180 tablet, Rfl: 3  ·  zinc gluconate (COLD-EEZE ORAL), Take 1 lozenge by mouth daily as needed., Disp: , Rfl:   ·  azelastine (ASTELIN) 137 mcg (0.1 %) nasal spray, 1 spray (137 mcg total) by Nasal route 2 (two) times daily. for 14 days, Disp: 90 mL, Rfl: 3  ·  famotidine (PEPCID) 40 MG tablet, Take 1 tablet (40 mg total) by mouth once daily., Disp: 7 tablet, Rfl: 0  ·  hydrocortisone 2.5 % cream, Apply topically 2 (two) times daily. for 10 days, Disp: 20 g, Rfl: 0              Review of Systems   Constitutional:  Negative for fever and chills.   HENT:  Negative for congestion and sore throat.    Respiratory:  Negative for cough and shortness of breath.    Skin:  Positive for activity-related sunscreen use and sensitivity to antibiotic ointment. Negative for itching, rash, dry skin, daily sunscreen use and sensitivity to bandage adhesive.   Hematologic/Lymphatic: Bruises/bleeds easily.     Objective:   Physical Exam   Constitutional: She appears well-developed and well-nourished. No distress.   Neurological: She is alert and oriented to person, place, and time. She is not disoriented.    Psychiatric: She has a normal mood and affect.   Skin:   Areas Examined (abnormalities noted in diagram):   Scalp / Hair Palpated and Inspected  Head / Face Inspection Performed  Neck Inspection Performed  Chest / Axilla Inspection Performed  Abdomen Inspection Performed  Genitals / Buttocks / Groin Inspection Performed  Back Inspection Performed  RUE Inspected  LUE Inspection Performed  RLE Inspected  LLE Inspection Performed               Diagram Legend     Erythematous scaling macule/papule c/w actinic keratosis       Vascular papule c/w angioma      Pigmented verrucoid papule/plaque c/w seborrheic keratosis      Yellow umbilicated papule c/w sebaceous hyperplasia      Irregularly shaped tan macule c/w lentigo     1-2 mm smooth white papules consistent with Milia      Movable subcutaneous cyst with punctum c/w epidermal inclusion cyst      Subcutaneous movable cyst c/w pilar cyst      Firm pink to brown papule c/w dermatofibroma      Pedunculated fleshy papule(s) c/w skin tag(s)      Evenly pigmented macule c/w junctional nevus     Mildly variegated pigmented, slightly irregular-bordered macule c/w mildly atypical nevus      Flesh colored to evenly pigmented papule c/w intradermal nevus       Pink pearly papule/plaque c/w basal cell carcinoma      Erythematous hyperkeratotic cursted plaque c/w SCC      Surgical scar with no sign of skin cancer recurrence      Open and closed comedones      Inflammatory papules and pustules      Verrucoid papule consistent consistent with wart     Erythematous eczematous patches and plaques     Dystrophic onycholytic nail with subungual debris c/w onychomycosis     Umbilicated papule    Erythematous-base heme-crusted tan verrucoid plaque consistent with inflamed seborrheic keratosis     Erythematous Silvery Scaling Plaque c/w Psoriasis     See annotation      Assessment / Plan:        Actinic keratoses    Porokeratosis, superficial disseminated eruptive type    Solar  lentigo    Barry angioma    Seborrheic keratoses    Encounter for follow-up surveillance of skin cancer    Other skin changes due to chronic exposure to nonionizing radiation             No follow-ups on file.

## 2024-07-01 NOTE — PATIENT INSTRUCTIONS

## 2024-08-07 DIAGNOSIS — I10 ESSENTIAL HYPERTENSION: ICD-10-CM

## 2024-08-07 RX ORDER — LOSARTAN POTASSIUM 50 MG/1
50 TABLET ORAL EVERY MORNING
Qty: 90 TABLET | Refills: 1 | Status: SHIPPED | OUTPATIENT
Start: 2024-08-07

## 2024-08-12 ENCOUNTER — LAB VISIT (OUTPATIENT)
Dept: LAB | Facility: HOSPITAL | Age: 79
End: 2024-08-12
Attending: INTERNAL MEDICINE
Payer: MEDICARE

## 2024-08-12 DIAGNOSIS — I10 ESSENTIAL HYPERTENSION: ICD-10-CM

## 2024-08-12 DIAGNOSIS — J20.9 ACUTE BRONCHITIS, UNSPECIFIED ORGANISM: ICD-10-CM

## 2024-08-12 DIAGNOSIS — E78.2 MIXED HYPERLIPIDEMIA: ICD-10-CM

## 2024-08-12 LAB
ALBUMIN SERPL BCP-MCNC: 4.2 G/DL (ref 3.5–5.2)
ALP SERPL-CCNC: 55 U/L (ref 55–135)
ALT SERPL W/O P-5'-P-CCNC: 21 U/L (ref 10–44)
ANION GAP SERPL CALC-SCNC: 11 MMOL/L (ref 8–16)
AST SERPL-CCNC: 34 U/L (ref 10–40)
BASOPHILS # BLD AUTO: 0.04 K/UL (ref 0–0.2)
BASOPHILS NFR BLD: 0.5 % (ref 0–1.9)
BILIRUB SERPL-MCNC: 0.8 MG/DL (ref 0.1–1)
BUN SERPL-MCNC: 12 MG/DL (ref 8–23)
CALCIUM SERPL-MCNC: 9.6 MG/DL (ref 8.7–10.5)
CHLORIDE SERPL-SCNC: 105 MMOL/L (ref 95–110)
CHOLEST SERPL-MCNC: 168 MG/DL (ref 120–199)
CHOLEST/HDLC SERPL: 2.8 {RATIO} (ref 2–5)
CO2 SERPL-SCNC: 24 MMOL/L (ref 23–29)
CREAT SERPL-MCNC: 1 MG/DL (ref 0.5–1.4)
DIFFERENTIAL METHOD BLD: ABNORMAL
EOSINOPHIL # BLD AUTO: 0.1 K/UL (ref 0–0.5)
EOSINOPHIL NFR BLD: 0.7 % (ref 0–8)
ERYTHROCYTE [DISTWIDTH] IN BLOOD BY AUTOMATED COUNT: 12.7 % (ref 11.5–14.5)
EST. GFR  (NO RACE VARIABLE): 57.7 ML/MIN/1.73 M^2
GLUCOSE SERPL-MCNC: 114 MG/DL (ref 70–110)
HCT VFR BLD AUTO: 45.8 % (ref 37–48.5)
HDLC SERPL-MCNC: 61 MG/DL (ref 40–75)
HDLC SERPL: 36.3 % (ref 20–50)
HGB BLD-MCNC: 15.1 G/DL (ref 12–16)
IMM GRANULOCYTES # BLD AUTO: 0.03 K/UL (ref 0–0.04)
IMM GRANULOCYTES NFR BLD AUTO: 0.4 % (ref 0–0.5)
LDLC SERPL CALC-MCNC: 89 MG/DL (ref 63–159)
LYMPHOCYTES # BLD AUTO: 1.8 K/UL (ref 1–4.8)
LYMPHOCYTES NFR BLD: 23.9 % (ref 18–48)
MCH RBC QN AUTO: 31.7 PG (ref 27–31)
MCHC RBC AUTO-ENTMCNC: 33 G/DL (ref 32–36)
MCV RBC AUTO: 96 FL (ref 82–98)
MONOCYTES # BLD AUTO: 0.5 K/UL (ref 0.3–1)
MONOCYTES NFR BLD: 7.1 % (ref 4–15)
NEUTROPHILS # BLD AUTO: 5.1 K/UL (ref 1.8–7.7)
NEUTROPHILS NFR BLD: 67.4 % (ref 38–73)
NONHDLC SERPL-MCNC: 107 MG/DL
NRBC BLD-RTO: 0 /100 WBC
PLATELET # BLD AUTO: 252 K/UL (ref 150–450)
PMV BLD AUTO: 11.5 FL (ref 9.2–12.9)
POTASSIUM SERPL-SCNC: 4.8 MMOL/L (ref 3.5–5.1)
PROT SERPL-MCNC: 7 G/DL (ref 6–8.4)
RBC # BLD AUTO: 4.76 M/UL (ref 4–5.4)
SODIUM SERPL-SCNC: 140 MMOL/L (ref 136–145)
TRIGL SERPL-MCNC: 90 MG/DL (ref 30–150)
WBC # BLD AUTO: 7.49 K/UL (ref 3.9–12.7)

## 2024-08-12 PROCEDURE — 85025 COMPLETE CBC W/AUTO DIFF WBC: CPT | Mod: HCNC | Performed by: INTERNAL MEDICINE

## 2024-08-12 PROCEDURE — 80053 COMPREHEN METABOLIC PANEL: CPT | Mod: HCNC | Performed by: INTERNAL MEDICINE

## 2024-08-12 PROCEDURE — 36415 COLL VENOUS BLD VENIPUNCTURE: CPT | Mod: HCNC,PO | Performed by: INTERNAL MEDICINE

## 2024-08-12 PROCEDURE — 80061 LIPID PANEL: CPT | Mod: HCNC | Performed by: INTERNAL MEDICINE

## 2024-08-19 ENCOUNTER — OFFICE VISIT (OUTPATIENT)
Dept: FAMILY MEDICINE | Facility: CLINIC | Age: 79
End: 2024-08-19
Payer: MEDICARE

## 2024-08-19 VITALS
HEIGHT: 64 IN | SYSTOLIC BLOOD PRESSURE: 120 MMHG | BODY MASS INDEX: 28.98 KG/M2 | WEIGHT: 169.75 LBS | DIASTOLIC BLOOD PRESSURE: 84 MMHG | HEART RATE: 86 BPM | TEMPERATURE: 98 F | OXYGEN SATURATION: 95 %

## 2024-08-19 DIAGNOSIS — Z12.11 SCREEN FOR COLON CANCER: ICD-10-CM

## 2024-08-19 DIAGNOSIS — Z79.899 ENCOUNTER FOR LONG-TERM (CURRENT) USE OF MEDICATIONS: ICD-10-CM

## 2024-08-19 DIAGNOSIS — E78.2 MIXED HYPERLIPIDEMIA: Primary | ICD-10-CM

## 2024-08-19 DIAGNOSIS — R73.09 ELEVATED GLUCOSE: ICD-10-CM

## 2024-08-19 DIAGNOSIS — I10 ESSENTIAL HYPERTENSION: ICD-10-CM

## 2024-08-19 PROCEDURE — 1101F PT FALLS ASSESS-DOCD LE1/YR: CPT | Mod: HCNC,CPTII,S$GLB, | Performed by: INTERNAL MEDICINE

## 2024-08-19 PROCEDURE — 99999 PR PBB SHADOW E&M-EST. PATIENT-LVL V: CPT | Mod: PBBFAC,HCNC,, | Performed by: INTERNAL MEDICINE

## 2024-08-19 PROCEDURE — 1157F ADVNC CARE PLAN IN RCRD: CPT | Mod: HCNC,CPTII,S$GLB, | Performed by: INTERNAL MEDICINE

## 2024-08-19 PROCEDURE — 99214 OFFICE O/P EST MOD 30 MIN: CPT | Mod: HCNC,S$GLB,, | Performed by: INTERNAL MEDICINE

## 2024-08-19 PROCEDURE — 3079F DIAST BP 80-89 MM HG: CPT | Mod: HCNC,CPTII,S$GLB, | Performed by: INTERNAL MEDICINE

## 2024-08-19 PROCEDURE — 1159F MED LIST DOCD IN RCRD: CPT | Mod: HCNC,CPTII,S$GLB, | Performed by: INTERNAL MEDICINE

## 2024-08-19 PROCEDURE — 3074F SYST BP LT 130 MM HG: CPT | Mod: HCNC,CPTII,S$GLB, | Performed by: INTERNAL MEDICINE

## 2024-08-19 PROCEDURE — 1160F RVW MEDS BY RX/DR IN RCRD: CPT | Mod: HCNC,CPTII,S$GLB, | Performed by: INTERNAL MEDICINE

## 2024-08-19 PROCEDURE — 1126F AMNT PAIN NOTED NONE PRSNT: CPT | Mod: HCNC,CPTII,S$GLB, | Performed by: INTERNAL MEDICINE

## 2024-08-19 PROCEDURE — 3288F FALL RISK ASSESSMENT DOCD: CPT | Mod: HCNC,CPTII,S$GLB, | Performed by: INTERNAL MEDICINE

## 2024-08-19 NOTE — PROGRESS NOTES
Subjective:       Patient ID: Madison Long is a 79 y.o. female.  Chief Complaint: Hypertension     HPI    Refuses most vaccines        Had < 40% blockage of b/l carotid artery 2017     HLD - mildly uncontrolled; LDL goal < 70, htn; taking Crestor qd  Recall: had severe LE cramps.  Stopped Lipitor 1 month ago and cramps resolved after 5-6 days.       GERD - mostly when drinks port wine     Asthma - controlled with daily Symbicort     4 eye surgeries in past. Dr Sotelo  HTN - controlled     COPD - no sob, but using Symbicort daily     Insomnia - 1- 2 trazodone works most nights; stopped her Ambien      ALEX - controlled      Atrophic vaginitis - estrogen cream used for 1-2 weeks and then will take a break.  due for mammogram 3/23/19; rx originally by urology Dr Hughes.      Keratosis - hereditary.  Wants to change to our derm.      Osteopenia - on vit D and calcium     Assessment:       1. Mixed hyperlipidemia    2. Screen for colon cancer    3. Essential hypertension    4. Elevated glucose    5. Encounter for long-term (current) use of medications        Plan:       Mixed hyperlipidemia  -     Comprehensive Metabolic Panel; Future; Expected date: 02/15/2025  -     Lipid Panel; Future; Expected date: 02/15/2025    Screen for colon cancer  -     Cologuard Screening (Multitarget Stool DNA); Future; Expected date: 08/19/2024    Essential hypertension    Elevated glucose  -     Hemoglobin A1C; Future; Expected date: 02/15/2025    Encounter for long-term (current) use of medications  -     CBC Auto Differential; Future; Expected date: 02/15/2025            Continue current management and monitor.  Other diagnoses were reviewed and found stable and will continue to monitor.  Counseled on regular exercise, maintenance of a healthy weight, balanced diet rich in fruits/vegetables and lean protein, and avoidance of unhealthy habits like smoking and excessive alcohol intake.   Also, counseled on importance of being compliant  with medication, health appointments, diet and exercise.     Follow up in about 28 weeks (around 3/3/2025).      Medication List with Changes/Refills   Current Medications    ACETAMINOPHEN (TYLENOL) 500 MG TABLET    Take 500 mg by mouth every 6 (six) hours as needed for Pain.    ALBUTEROL (PROVENTIL/VENTOLIN HFA) 90 MCG/ACTUATION INHALER    Inhale 2 puffs into the lungs every 6 (six) hours as needed. Rescue    AMMONIUM LACTATE (LAC-HYDRIN) 12 % LOTION    Apply to dry skin daily to BID    ASPIRIN 81 MG CHEW    Take 81 mg by mouth daily as needed. Usually takes about every 2 weeks.    AZELASTINE (ASTELIN) 137 MCG (0.1 %) NASAL SPRAY    1 spray (137 mcg total) by Nasal route 2 (two) times daily. for 14 days    AZITHROMYCIN (ZITHROMAX Z-KISHORE) 250 MG TABLET    Take 2 po day 1, then 1 po day 2 - 5    BENZONATATE (TESSALON) 100 MG CAPSULE    1 - 2 po every 6 hours prn cough    BUDESONIDE-FORMOTEROL 160-4.5 MCG (SYMBICORT) 160-4.5 MCG/ACTUATION HFAA    Inhale 2 puffs into the lungs 2 (two) times daily.    ECONAZOLE NITRATE 1 % CREAM    Apply topically 2 (two) times daily as needed. Twice a day  PRN    FAMOTIDINE (PEPCID) 40 MG TABLET    Take 1 tablet (40 mg total) by mouth once daily.    FISH OIL-OMEGA-3 FATTY ACIDS 300-1,000 MG CAPSULE    Take 1 g by mouth every morning.     FLUOXETINE 10 MG CAPSULE    TAKE 1 CAPSULE(10 MG) BY MOUTH EVERY DAY    FLUTICASONE (FLONASE) 50 MCG/ACTUATION NASAL SPRAY    SPRAY 2 SPRAYS IN EACH NOSTRIL EVERY DAY    GUAIFENESIN (MUCINEX) 600 MG 12 HR TABLET    Take 1,200 mg by mouth 2 (two) times daily as needed.     HYDROCORTISONE 2.5 % CREAM    Apply topically 2 (two) times daily. for 10 days    IBUPROFEN/DIPHENHYDRAMINE CIT (ADVIL PM ORAL)    Take by mouth.    LOSARTAN (COZAAR) 50 MG TABLET    Take 1 tablet (50 mg total) by mouth every morning.    MULTIVIT,STRESS FORMULA-ZINC TAB    Take 1 tablet by mouth every morning. Uses Alive    MUPIROCIN (BACTROBAN) 2 % OINTMENT    Apply topically 3  "(three) times daily.    OMEPRAZOLE (PRILOSEC) 20 MG CAPSULE    TAKE 1 CAPSULE(20 MG) BY MOUTH EVERY DAY    PSEUDOEPHEDRINE (SUDAFED) 30 MG TABLET    Take 30 mg by mouth every 4 (four) hours as needed for Congestion.    ROSUVASTATIN (CRESTOR) 40 MG TAB    TAKE 1 TABLET(40 MG) BY MOUTH EVERY DAY    SODIUM CHLORIDE (OCEAN) 0.65 % NASAL SPRAY    1 spray by Nasal route as needed for Congestion.    TRAZODONE (DESYREL) 50 MG TABLET    TAKE 1 TABLET BY MOUTH EVERY NIGHT AS NEEDED FOR INSOMNIA, MAY TAKE UP TO 2 TABLETS EVERY NIGHT AS NEEDED    ZINC GLUCONATE (COLD-EEZE ORAL)    Take 1 lozenge by mouth daily as needed.       BP Readings from Last 3 Encounters:   08/19/24 120/84   02/19/24 128/86   01/04/24 136/84     No results found for: "HGBA1C"  Lab Results   Component Value Date    TSH 1.766 01/10/2018     Lab Results   Component Value Date    LDLCALC 89.0 08/12/2024    LDLCALC 67.0 02/07/2024    LDLCALC 89.6 08/07/2023     Lab Results   Component Value Date    TRIG 90 08/12/2024    TRIG 50 02/07/2024    TRIG 117 08/07/2023     Wt Readings from Last 3 Encounters:   08/19/24 77 kg (169 lb 12.1 oz)   06/25/24 77.4 kg (170 lb 10.2 oz)   02/19/24 77.4 kg (170 lb 10.2 oz)     Lab Results   Component Value Date    HGB 15.1 08/12/2024    HCT 45.8 08/12/2024    WBC 7.49 08/12/2024    ALT 21 08/12/2024    AST 34 08/12/2024     08/12/2024    K 4.8 08/12/2024    CREATININE 1.0 08/12/2024           Review of Systems        Objective:      Vitals:    08/19/24 0809   BP: 120/84   Pulse: 86   Temp: 98.2 °F (36.8 °C)     Physical Exam  Vitals reviewed.   Constitutional:       Appearance: Normal appearance.   Eyes:      Conjunctiva/sclera: Conjunctivae normal.   Cardiovascular:      Rate and Rhythm: Normal rate.   Pulmonary:      Effort: Pulmonary effort is normal.      Breath sounds: Normal breath sounds.   Musculoskeletal:      Cervical back: Normal range of motion.      Comments: Normal ROM bilateral    Skin:     General: Skin is " warm and dry.   Neurological:      Mental Status: She is alert.      Cranial Nerves: Cranial nerve deficit: grossly intact.   Psychiatric:      Comments: Alert and orientated

## 2024-10-22 ENCOUNTER — TELEPHONE (OUTPATIENT)
Dept: ADMINISTRATIVE | Facility: CLINIC | Age: 79
End: 2024-10-22
Payer: MEDICARE

## 2024-10-23 ENCOUNTER — OFFICE VISIT (OUTPATIENT)
Dept: FAMILY MEDICINE | Facility: CLINIC | Age: 79
End: 2024-10-23
Payer: MEDICARE

## 2024-10-23 VITALS
WEIGHT: 169.75 LBS | DIASTOLIC BLOOD PRESSURE: 70 MMHG | HEIGHT: 64 IN | BODY MASS INDEX: 28.98 KG/M2 | TEMPERATURE: 98 F | SYSTOLIC BLOOD PRESSURE: 132 MMHG | OXYGEN SATURATION: 99 % | HEART RATE: 74 BPM

## 2024-10-23 DIAGNOSIS — E78.5 HYPERLIPIDEMIA, UNSPECIFIED HYPERLIPIDEMIA TYPE: Chronic | ICD-10-CM

## 2024-10-23 DIAGNOSIS — K21.9 GASTROESOPHAGEAL REFLUX DISEASE, UNSPECIFIED WHETHER ESOPHAGITIS PRESENT: ICD-10-CM

## 2024-10-23 DIAGNOSIS — J44.9 CHRONIC OBSTRUCTIVE PULMONARY DISEASE, UNSPECIFIED COPD TYPE: ICD-10-CM

## 2024-10-23 DIAGNOSIS — F41.1 GAD (GENERALIZED ANXIETY DISORDER): ICD-10-CM

## 2024-10-23 DIAGNOSIS — I65.23 ATHEROSCLEROSIS OF BOTH CAROTID ARTERIES: ICD-10-CM

## 2024-10-23 DIAGNOSIS — Z00.00 ENCOUNTER FOR PREVENTIVE HEALTH EXAMINATION: Primary | ICD-10-CM

## 2024-10-23 DIAGNOSIS — I10 HYPERTENSION, UNSPECIFIED TYPE: Chronic | ICD-10-CM

## 2024-10-23 PROCEDURE — 99999 PR PBB SHADOW E&M-EST. PATIENT-LVL V: CPT | Mod: PBBFAC,HCNC,, | Performed by: NURSE PRACTITIONER

## 2024-10-23 NOTE — PROGRESS NOTES
"  Madison Long presented for an initial Medicare AWV today. The following components were reviewed and updated:    Medical history  Family History  Social history  Allergies and Current Medications  Health Risk Assessment  Health Maintenance  Care Team    **See Completed Assessments for Annual Wellness visit with in the encounter summary    The following assessments were completed:  Depression Screening  Cognitive function Screening    Timed Get Up Test  Whisper Test    Opioid documentation:  Patient does not have a current opioid prescription.        Vitals:    10/23/24 0901   BP: 132/70   Pulse: 74   Temp: 97.6 °F (36.4 °C)   TempSrc: Oral   SpO2: 99%   Weight: 77 kg (169 lb 12.1 oz)   Height: 5' 4" (1.626 m)     Body mass index is 29.14 kg/m².     Physical Exam  Vitals reviewed.   Constitutional:       General: She is not in acute distress.  Pulmonary:      Effort: Pulmonary effort is normal. No respiratory distress.   Neurological:      Mental Status: She is alert and oriented to person, place, and time.   Psychiatric:         Mood and Affect: Mood normal.         Behavior: Behavior normal.         Thought Content: Thought content normal.         Judgment: Judgment normal.     Diagnoses and health risks identified today and associated recommendations/orders:  1. Encounter for preventive health examination  Reviewed and discussed health maintenance.      2. Chronic obstructive pulmonary disease, unspecified COPD type  Stable- continue current treatment and follow up routinely with PCP   Symbicort daily. Albuterol prn    3. Hypertension, unspecified type  Stable- continue current treatment and follow up routinely with PCP   Losartan 50mg daily   Vitals:    10/23/24 0901   BP: 132/70   Pulse: 74   Temp: 97.6 °F (36.4 °C)      4. Hyperlipidemia, unspecified hyperlipidemia type  Stable- continue current treatment and follow up routinely with PCP    Crestor 40mg daily   Lab Results   Component Value Date    CHOL " 168 08/12/2024    CHOL 140 02/07/2024    CHOL 176 08/07/2023     Lab Results   Component Value Date    HDL 61 08/12/2024    HDL 63 02/07/2024    HDL 63 08/07/2023     Lab Results   Component Value Date    LDLCALC 89.0 08/12/2024    LDLCALC 67.0 02/07/2024    LDLCALC 89.6 08/07/2023     Lab Results   Component Value Date    TRIG 90 08/12/2024    TRIG 50 02/07/2024    TRIG 117 08/07/2023       Lab Results   Component Value Date    CHOLHDL 36.3 08/12/2024    CHOLHDL 45.0 02/07/2024    CHOLHDL 35.8 08/07/2023      5. Atherosclerosis of both carotid arteries  Stable- continue current treatment and follow up routinely with PCP    Crestor 40mg daily     6. Gastroesophageal reflux disease, unspecified whether esophagitis present  Stable- continue current treatment and follow up routinely with PCP   Prilosec 20mg daily    7. ALEX (generalized anxiety disorder)  Stable- continue current treatment and follow up routinely with PCP   Doing well on prozac 10mg daily    Provided Madison with a 5-10 year written screening schedule and personal prevention plan. Recommendations were developed using the USPSTF age appropriate recommendations. Education, counseling, and referrals were provided as needed.  After Visit Summary printed and given to patient which includes a list of additional screenings\tests needed.  I offered to discuss advanced care planning, including how to pick a person who would make decisions for you if you were unable to make them for yourself, called a health care power of , and what kind of decisions you might make such as use of life sustaining treatments such as ventilators and tube feeding when faced with a life limiting illness recorded on a living will that they will need to know. (How you want to be cared for as you near the end of your natural life)   X  Patient has advanced directives on file, which we reviewed, and they do not wish to make changes.  Valencia Frank, SALLIE

## 2024-10-23 NOTE — PATIENT INSTRUCTIONS
Counseling and Referral of Other Preventative  (Italic type indicates deductible and co-insurance are waived)    Patient Name: Madison Long  Today's Date: 10/23/2024    Health Maintenance       Date Due Completion Date    RSV Vaccine (Age 60+ and Pregnant patients) (1 - 1-dose 75+ series) Never done ---    COVID-19 Vaccine (3 - 2024-25 season) 09/01/2024 3/2/2021    DEXA Scan 03/07/2025 3/7/2022    Mammogram 05/06/2025 5/6/2024    TETANUS VACCINE 01/23/2028 1/23/2018    Lipid Panel 08/12/2029 8/12/2024        No orders of the defined types were placed in this encounter.    The following information is provided to all patients.  This information is to help you find resources for any of the problems found today that may be affecting your health:                  Living healthy guide: www.[facility].[place].[facility]      Understanding Diabetes: www.diabetes.org      Eating healthy: www.cdc.gov/healthyweight      [facility] home safety checklist: www.cdc.gov/steadi/patient.html      Agency on Aging: www.goea.[place].[facility]      Alcoholics anonymous (AA): www.aa.org      Physical Activity: www.michele.nih.gov/ci7oqub      Tobacco use: www.quitwithusla.org

## 2024-10-28 ENCOUNTER — TELEPHONE (OUTPATIENT)
Dept: DERMATOLOGY | Facility: CLINIC | Age: 79
End: 2024-10-28
Payer: MEDICARE

## 2024-11-04 ENCOUNTER — OFFICE VISIT (OUTPATIENT)
Dept: DERMATOLOGY | Facility: CLINIC | Age: 79
End: 2024-11-04
Payer: MEDICARE

## 2024-11-04 ENCOUNTER — TELEPHONE (OUTPATIENT)
Dept: DERMATOLOGY | Facility: CLINIC | Age: 79
End: 2024-11-04

## 2024-11-04 VITALS — BODY MASS INDEX: 28.98 KG/M2 | HEIGHT: 64 IN | WEIGHT: 169.75 LBS

## 2024-11-04 DIAGNOSIS — D48.5 NEOPLASM OF UNCERTAIN BEHAVIOR OF SKIN: Primary | ICD-10-CM

## 2024-11-04 PROCEDURE — 88305 TISSUE EXAM BY PATHOLOGIST: CPT | Mod: HCNC | Performed by: PATHOLOGY

## 2024-11-04 PROCEDURE — 88342 IMHCHEM/IMCYTCHM 1ST ANTB: CPT | Mod: HCNC | Performed by: PATHOLOGY

## 2024-11-04 PROCEDURE — 99499 UNLISTED E&M SERVICE: CPT | Mod: S$GLB,,, | Performed by: DERMATOLOGY

## 2024-11-04 PROCEDURE — 11102 TANGNTL BX SKIN SINGLE LES: CPT | Mod: S$GLB,,, | Performed by: DERMATOLOGY

## 2024-11-04 NOTE — PATIENT INSTRUCTIONS
Shave Biopsy Wound Care    Your doctor has performed a shave biopsy today.  A band aid and vaseline ointment has been placed over the site.  This should remain in place for 24 hours.  It is recommended that you keep the area dry for the first 24 hours.  After 24 hours, you may remove the band aid and wash the area with warm soap and water and apply Vaseline jelly.  Many patients prefer to use Neosporin or Bacitracin ointment.  This is acceptable; however, know that you can develop an allergy to this medication even if you have used it safely for years.  It is important to keep the area moist.  Letting it dry out and get air slows healing time, and will worsen the scar.  Band aid is optional after first 24 hours.      If you notice increasing redness, tenderness, pain, or yellow drainage at the biopsy site, please notify your doctor.  These are signs of an infection.    If your biopsy site is bleeding, apply firm pressure for 15 minutes straight.  Repeat for another 15 minutes, if it is still bleeding.   If the surgical site continues to bleed, then please contact your doctor.       [facility] - DERMATOLOGY  [address], [address]  [place] [address]  Dept: [phone]  Dept Fax: [phone]

## 2024-11-04 NOTE — PROGRESS NOTES
Subjective:      Patient ID:  Madison Long is a 79 y.o. female who presents for   Chief Complaint   Patient presents with    Spot     Nasal tip     LOV 6/25/24 AK, Lentigo, Porokeratosis, Angioma    Patient here today for spot to nose x 3 weeks.   Using OTC Bacitracin Zinc, no resolution.     Derm Hx:  yes Phx of NMSC.  +BCC on right cheek (lateral lower eyelid) treated by Mohs (Dr Hooper 2013)  Has fhx of MM-sister     Current Outpatient Medications:   ·  acetaminophen (TYLENOL) 500 MG tablet, Take 500 mg by mouth every 6 (six) hours as needed for Pain., Disp: , Rfl:   ·  albuterol (PROVENTIL/VENTOLIN HFA) 90 mcg/actuation inhaler, Inhale 2 puffs into the lungs every 6 (six) hours as needed. Rescue, Disp: 54 g, Rfl: 3  ·  ammonium lactate (LAC-HYDRIN) 12 % lotion, Apply to dry skin daily to BID, Disp: 396 g, Rfl: 5  ·  azelastine (ASTELIN) 137 mcg (0.1 %) nasal spray, 1 spray (137 mcg total) by Nasal route 2 (two) times daily. for 14 days, Disp: 90 mL, Rfl: 3  ·  budesonide-formoterol 160-4.5 mcg (SYMBICORT) 160-4.5 mcg/actuation HFAA, Inhale 2 puffs into the lungs 2 (two) times daily., Disp: 30.6 g, Rfl: 3  ·  famotidine (PEPCID) 40 MG tablet, Take 1 tablet (40 mg total) by mouth once daily., Disp: 7 tablet, Rfl: 0  ·  fish oil-omega-3 fatty acids 300-1,000 mg capsule, Take 1 g by mouth every morning. , Disp: , Rfl:   ·  FLUoxetine 10 MG capsule, TAKE 1 CAPSULE(10 MG) BY MOUTH EVERY DAY, Disp: 90 capsule, Rfl: 2  ·  fluticasone (FLONASE) 50 mcg/actuation nasal spray, SPRAY 2 SPRAYS IN EACH NOSTRIL EVERY DAY, Disp: 1 Bottle, Rfl: 6  ·  guaifenesin (MUCINEX) 600 mg 12 hr tablet, Take 1,200 mg by mouth 2 (two) times daily as needed. , Disp: , Rfl:   ·  hydrocortisone 2.5 % cream, Apply topically 2 (two) times daily. for 10 days, Disp: 20 g, Rfl: 0  ·  losartan (COZAAR) 50 MG tablet, Take 1 tablet (50 mg total) by mouth every morning., Disp: 90 tablet, Rfl: 1  ·  multivit,stress formula-zinc Tab, Take 1 tablet  by mouth every morning. Uses Alive, Disp: , Rfl:   ·  omeprazole (PRILOSEC) 20 MG capsule, TAKE 1 CAPSULE(20 MG) BY MOUTH EVERY DAY, Disp: 90 capsule, Rfl: 0  ·  rosuvastatin (CRESTOR) 40 MG Tab, TAKE 1 TABLET(40 MG) BY MOUTH EVERY DAY, Disp: 90 tablet, Rfl: 3  ·  sodium chloride (OCEAN) 0.65 % nasal spray, 1 spray by Nasal route as needed for Congestion., Disp: , Rfl:   ·  traZODone (DESYREL) 50 MG tablet, TAKE 1 TABLET BY MOUTH EVERY NIGHT AS NEEDED FOR INSOMNIA, MAY TAKE UP TO 2 TABLETS EVERY NIGHT AS NEEDED, Disp: 180 tablet, Rfl: 3          Review of Systems   Constitutional:  Negative for fever and chills.   HENT:  Negative for congestion and sore throat.    Respiratory:  Negative for cough and shortness of breath.    Skin:  Positive for activity-related sunscreen use and sensitivity to antibiotic ointment. Negative for itching, rash, dry skin, daily sunscreen use and sensitivity to bandage adhesive.   Hematologic/Lymphatic: Bruises/bleeds easily.       Objective:   Physical Exam   Constitutional: She appears well-developed and well-nourished. No distress.   Neurological: She is alert and oriented to person, place, and time. She is not disoriented.   Psychiatric: She has a normal mood and affect.   Skin:   Areas Examined (abnormalities noted in diagram):   Head / Face Inspection Performed                 Diagram Legend     Erythematous scaling macule/papule c/w actinic keratosis       Vascular papule c/w angioma      Pigmented verrucoid papule/plaque c/w seborrheic keratosis      Yellow umbilicated papule c/w sebaceous hyperplasia      Irregularly shaped tan macule c/w lentigo     1-2 mm smooth white papules consistent with Milia      Movable subcutaneous cyst with punctum c/w epidermal inclusion cyst      Subcutaneous movable cyst c/w pilar cyst      Firm pink to brown papule c/w dermatofibroma      Pedunculated fleshy papule(s) c/w skin tag(s)      Evenly pigmented macule c/w junctional nevus     Mildly  variegated pigmented, slightly irregular-bordered macule c/w mildly atypical nevus      Flesh colored to evenly pigmented papule c/w intradermal nevus       Pink pearly papule/plaque c/w basal cell carcinoma      Erythematous hyperkeratotic cursted plaque c/w SCC      Surgical scar with no sign of skin cancer recurrence      Open and closed comedones      Inflammatory papules and pustules      Verrucoid papule consistent consistent with wart     Erythematous eczematous patches and plaques     Dystrophic onycholytic nail with subungual debris c/w onychomycosis     Umbilicated papule    Erythematous-base heme-crusted tan verrucoid plaque consistent with inflamed seborrheic keratosis     Erythematous Silvery Scaling Plaque c/w Psoriasis     See annotation      Assessment / Plan:      Pathology Orders:       Normal Orders This Visit    Specimen to Pathology, Dermatology     Questions:    Procedure Type: Dermatology and skin neoplasms    Number of Specimens: 1    ------------------------: -------------------------    Spec 1 Procedure: Biopsy    Spec 1 Clinical Impression: BCC vs traumatized angiofibroma vs other    Spec 1 Source: nasal supratip    Release to patient:           Neoplasm of uncertain behavior of skin  -     Specimen to Pathology, Dermatology    Shave biopsy procedure note:    Shave biopsy performed after verbal consent including risk of infection, scar, recurrence, need for additional treatment of site. Area prepped with alcohol, anesthetized with approximately 1.0cc of 1% lidocaine with epinephrine. Lesional tissue shaved with razor blade. Hemostasis achieved with application of aluminum chloride followed by hyfrecation. No complications. Dressing applied. Wound care explained.           No follow-ups on file.

## 2024-11-04 NOTE — TELEPHONE ENCOUNTER
Pt scheduled.     ----- Message from Kylee sent at 11/4/2024  1:15 PM CST -----  Type: Needs Medical Advice  Who Called:  pt  Symptoms (please be specific):  spot on nose that was basal cell years ago  it keeps coming back and opening  How long has patient had these symptoms:    Pharmacy name and phone #:    Best Call Back Number: [phone]  Additional Information: Please call pt  She needs to be seen soon  Thank you

## 2024-11-07 DIAGNOSIS — F32.0 CURRENT MILD EPISODE OF MAJOR DEPRESSIVE DISORDER WITHOUT PRIOR EPISODE: ICD-10-CM

## 2024-11-07 RX ORDER — FLUOXETINE 10 MG/1
10 CAPSULE ORAL
Qty: 90 CAPSULE | Refills: 3 | Status: SHIPPED | OUTPATIENT
Start: 2024-11-07

## 2024-11-07 NOTE — TELEPHONE ENCOUNTER
No care due was identified.  [employer] Embedded Care Due Messages. Reference number: [id number].   11/07/2024 10:02:47 AM CST

## 2024-11-07 NOTE — TELEPHONE ENCOUNTER
Refill Decision Note   Madison Mercedes  is requesting a refill authorization.  Brief Assessment and Rationale for Refill:  Approve     Medication Therapy Plan:        Comments:     Note composed:10:13 AM 11/07/2024

## 2024-11-08 LAB
FINAL PATHOLOGIC DIAGNOSIS: NORMAL
GROSS: NORMAL
Lab: NORMAL
MICROSCOPIC EXAM: NORMAL

## 2024-11-12 RX ORDER — FLUOROURACIL 50 MG/G
CREAM TOPICAL
Qty: 40 G | Refills: 1 | Status: SHIPPED | OUTPATIENT
Start: 2024-11-12

## 2024-11-12 NOTE — TELEPHONE ENCOUNTER
----- Message from Mia Delatorre MD sent at 11/12/2024 12:06 PM CST -----  Pantera Ms Wallace,  Lesion was thin skin cancer. It appears to be fully removed by biopsy.   However, SCC can be aggressive and I would like to treat the area with a chemotherapeutic cream (fluorouracil) 2 times daily for 2 weeks. Expect a robust reaction (redness, scaling, maybe ulceration). Then allow me to reexamine the site 2-4 weeks post completion.  Another option would be to refer you to Mohs surgeon to treat surgically, but it think it is reasonable to try the cream first.   My nurse will call you to discuss.    1. Skin, nasal supratip, shave biopsy:  - SQUAMOUS CELL CARCINOMA IN SITU.  - MARGINS ARE NEGATIVE IN THE PLANES OF SECTION EXAMINED.

## 2024-11-27 NOTE — PROGRESS NOTES
Subjective:      Patient ID:  Madison Long is a 79 y.o. female who presents for   Chief Complaint   Patient presents with    Skin Check     TBSE     LOV 12/19/23 [place], SK, [place]    Patient here today for TBSC  This is a high risk patient here to check for the development of new lesions.    Derm Hx:  yes Phx of NMSC.  +BCC on right cheek (lateral lower eyelid) treated by Mohs (Dr Hooper 2013)  Has fhx of MM-sister      Current Outpatient Medications:   ·  acetaminophen (TYLENOL) 500 MG tablet, Take 500 mg by mouth every 6 (six) hours as needed for Pain., Disp: , Rfl:   ·  albuterol (PROVENTIL/VENTOLIN HFA) 90 mcg/actuation inhaler, Inhale 2 puffs into the lungs every 6 (six) hours as needed. Rescue, Disp: 54 g, Rfl: 3  ·  ammonium lactate (LAC-HYDRIN) 12 % lotion, Apply to dry skin daily to BID, Disp: 396 g, Rfl: 5  ·  aspirin 81 MG Chew, Take 81 mg by mouth daily as needed. Usually takes about every 2 weeks., Disp: , Rfl:   ·  azithromycin (ZITHROMAX Z-KISHORE) 250 MG tablet, Take 2 po day 1, then 1 po day 2 - 5, Disp: 6 tablet, Rfl: 0  ·  benzonatate (TESSALON) 100 MG capsule, 1 - 2 po every 6 hours prn cough, Disp: 45 capsule, Rfl: 0  ·  budesonide-formoterol 160-4.5 mcg (SYMBICORT) 160-4.5 mcg/actuation HFAA, Inhale 2 puffs into the lungs 2 (two) times daily., Disp: 30.6 g, Rfl: 3  ·  econazole nitrate 1 % cream, Apply topically 2 (two) times daily as needed. Twice a day  PRN, Disp: , Rfl:   ·  fish oil-omega-3 fatty acids 300-1,000 mg capsule, Take 1 g by mouth every morning. , Disp: , Rfl:   ·  FLUoxetine 10 MG capsule, TAKE 1 CAPSULE(10 MG) BY MOUTH EVERY DAY, Disp: 90 capsule, Rfl: 2  ·  fluticasone (FLONASE) 50 mcg/actuation nasal spray, SPRAY 2 SPRAYS IN EACH NOSTRIL EVERY DAY, Disp: 1 Bottle, Rfl: 6  ·  guaifenesin (MUCINEX) 600 mg 12 hr tablet, Take 1,200 mg by mouth 2 (two) times daily as needed. , Disp: , Rfl:   ·  ibuprofen/diphenhydramine cit (ADVIL PM ORAL), Take by mouth., Disp: , Rfl:   ·   losartan (COZAAR) 50 MG tablet, TAKE 1 TABLET(50 MG) BY MOUTH EVERY MORNING, Disp: 90 tablet, Rfl: 3  ·  multivit,stress formula-zinc Tab, Take 1 tablet by mouth every morning. Uses Alive, Disp: , Rfl:   ·  mupirocin (BACTROBAN) 2 % ointment, Apply topically 3 (three) times daily., Disp: 15 g, Rfl: 1  ·  omeprazole (PRILOSEC) 20 MG capsule, TAKE 1 CAPSULE(20 MG) BY MOUTH EVERY DAY, Disp: 90 capsule, Rfl: 0  ·  pseudoephedrine (SUDAFED) 30 MG tablet, Take 30 mg by mouth every 4 (four) hours as needed for Congestion., Disp: , Rfl:   ·  rosuvastatin (CRESTOR) 40 MG Tab, TAKE 1 TABLET(40 MG) BY MOUTH EVERY DAY, Disp: 90 tablet, Rfl: 3  ·  sodium chloride (OCEAN) 0.65 % nasal spray, 1 spray by Nasal route as needed for Congestion., Disp: , Rfl:   ·  traZODone (DESYREL) 50 MG tablet, TAKE 1 TABLET BY MOUTH EVERY NIGHT AS NEEDED FOR INSOMNIA, MAY TAKE UP TO 2 TABLETS EVERY NIGHT AS NEEDED, Disp: 180 tablet, Rfl: 3  ·  zinc gluconate (COLD-EEZE ORAL), Take 1 lozenge by mouth daily as needed., Disp: , Rfl:   ·  azelastine (ASTELIN) 137 mcg (0.1 %) nasal spray, 1 spray (137 mcg total) by Nasal route 2 (two) times daily. for 14 days, Disp: 90 mL, Rfl: 3  ·  famotidine (PEPCID) 40 MG tablet, Take 1 tablet (40 mg total) by mouth once daily., Disp: 7 tablet, Rfl: 0  ·  hydrocortisone 2.5 % cream, Apply topically 2 (two) times daily. for 10 days, Disp: 20 g, Rfl: 0              Review of Systems   Constitutional:  Negative for fever and chills.   HENT:  Negative for congestion and sore throat.    Respiratory:  Negative for cough and shortness of breath.    Skin:  Positive for activity-related sunscreen use and sensitivity to antibiotic ointment. Negative for itching, daily sunscreen use and sensitivity to bandage adhesive.   Hematologic/Lymphatic: Bruises/bleeds easily.       Objective:   Physical Exam   Constitutional: She appears well-developed and well-nourished. No distress.   Neurological: She is alert and oriented to person,  place, and time. She is not disoriented.   Psychiatric: She has a normal mood and affect.   Skin:   Areas Examined (abnormalities noted in diagram):   Scalp / Hair Palpated and Inspected  Head / Face Inspection Performed  Neck Inspection Performed  Chest / Axilla Inspection Performed  Abdomen Inspection Performed  Genitals / Buttocks / Groin Inspection Performed  Back Inspection Performed  RUE Inspected  LUE Inspection Performed  RLE Inspected  LLE Inspection Performed                 Diagram Legend     Erythematous scaling macule/papule c/w actinic keratosis       Vascular papule c/w angioma      Pigmented verrucoid papule/plaque c/w seborrheic keratosis      Yellow umbilicated papule c/w sebaceous hyperplasia      Irregularly shaped tan macule c/w lentigo     1-2 mm smooth white papules consistent with Milia      Movable subcutaneous cyst with punctum c/w epidermal inclusion cyst      Subcutaneous movable cyst c/w pilar cyst      Firm pink to brown papule c/w dermatofibroma      Pedunculated fleshy papule(s) c/w skin tag(s)      Evenly pigmented macule c/w junctional nevus     Mildly variegated pigmented, slightly irregular-bordered macule c/w mildly atypical nevus      Flesh colored to evenly pigmented papule c/w intradermal nevus       Pink pearly papule/plaque c/w basal cell carcinoma      Erythematous hyperkeratotic cursted plaque c/w SCC      Surgical scar with no sign of skin cancer recurrence      Open and closed comedones      Inflammatory papules and pustules      Verrucoid papule consistent consistent with wart     Erythematous eczematous patches and plaques     Dystrophic onycholytic nail with subungual debris c/w onychomycosis     Umbilicated papule    Erythematous-base heme-crusted tan verrucoid plaque consistent with inflamed seborrheic keratosis     Erythematous Silvery Scaling Plaque c/w Psoriasis     See annotation      Assessment / Plan:        Actinic keratoses  Cryosurgery Procedure  Note    Verbal consent from the patient is obtained and the patient is aware of the precancerous quality and need for treatment of these lesions. Liquid nitrogen cryosurgery is applied to the 4 actinic keratoses, as detailed in the physical exam, to produce a freeze injury. The patient is aware that blisters may form and is instructed on wound care with gentle cleansing and use of vaseline ointment to keep moist until healed. The patient is supplied a handout on cryosurgery and is instructed to call if lesions do not completely resolve.    Porokeratosis, superficial disseminated eruptive type  Discussed compounded topical lovastatin-cholesterol cream (skin medicinals)  Patient not bothered by lesions, does not wish to treat at this time.    Solar lentigo  This is a benign hyperpigmented sun induced lesion. Daily sun protection will reduce the number of new lesions. Treatment of these benign lesions are considered cosmetic.    Cherry angioma  This is a benign vascular lesion. Reassurance given. No treatment required.     Seborrheic keratoses  These are benign inherited growths without a malignant potential. Reassurance given to patient. No treatment is necessary.     Encounter for follow-up surveillance of skin cancer  Area of previous NMSCs ( examined. Site well healed with no signs of recurrence.    Total body skin examination performed today including at least 12 points as noted in physical examination. No lesions suspicious for malignancy noted.      Other skin changes due to chronic exposure to nonionizing radiation  Patient instructed in importance in daily broad spectrum sun protection of at least spf 30. Mineral sunscreen ingredients preferred (Zinc +/- Titanium) and can be found OTC.   Recommend Elta MD for daily use on face and neck.  Patient encouraged to wear hat for all outdoor exposure.   Also discussed sun avoidance and use of protective clothing.             Follow up in about 6 months (around  12/25/2024).   English

## 2024-12-06 ENCOUNTER — TELEPHONE (OUTPATIENT)
Dept: DERMATOLOGY | Facility: CLINIC | Age: 79
End: 2024-12-06
Payer: MEDICARE

## 2024-12-06 NOTE — TELEPHONE ENCOUNTER
Called and spoke with patient let her know that she was having a normal reaction to the Efudex treatment.     ----- Message from Bhavana sent at 12/6/2024 10:29 AM CST -----  Regarding: rt call  Type:  Patient Returning Call    Who Called:pt    Who Left Message for Patient:unknown     Does the patient know what this is regarding?:yes    Best Call Back Number:[phone]      Additional Information: pt wants a call back to discuss some concerns she have about her sx she had a few weeks back. She st that she has a rash around the spot.

## 2025-01-07 ENCOUNTER — TELEPHONE (OUTPATIENT)
Dept: DERMATOLOGY | Facility: CLINIC | Age: 80
End: 2025-01-07

## 2025-01-07 ENCOUNTER — OFFICE VISIT (OUTPATIENT)
Dept: DERMATOLOGY | Facility: CLINIC | Age: 80
End: 2025-01-07
Payer: MEDICARE

## 2025-01-07 VITALS — WEIGHT: 169.75 LBS | BODY MASS INDEX: 28.98 KG/M2 | HEIGHT: 64 IN

## 2025-01-07 DIAGNOSIS — Z85.828 ENCOUNTER FOR FOLLOW-UP SURVEILLANCE OF SKIN CANCER: ICD-10-CM

## 2025-01-07 DIAGNOSIS — L56.5: ICD-10-CM

## 2025-01-07 DIAGNOSIS — L82.1 SEBORRHEIC KERATOSES: ICD-10-CM

## 2025-01-07 DIAGNOSIS — D18.01 CHERRY ANGIOMA: ICD-10-CM

## 2025-01-07 DIAGNOSIS — Z08 ENCOUNTER FOR FOLLOW-UP SURVEILLANCE OF SKIN CANCER: ICD-10-CM

## 2025-01-07 DIAGNOSIS — L81.4 SOLAR LENTIGO: ICD-10-CM

## 2025-01-07 DIAGNOSIS — L57.0 ACTINIC KERATOSES: Primary | ICD-10-CM

## 2025-01-07 PROCEDURE — 17003 DESTRUCT PREMALG LES 2-14: CPT | Mod: S$GLB,,, | Performed by: DERMATOLOGY

## 2025-01-07 PROCEDURE — 3288F FALL RISK ASSESSMENT DOCD: CPT | Mod: CPTII,S$GLB,, | Performed by: DERMATOLOGY

## 2025-01-07 PROCEDURE — 17000 DESTRUCT PREMALG LESION: CPT | Mod: S$GLB,,, | Performed by: DERMATOLOGY

## 2025-01-07 PROCEDURE — 1160F RVW MEDS BY RX/DR IN RCRD: CPT | Mod: CPTII,S$GLB,, | Performed by: DERMATOLOGY

## 2025-01-07 PROCEDURE — 1157F ADVNC CARE PLAN IN RCRD: CPT | Mod: CPTII,S$GLB,, | Performed by: DERMATOLOGY

## 2025-01-07 PROCEDURE — 1101F PT FALLS ASSESS-DOCD LE1/YR: CPT | Mod: CPTII,S$GLB,, | Performed by: DERMATOLOGY

## 2025-01-07 PROCEDURE — 1159F MED LIST DOCD IN RCRD: CPT | Mod: CPTII,S$GLB,, | Performed by: DERMATOLOGY

## 2025-01-07 PROCEDURE — 99213 OFFICE O/P EST LOW 20 MIN: CPT | Mod: 25,S$GLB,, | Performed by: DERMATOLOGY

## 2025-01-07 NOTE — PROGRESS NOTES
Subjective:      Patient ID:  Madison Long is a 79 y.o. female who presents for   Chief Complaint   Patient presents with    Skin Check     TBSE     LOV: 11/4/24 -NUB    Skin, nasal supratip, shave biopsy:   - SQUAMOUS CELL CARCINOMA IN SITU.   - MARGINS ARE NEGATIVE IN THE PLANES OF SECTION EXAMINED.     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  +BCC on right cheek (lateral lower eyelid) treated by Mohs (Dr Hooper 2013)  Has fhx of MM-sister    Current Outpatient Medications:   ·  acetaminophen (TYLENOL) 500 MG tablet, Take 500 mg by mouth every 6 (six) hours as needed for Pain., Disp: , Rfl:   ·  albuterol (PROVENTIL/VENTOLIN HFA) 90 mcg/actuation inhaler, Inhale 2 puffs into the lungs every 6 (six) hours as needed. Rescue, Disp: 54 g, Rfl: 3  ·  ammonium lactate (LAC-HYDRIN) 12 % lotion, Apply to dry skin daily to BID, Disp: 396 g, Rfl: 5  ·  budesonide-formoterol 160-4.5 mcg (SYMBICORT) 160-4.5 mcg/actuation HFAA, Inhale 2 puffs into the lungs 2 (two) times daily., Disp: 30.6 g, Rfl: 3  ·  fish oil-omega-3 fatty acids 300-1,000 mg capsule, Take 1 g by mouth every morning. , Disp: , Rfl:   ·  FLUoxetine 10 MG capsule, TAKE 1 CAPSULE(10 MG) BY MOUTH EVERY DAY, Disp: 90 capsule, Rfl: 3  ·  fluticasone (FLONASE) 50 mcg/actuation nasal spray, SPRAY 2 SPRAYS IN EACH NOSTRIL EVERY DAY, Disp: 1 Bottle, Rfl: 6  ·  guaifenesin (MUCINEX) 600 mg 12 hr tablet, Take 1,200 mg by mouth 2 (two) times daily as needed. , Disp: , Rfl:   ·  losartan (COZAAR) 50 MG tablet, Take 1 tablet (50 mg total) by mouth every morning., Disp: 90 tablet, Rfl: 1  ·  multivit,stress formula-zinc Tab, Take 1 tablet by mouth every morning. Uses Alive, Disp: , Rfl:   ·  omeprazole (PRILOSEC) 20 MG capsule, TAKE 1 CAPSULE(20 MG) BY MOUTH EVERY DAY, Disp: 90 capsule, Rfl: 0  ·  rosuvastatin (CRESTOR)  40 MG Tab, TAKE 1 TABLET(40 MG) BY MOUTH EVERY DAY, Disp: 90 tablet, Rfl: 3  ·  sodium chloride (OCEAN) 0.65 % nasal spray, 1 spray by Nasal route as needed for Congestion., Disp: , Rfl:   ·  traZODone (DESYREL) 50 MG tablet, TAKE 1 TABLET BY MOUTH EVERY NIGHT AS NEEDED FOR INSOMNIA, MAY TAKE UP TO 2 TABLETS EVERY NIGHT AS NEEDED, Disp: 180 tablet, Rfl: 3  ·  azelastine (ASTELIN) 137 mcg (0.1 %) nasal spray, 1 spray (137 mcg total) by Nasal route 2 (two) times daily. for 14 days, Disp: 90 mL, Rfl: 3  ·  famotidine (PEPCID) 40 MG tablet, Take 1 tablet (40 mg total) by mouth once daily., Disp: 7 tablet, Rfl: 0  ·  fluorouraciL (EFUDEX) 5 % cream, Apply to affective area bid for 2 weeks. (Patient not taking: Reported on 1/7/2025), Disp: 40 g, Rfl: 1  ·  hydrocortisone 2.5 % cream, Apply topically 2 (two) times daily. for 10 days, Disp: 20 g, Rfl: 0               Review of Systems   Constitutional:  Negative for fever and chills.   HENT:  Negative for congestion and sore throat.    Respiratory:  Negative for cough and shortness of breath.    Skin:  Positive for activity-related sunscreen use and sensitivity to antibiotic ointment. Negative for itching, rash, dry skin, daily sunscreen use and sensitivity to bandage adhesive.   Hematologic/Lymphatic: Bruises/bleeds easily.       Objective:   Physical Exam   Constitutional: She appears well-developed and well-nourished. No distress.   Neurological: She is alert and oriented to person, place, and time. She is not disoriented.   Psychiatric: She has a normal mood and affect.   Skin:   Areas Examined (abnormalities noted in diagram):   Scalp / Hair Palpated and Inspected  Head / Face Inspection Performed  Neck Inspection Performed  Chest / Axilla Inspection Performed  Abdomen Inspection Performed  Back Inspection Performed  RUE Inspected  LUE Inspection Performed  RLE Inspected  LLE Inspection Performed                     Diagram Legend     Erythematous scaling macule/papule  c/w actinic keratosis       Vascular papule c/w angioma      Pigmented verrucoid papule/plaque c/w seborrheic keratosis      Yellow umbilicated papule c/w sebaceous hyperplasia      Irregularly shaped tan macule c/w lentigo     1-2 mm smooth white papules consistent with Milia      Movable subcutaneous cyst with punctum c/w epidermal inclusion cyst      Subcutaneous movable cyst c/w pilar cyst      Firm pink to brown papule c/w dermatofibroma      Pedunculated fleshy papule(s) c/w skin tag(s)      Evenly pigmented macule c/w junctional nevus     Mildly variegated pigmented, slightly irregular-bordered macule c/w mildly atypical nevus      Flesh colored to evenly pigmented papule c/w intradermal nevus       Pink pearly papule/plaque c/w basal cell carcinoma      Erythematous hyperkeratotic cursted plaque c/w SCC      Surgical scar with no sign of skin cancer recurrence      Open and closed comedones      Inflammatory papules and pustules      Verrucoid papule consistent consistent with wart     Erythematous eczematous patches and plaques     Dystrophic onycholytic nail with subungual debris c/w onychomycosis     Umbilicated papule    Erythematous-base heme-crusted tan verrucoid plaque consistent with inflamed seborrheic keratosis     Erythematous Silvery Scaling Plaque c/w Psoriasis     See annotation      Assessment / Plan:        Actinic keratoses  Cryosurgery Procedure Note    Verbal consent from the patient is obtained and the patient is aware of the precancerous quality and need for treatment of these lesions. Liquid nitrogen cryosurgery is applied to the 8 actinic keratoses, as detailed in the physical exam, to produce a freeze injury. The patient is aware that blisters may form and is instructed on wound care with gentle cleansing and use of vaseline ointment to keep moist until healed. The patient is supplied a handout on cryosurgery and is instructed to call if lesions do not completely  resolve.    Porokeratosis, superficial disseminated eruptive type  DSAP- stable, not bothersome to patient; discussed compounded lovastatin-cholesterol, declines for now    Solar lentigo  This is a benign hyperpigmented sun induced lesion. Daily sun protection will reduce the number of new lesions. Treatment of these benign lesions are considered cosmetic.    Encounter for follow-up surveillance of skin cancer  Area of previous NMSCs examined. Sites well healed with no signs of recurrence.    Total body skin examination performed today including at least 12 points as noted in physical examination. No lesions suspicious for malignancy noted.    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 Reeval in 2 months  Gold standard treatment of skin cancer is surgery,. Preferably Mohs for facial SCC type. This was superficial SCC, negative bx margins in 78yo woman, trial of efudex cream.     Seborrheic keratoses  These are benign inherited growths without a malignant potential. Reassurance given to patient. No treatment is necessary.     Cherry angioma  This is a benign vascular lesion. Reassurance given. No treatment required.     Patient instructed in importance in daily broad spectrum sun protection of at least spf 30. Mineral sunscreen ingredients preferred (Zinc +/- Titanium) and can be found OTC.   Patient encouraged to wear hat for all outdoor exposure.   Also discussed sun avoidance and use of protective clothing.                     No follow-ups on file.

## 2025-01-07 NOTE — TELEPHONE ENCOUNTER
Spoke with patient, patient stated she is confused as to why you told her that she still has cancer cells in her nose but due to her age you do not wish to further treat. Advised patient that based off the pathology report all cancer cells were removed the chemo cream was just used as an extra precaution. Patient then stated she doesn't understand how you know all the skin cancer cells are removed when you did nothing to her nose today. Again tried to explain to patient what the chemo cream was for. Patient then stated talking about other areas that were removed and just still doesn't understand why you wouldn't complete the surgery due to her age if cancer cells are still present.     Patient wishes for you to return a call to her.    ----- Message from Sonia sent at 1/7/2025  3:11 PM CST -----  Type:  Needs Medical Advice    Who Called: pt       Would the patient rather a call back or a response via [facility]? Call back       Best Call Back Number: [phone]       Additional Information: pt needs to speak with someone regarding appt she had today needs some clarity.     Please call back to advise. Thank you.

## 2025-01-07 NOTE — TELEPHONE ENCOUNTER
I am sorry Ms Wallace seems to have misunderstood me.  The skin cancer was a superficial SCC, with negative biopsy margins.   Treatment options for this type of lesions are:  1- chemotherapy cream (fluorouracil, which we did)  2- simple excision (which I would not recommend on nasal tip)  3- Mohs surgery (referral to Dr Fay)  4- radiation treatment (20 to 30 sessions with radiation oncologist)  I felt that it was worth trying chemotherapy cream first, but that means monitoring the site for complete clearance once the inflammation from efudex has subsided.  If you would like me to refer you to Dr reagan to talk about excision, I most certainly can.  I do not think that you have cancer cells remaining at this time, but gold standard of treatment is surgery.  Please let me know what you want to do.

## 2025-01-07 NOTE — TELEPHONE ENCOUNTER
I am sorry Ms Wallace seems to have misunderstood me.  The skin cancer was a superficial SCC, with negative biopsy margins.   Treatment options for this type of lesions are:  1- chemotherapy cream (fluorouracil, which we did)  2- simple excision (which I would not recommend on nasal tip)  3- Mohs surgery (referral to Dr Fay)  4- radiation treatment (20 to 30 sessions with radiation oncologist)  I felt that it was worth trying chemotherapy cream first, but that means monitoring the site for complete clearance once the inflammation from efudex has subsided.  If you would like me to refer you to Dr reagan to talk about excision, I most certainly can.  I do not think that you have cancer cells remaining at this time, but gold standard of treatment is surgery.  Please let me know what you want to do.      Pt was contacted by writer and reviewed above information with her.  She stated that she will keep her f/u appt with in March and see it the area is still ok.

## 2025-02-24 DIAGNOSIS — Z00.00 ENCOUNTER FOR MEDICARE ANNUAL WELLNESS EXAM: ICD-10-CM

## 2025-02-26 ENCOUNTER — TELEPHONE (OUTPATIENT)
Dept: FAMILY MEDICINE | Facility: CLINIC | Age: 80
End: 2025-02-26
Payer: MEDICARE

## 2025-02-26 NOTE — TELEPHONE ENCOUNTER
----- Message from Lissette sent at 2/26/2025  4:39 PM CST -----  Type: Needs Medical AdviceWho Called:  ptBest Call Back Number: [phone] Information: pt is calling in regards to needing to get a referral for ortho with Dr. Maradiaga. Pt states she needs to be seen for her hand. Please call back and advise. Thanks!

## 2025-02-26 NOTE — TELEPHONE ENCOUNTER
Dr. Maradiaga.     Pt requesting referral states she spoke with you about this already  for her hands.     Previously seen ortho she called to schedule apt but they need referral she has not seen them in 3 years and is considered new patient with them.    She states can wait till apt with you.     Do not see no mention of any hand pain in no notes.

## 2025-03-06 ENCOUNTER — LAB VISIT (OUTPATIENT)
Dept: LAB | Facility: HOSPITAL | Age: 80
End: 2025-03-06
Attending: INTERNAL MEDICINE
Payer: MEDICARE

## 2025-03-06 DIAGNOSIS — E78.2 MIXED HYPERLIPIDEMIA: ICD-10-CM

## 2025-03-06 DIAGNOSIS — R73.09 ELEVATED GLUCOSE: ICD-10-CM

## 2025-03-06 DIAGNOSIS — Z79.899 ENCOUNTER FOR LONG-TERM (CURRENT) USE OF MEDICATIONS: ICD-10-CM

## 2025-03-06 LAB
ALBUMIN SERPL BCP-MCNC: 4.2 G/DL (ref 3.5–5.2)
ALP SERPL-CCNC: 52 U/L (ref 40–150)
ALT SERPL W/O P-5'-P-CCNC: 19 U/L (ref 10–44)
ANION GAP SERPL CALC-SCNC: 9 MMOL/L (ref 8–16)
AST SERPL-CCNC: 35 U/L (ref 10–40)
BASOPHILS # BLD AUTO: 0.04 K/UL (ref 0–0.2)
BASOPHILS NFR BLD: 0.6 % (ref 0–1.9)
BILIRUB SERPL-MCNC: 0.8 MG/DL (ref 0.1–1)
BUN SERPL-MCNC: 18 MG/DL (ref 8–23)
CALCIUM SERPL-MCNC: 9.5 MG/DL (ref 8.7–10.5)
CHLORIDE SERPL-SCNC: 105 MMOL/L (ref 95–110)
CHOLEST SERPL-MCNC: 152 MG/DL (ref 120–199)
CHOLEST/HDLC SERPL: 2.3 {RATIO} (ref 2–5)
CO2 SERPL-SCNC: 25 MMOL/L (ref 23–29)
CREAT SERPL-MCNC: 0.7 MG/DL (ref 0.5–1.4)
DIFFERENTIAL METHOD BLD: ABNORMAL
EOSINOPHIL # BLD AUTO: 0.2 K/UL (ref 0–0.5)
EOSINOPHIL NFR BLD: 2.3 % (ref 0–8)
ERYTHROCYTE [DISTWIDTH] IN BLOOD BY AUTOMATED COUNT: 12.5 % (ref 11.5–14.5)
EST. GFR  (NO RACE VARIABLE): >60 ML/MIN/1.73 M^2
ESTIMATED AVG GLUCOSE: 105 MG/DL (ref 68–131)
GLUCOSE SERPL-MCNC: 105 MG/DL (ref 70–110)
HBA1C MFR BLD: 5.3 % (ref 4–5.6)
HCT VFR BLD AUTO: 41.8 % (ref 37–48.5)
HDLC SERPL-MCNC: 67 MG/DL (ref 40–75)
HDLC SERPL: 44.1 % (ref 20–50)
HGB BLD-MCNC: 13.7 G/DL (ref 12–16)
IMM GRANULOCYTES # BLD AUTO: 0.02 K/UL (ref 0–0.04)
IMM GRANULOCYTES NFR BLD AUTO: 0.3 % (ref 0–0.5)
LDLC SERPL CALC-MCNC: 73.6 MG/DL (ref 63–159)
LYMPHOCYTES # BLD AUTO: 2.1 K/UL (ref 1–4.8)
LYMPHOCYTES NFR BLD: 32.2 % (ref 18–48)
MCH RBC QN AUTO: 31.4 PG (ref 27–31)
MCHC RBC AUTO-ENTMCNC: 32.8 G/DL (ref 32–36)
MCV RBC AUTO: 96 FL (ref 82–98)
MONOCYTES # BLD AUTO: 0.5 K/UL (ref 0.3–1)
MONOCYTES NFR BLD: 7.8 % (ref 4–15)
NEUTROPHILS # BLD AUTO: 3.8 K/UL (ref 1.8–7.7)
NEUTROPHILS NFR BLD: 56.8 % (ref 38–73)
NONHDLC SERPL-MCNC: 85 MG/DL
NRBC BLD-RTO: 0 /100 WBC
PLATELET # BLD AUTO: 238 K/UL (ref 150–450)
PMV BLD AUTO: 10.9 FL (ref 9.2–12.9)
POTASSIUM SERPL-SCNC: 4.5 MMOL/L (ref 3.5–5.1)
PROT SERPL-MCNC: 6.8 G/DL (ref 6–8.4)
RBC # BLD AUTO: 4.36 M/UL (ref 4–5.4)
SODIUM SERPL-SCNC: 139 MMOL/L (ref 136–145)
TRIGL SERPL-MCNC: 57 MG/DL (ref 30–150)
WBC # BLD AUTO: 6.65 K/UL (ref 3.9–12.7)

## 2025-03-06 PROCEDURE — 85025 COMPLETE CBC W/AUTO DIFF WBC: CPT | Mod: HCNC | Performed by: INTERNAL MEDICINE

## 2025-03-06 PROCEDURE — 36415 COLL VENOUS BLD VENIPUNCTURE: CPT | Mod: HCNC,PO | Performed by: INTERNAL MEDICINE

## 2025-03-06 PROCEDURE — 80061 LIPID PANEL: CPT | Mod: HCNC | Performed by: INTERNAL MEDICINE

## 2025-03-06 PROCEDURE — 83036 HEMOGLOBIN GLYCOSYLATED A1C: CPT | Mod: HCNC | Performed by: INTERNAL MEDICINE

## 2025-03-06 PROCEDURE — 80053 COMPREHEN METABOLIC PANEL: CPT | Mod: HCNC | Performed by: INTERNAL MEDICINE

## 2025-03-11 ENCOUNTER — OFFICE VISIT (OUTPATIENT)
Dept: DERMATOLOGY | Facility: CLINIC | Age: 80
End: 2025-03-11
Payer: MEDICARE

## 2025-03-11 VITALS — WEIGHT: 170 LBS | BODY MASS INDEX: 28.32 KG/M2 | HEIGHT: 65 IN

## 2025-03-11 DIAGNOSIS — Z08 ENCOUNTER FOR FOLLOW-UP SURVEILLANCE OF SKIN CANCER: Primary | ICD-10-CM

## 2025-03-11 DIAGNOSIS — L57.0 ACTINIC KERATOSES: ICD-10-CM

## 2025-03-11 DIAGNOSIS — Z85.828 ENCOUNTER FOR FOLLOW-UP SURVEILLANCE OF SKIN CANCER: Primary | ICD-10-CM

## 2025-03-11 PROCEDURE — 3288F FALL RISK ASSESSMENT DOCD: CPT | Mod: CPTII,S$GLB,, | Performed by: DERMATOLOGY

## 2025-03-11 PROCEDURE — 1157F ADVNC CARE PLAN IN RCRD: CPT | Mod: CPTII,S$GLB,, | Performed by: DERMATOLOGY

## 2025-03-11 PROCEDURE — 1160F RVW MEDS BY RX/DR IN RCRD: CPT | Mod: CPTII,S$GLB,, | Performed by: DERMATOLOGY

## 2025-03-11 PROCEDURE — 99213 OFFICE O/P EST LOW 20 MIN: CPT | Mod: S$GLB,,, | Performed by: DERMATOLOGY

## 2025-03-11 PROCEDURE — 1101F PT FALLS ASSESS-DOCD LE1/YR: CPT | Mod: CPTII,S$GLB,, | Performed by: DERMATOLOGY

## 2025-03-11 PROCEDURE — 1126F AMNT PAIN NOTED NONE PRSNT: CPT | Mod: CPTII,S$GLB,, | Performed by: DERMATOLOGY

## 2025-03-11 PROCEDURE — 1159F MED LIST DOCD IN RCRD: CPT | Mod: CPTII,S$GLB,, | Performed by: DERMATOLOGY

## 2025-03-11 NOTE — PROGRESS NOTES
Subjective:      Patient ID:  Madison Long is a 79 y.o. 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 Reeval in 2 months  Gold standard treatment of skin cancer is surgery,. Preferably Mohs for facial SCC type. This was superficial SCC, negative bx margins in 80yo woman, trial of efudex cream.     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  +BCC on right cheek (lateral lower eyelid) treated by Mohs (Dr Hooper 2013)  Has fhx of MM-sister    Current Outpatient Medications:   ·  acetaminophen (TYLENOL) 500 MG tablet, Take 500 mg by mouth every 6 (six) hours as needed for Pain., Disp: , Rfl:   ·  albuterol (PROVENTIL/VENTOLIN HFA) 90 mcg/actuation inhaler, Inhale 2 puffs into the lungs every 6 (six) hours as needed. Rescue, Disp: 54 g, Rfl: 3  ·  ammonium lactate (LAC-HYDRIN) 12 % lotion, Apply to dry skin daily to BID, Disp: 396 g, Rfl: 5  ·  budesonide-formoterol 160-4.5 mcg (SYMBICORT) 160-4.5 mcg/actuation HFAA, Inhale 2 puffs into the lungs 2 (two) times daily., Disp: 30.6 g, Rfl: 3  ·  fish oil-omega-3 fatty acids 300-1,000 mg capsule, Take 1 g by mouth every morning. , Disp: , Rfl:   ·  fluorouraciL (EFUDEX) 5 % cream, Apply to affective area bid for 2 weeks., Disp: 40 g, Rfl: 1  ·  FLUoxetine 10 MG capsule, TAKE 1 CAPSULE(10 MG) BY MOUTH EVERY DAY, Disp: 90 capsule, Rfl: 3  ·  fluticasone (FLONASE) 50 mcg/actuation nasal spray, SPRAY 2 SPRAYS IN EACH NOSTRIL EVERY DAY, Disp: 1 Bottle, Rfl: 6  ·  guaifenesin (MUCINEX) 600 mg 12 hr tablet, Take 1,200 mg by mouth 2 (two) times daily as needed. , Disp: , Rfl:   ·  losartan (COZAAR) 50 MG tablet, Take 1  tablet (50 mg total) by mouth every morning., Disp: 90 tablet, Rfl: 1  ·  multivit,stress formula-zinc Tab, Take 1 tablet by mouth every morning. Uses Alive, Disp: , Rfl:   ·  omeprazole (PRILOSEC) 20 MG capsule, TAKE 1 CAPSULE(20 MG) BY MOUTH EVERY DAY, Disp: 90 capsule, Rfl: 0  ·  rosuvastatin (CRESTOR) 40 MG Tab, TAKE 1 TABLET(40 MG) BY MOUTH EVERY DAY, Disp: 90 tablet, Rfl: 3  ·  sodium chloride (OCEAN) 0.65 % nasal spray, 1 spray by Nasal route as needed for Congestion., Disp: , Rfl:   ·  traZODone (DESYREL) 50 MG tablet, TAKE 1 TABLET BY MOUTH EVERY NIGHT AS NEEDED FOR INSOMNIA, MAY TAKE UP TO 2 TABLETS EVERY NIGHT AS NEEDED, Disp: 180 tablet, Rfl: 3  ·  azelastine (ASTELIN) 137 mcg (0.1 %) nasal spray, 1 spray (137 mcg total) by Nasal route 2 (two) times daily. for 14 days, Disp: 90 mL, Rfl: 3  ·  famotidine (PEPCID) 40 MG tablet, Take 1 tablet (40 mg total) by mouth once daily., Disp: 7 tablet, Rfl: 0  ·  hydrocortisone 2.5 % cream, Apply topically 2 (two) times daily. for 10 days, Disp: 20 g, Rfl: 0            Review of Systems   Constitutional:  Negative for fever and chills.   HENT:  Negative for congestion and sore throat.    Respiratory:  Negative for cough and shortness of breath.    Skin:  Positive for activity-related sunscreen use and sensitivity to antibiotic ointment. Negative for itching, rash, dry skin, daily sunscreen use and sensitivity to bandage adhesive.   Hematologic/Lymphatic: Bruises/bleeds easily.       Objective:   Physical Exam   Skin:   Areas Examined (abnormalities noted in diagram):   Head / Face Inspection Performed            Diagram Legend     Erythematous scaling macule/papule c/w actinic keratosis       Vascular papule c/w angioma      Pigmented verrucoid papule/plaque c/w seborrheic keratosis      Yellow umbilicated papule c/w sebaceous hyperplasia      Irregularly shaped tan macule c/w lentigo     1-2 mm smooth white papules consistent with Milia      Movable subcutaneous  cyst with punctum c/w epidermal inclusion cyst      Subcutaneous movable cyst c/w pilar cyst      Firm pink to brown papule c/w dermatofibroma      Pedunculated fleshy papule(s) c/w skin tag(s)      Evenly pigmented macule c/w junctional nevus     Mildly variegated pigmented, slightly irregular-bordered macule c/w mildly atypical nevus      Flesh colored to evenly pigmented papule c/w intradermal nevus       Pink pearly papule/plaque c/w basal cell carcinoma      Erythematous hyperkeratotic cursted plaque c/w SCC      Surgical scar with no sign of skin cancer recurrence      Open and closed comedones      Inflammatory papules and pustules      Verrucoid papule consistent consistent with wart     Erythematous eczematous patches and plaques     Dystrophic onycholytic nail with subungual debris c/w onychomycosis     Umbilicated papule    Erythematous-base heme-crusted tan verrucoid plaque consistent with inflamed seborrheic keratosis     Erythematous Silvery Scaling Plaque c/w Psoriasis     See annotation            Assessment / Plan:        Encounter for follow-up surveillance of skin cancer  Actinic keratoses  SCCIS biopsy area: scar, NER  Actinic damage more broadly on nose with AK on r nasal tip  Repeat course of efudex BID x 2 weeks             No follow-ups on file.   Breath sounds clear and equal bilaterally.

## 2025-03-12 ENCOUNTER — TELEPHONE (OUTPATIENT)
Dept: OPTOMETRY | Facility: CLINIC | Age: 80
End: 2025-03-12
Payer: MEDICARE

## 2025-03-12 NOTE — TELEPHONE ENCOUNTER
----- Message from Johnnie sent at 3/12/2025 12:54 PM CDT -----  Contact: Pt [phone]  Type:  Needs Medical AdviceWho Called: PtWould the patient rather a call back or a response via [facility]? CallBe Call Back Number: [phone] Additional Information: Pt adv she is having car trouble and might be a little late to her appt tomorrow and would like to speak with someone. Pls call back and adv. Thank you.

## 2025-03-13 ENCOUNTER — OFFICE VISIT (OUTPATIENT)
Dept: OPTOMETRY | Facility: CLINIC | Age: 80
End: 2025-03-13
Payer: MEDICARE

## 2025-03-13 DIAGNOSIS — H52.03 HYPEROPIA OF BOTH EYES WITH ASTIGMATISM: Primary | ICD-10-CM

## 2025-03-13 DIAGNOSIS — H52.203 HYPEROPIA OF BOTH EYES WITH ASTIGMATISM: Primary | ICD-10-CM

## 2025-03-13 PROCEDURE — 99999 PR PBB SHADOW E&M-EST. PATIENT-LVL III: CPT | Mod: PBBFAC,HCNC,, | Performed by: OPTOMETRIST

## 2025-03-13 PROCEDURE — 92015 DETERMINE REFRACTIVE STATE: CPT | Mod: HCNC,S$GLB,, | Performed by: OPTOMETRIST

## 2025-03-13 PROCEDURE — 3288F FALL RISK ASSESSMENT DOCD: CPT | Mod: HCNC,CPTII,S$GLB, | Performed by: OPTOMETRIST

## 2025-03-13 PROCEDURE — 1159F MED LIST DOCD IN RCRD: CPT | Mod: HCNC,CPTII,S$GLB, | Performed by: OPTOMETRIST

## 2025-03-13 PROCEDURE — 1101F PT FALLS ASSESS-DOCD LE1/YR: CPT | Mod: HCNC,CPTII,S$GLB, | Performed by: OPTOMETRIST

## 2025-03-13 PROCEDURE — 1157F ADVNC CARE PLAN IN RCRD: CPT | Mod: HCNC,CPTII,S$GLB, | Performed by: OPTOMETRIST

## 2025-03-13 PROCEDURE — 1126F AMNT PAIN NOTED NONE PRSNT: CPT | Mod: HCNC,CPTII,S$GLB, | Performed by: OPTOMETRIST

## 2025-03-13 NOTE — PROGRESS NOTES
HPI    Pt presents today for MRX only.  Pt due to see Dr. Chaves next week for DFE.  Pt states she needs new gls Rx; wears PAL's  Broke most recent pair and wearing older pair.    Last edited by Rosa Elena Loredo on 3/13/2025 10:45 AM.            Assessment /Plan     For exam results, see Encounter Report.    Hyperopia of both eyes with astigmatism      Refraction only

## 2025-03-17 ENCOUNTER — OFFICE VISIT (OUTPATIENT)
Dept: OPHTHALMOLOGY | Facility: CLINIC | Age: 80
End: 2025-03-17
Payer: MEDICARE

## 2025-03-17 DIAGNOSIS — H33.021 RETINAL DETACHMENT OF RIGHT EYE WITH MULTIPLE BREAKS: Primary | ICD-10-CM

## 2025-03-17 DIAGNOSIS — H40.051 OCULAR HYPERTENSION, RIGHT: ICD-10-CM

## 2025-03-17 DIAGNOSIS — H35.21 PROLIFERATIVE VITREORETINOPATHY, RIGHT: ICD-10-CM

## 2025-03-17 PROCEDURE — 92014 COMPRE OPH EXAM EST PT 1/>: CPT | Mod: HCNC,S$GLB,, | Performed by: OPHTHALMOLOGY

## 2025-03-17 PROCEDURE — 1157F ADVNC CARE PLAN IN RCRD: CPT | Mod: HCNC,CPTII,S$GLB, | Performed by: OPHTHALMOLOGY

## 2025-03-17 PROCEDURE — 1159F MED LIST DOCD IN RCRD: CPT | Mod: HCNC,CPTII,S$GLB, | Performed by: OPHTHALMOLOGY

## 2025-03-17 PROCEDURE — 99999 PR PBB SHADOW E&M-EST. PATIENT-LVL III: CPT | Mod: PBBFAC,HCNC,, | Performed by: OPHTHALMOLOGY

## 2025-03-17 PROCEDURE — 3288F FALL RISK ASSESSMENT DOCD: CPT | Mod: HCNC,CPTII,S$GLB, | Performed by: OPHTHALMOLOGY

## 2025-03-17 PROCEDURE — 1160F RVW MEDS BY RX/DR IN RCRD: CPT | Mod: HCNC,CPTII,S$GLB, | Performed by: OPHTHALMOLOGY

## 2025-03-17 PROCEDURE — 1126F AMNT PAIN NOTED NONE PRSNT: CPT | Mod: HCNC,CPTII,S$GLB, | Performed by: OPHTHALMOLOGY

## 2025-03-17 PROCEDURE — 1101F PT FALLS ASSESS-DOCD LE1/YR: CPT | Mod: HCNC,CPTII,S$GLB, | Performed by: OPHTHALMOLOGY

## 2025-03-17 PROCEDURE — 92201 OPSCPY EXTND RTA DRAW UNI/BI: CPT | Mod: HCNC,S$GLB,, | Performed by: OPHTHALMOLOGY

## 2025-03-17 PROCEDURE — 92134 CPTRZ OPH DX IMG PST SGM RTA: CPT | Mod: HCNC,S$GLB,, | Performed by: OPHTHALMOLOGY

## 2025-03-17 NOTE — PROGRESS NOTES
HPI     Concerns About Ocular Health     Additional comments: Yearly- RD repair f/u           Comments    C/o few floaters, diplopia as before, no pain ou and denies flashes ou.    AT ou prn          Last edited by Melyssa Rodriguez on 3/17/2025  1:18 PM.              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 RRD OD   Macula involving x 4  Days    S/p  25g PPV/EL/SF6 OD for RRD 9/6/17  IOP improved    10/30/17 - Recurrent IT RD with early PVR and small break    s/p 25g PPV/membrane stripping/inferior retinopexy/C3F8 OD for RRD/PVR OD 11/1/17 12/4/17 - recurrent inferior RD - small holes posterior to laser    s/p 25g PPV/EL/AFx/1000cs Silicone Oil OD for Recurrent RRD with PVR 12/6/17    Doing well, good IOP    1/9/18 increasing inferior fluid collection beneath oil into macula - needs SB - multiple small break    s/p /270, 25g PPV/SO removal/membrane stripping/AFx/EL/1000cs Silicone Oil OD for RRD/PVR 1/10/18    Doing well, good IOP  Had ST scleral dehiscence - closed with 5-0 mersilene    Side sleep or stomach    Inferior PVR OD with shallow submacular fluid   IOP low - stop Cosopt  Will likely need inferior retinectomy    2/18 - some progression    s/p 25 PPV/SO removal/membrane stripping/inferior retinectomy/posterior capsulectomy/EL/Leonora oil OD for RRD with PVR and PCO OD 4/11/18    Stable inferonasal RD under oil  IOP increased  - continue COsopt BID  Continue PF Q day     Will need heavier inferonasal laser during oil removal 3-6 months (around 4/19)    S/p 25g PPV/1000cs SO removal/EL/AFx/C3F8 OD 3/27/19    Doing well, good IOP  Flat!    IOP good off cosopt     2. PCIOL OU    3. HTN   Good BP control    4. PVD OS  No breaks OS        1 year OCT

## 2025-03-24 ENCOUNTER — OFFICE VISIT (OUTPATIENT)
Dept: FAMILY MEDICINE | Facility: CLINIC | Age: 80
End: 2025-03-24
Payer: MEDICARE

## 2025-03-24 ENCOUNTER — HOSPITAL ENCOUNTER (OUTPATIENT)
Dept: RADIOLOGY | Facility: HOSPITAL | Age: 80
Discharge: HOME | End: 2025-03-24
Attending: NURSE PRACTITIONER
Payer: MEDICARE

## 2025-03-24 VITALS
HEART RATE: 83 BPM | DIASTOLIC BLOOD PRESSURE: 84 MMHG | SYSTOLIC BLOOD PRESSURE: 130 MMHG | HEIGHT: 65 IN | OXYGEN SATURATION: 97 % | TEMPERATURE: 98 F | WEIGHT: 166.25 LBS | BODY MASS INDEX: 27.7 KG/M2

## 2025-03-24 DIAGNOSIS — E78.2 MIXED HYPERLIPIDEMIA: ICD-10-CM

## 2025-03-24 DIAGNOSIS — I77.9 RIGHT-SIDED CAROTID ARTERY DISEASE, UNSPECIFIED TYPE: Primary | ICD-10-CM

## 2025-03-24 DIAGNOSIS — Z12.11 ENCOUNTER FOR SCREENING FOR MALIGNANT NEOPLASM OF COLON: ICD-10-CM

## 2025-03-24 DIAGNOSIS — J44.9 CHRONIC OBSTRUCTIVE PULMONARY DISEASE, UNSPECIFIED COPD TYPE: ICD-10-CM

## 2025-03-24 DIAGNOSIS — I10 ESSENTIAL HYPERTENSION: ICD-10-CM

## 2025-03-24 DIAGNOSIS — I77.89 OTHER SPECIFIED DISORDERS OF ARTERIES AND ARTERIOLES: ICD-10-CM

## 2025-03-24 DIAGNOSIS — I77.9 RIGHT-SIDED CAROTID ARTERY DISEASE, UNSPECIFIED TYPE: ICD-10-CM

## 2025-03-24 DIAGNOSIS — M81.0 OSTEOPOROSIS, UNSPECIFIED OSTEOPOROSIS TYPE, UNSPECIFIED PATHOLOGICAL FRACTURE PRESENCE: ICD-10-CM

## 2025-03-24 DIAGNOSIS — J30.1 SEASONAL ALLERGIC RHINITIS DUE TO POLLEN: ICD-10-CM

## 2025-03-24 DIAGNOSIS — M72.0 DUPUYTREN'S CONTRACTURE OF BOTH HANDS: ICD-10-CM

## 2025-03-24 DIAGNOSIS — Z12.31 ENCOUNTER FOR SCREENING MAMMOGRAM FOR MALIGNANT NEOPLASM OF BREAST: ICD-10-CM

## 2025-03-24 DIAGNOSIS — Z86.0100 PERSONAL HISTORY OF COLON POLYPS, UNSPECIFIED: ICD-10-CM

## 2025-03-24 PROCEDURE — 1159F MED LIST DOCD IN RCRD: CPT | Mod: HCNC,CPTII,S$GLB, | Performed by: NURSE PRACTITIONER

## 2025-03-24 PROCEDURE — 1157F ADVNC CARE PLAN IN RCRD: CPT | Mod: HCNC,CPTII,S$GLB, | Performed by: NURSE PRACTITIONER

## 2025-03-24 PROCEDURE — 3079F DIAST BP 80-89 MM HG: CPT | Mod: HCNC,CPTII,S$GLB, | Performed by: NURSE PRACTITIONER

## 2025-03-24 PROCEDURE — 3075F SYST BP GE 130 - 139MM HG: CPT | Mod: HCNC,CPTII,S$GLB, | Performed by: NURSE PRACTITIONER

## 2025-03-24 PROCEDURE — 99214 OFFICE O/P EST MOD 30 MIN: CPT | Mod: HCNC,S$GLB,, | Performed by: NURSE PRACTITIONER

## 2025-03-24 PROCEDURE — 1126F AMNT PAIN NOTED NONE PRSNT: CPT | Mod: HCNC,CPTII,S$GLB, | Performed by: NURSE PRACTITIONER

## 2025-03-24 PROCEDURE — 99999 PR PBB SHADOW E&M-EST. PATIENT-LVL V: CPT | Mod: PBBFAC,HCNC,, | Performed by: NURSE PRACTITIONER

## 2025-03-24 PROCEDURE — 3288F FALL RISK ASSESSMENT DOCD: CPT | Mod: HCNC,CPTII,S$GLB, | Performed by: NURSE PRACTITIONER

## 2025-03-24 PROCEDURE — 1101F PT FALLS ASSESS-DOCD LE1/YR: CPT | Mod: HCNC,CPTII,S$GLB, | Performed by: NURSE PRACTITIONER

## 2025-03-24 PROCEDURE — 93880 EXTRACRANIAL BILAT STUDY: CPT | Mod: TC,HCNC,PO

## 2025-03-24 RX ORDER — FLUTICASONE PROPIONATE 50 MCG
2 SPRAY, SUSPENSION (ML) NASAL DAILY
Qty: 16 G | Refills: 2 | Status: SHIPPED | OUTPATIENT
Start: 2025-03-24

## 2025-03-24 NOTE — PROGRESS NOTES
Subjective:       Patient ID: Madison Long is a 79 y.o. female.    Chief Complaint: Follow-up    HPI  Patient presents for routine follow up of chronic conditions    Had 40%-50% blockage of right carotid artery 2022     HLD - LDL 73 on Crestor 40mg.  Recall: had severe LE cramps.  Stopped Lipitor 1 month ago and cramps resolved after 5-6 days.       HTN controlled     4 eye surgeries in past. Dr Sotelo     COPD - no sob, using Symbicort daily     ALEX - controlled      osteoporosis - hesitant to take fosamax. on vit D and calcium     Vitals:    03/24/25 1328   BP: 130/84   Pulse: 83   Temp: 98.2 °F (36.8 °C)     Review of Systems   Constitutional:  Negative for fever.   Respiratory:  Negative for cough and shortness of breath.    Cardiovascular:  Negative for chest pain.       Past Medical History:   Diagnosis Date    Allergy     Asthma     Basal cell carcinoma 2013     right cheek (cutaneous lateral lower eyelid) MOHS Dr Hooper     BCC (basal cell carcinoma)     COPD (chronic obstructive pulmonary disease)     GERD (gastroesophageal reflux disease)     Hyperlipidemia     Hypertension     Retinal detachment      Objective:      Physical Exam  Vitals and nursing note reviewed.   Constitutional:       General: She is not in acute distress.     Appearance: She is not diaphoretic.   HENT:      Head: Normocephalic.      Right Ear: Tympanic membrane, ear canal and external ear normal.      Left Ear: Tympanic membrane, ear canal and external ear normal.   Eyes:      General:         Right eye: No discharge.         Left eye: No discharge.   Neck:      Trachea: No tracheal deviation.   Cardiovascular:      Rate and Rhythm: Normal rate and regular rhythm.      Heart sounds: Normal heart sounds.   Pulmonary:      Effort: Pulmonary effort is normal.      Breath sounds: Normal breath sounds.   Skin:     Coloration: Skin is not pale.   Neurological:      Mental Status: She is alert and oriented to person, place, and time.    Psychiatric:         Speech: Speech normal.         Behavior: Behavior normal.         Thought Content: Thought content normal.         Judgment: Judgment normal.         Assessment:       1. Right-sided carotid artery disease, unspecified type    2. Other specified disorders of arteries and arterioles    3. Essential hypertension    4. Mixed hyperlipidemia    5. Chronic obstructive pulmonary disease, unspecified COPD type    6. Osteoporosis, unspecified osteoporosis type, unspecified pathological fracture presence    7. Encounter for screening mammogram for malignant neoplasm of breast    8. Seasonal allergic rhinitis due to pollen    9. Dupuytren's contracture of both hands    10. Personal history of colon polyps, unspecified    11. Encounter for screening for malignant neoplasm of colon        Plan:       Right-sided carotid artery disease, unspecified type  -     US Carotid Bilateral; Future; Expected date: 03/24/2025    Other specified disorders of arteries and arterioles  -     US Carotid Bilateral; Future; Expected date: 03/24/2025    Essential hypertension  -     CBC Auto Differential; Future; Expected date: 09/24/2025  -     TSH; Future; Expected date: 09/24/2025    Mixed hyperlipidemia  -     Comprehensive Metabolic Panel; Future; Expected date: 09/24/2025  -     Lipid Panel; Future; Expected date: 09/24/2025    Chronic obstructive pulmonary disease, unspecified COPD type    Osteoporosis, unspecified osteoporosis type, unspecified pathological fracture presence  -     DXA Bone Density Axial Skeleton 1 or more sites; Future; Expected date: 03/24/2025  -     Vitamin D; Future; Expected date: 03/24/2025    Encounter for screening mammogram for malignant neoplasm of breast  -     Mammo Digital Screening Bilat w/ Valentín (XPD); Future; Expected date: 05/07/2025    Seasonal allergic rhinitis due to pollen  -     fluticasone propionate (FLONASE) 50 mcg/actuation nasal spray; 2 sprays (100 mcg total) by Each  Nostril route once daily.  Dispense: 16 g; Refill: 2    Dupuytren's contracture of both hands  -     Ambulatory referral/consult to Orthopedics; Future; Expected date: 03/31/2025    Personal history of colon polyps, unspecified  -     Case Request Endoscopy: COLONOSCOPY    Encounter for screening for malignant neoplasm of colon  -     Case Request Endoscopy: COLONOSCOPY            Follow up in about 6 months (around 9/24/2025).    Medication List with Changes/Refills   Current Medications    ACETAMINOPHEN (TYLENOL) 500 MG TABLET    Take 500 mg by mouth every 6 (six) hours as needed for Pain.    ALBUTEROL (PROVENTIL/VENTOLIN HFA) 90 MCG/ACTUATION INHALER    Inhale 2 puffs into the lungs every 6 (six) hours as needed. Rescue    AMMONIUM LACTATE (LAC-HYDRIN) 12 % LOTION    Apply to dry skin daily to BID    AZELASTINE (ASTELIN) 137 MCG (0.1 %) NASAL SPRAY    1 spray (137 mcg total) by Nasal route 2 (two) times daily. for 14 days    BUDESONIDE-FORMOTEROL 160-4.5 MCG (SYMBICORT) 160-4.5 MCG/ACTUATION HFAA    Inhale 2 puffs into the lungs 2 (two) times daily.    FAMOTIDINE (PEPCID) 40 MG TABLET    Take 1 tablet (40 mg total) by mouth once daily.    FISH OIL-OMEGA-3 FATTY ACIDS 300-1,000 MG CAPSULE    Take 1 g by mouth every morning.     FLUOROURACIL (EFUDEX) 5 % CREAM    Apply to affective area bid for 2 weeks.    FLUOXETINE 10 MG CAPSULE    TAKE 1 CAPSULE(10 MG) BY MOUTH EVERY DAY    GUAIFENESIN (MUCINEX) 600 MG 12 HR TABLET    Take 1,200 mg by mouth 2 (two) times daily as needed.     HYDROCORTISONE 2.5 % CREAM    Apply topically 2 (two) times daily. for 10 days    LOSARTAN (COZAAR) 50 MG TABLET    Take 1 tablet (50 mg total) by mouth every morning.    MULTIVIT,STRESS FORMULA-ZINC TAB    Take 1 tablet by mouth every morning. Uses Alive    OMEPRAZOLE (PRILOSEC) 20 MG CAPSULE    TAKE 1 CAPSULE(20 MG) BY MOUTH EVERY DAY    ROSUVASTATIN (CRESTOR) 40 MG TAB    TAKE 1 TABLET(40 MG) BY MOUTH EVERY DAY    SODIUM CHLORIDE (OCEAN)  0.65 % NASAL SPRAY    1 spray by Nasal route as needed for Congestion.    TRAZODONE (DESYREL) 50 MG TABLET    TAKE 1 TABLET BY MOUTH EVERY NIGHT AS NEEDED FOR INSOMNIA, MAY TAKE UP TO 2 TABLETS EVERY NIGHT AS NEEDED   Changed and/or Refilled Medications    Modified Medication Previous Medication    FLUTICASONE PROPIONATE (FLONASE) 50 MCG/ACTUATION NASAL SPRAY fluticasone (FLONASE) 50 mcg/actuation nasal spray       2 sprays (100 mcg total) by Each Nostril route once daily.    SPRAY 2 SPRAYS IN EACH NOSTRIL EVERY DAY

## 2025-03-25 ENCOUNTER — TELEPHONE (OUTPATIENT)
Dept: FAMILY MEDICINE | Facility: CLINIC | Age: 80
End: 2025-03-25
Payer: MEDICARE

## 2025-03-25 ENCOUNTER — RESULTS FOLLOW-UP (OUTPATIENT)
Dept: FAMILY MEDICINE | Facility: CLINIC | Age: 80
End: 2025-03-25
Payer: MEDICARE

## 2025-03-25 DIAGNOSIS — I65.21 STENOSIS OF RIGHT CAROTID ARTERY: Primary | ICD-10-CM

## 2025-03-25 RX ORDER — EZETIMIBE 10 MG/1
10 TABLET ORAL DAILY
Qty: 90 TABLET | Refills: 3 | Status: SHIPPED | OUTPATIENT
Start: 2025-03-25 | End: 2026-03-25

## 2025-03-25 NOTE — TELEPHONE ENCOUNTER
----- Message from Berkley Zuluaga NP sent at 3/25/2025  9:12 AM CDT -----  Please notify patient plaque buildup has increase in right carotid artery. Start baby aspirin 81mg daily (over the counter). Continue current medications. We will also add Zetia--another cholesterol   medication to take in addition to Crestor--to reduce further plaque buildup. Schedule repeat fasting labs 8-10 weeks. Schedule US 1 year if possible that far out.   ----- Message -----  From: Interface, Rad Results In  Sent: 3/24/2025   4:18 PM CDT  To: Berkley Zuluaga NP

## 2025-03-26 ENCOUNTER — TELEPHONE (OUTPATIENT)
Dept: GASTROENTEROLOGY | Facility: CLINIC | Age: 80
End: 2025-03-26
Payer: MEDICARE

## 2025-03-26 NOTE — TELEPHONE ENCOUNTER
Spoke to pt and scheduled scope. Prep instructions placed in mail to pt. Pt verbalized understanding to all

## 2025-03-28 ENCOUNTER — TELEPHONE (OUTPATIENT)
Dept: DERMATOLOGY | Facility: CLINIC | Age: 80
End: 2025-03-28
Payer: MEDICARE

## 2025-03-28 NOTE — TELEPHONE ENCOUNTER
----- Message from Med Assistant Melyssa sent at 3/28/2025  2:26 PM CDT -----  Regarding: FW: Patient Advice  Contact: Pt  [phone]  I called and spoke with patient states that her nose was oozing a little yellow this morning. She has used the Efudex on her nose bid x's 6 days. States that it is red like a sun burn. She said it is dry now and it is not bothering her. She just wants to know if she should continue using it. She does not have the portal so can't send a message.  ----- Message -----  From: Meagan Farris  Sent: 3/28/2025   1:54 PM CDT  To: Juan Ramon ARREGUIN Staff  Subject: Patient Advice                                   Pt is calling to speak to nurse about nose oozing from rx: fluorouraciL (EFUDEX) 5 % cream use please call

## 2025-03-31 DIAGNOSIS — I10 ESSENTIAL HYPERTENSION: ICD-10-CM

## 2025-03-31 NOTE — TELEPHONE ENCOUNTER
No care due was identified.  [facility] [place] Embedded Care Due Messages. Reference number: [id number].   3/31/2025 1:53:46 PM CDT

## 2025-04-01 RX ORDER — LOSARTAN POTASSIUM 50 MG/1
TABLET ORAL
Qty: 90 TABLET | Refills: 1 | Status: SHIPPED | OUTPATIENT
Start: 2025-04-01

## 2025-04-01 NOTE — TELEPHONE ENCOUNTER
Refill Decision Note   Madison Mercedes  is requesting a refill authorization.  Brief Assessment and Rationale for Refill:  Approve     Medication Therapy Plan:        Comments:     Note composed:7:32 AM 04/01/2025

## 2025-04-21 ENCOUNTER — OFFICE VISIT (OUTPATIENT)
Dept: ORTHOPEDICS | Facility: CLINIC | Age: 80
End: 2025-04-21
Payer: MEDICARE

## 2025-04-21 ENCOUNTER — HOSPITAL ENCOUNTER (OUTPATIENT)
Dept: RADIOLOGY | Facility: HOSPITAL | Age: 80
Discharge: HOME | End: 2025-04-21
Attending: NURSE PRACTITIONER
Payer: MEDICARE

## 2025-04-21 DIAGNOSIS — M81.0 OSTEOPOROSIS, UNSPECIFIED OSTEOPOROSIS TYPE, UNSPECIFIED PATHOLOGICAL FRACTURE PRESENCE: ICD-10-CM

## 2025-04-21 DIAGNOSIS — M72.0 DUPUYTREN'S DISEASE OF BOTH PALM AND FINGER WITH CONTRACTURE: Primary | ICD-10-CM

## 2025-04-21 PROCEDURE — 3288F FALL RISK ASSESSMENT DOCD: CPT | Mod: HCNC,CPTII,S$GLB, | Performed by: PHYSICIAN ASSISTANT

## 2025-04-21 PROCEDURE — 77080 DXA BONE DENSITY AXIAL: CPT | Mod: TC,HCNC,PO

## 2025-04-21 PROCEDURE — 77092 TBS I&R FX RSK QHP: CPT | Mod: HCNC,,, | Performed by: RADIOLOGY

## 2025-04-21 PROCEDURE — 77080 DXA BONE DENSITY AXIAL: CPT | Mod: 26,HCNC,, | Performed by: RADIOLOGY

## 2025-04-21 PROCEDURE — 1101F PT FALLS ASSESS-DOCD LE1/YR: CPT | Mod: HCNC,CPTII,S$GLB, | Performed by: PHYSICIAN ASSISTANT

## 2025-04-21 PROCEDURE — 99999 PR PBB SHADOW E&M-EST. PATIENT-LVL III: CPT | Mod: PBBFAC,HCNC,, | Performed by: PHYSICIAN ASSISTANT

## 2025-04-21 PROCEDURE — 1159F MED LIST DOCD IN RCRD: CPT | Mod: HCNC,CPTII,S$GLB, | Performed by: PHYSICIAN ASSISTANT

## 2025-04-21 PROCEDURE — 1157F ADVNC CARE PLAN IN RCRD: CPT | Mod: HCNC,CPTII,S$GLB, | Performed by: PHYSICIAN ASSISTANT

## 2025-04-21 PROCEDURE — 1160F RVW MEDS BY RX/DR IN RCRD: CPT | Mod: HCNC,CPTII,S$GLB, | Performed by: PHYSICIAN ASSISTANT

## 2025-04-21 PROCEDURE — 99213 OFFICE O/P EST LOW 20 MIN: CPT | Mod: HCNC,S$GLB,, | Performed by: PHYSICIAN ASSISTANT

## 2025-04-21 PROCEDURE — 1125F AMNT PAIN NOTED PAIN PRSNT: CPT | Mod: HCNC,CPTII,S$GLB, | Performed by: PHYSICIAN ASSISTANT

## 2025-04-21 NOTE — PROGRESS NOTES
4/21/2025    HPI:  Madison Long is a 80 y.o. female, who presents to clinic today for evaluation of her bilateral hand Dupuytren's disease.  States her Dupuytren's disease has worsened.  States she has contractures of the fingers of the right hand, I would like to discuss treatment options for it.  States he has some mild soreness throughout the right hand, but it is intermittent and not consistent.  States the soreness of the hand is not her main concern.  Denies any acute injuries.  Denies any other complaints at this time.    PMHX:  Past Medical History:   Diagnosis Date    Allergy     Asthma     Basal cell carcinoma 2013     right cheek (cutaneous lateral lower eyelid) MOHS Dr Hooper     BCC (basal cell carcinoma)     COPD (chronic obstructive pulmonary disease)     GERD (gastroesophageal reflux disease)     Hyperlipidemia     Hypertension     Retinal detachment        PSHX:  Past Surgical History:   Procedure Laterality Date    BASAL CELL CARCINOMA EXCISION      BREAST CYST EXCISION Left     long time ago, bening    CATARACT EXTRACTION      COLONOSCOPY  2013    COLONOSCOPY N/A 8/1/2018    Procedure: COLONOSCOPY;  Surgeon: Dung Durant MD;  Location: [facility];  Service: Endoscopy;  Laterality: N/A;    EYE SURGERY      cataracts & retinea detachment    HYSTERECTOMY      REPAIR OF RETINAL DETACHMENT WITH VITRECTOMY Right 3/27/2019    Procedure: REPAIR, RETINAL DETACHMENT, WITH VITRECTOMY;  Surgeon: JANEL Chaves MD;  Location: [address];  Service: Ophthalmology;  Laterality: Right;  LMA  60 min, Endo laser     UPPER GASTROINTESTINAL ENDOSCOPY         FMHX:  Family History   Problem Relation Name Age of Onset    Asthma Mother      Stroke Father  77        cva    Melanoma Sister      Colon cancer Neg Hx      Colon polyps Neg Hx      Celiac disease Neg Hx      Esophageal cancer Neg Hx      Stomach cancer Neg Hx      Irritable bowel syndrome Neg Hx      Inflammatory bowel disease Neg Hx       Amblyopia Neg Hx      Blindness Neg Hx      Cataracts Neg Hx      Glaucoma Neg Hx      Macular degeneration Neg Hx      Retinal detachment Neg Hx      Strabismus Neg Hx         SOCHX:  Social History     Tobacco Use    Smoking status: Never    Smokeless tobacco: Never   Substance Use Topics    Alcohol use: Yes     Alcohol/week: 2.0 standard drinks of alcohol     Types: 2 Glasses of wine per week     Comment: socially       ALLERGIES:  Penicillins, Penicillin, Ciprofloxacin, Doxycycline, and Oxycodone    CURRENT MEDICATIONS:  Medications Ordered Prior to Encounter[1]    REVIEW OF SYSTEMS:  Review of Systems Complete; Negative, unless noted above.    GENERAL PHYSICAL EXAM:   There were no vitals taken for this visit.   GEN: well developed, well nourished, no acute distress   PULM: No wheezing, no respiratory distress   CV: RRR    ORTHO EXAM:   Examination of the bilateral hands reveals mild Dupuytren's contracture of the right index and right little fingers.  Presence of Dupuytren's nodules/disease of the bilateral palms.  Able make composite fist and extend the fingers, but not fully extend the right index and right little fingers.  5/5 /intrinsic strength.  Minimal tenderness palpation of the 1st CMC joint.  Normal sensation in the radial, ulnar, and median nerve distributions.  Capillary refill less than 2 seconds.    RADIOLOGY:   None.    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 I discussed with Madison Long that since her osteoarthritis pain is mild intermittent we can continue to monitor at this time.  We did discuss for any worsening of her arthritis symptoms to contact the clinic for reappointment.  We discussed for her Dupuytren's contractures of the right hand we would have her follow up with Dr. Nuñez for a needle procedure.  She verbally agreed with the treatment plan.      2. I  would like to have her follow up in clinic at the next available needle procedure.  She was instructed to contact the clinic for any problems or concerns in the interim.           [1]   Current Outpatient Medications on File Prior to Visit   Medication Sig Dispense Refill    acetaminophen (TYLENOL) 500 MG tablet Take 500 mg by mouth every 6 (six) hours as needed for Pain.      albuterol (PROVENTIL/VENTOLIN HFA) 90 mcg/actuation inhaler Inhale 2 puffs into the lungs every 6 (six) hours as needed. Rescue 54 g 3    ammonium lactate (LAC-HYDRIN) 12 % lotion Apply to dry skin daily to  g 5    budesonide-formoterol 160-4.5 mcg (SYMBICORT) 160-4.5 mcg/actuation HFAA Inhale 2 puffs into the lungs 2 (two) times daily. 30.6 g 3    ezetimibe (ZETIA) 10 mg tablet Take 1 tablet (10 mg total) by mouth once daily. 90 tablet 3    fish oil-omega-3 fatty acids 300-1,000 mg capsule Take 1 g by mouth every morning.       fluorouraciL (EFUDEX) 5 % cream Apply to affective area bid for 2 weeks. 40 g 1    FLUoxetine 10 MG capsule TAKE 1 CAPSULE(10 MG) BY MOUTH EVERY DAY 90 capsule 3    fluticasone propionate (FLONASE) 50 mcg/actuation nasal spray 2 sprays (100 mcg total) by Each Nostril route once daily. 16 g 2    guaifenesin (MUCINEX) 600 mg 12 hr tablet Take 1,200 mg by mouth 2 (two) times daily as needed.       losartan (COZAAR) 50 MG tablet TAKE 1 TABLET(50 MG) BY MOUTH EVERY MORNING 90 tablet 1    multivit,stress formula-zinc Tab Take 1 tablet by mouth every morning. Uses Alive      omeprazole (PRILOSEC) 20 MG capsule TAKE 1 CAPSULE(20 MG) BY MOUTH EVERY DAY 90 capsule 0    rosuvastatin (CRESTOR) 40 MG Tab TAKE 1 TABLET(40 MG) BY MOUTH EVERY DAY 90 tablet 3    sodium chloride (OCEAN) 0.65 % nasal spray 1 spray by Nasal route as needed for Congestion.      traZODone (DESYREL) 50 MG tablet TAKE 1 TABLET BY MOUTH EVERY NIGHT AS NEEDED FOR INSOMNIA, MAY TAKE UP TO 2 TABLETS EVERY NIGHT AS NEEDED 180 tablet 3    azelastine  (ASTELIN) 137 mcg (0.1 %) nasal spray 1 spray (137 mcg total) by Nasal route 2 (two) times daily. for 14 days 90 mL 3    famotidine (PEPCID) 40 MG tablet Take 1 tablet (40 mg total) by mouth once daily. 7 tablet 0    hydrocortisone 2.5 % cream Apply topically 2 (two) times daily. for 10 days 20 g 0     No current facility-administered medications on file prior to visit.

## 2025-04-22 ENCOUNTER — RESULTS FOLLOW-UP (OUTPATIENT)
Dept: FAMILY MEDICINE | Facility: CLINIC | Age: 80
End: 2025-04-22

## 2025-04-22 ENCOUNTER — TELEPHONE (OUTPATIENT)
Dept: FAMILY MEDICINE | Facility: CLINIC | Age: 80
End: 2025-04-22
Payer: MEDICARE

## 2025-04-22 NOTE — TELEPHONE ENCOUNTER
----- Message from Berkley Zuluaga NP sent at 4/22/2025  9:28 AM CDT -----  Please notify patient her bone density has improved from 2022, however still in osteoporosis category, high risk hip fracture. Continue Vitamin D and calcium supplement. If she changed her mind about   weekly Rx fosamax let me know.   ----- Message -----  From: Shelli, Rad Results In  Sent: 4/21/2025   2:35 PM CDT  To: Berkley Zuluaga NP

## 2025-04-23 DIAGNOSIS — M72.0 DUPUYTREN'S DISEASE OF BOTH PALM AND FINGER WITH CONTRACTURE: Primary | ICD-10-CM

## 2025-05-03 ENCOUNTER — NURSE TRIAGE (OUTPATIENT)
Dept: ADMINISTRATIVE | Facility: CLINIC | Age: 80
End: 2025-05-03
Payer: MEDICARE

## 2025-05-03 NOTE — TELEPHONE ENCOUNTER
Patient is calling re asking if it is OK to have lemon flavored mag citrate. Advised she could as long as it was clear. Further questions re chicken and beef broth and water. Advised water is needed. Clearer the stool the better the photos. Unable to see instructions  as patient does not use MY CH[employer] and her information was mailed to her. Triage done- dispo home care.   Reason for Disposition   Bowel prep for colonoscopy, questions about    Protocols used: Colonoscopy Symptoms and [id number]

## 2025-05-05 ENCOUNTER — HOSPITAL ENCOUNTER (OUTPATIENT)
Facility: HOSPITAL | Age: 80
Discharge: HOME | End: 2025-05-05
Attending: INTERNAL MEDICINE | Admitting: INTERNAL MEDICINE
Payer: MEDICARE

## 2025-05-05 ENCOUNTER — ANESTHESIA (OUTPATIENT)
Dept: ENDOSCOPY | Facility: HOSPITAL | Age: 80
End: 2025-05-05
Payer: MEDICARE

## 2025-05-05 ENCOUNTER — ANESTHESIA EVENT (OUTPATIENT)
Dept: ENDOSCOPY | Facility: HOSPITAL | Age: 80
End: 2025-05-05
Payer: MEDICARE

## 2025-05-05 VITALS
BODY MASS INDEX: 28.66 KG/M2 | DIASTOLIC BLOOD PRESSURE: 62 MMHG | RESPIRATION RATE: 16 BRPM | SYSTOLIC BLOOD PRESSURE: 122 MMHG | TEMPERATURE: 98 F | HEIGHT: 65 IN | WEIGHT: 172 LBS | OXYGEN SATURATION: 98 % | HEART RATE: 72 BPM

## 2025-05-05 DIAGNOSIS — Z86.0100 HX OF COLONIC POLYPS: ICD-10-CM

## 2025-05-05 PROCEDURE — 63600175 PHARM REV CODE 636 W HCPCS: Mod: HCNC,PO | Performed by: NURSE ANESTHETIST, CERTIFIED REGISTERED

## 2025-05-05 PROCEDURE — G0105 COLORECTAL SCRN; HI RISK IND: HCPCS | Mod: 53,HCNC,, | Performed by: INTERNAL MEDICINE

## 2025-05-05 PROCEDURE — 25000003 PHARM REV CODE 250: Mod: HCNC,PO | Performed by: NURSE ANESTHETIST, CERTIFIED REGISTERED

## 2025-05-05 PROCEDURE — 37000008 HC ANESTHESIA 1ST 15 MINUTES: Mod: HCNC,PO | Performed by: INTERNAL MEDICINE

## 2025-05-05 PROCEDURE — G0105 COLORECTAL SCRN; HI RISK IND: HCPCS | Mod: HCNC,PO | Performed by: INTERNAL MEDICINE

## 2025-05-05 PROCEDURE — 63600175 PHARM REV CODE 636 W HCPCS: Mod: HCNC,PO | Performed by: INTERNAL MEDICINE

## 2025-05-05 PROCEDURE — 37000009 HC ANESTHESIA EA ADD 15 MINS: Mod: HCNC,PO | Performed by: INTERNAL MEDICINE

## 2025-05-05 RX ORDER — LIDOCAINE HYDROCHLORIDE 20 MG/ML
JELLY TOPICAL
Status: DISCONTINUED | OUTPATIENT
Start: 2025-05-05 | End: 2025-05-05

## 2025-05-05 RX ORDER — PHENYLEPHRINE HYDROCHLORIDE 10 MG/ML
INJECTION INTRAVENOUS
Status: DISCONTINUED | OUTPATIENT
Start: 2025-05-05 | End: 2025-05-05

## 2025-05-05 RX ORDER — SODIUM CHLORIDE, SODIUM LACTATE, POTASSIUM CHLORIDE, CALCIUM CHLORIDE 600; 310; 30; 20 MG/100ML; MG/100ML; MG/100ML; MG/100ML
INJECTION, SOLUTION INTRAVENOUS CONTINUOUS
Status: DISCONTINUED | OUTPATIENT
Start: 2025-05-05 | End: 2025-05-05 | Stop reason: HOSPADM

## 2025-05-05 RX ORDER — PROPOFOL 10 MG/ML
VIAL (ML) INTRAVENOUS
Status: DISCONTINUED | OUTPATIENT
Start: 2025-05-05 | End: 2025-05-05

## 2025-05-05 RX ORDER — SODIUM CHLORIDE 0.9 % (FLUSH) 0.9 %
10 SYRINGE (ML) INJECTION
Status: DISCONTINUED | OUTPATIENT
Start: 2025-05-05 | End: 2025-05-05 | Stop reason: HOSPADM

## 2025-05-05 RX ADMIN — PROPOFOL 50 MG: 10 INJECTION, EMULSION INTRAVENOUS at 01:05

## 2025-05-05 RX ADMIN — PROPOFOL 30 MG: 10 INJECTION, EMULSION INTRAVENOUS at 01:05

## 2025-05-05 RX ADMIN — PROPOFOL 70 MG: 10 INJECTION, EMULSION INTRAVENOUS at 01:05

## 2025-05-05 RX ADMIN — SODIUM CHLORIDE, SODIUM LACTATE, POTASSIUM CHLORIDE, AND CALCIUM CHLORIDE: .6; .31; .03; .02 INJECTION, SOLUTION INTRAVENOUS at 12:05

## 2025-05-05 RX ADMIN — LIDOCAINE HYDROCHLORIDE 50 ML: 20 JELLY TOPICAL at 01:05

## 2025-05-05 RX ADMIN — PHENYLEPHRINE HYDROCHLORIDE 100 MCG: 10 INJECTION INTRAVENOUS at 01:05

## 2025-05-05 NOTE — ANESTHESIA POSTPROCEDURE EVALUATION
Anesthesia Post Evaluation    Patient: Madison Long    Procedure(s) Performed: Procedure(s) (LRB):  COLONOSCOPY, SCREENING, HIGH RISK PATIENT (N/A)    Final Anesthesia Type: general      Patient location during evaluation: PACU  Patient participation: Yes- Able to Participate  Level of consciousness: awake and alert and oriented  Post-procedure vital signs: reviewed and stable  Pain management: adequate  Airway patency: patent    PONV status at discharge: No PONV  Anesthetic complications: no      Cardiovascular status: blood pressure returned to baseline  Respiratory status: unassisted, spontaneous ventilation and room air  Hydration status: euvolemic  Follow-up not needed.              Vitals Value Taken Time   /62 05/05/25 14:06   Temp 36.4 °C (97.5 °F) 05/05/25 13:39   Pulse 72 05/05/25 14:06   Resp 16 05/05/25 14:06   SpO2 98 % 05/05/25 14:06         Event Time   Out of Recovery 14:12:00         Pain/Margie Score: Margie Score: 10 (5/5/2025  2:09 PM)

## 2025-05-05 NOTE — PROVATION PATIENT INSTRUCTIONS
Discharge Summary/Instructions after an Endoscopic Procedure  Patient Name: Madison Long  Patient MRN: [id number]  Patient YOB: 1945  Monday, May 5, 2025  Dung Durant MD  Dear patient,  As a result of recent federal legislation (The Federal Cures Act), you may   receive lab or pathology results from your procedure in your [facility]   account before your physician is able to contact you. Your physician or   their representative will relay the results to you with their   recommendations at their soonest availability.  Thank you,  RESTRICTIONS:  During your procedure today, you received medications for sedation.  These   medications may affect your judgment, balance and coordination.  Therefore,   for 24 hours, you have the following restrictions:   - DO NOT drive a car, operate machinery, make legal/financial decisions,   sign important papers or drink alcohol.    ACTIVITY:  Today: no heavy lifting, straining or running due to procedural   sedation/anesthesia.  The following day: return to full activity including work.  DIET:  Eat and drink normally unless instructed otherwise.     TREATMENT FOR COMMON SIDE EFFECTS:  - Mild abdominal pain, nausea, belching, bloating or excessive gas:  rest,   eat lightly and use a heating pad.  - Sore Throat: treat with throat lozenges and/or gargle with warm salt   water.  - Because air was used during the procedure, expelling large amounts of air   from your rectum or belching is normal.  - If a bowel prep was taken, you may not have a bowel movement for 1-3 days.    This is normal.  SYMPTOMS TO WATCH FOR AND REPORT TO YOUR PHYSICIAN:  1. Abdominal pain or bloating, other than gas cramps.  2. Chest pain.  3. Back pain.  4. Signs of infection such as: chills or fever occurring within 24 hours   after the procedure.  5. Rectal bleeding, which would show as bright red, maroon, or black stools.   (A tablespoon of blood from the rectum is not serious, especially if    hemorrhoids are present.)  6. Vomiting.  7. Weakness or dizziness.  GO DIRECTLY TO THE NEAREST EMERGENCY ROOM IF YOU HAVE ANY OF THE FOLLOWING:      Difficulty breathing              Chills and/or fever over 101 F   Persistent vomiting and/or vomiting blood   Severe abdominal pain   Severe chest pain   Black, tarry stools   Bleeding- more than one tablespoon   Any other symptom or condition that you feel may need urgent attention  Your doctor recommends these additional instructions:  If any biopsies were taken, your doctors clinic will contact you in 1 to 2   weeks with any results.  Your physician has recommended a barium enema at appointment to be   scheduled.   Your physician has indicated that a repeat colonoscopy is not recommended   for surveillance.   You are being discharged to home.  For questions, problems or results please call your physician - Dung Durant MD at Work:  [phone].  EMERGENCY PHONE NUMBER: [phone], LAB RESULTS: [phone]  IF A COMPLICATION OR EMERGENCY SITUATION ARISES AND YOU ARE UNABLE TO REACH   YOUR PHYSICIAN - GO DIRECTLY TO THE EMERGENCY ROOM.  ___________________________________________  Nurse Signature  ___________________________________________  Patient/Designated Responsible Party Signature  Dung Durant MD  5/5/2025 1:41:57 PM  This report has been verified and signed electronically.  Dear patient,  As a result of recent federal legislation (The Federal Cures Act), you may   receive lab or pathology results from your procedure in your [facility]   account before your physician is able to contact you. Your physician or   their representative will relay the results to you with their   recommendations at their soonest availability.  Thank you.  PROVATION

## 2025-05-05 NOTE — H&P
History & Physical - Short Stay  Gastroenterology      SUBJECTIVE:     Procedure: Colonoscopy    Chief Complaint/Indication for Procedure: Previous Polyps    PTA Medications   Medication Sig    albuterol (PROVENTIL/VENTOLIN HFA) 90 mcg/actuation inhaler Inhale 2 puffs into the lungs every 6 (six) hours as needed. Rescue    budesonide-formoterol 160-4.5 mcg (SYMBICORT) 160-4.5 mcg/actuation HFAA Inhale 2 puffs into the lungs 2 (two) times daily.    ezetimibe (ZETIA) 10 mg tablet Take 1 tablet (10 mg total) by mouth once daily.    famotidine (PEPCID) 40 MG tablet Take 1 tablet (40 mg total) by mouth once daily.    fish oil-omega-3 fatty acids 300-1,000 mg capsule Take 1 g by mouth every morning.     FLUoxetine 10 MG capsule TAKE 1 CAPSULE(10 MG) BY MOUTH EVERY DAY    fluticasone propionate (FLONASE) 50 mcg/actuation nasal spray 2 sprays (100 mcg total) by Each Nostril route once daily.    losartan (COZAAR) 50 MG tablet TAKE 1 TABLET(50 MG) BY MOUTH EVERY MORNING    multivit,stress formula-zinc Tab Take 1 tablet by mouth every morning. Uses Alive    rosuvastatin (CRESTOR) 40 MG Tab TAKE 1 TABLET(40 MG) BY MOUTH EVERY DAY    traZODone (DESYREL) 50 MG tablet TAKE 1 TABLET BY MOUTH EVERY NIGHT AS NEEDED FOR INSOMNIA, MAY TAKE UP TO 2 TABLETS EVERY NIGHT AS NEEDED    acetaminophen (TYLENOL) 500 MG tablet Take 500 mg by mouth every 6 (six) hours as needed for Pain.    ammonium lactate (LAC-HYDRIN) 12 % lotion Apply to dry skin daily to BID    azelastine (ASTELIN) 137 mcg (0.1 %) nasal spray 1 spray (137 mcg total) by Nasal route 2 (two) times daily. for 14 days    fluorouraciL (EFUDEX) 5 % cream Apply to affective area bid for 2 weeks.    guaifenesin (MUCINEX) 600 mg 12 hr tablet Take 1,200 mg by mouth 2 (two) times daily as needed.     hydrocortisone 2.5 % cream Apply topically 2 (two) times daily. for 10 days    omeprazole (PRILOSEC) 20 MG capsule TAKE 1 CAPSULE(20 MG) BY MOUTH EVERY DAY    sodium chloride (OCEAN) 0.65 %  nasal spray 1 spray by Nasal route as needed for Congestion.       Review of patient's allergies indicates:   Allergen Reactions    Penicillins Hives    Penicillin Hives    Ciprofloxacin      Muscle weakness and pain    Doxycycline Other (See Comments)     unknown    Oxycodone Anxiety        Past Medical History:   Diagnosis Date    Allergy     Asthma     Basal cell carcinoma 2013     right cheek (cutaneous lateral lower eyelid) MOHS Dr Hooper     BCC (basal cell carcinoma)     COPD (chronic obstructive pulmonary disease)     GERD (gastroesophageal reflux disease)     Hyperlipidemia     Hypertension     Retinal detachment      Past Surgical History:   Procedure Laterality Date    BASAL CELL CARCINOMA EXCISION      BREAST CYST EXCISION Left     long time ago, [place]    CATARACT EXTRACTION      COLONOSCOPY  2013    COLONOSCOPY N/A 8/1/2018    Procedure: COLONOSCOPY;  Surgeon: Dung Durant MD;  Location: [facility];  Service: Endoscopy;  Laterality: N/A;    EYE SURGERY      cataracts & retinea detachment    HYSTERECTOMY      REPAIR OF RETINAL DETACHMENT WITH VITRECTOMY Right 3/27/2019    Procedure: REPAIR, RETINAL DETACHMENT, WITH VITRECTOMY;  Surgeon: JANEL Chaves MD;  Location: [facility] OR [address] [place] [address];  Service: Ophthalmology;  Laterality: Right;  LMA  60 min, Endo laser     UPPER GASTROINTESTINAL ENDOSCOPY       Family History   Problem Relation Name Age of Onset    Asthma Mother      Stroke Father  77        cva    Melanoma Sister      Colon cancer Neg Hx      Colon polyps Neg Hx      Celiac disease Neg Hx      Esophageal cancer Neg Hx      Stomach cancer Neg Hx      Irritable bowel syndrome Neg Hx      Inflammatory bowel disease Neg Hx      Amblyopia Neg Hx      Blindness Neg Hx      Cataracts Neg Hx      Glaucoma Neg Hx      Macular degeneration Neg Hx      Retinal detachment Neg Hx      Strabismus Neg Hx       Social History[1]      OBJECTIVE:     Vital Signs (Most Recent)       Physical Exam:                                                        GENERAL:  Comfortable, in no acute distress.                                 HEENT EXAM:  Nonicteric.  No adenopathy.  Oropharynx is clear.               NECK:  Supple.                                                               LUNGS:  Clear.                                                               CARDIAC:  Regular rate and rhythm.  S1, S2.  No murmur.                      ABDOMEN:  Soft, positive bowel sounds, nontender.  No hepatosplenomegaly or masses.  No rebound or guarding.                                             EXTREMITIES:  No edema.     MENTAL STATUS:  Normal, alert and oriented.      ASSESSMENT/PLAN:     Assessment: Previous Polyps    Plan: Colonoscopy    Anesthesia Plan: General    ASA Grade: ASA 2 - Patient with mild systemic disease with no functional limitations    MALLAMPATI SCORE:  I (soft palate, uvula, fauces, and tonsillar pillars visible)           [1]   Social History  Tobacco Use    Smoking status: Never    Smokeless tobacco: Never   Substance Use Topics    Alcohol use: Yes     Alcohol/week: 2.0 standard drinks of alcohol     Types: 2 Glasses of wine per week     Comment: socially    Drug use: No

## 2025-05-05 NOTE — ANESTHESIA PREPROCEDURE EVALUATION
05/05/2025  Madison Long is a 80 y.o., female.      Pre-op Assessment    I have reviewed the NPO Status.   I have reviewed the Medications.     Review of Systems  Cardiovascular:     Hypertension                                    Hypertension         Pulmonary:   COPD Asthma       Asthma:   Chronic Obstructive Pulmonary Disease (COPD):                      Hepatic/GI:  Bowel Prep.   GERD         Gerd          Psych:  Psychiatric History                  Physical Exam  General: Well nourished    Airway:  Mallampati: II   Mouth Opening: Normal  TM Distance: Normal  Tongue: Normal  Neck ROM: Normal ROM    Dental:  Intact    Chest/Lungs:  Clear to auscultation, Normal Respiratory Rate        Anesthesia Plan  Type of Anesthesia, risks & benefits discussed:    Anesthesia Type: Gen Natural Airway  Intra-op Monitoring Plan: Standard ASA Monitors  Induction:  IV  Informed Consent: Informed consent signed with the Patient and all parties understand the risks and agree with anesthesia plan.  All questions answered.   ASA Score: 3    Ready For Surgery From Anesthesia Perspective.     .

## 2025-05-05 NOTE — TRANSFER OF CARE
"Anesthesia Transfer of Care Note    Patient: Madison Long    Procedure(s) Performed: Procedure(s) (LRB):  COLONOSCOPY, SCREENING, HIGH RISK PATIENT (N/A)    Patient location: PACU    Anesthesia Type: general    Transport from OR: Transported from OR on room air with adequate spontaneous ventilation    Post pain: adequate analgesia    Post assessment: no apparent anesthetic complications and tolerated procedure well    Post vital signs: stable    Level of consciousness: responds to stimulation    Nausea/Vomiting: no nausea/vomiting    Complications: none    Transfer of care protocol was followed      Last vitals: Visit Vitals  /64 (BP Location: Right arm, Patient Position: Lying)   Pulse 71   Temp 36.2 °C (97.2 °F) (Skin)   Resp 16   Ht 5' 5" (1.651 m)   Wt 78 kg (172 lb)   SpO2 96%   Breastfeeding No   BMI 28.62 kg/m²     "

## 2025-05-05 NOTE — DISCHARGE SUMMARY
[facility] - Endoscopy  Discharge Note  Short Stay  Discharge Note  Short Stay      SUMMARY     Admit Date: 5/5/2025    Attending Physician: Dung Durant MD     Discharge Physician: Dung Durant MD    Discharge Date: 5/5/2025 1:43 PM    Final Diagnosis: Screening [Z13.9]    Disposition: HOME OR SELF CARE    Patient Instructions:   Current Discharge Medication List        CONTINUE these medications which have NOT CHANGED    Details   albuterol (PROVENTIL/VENTOLIN HFA) 90 mcg/actuation inhaler Inhale 2 puffs into the lungs every 6 (six) hours as needed. Rescue  Qty: 54 g, Refills: 3    Associated Diagnoses: Chronic obstructive pulmonary disease, unspecified COPD type      budesonide-formoterol 160-4.5 mcg (SYMBICORT) 160-4.5 mcg/actuation HFAA Inhale 2 puffs into the lungs 2 (two) times daily.  Qty: 30.6 g, Refills: 3    Comments: Please discontinue all prior scripts with the same name and strength including any scripts on hold.  Associated Diagnoses: Chronic obstructive pulmonary disease, unspecified COPD type      ezetimibe (ZETIA) 10 mg tablet Take 1 tablet (10 mg total) by mouth once daily.  Qty: 90 tablet, Refills: 3    Associated Diagnoses: Stenosis of right carotid artery      famotidine (PEPCID) 40 MG tablet Take 1 tablet (40 mg total) by mouth once daily.  Qty: 7 tablet, Refills: 0    Associated Diagnoses: Rash      fish oil-omega-3 fatty acids 300-1,000 mg capsule Take 1 g by mouth every morning.       FLUoxetine 10 MG capsule TAKE 1 CAPSULE(10 MG) BY MOUTH EVERY DAY  Qty: 90 capsule, Refills: 3    Associated Diagnoses: Current mild episode of major depressive disorder without prior episode      fluticasone propionate (FLONASE) 50 mcg/actuation nasal spray 2 sprays (100 mcg total) by Each Nostril route once daily.  Qty: 16 g, Refills: 2    Associated Diagnoses: Seasonal allergic rhinitis due to pollen      losartan (COZAAR) 50 MG tablet TAKE 1 TABLET(50 MG) BY MOUTH EVERY MORNING  Qty: 90  tablet, Refills: 1    Comments: .  Associated Diagnoses: Essential hypertension      multivit,stress formula-zinc Tab Take 1 tablet by mouth every morning. Uses Alive      rosuvastatin (CRESTOR) 40 MG Tab TAKE 1 TABLET(40 MG) BY MOUTH EVERY DAY  Qty: 90 tablet, Refills: 3    Associated Diagnoses: Dyslipidemia; Atherosclerosis of both carotid arteries      traZODone (DESYREL) 50 MG tablet TAKE 1 TABLET BY MOUTH EVERY NIGHT AS NEEDED FOR INSOMNIA, MAY TAKE UP TO 2 TABLETS EVERY NIGHT AS NEEDED  Qty: 180 tablet, Refills: 3    Associated Diagnoses: Insomnia, unspecified type      acetaminophen (TYLENOL) 500 MG tablet Take 500 mg by mouth every 6 (six) hours as needed for Pain.      ammonium lactate (LAC-HYDRIN) 12 % lotion Apply to dry skin daily to BID  Qty: 396 g, Refills: 5    Associated Diagnoses: Actinic keratoses; Xerosis cutis      azelastine (ASTELIN) 137 mcg (0.1 %) nasal spray 1 spray (137 mcg total) by Nasal route 2 (two) times daily. for 14 days  Qty: 90 mL, Refills: 3    Comments: Please inactivate all prior scripts with same name and strength including on holds.  Associated Diagnoses: Viral syndrome      fluorouraciL (EFUDEX) 5 % cream Apply to affective area bid for 2 weeks.  Qty: 40 g, Refills: 1      guaifenesin (MUCINEX) 600 mg 12 hr tablet Take 1,200 mg by mouth 2 (two) times daily as needed.       hydrocortisone 2.5 % cream Apply topically 2 (two) times daily. for 10 days  Qty: 20 g, Refills: 0    Associated Diagnoses: Rash      omeprazole (PRILOSEC) 20 MG capsule TAKE 1 CAPSULE(20 MG) BY MOUTH EVERY DAY  Qty: 90 capsule, Refills: 0    Comments: **Patient requests 90 days supply**  Associated Diagnoses: Encounter for preventive health examination; Gastroesophageal reflux disease without esophagitis      sodium chloride (OCEAN) 0.65 % nasal spray 1 spray by Nasal route as needed for Congestion.             Discharge Procedure Orders (must include Diet, Follow-up, Activity)    Follow Up:  Follow up  with PCP as previously scheduled  Resume routine diet.  Activity as tolerated.    No driving day of procedure.

## 2025-05-20 ENCOUNTER — LAB VISIT (OUTPATIENT)
Dept: LAB | Facility: HOSPITAL | Age: 80
End: 2025-05-20
Attending: NURSE PRACTITIONER
Payer: MEDICARE

## 2025-05-20 DIAGNOSIS — I65.21 STENOSIS OF RIGHT CAROTID ARTERY: ICD-10-CM

## 2025-05-20 LAB
ALBUMIN SERPL BCP-MCNC: 4 G/DL (ref 3.5–5.2)
ALP SERPL-CCNC: 53 UNIT/L (ref 40–150)
ALT SERPL W/O P-5'-P-CCNC: 17 UNIT/L (ref 10–44)
ANION GAP (OHS): 11 MMOL/L (ref 8–16)
AST SERPL-CCNC: 23 UNIT/L (ref 11–45)
BILIRUB SERPL-MCNC: 0.5 MG/DL (ref 0.1–1)
BUN SERPL-MCNC: 17 MG/DL (ref 8–23)
CALCIUM SERPL-MCNC: 9 MG/DL (ref 8.7–10.5)
CHLORIDE SERPL-SCNC: 102 MMOL/L (ref 95–110)
CHOLEST SERPL-MCNC: 150 MG/DL (ref 120–199)
CHOLEST/HDLC SERPL: 2.5 {RATIO} (ref 2–5)
CO2 SERPL-SCNC: 25 MMOL/L (ref 23–29)
CREAT SERPL-MCNC: 0.8 MG/DL (ref 0.5–1.4)
GFR SERPLBLD CREATININE-BSD FMLA CKD-EPI: >60 ML/MIN/1.73/M2
GLUCOSE SERPL-MCNC: 102 MG/DL (ref 70–110)
HDLC SERPL-MCNC: 61 MG/DL (ref 40–75)
HDLC SERPL: 40.7 % (ref 20–50)
LDLC SERPL CALC-MCNC: 78.2 MG/DL (ref 63–159)
NONHDLC SERPL-MCNC: 89 MG/DL
POTASSIUM SERPL-SCNC: 4.7 MMOL/L (ref 3.5–5.1)
PROT SERPL-MCNC: 6.9 GM/DL (ref 6–8.4)
SODIUM SERPL-SCNC: 138 MMOL/L (ref 136–145)
TRIGL SERPL-MCNC: 54 MG/DL (ref 30–150)

## 2025-05-20 PROCEDURE — 36415 COLL VENOUS BLD VENIPUNCTURE: CPT | Mod: PO

## 2025-05-20 PROCEDURE — 80053 COMPREHEN METABOLIC PANEL: CPT

## 2025-05-20 PROCEDURE — 80061 LIPID PANEL: CPT

## 2025-05-21 ENCOUNTER — TELEPHONE (OUTPATIENT)
Dept: GASTROENTEROLOGY | Facility: CLINIC | Age: 80
End: 2025-05-21
Payer: MEDICARE

## 2025-05-21 ENCOUNTER — RESULTS FOLLOW-UP (OUTPATIENT)
Dept: FAMILY MEDICINE | Facility: CLINIC | Age: 80
End: 2025-05-21

## 2025-05-21 DIAGNOSIS — Z86.0100 HX OF COLONIC POLYPS: Primary | ICD-10-CM

## 2025-05-21 NOTE — TELEPHONE ENCOUNTER
----- Message from Araceli sent at 5/21/2025 12:56 PM CDT -----  Name of Who is Calling:JEISON POP [[id number]]  What is the request in detail: pt Is calling to see or speak to someone about an barium enema  please advise thank you   Can the clinic reply by LUCNER:call back   [facility] Telephone Information:Mobile          [phone]

## 2025-05-21 NOTE — TELEPHONE ENCOUNTER
----- Message from Araceli sent at 5/21/2025 12:56 PM CDT -----  Name of Who is Calling:JEISON POP [[id number]]  What is the request in detail: pt Is calling to see or speak to someone about an barium enema  please advise thank you   Can the clinic reply by LUCNER:call back   What Number to Call Back [facility]: Telephone Information:Mobile          [phone]

## 2025-05-30 DIAGNOSIS — M72.0 DUPUYTREN'S DISEASE OF BOTH PALM AND FINGER WITH CONTRACTURE: Primary | ICD-10-CM

## 2025-06-02 ENCOUNTER — TELEPHONE (OUTPATIENT)
Dept: ORTHOPEDICS | Facility: CLINIC | Age: 80
End: 2025-06-02
Payer: MEDICARE

## 2025-06-02 ENCOUNTER — TELEPHONE (OUTPATIENT)
Dept: PHARMACY | Facility: CLINIC | Age: 80
End: 2025-06-02
Payer: MEDICARE

## 2025-06-02 DIAGNOSIS — M72.0 DUPUYTREN'S DISEASE OF BOTH PALM AND FINGER WITH CONTRACTURE: Primary | ICD-10-CM

## 2025-06-06 ENCOUNTER — CLINICAL SUPPORT (OUTPATIENT)
Dept: REHABILITATION | Facility: HOSPITAL | Age: 80
End: 2025-06-06
Attending: ORTHOPAEDIC SURGERY
Payer: MEDICARE

## 2025-06-06 ENCOUNTER — OFFICE VISIT (OUTPATIENT)
Dept: ORTHOPEDICS | Facility: CLINIC | Age: 80
End: 2025-06-06
Payer: MEDICARE

## 2025-06-06 VITALS — WEIGHT: 171.94 LBS | HEIGHT: 65 IN | BODY MASS INDEX: 28.65 KG/M2

## 2025-06-06 DIAGNOSIS — M25.641 JOINT STIFFNESS OF HAND, RIGHT: Primary | ICD-10-CM

## 2025-06-06 DIAGNOSIS — M72.0 DUPUYTREN'S DISEASE OF BOTH PALM AND FINGER WITH CONTRACTURE: ICD-10-CM

## 2025-06-06 DIAGNOSIS — M72.0 DUPUYTREN'S DISEASE OF BOTH PALM AND FINGER WITH CONTRACTURE: Primary | ICD-10-CM

## 2025-06-06 PROCEDURE — L3913 HFO W/O JOINTS CF: HCPCS | Mod: HCNC,PO

## 2025-06-06 PROCEDURE — 1159F MED LIST DOCD IN RCRD: CPT | Mod: CPTII,HCNC,S$GLB, | Performed by: ORTHOPAEDIC SURGERY

## 2025-06-06 PROCEDURE — 26040 RELEASE PALM CONTRACTURE: CPT | Mod: HCNC,RT,S$GLB, | Performed by: ORTHOPAEDIC SURGERY

## 2025-06-06 PROCEDURE — 1101F PT FALLS ASSESS-DOCD LE1/YR: CPT | Mod: CPTII,HCNC,S$GLB, | Performed by: ORTHOPAEDIC SURGERY

## 2025-06-06 PROCEDURE — 97110 THERAPEUTIC EXERCISES: CPT | Mod: HCNC,PO

## 2025-06-06 PROCEDURE — 1157F ADVNC CARE PLAN IN RCRD: CPT | Mod: CPTII,HCNC,S$GLB, | Performed by: ORTHOPAEDIC SURGERY

## 2025-06-06 PROCEDURE — 99499 UNLISTED E&M SERVICE: CPT | Mod: HCNC,S$GLB,, | Performed by: ORTHOPAEDIC SURGERY

## 2025-06-06 PROCEDURE — 1126F AMNT PAIN NOTED NONE PRSNT: CPT | Mod: CPTII,HCNC,S$GLB, | Performed by: ORTHOPAEDIC SURGERY

## 2025-06-06 PROCEDURE — 3288F FALL RISK ASSESSMENT DOCD: CPT | Mod: CPTII,HCNC,S$GLB, | Performed by: ORTHOPAEDIC SURGERY

## 2025-06-06 PROCEDURE — 99999 PR PBB SHADOW E&M-EST. PATIENT-LVL III: CPT | Mod: PBBFAC,HCNC,, | Performed by: ORTHOPAEDIC SURGERY

## 2025-06-09 NOTE — PROGRESS NOTES
Ms Long to clinic today.  She has a history of Dupuytren's contracture.  She is here today to undergo a contracture release of her right hand/small finger.    Physical exam: Examination of the right hand reveals that there is obvious changes consistent with Dupuytren's disease.  There was no significant edema or skin change.  Palpation does not produce tenderness.  She has a contracture mainly of the MCP of the small finger.  This does include a portion of the PIP joint.  Contracture of the MCP has a approximately 40° and of the PIP of approximately 30°.    Assessment: Right hand Dupuytren's contracture     Plan:    1. After verbal informed consent was obtained the right hand was prepped sterilely.  Local anesthesia was induced with lidocaine.  Once appropriate anesthesia was achieved a needle was used to perforate the Dupuytren's cord.  After multiple perforations were performed the finger was manipulated.  There was a good release of the cord with improvement of the contracture.  There was only 10° remaining contracture of the PIP and the MCP joints.  There was no major skin tear noted.  The wounds were dressed with a sterile dressing.      2.  The patient will go to the therapy office to have a thermoplastic splint created     3. She will follow up in 2 weeks for repeat evaluation

## 2025-06-10 ENCOUNTER — TELEPHONE (OUTPATIENT)
Dept: ORTHOPEDICS | Facility: CLINIC | Age: 80
End: 2025-06-10
Payer: MEDICARE

## 2025-06-10 ENCOUNTER — TELEPHONE (OUTPATIENT)
Dept: REHABILITATION | Facility: HOSPITAL | Age: 80
End: 2025-06-10
Payer: MEDICARE

## 2025-06-10 NOTE — TELEPHONE ENCOUNTER
OT returned pts call from yesterday (OT was out of office on 6/9/2025 and pt reports a reports new wound at injection site. Pt has been cleaning with alcohol and reports it does not burn. Pt reports she will be calling MD office today. OT recommended pt do so and refrain from alcohol to hand and wash (no soaking) with a pump antibacterial soap, to cease and PROM of her hand and to cover wound with a band-aid and change daily until further instruction per MD/ortho.

## 2025-06-10 NOTE — TELEPHONE ENCOUNTER
Returned call to pt, she stated her hand is red where her incision was from the injection. Informed she needs to come see Kevin Thao PA-C today to make sure her hand is not getting infected. Pt verbalized understanding and stated she will come by the clinic today.

## 2025-06-13 ENCOUNTER — OFFICE VISIT (OUTPATIENT)
Dept: ORTHOPEDICS | Facility: CLINIC | Age: 80
End: 2025-06-13
Payer: MEDICARE

## 2025-06-13 ENCOUNTER — OFFICE VISIT (OUTPATIENT)
Dept: DERMATOLOGY | Facility: CLINIC | Age: 80
End: 2025-06-13
Payer: MEDICARE

## 2025-06-13 VITALS — BODY MASS INDEX: 28.65 KG/M2 | WEIGHT: 171.94 LBS | HEIGHT: 65 IN

## 2025-06-13 VITALS — WEIGHT: 171.94 LBS | BODY MASS INDEX: 28.62 KG/M2

## 2025-06-13 DIAGNOSIS — D04.39 SQUAMOUS CELL CARCINOMA IN SITU OF SKIN OF NOSE: Primary | ICD-10-CM

## 2025-06-13 DIAGNOSIS — M72.0 DUPUYTREN'S DISEASE OF BOTH PALM AND FINGER WITH CONTRACTURE: Primary | ICD-10-CM

## 2025-06-13 PROCEDURE — 99999 PR PBB SHADOW E&M-EST. PATIENT-LVL III: CPT | Mod: PBBFAC,HCNC,, | Performed by: PHYSICIAN ASSISTANT

## 2025-06-13 NOTE — PROGRESS NOTES
Subjective:      Patient ID:  Madison Long is a 80 y.o. female who presents for   Chief Complaint   Patient presents with    Spot     Recheck nose     LOV 03/11/2025  Ak    Patient is coming in for a follow up on her nose.       1. Skin, nasal supratip, shave biopsy:  - SQUAMOUS CELL CARCINOMA IN SITU.  - MARGINS ARE NEGATIVE IN THE PLANES OF SECTION EXAMINED.        Derm Hx:  SCCIS - nasal supratip, treated with efudex 11/24 BID x 1 week, could not tolerate longer course  yes Phx of NMSC.  +BCC on right cheek (lateral lower eyelid) treated by Mohs (Dr Hooper 2013)  Has fhx of MM-sister      Current Outpatient Medications:   ·  acetaminophen (TYLENOL) 500 MG tablet, Take 500 mg by mouth every 6 (six) hours as needed for Pain., Disp: , Rfl:   ·  albuterol (PROVENTIL/VENTOLIN HFA) 90 mcg/actuation inhaler, Inhale 2 puffs into the lungs every 6 (six) hours as needed. Rescue, Disp: 54 g, Rfl: 3  ·  ammonium lactate (LAC-HYDRIN) 12 % lotion, Apply to dry skin daily to BID, Disp: 396 g, Rfl: 5  ·  azelastine (ASTELIN) 137 mcg (0.1 %) nasal spray, 1 spray (137 mcg total) by Nasal route 2 (two) times daily. for 14 days, Disp: 90 mL, Rfl: 3  ·  budesonide-formoterol 160-4.5 mcg (SYMBICORT) 160-4.5 mcg/actuation HFAA, Inhale 2 puffs into the lungs 2 (two) times daily., Disp: 30.6 g, Rfl: 3  ·  ezetimibe (ZETIA) 10 mg tablet, Take 1 tablet (10 mg total) by mouth once daily., Disp: 90 tablet, Rfl: 3  ·  famotidine (PEPCID) 40 MG tablet, Take 1 tablet (40 mg total) by mouth once daily., Disp: 7 tablet, Rfl: 0  ·  fish oil-omega-3 fatty acids 300-1,000 mg capsule, Take 1 g by mouth every morning. , Disp: , Rfl:   ·  fluorouraciL (EFUDEX) 5 % cream, Apply to affective area bid for 2 weeks., Disp: 40 g, Rfl: 1  ·  FLUoxetine 10 MG capsule, TAKE 1 CAPSULE(10 MG) BY MOUTH EVERY DAY, Disp: 90 capsule, Rfl: 3  ·  fluticasone propionate (FLONASE) 50 mcg/actuation nasal spray, 2 sprays (100 mcg total) by Each Nostril route once  daily., Disp: 16 g, Rfl: 2  ·  guaifenesin (MUCINEX) 600 mg 12 hr tablet, Take 1,200 mg by mouth 2 (two) times daily as needed. , Disp: , Rfl:   ·  hydrocortisone 2.5 % cream, Apply topically 2 (two) times daily. for 10 days, Disp: 20 g, Rfl: 0  ·  losartan (COZAAR) 50 MG tablet, TAKE 1 TABLET(50 MG) BY MOUTH EVERY MORNING, Disp: 90 tablet, Rfl: 1  ·  multivit,stress formula-zinc Tab, Take 1 tablet by mouth every morning. Uses Alive, Disp: , Rfl:   ·  omeprazole (PRILOSEC) 20 MG capsule, TAKE 1 CAPSULE(20 MG) BY MOUTH EVERY DAY, Disp: 90 capsule, Rfl: 0  ·  rosuvastatin (CRESTOR) 40 MG Tab, TAKE 1 TABLET(40 MG) BY MOUTH EVERY DAY, Disp: 90 tablet, Rfl: 3  ·  sodium chloride (OCEAN) 0.65 % nasal spray, 1 spray by Nasal route as needed for Congestion., Disp: , Rfl:   ·  traZODone (DESYREL) 50 MG tablet, TAKE 1 TABLET BY MOUTH EVERY NIGHT AS NEEDED FOR INSOMNIA, MAY TAKE UP TO 2 TABLETS EVERY NIGHT AS NEEDED, Disp: 180 tablet, Rfl: 3       Review of Systems   Constitutional:  Negative for fever and chills.   HENT:  Negative for congestion and sore throat.    Respiratory:  Negative for cough and shortness of breath.    Skin:  Positive for activity-related sunscreen use and sensitivity to antibiotic ointment. Negative for itching, rash, dry skin, daily sunscreen use and sensitivity to bandage adhesive.   Hematologic/Lymphatic: Bruises/bleeds easily.       Objective:   Physical Exam   Constitutional: She appears well-developed and well-nourished.   Neurological: She is alert and oriented to person, place, and time.   Psychiatric: She has a normal mood and affect.   Skin:   Areas Examined (abnormalities noted in diagram):   Head / Face Inspection Performed            Diagram Legend     Erythematous scaling macule/papule c/w actinic keratosis       Vascular papule c/w angioma      Pigmented verrucoid papule/plaque c/w seborrheic keratosis      Yellow umbilicated papule c/w sebaceous hyperplasia      Irregularly shaped tan  macule c/w lentigo     1-2 mm smooth white papules consistent with Milia      Movable subcutaneous cyst with punctum c/w epidermal inclusion cyst      Subcutaneous movable cyst c/w pilar cyst      Firm pink to brown papule c/w dermatofibroma      Pedunculated fleshy papule(s) c/w skin tag(s)      Evenly pigmented macule c/w junctional nevus     Mildly variegated pigmented, slightly irregular-bordered macule c/w mildly atypical nevus      Flesh colored to evenly pigmented papule c/w intradermal nevus       Pink pearly papule/plaque c/w basal cell carcinoma      Erythematous hyperkeratotic cursted plaque c/w SCC      Surgical scar with no sign of skin cancer recurrence      Open and closed comedones      Inflammatory papules and pustules      Verrucoid papule consistent consistent with wart     Erythematous eczematous patches and plaques     Dystrophic onycholytic nail with subungual debris c/w onychomycosis     Umbilicated papule    Erythematous-base heme-crusted tan verrucoid plaque consistent with inflamed seborrheic keratosis     Erythematous Silvery Scaling Plaque c/w Psoriasis     See annotation  06/2025 03/2025 11/2024        Assessment / Plan:        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

## 2025-06-13 NOTE — PROGRESS NOTES
Madison Long is a 80 y.o. female who presents to clinic for follow up status post right index finger Dupuytren's contracture aponeurotomy procedure.  She is about 1 weeks status post procedure.  She had a subsequent skin tear, and has been follow up with me routinely for wound care.  States he has kept his dressing dry, clean, and intact as instructed.  Denies any other complaints at this time.    Physical Exam:  Examination of the right hand reveals an appropriately healing skin tear of the aponeurotomy site.  Able to flex extend the fingers appropriately.  Normal sensation of the fingers.  Capillary refill less than 2 seconds.    Radiology:  None.    Assessment:  Skin tear due to Dupuytren's contracture aponeurotomy procedure    Plan:  1. I discussed with Madison Long that he is progressing appropriately in the treatment course.  We discussed the best course of action this time is to perform daily wound washing and dressing changes with antibacterial soap and clean running water.  We did discuss the importance of keeping the wound is dry and cleaned as possible.  She verbally agreed with the treatment plan     2. Her right index finger wound was thoroughly cleaned in clinic today with chlorhexidine, and dressed with 4 x 4 gauze and Coban wrap.      3. She was then placed in a thermoplastic extension splint of the right index and right middle fingers.      4. I would like him to follow up in clinic in 1 week for repeat evaluation.  She was instructed to contact the clinic for any problems or concerns in the interim.

## 2025-06-13 NOTE — PATIENT INSTRUCTIONS
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

## 2025-06-18 DIAGNOSIS — E78.5 DYSLIPIDEMIA: ICD-10-CM

## 2025-06-18 DIAGNOSIS — I65.23 ATHEROSCLEROSIS OF BOTH CAROTID ARTERIES: ICD-10-CM

## 2025-06-18 RX ORDER — ROSUVASTATIN CALCIUM 40 MG/1
40 TABLET, COATED ORAL DAILY
Qty: 90 TABLET | Refills: 0 | Status: SHIPPED | OUTPATIENT
Start: 2025-06-18

## 2025-06-18 NOTE — TELEPHONE ENCOUNTER
No care due was identified.  [facility] Embedded Care Due Messages. Reference number: [id number].   6/18/2025 10:53:01 AM CDT

## 2025-06-18 NOTE — TELEPHONE ENCOUNTER
Refill Decision Note   Madison Mercedes  is requesting a refill authorization.  Brief Assessment and Rationale for Refill:  Approve     Medication Therapy Plan:        Comments:     Note composed:12:05 PM 06/18/2025

## 2025-06-18 NOTE — TELEPHONE ENCOUNTER
Copied from CRM #[id number]. Topic: Medications - Medication Refill  >> Jun 18, 2025 10:28 AM Melissa wrote:  Type:  Needs Medical Advice    Who Called: self  Symptoms (please be specific): pt is needing a refill on rx, rosuvastatin (CRESTOR) 40 MG Tab    Pharmacy name and phone #:    [id number] - [place], [place] - [address] AT [facility] [address]  [address] [place] [address]  Phone: [phone] Fax: [phone]    Would the patient rather a call back or a response via [facility]? call  Best Call Back Number: [phone] (home)    Additional Information: please advise and thank you.

## 2025-06-20 ENCOUNTER — OFFICE VISIT (OUTPATIENT)
Dept: ORTHOPEDICS | Facility: CLINIC | Age: 80
End: 2025-06-20
Payer: MEDICARE

## 2025-06-20 DIAGNOSIS — M72.0 DUPUYTREN'S DISEASE OF BOTH PALM AND FINGER WITH CONTRACTURE: Primary | ICD-10-CM

## 2025-06-20 PROBLEM — M25.641 JOINT STIFFNESS OF HAND, RIGHT: Status: RESOLVED | Noted: 2025-06-06 | Resolved: 2025-06-20

## 2025-06-20 PROCEDURE — 99999 PR PBB SHADOW E&M-EST. PATIENT-LVL III: CPT | Mod: PBBFAC,HCNC,, | Performed by: PHYSICIAN ASSISTANT

## 2025-06-20 NOTE — PROGRESS NOTES
Madison Long is a 80 y.o. female who presents to clinic for follow up status post right index finger Dupuytren's contracture aponeurotomy with the associated skin tear.  States overall she is doing very well.  States that has skin tear has scabbed over in his nearly healed.  Denies acute injuries since last visit.  States he has worn the thermoplastic splint as instructed.  Denies any other complaints at this time.    Physical Exam:  Examination of the right hand reveals a nearly healed skin tear with the appropriate eschar formation noted.  Able to flex extend the fingers appropriately.  No significant Dupuytren's flexion contracture noted.  Normal sensation in the radial, ulnar, and median nerve distributions.  Capillary refill less than 2 seconds.    Radiology:  None.    Assessment:  Status post right index finger Dupuytren's contracture aponeurotomy    Plan:  1. I discussed with Madison Long that he is progressing appropriately in the postprocedural course.  We discussed the best course of action this time is transition to wearing the thermoplastic splint at night only for the next 2 weeks.  She verbally agreed with the treatment plan     2. I would like to have her follow up in clinic in 2 weeks for repeat evaluation.  She was instructed to contact the clinic for any problems or concerns in the interim.

## 2025-06-23 NOTE — PROGRESS NOTES
Post-Op Assessment Note    CV Status:  Stable  Pain Score: 0    Pain management: adequate       Mental Status:  Alert   Hydration Status:  Stable   PONV Controlled:  None   Airway Patency:  Patent  Two or more mitigation strategies used for obstructive sleep apnea   Post Op Vitals Reviewed: Yes    No anethesia notable event occurred.    Staff: CRNA           Last Filed PACU Vitals:  Vitals Value Taken Time   Temp     Pulse 52    /70    Resp 16    SpO2 99                Subjective:       Patient ID: Madison Long is a 77 y.o. female.  Chief Complaint: Annual Exam     HPI    Here for routine health maintenance.      Had < 40% blockage of b/l carotid artery 2017     HLD - uncontrolled; LDL goal < 70, htn; taking Crestor 10 mg qd  Recall: had severe LE cramps.  Stopped Lipitor 1 month ago and cramps resolved after 5-6 days.       GERD - mostly when drinks port wine     Asthma - controlled with daily Symbicort     4 eye surgeries in past. Dr Sotelo  HTN - controlled     COPD - no sob;      Insomnia - 1- 2 trazodone works most nights; stopped her Ambien      ALEX - controlled      Atrophic vaginitis - estrogen cream used for 1-2 weeks and then will take a break.  due for mammogram 3/23/19; rx originally by urology Dr Hughes.      Keratosis - hereditary.  Wants to change to our derm.      Osteopenia - on vit D and calcium     Assessment:       1. Routine physical examination    2. Essential hypertension    3. Dyslipidemia    4. Centrilobular emphysema    5. Encounter for screening mammogram for breast cancer          Plan:       Routine physical examination    Essential hypertension  -     Comprehensive Metabolic Panel; Future; Expected date: 08/13/2023    Dyslipidemia  -     Lipid Panel; Future; Expected date: 08/13/2023    Centrilobular emphysema  -     CBC Auto Differential; Future; Expected date: 08/13/2023    Encounter for screening mammogram for breast cancer              Wellness reviewed  Wants wait until next visit to order mammo   Continue current management and monitor.  Other diagnoses were reviewed and found stable and will continue to monitor.  Counseled on regular exercise, maintenance of a healthy weight, balanced diet rich in fruits/vegetables and lean protein, and avoidance of unhealthy habits like smoking and excessive alcohol intake.   Also, counseled on importance of being compliant with medication, health appointments, diet and exercise.     Follow up in about 6  months (around 8/14/2023).      Medication List with Changes/Refills   Current Medications    ACETAMINOPHEN (TYLENOL) 500 MG TABLET    Take 500 mg by mouth every 6 (six) hours as needed for Pain.    ALBUTEROL (PROVENTIL/VENTOLIN HFA) 90 MCG/ACTUATION INHALER    Inhale 2 puffs into the lungs every 6 (six) hours as needed. Rescue    AMMONIUM LACTATE (LAC-HYDRIN) 12 % LOTION    Apply to dry skin daily to BID    ASPIRIN 81 MG CHEW    Take 81 mg by mouth daily as needed. Usually takes about every 2 weeks.    AZELASTINE (ASTELIN) 137 MCG (0.1 %) NASAL SPRAY    1 spray (137 mcg total) by Nasal route 2 (two) times daily. for 14 days    CETIRIZINE (ZYRTEC) 10 MG TABLET    Take 1 tablet (10 mg total) by mouth once daily.    DORZOLAMIDE-TIMOLOL, PF, (COSOPT PF) 2-0.5 % DPET OPHTHALMIC SOLUTION        ECONAZOLE NITRATE 1 % CREAM    Apply topically 2 (two) times daily as needed. Twice a day  PRN    FAMOTIDINE (PEPCID) 40 MG TABLET    Take 1 tablet (40 mg total) by mouth once daily.    FISH OIL-OMEGA-3 FATTY ACIDS 300-1,000 MG CAPSULE    Take 1 g by mouth every morning.     FLUCONAZOLE (DIFLUCAN) 200 MG TAB    Take 1 tab PO qweek.    FLUOXETINE 10 MG TAB    Take 1 tablet (10 mg total) by mouth once daily.    FLUTICASONE (FLONASE) 50 MCG/ACTUATION NASAL SPRAY    SPRAY 2 SPRAYS IN EACH NOSTRIL EVERY DAY    GUAIFENESIN (MUCINEX) 600 MG 12 HR TABLET    Take 1,200 mg by mouth 2 (two) times daily as needed.     IBUPROFEN/DIPHENHYDRAMINE CIT (ADVIL PM ORAL)    Take by mouth.    LOSARTAN (COZAAR) 50 MG TABLET    TAKE 1 TABLET(50 MG) BY MOUTH EVERY MORNING    MULTIVIT,STRESS FORMULA-ZINC TAB    Take 1 tablet by mouth every morning. Uses Alive    OMEPRAZOLE (PRILOSEC) 20 MG CAPSULE    TAKE 1 CAPSULE(20 MG) BY MOUTH EVERY DAY    PSEUDOEPHEDRINE (SUDAFED) 30 MG TABLET    Take 30 mg by mouth every 4 (four) hours as needed for Congestion.    ROSUVASTATIN (CRESTOR) 40 MG TAB    Take 1 tablet (40 mg total) by mouth once daily.    SODIUM CHLORIDE  (OCEAN) 0.65 % NASAL SPRAY    1 spray by Nasal route as needed for Congestion.    SYMBICORT 160-4.5 MCG/ACTUATION HFAA    INHALE 2 PUFFS BY MOUTH TWICE DAILY    TRAZODONE (DESYREL) 50 MG TABLET    TAKE 1 TABLET BY MOUTH EVERY NIGHT AS NEEDED FOR INSOMNIA, MAY TAKE UP TO 2 TABLETS EVERY NIGHT AS NEEDED    ZINC GLUCONATE (COLD-EEZE ORAL)    Take 1 lozenge by mouth daily as needed.       BP Readings from Last 3 Encounters:   02/14/23 136/86   10/09/22 (!) 145/95   08/16/22 122/84     No results found for: HGBA1C  Lab Results   Component Value Date    TSH 1.766 01/10/2018     Lab Results   Component Value Date    LDLCALC 80.8 02/07/2023    LDLCALC 92.0 08/08/2022    LDLCALC 91.4 02/08/2022     Lab Results   Component Value Date    TRIG 56 02/07/2023    TRIG 75 08/08/2022    TRIG 63 02/08/2022     Wt Readings from Last 3 Encounters:   02/14/23 74 kg (163 lb 4 oz)   10/27/22 75.3 kg (166 lb)   10/09/22 75.3 kg (166 lb)     Lab Results   Component Value Date    HGB 15.3 08/08/2022    HCT 44.8 08/08/2022    WBC 7.52 08/08/2022    ALT 21 02/07/2023    AST 27 02/07/2023     02/07/2023    K 4.6 02/07/2023    CREATININE 0.9 02/07/2023           Review of Systems   Constitutional:  Negative for diaphoresis and fever.   HENT:  Negative for drooling and nosebleeds.    Eyes:  Negative for discharge and redness.   Respiratory:  Negative for apnea and choking.    Cardiovascular:  Negative for chest pain and palpitations.   Gastrointestinal:  Negative for abdominal pain and nausea.   Skin:  Negative for color change.   Neurological:  Negative for seizures and syncope.   Psychiatric/Behavioral:  Negative for behavioral problems.          Objective:      Vitals:    02/14/23 1103   BP: 136/86   Pulse: 87   Temp: 98.5 °F (36.9 °C)     Physical Exam  Vitals reviewed.   Eyes:      Conjunctiva/sclera: Conjunctivae normal.   Neck:      Thyroid: No thyromegaly.      Trachea: Trachea normal.   Cardiovascular:      Rate and Rhythm: Normal  rate and regular rhythm.      Comments: Edema negative  Pulmonary:      Effort: Pulmonary effort is normal.      Breath sounds: Normal breath sounds.   Abdominal:      General: Bowel sounds are normal.      Palpations: Abdomen is soft. There is no hepatomegaly.   Musculoskeletal:      Cervical back: Normal range of motion.      Comments: ROM normal bilateral  Strength normal bilateral   Skin:     General: Skin is warm and dry.   Neurological:      Mental Status: She is alert.      Deep Tendon Reflexes: Reflexes are normal and symmetric.      Comments: DTR decreased bilateral   Psychiatric:      Comments: Alert and Oriented

## 2025-06-27 ENCOUNTER — TELEPHONE (OUTPATIENT)
Facility: CLINIC | Age: 80
End: 2025-06-27
Payer: MEDICARE

## 2025-06-27 NOTE — TELEPHONE ENCOUNTER
Copied from CRM #[id number]. Topic: General Inquiry - Patient Advice  >> Jun 27, 2025 11:42 AM Ciera wrote:  Type:  Needs Medical Advice    Who Called: pt    Would the patient rather a call back or a response via [facility]?  Call back    Best Call Back Number:  [phone]    Additional Information: is using the cream (fluorouracil) on her face for a week and now nose is blistering and bled in a few area    Wants to see you before using any more cream.     Please call to advise    Thanks

## 2025-07-08 ENCOUNTER — OFFICE VISIT (OUTPATIENT)
Dept: DERMATOLOGY | Facility: CLINIC | Age: 80
End: 2025-07-08
Payer: MEDICARE

## 2025-07-08 DIAGNOSIS — Z86.007 HISTORY OF SQUAMOUS CELL CARCINOMA IN SITU: ICD-10-CM

## 2025-07-08 DIAGNOSIS — T49.95XA DERMATITIS MEDICAMENTOSA DUE TO DRUG APPLIED TO SKIN: Primary | ICD-10-CM

## 2025-07-08 DIAGNOSIS — L90.5 SCAR: ICD-10-CM

## 2025-07-08 DIAGNOSIS — L25.1 DERMATITIS MEDICAMENTOSA DUE TO DRUG APPLIED TO SKIN: Primary | ICD-10-CM

## 2025-07-08 PROCEDURE — 1101F PT FALLS ASSESS-DOCD LE1/YR: CPT | Mod: CPTII,S$GLB,, | Performed by: STUDENT IN AN ORGANIZED HEALTH CARE EDUCATION/TRAINING PROGRAM

## 2025-07-08 PROCEDURE — 99212 OFFICE O/P EST SF 10 MIN: CPT | Mod: S$GLB,,, | Performed by: STUDENT IN AN ORGANIZED HEALTH CARE EDUCATION/TRAINING PROGRAM

## 2025-07-08 PROCEDURE — 1157F ADVNC CARE PLAN IN RCRD: CPT | Mod: CPTII,S$GLB,, | Performed by: STUDENT IN AN ORGANIZED HEALTH CARE EDUCATION/TRAINING PROGRAM

## 2025-07-08 PROCEDURE — 1126F AMNT PAIN NOTED NONE PRSNT: CPT | Mod: CPTII,S$GLB,, | Performed by: STUDENT IN AN ORGANIZED HEALTH CARE EDUCATION/TRAINING PROGRAM

## 2025-07-08 PROCEDURE — 1159F MED LIST DOCD IN RCRD: CPT | Mod: CPTII,S$GLB,, | Performed by: STUDENT IN AN ORGANIZED HEALTH CARE EDUCATION/TRAINING PROGRAM

## 2025-07-08 PROCEDURE — 3288F FALL RISK ASSESSMENT DOCD: CPT | Mod: CPTII,S$GLB,, | Performed by: STUDENT IN AN ORGANIZED HEALTH CARE EDUCATION/TRAINING PROGRAM

## 2025-07-08 PROCEDURE — 1160F RVW MEDS BY RX/DR IN RCRD: CPT | Mod: CPTII,S$GLB,, | Performed by: STUDENT IN AN ORGANIZED HEALTH CARE EDUCATION/TRAINING PROGRAM

## 2025-07-08 NOTE — PROGRESS NOTES
Subjective:      Patient ID:  Madison Long is a 80 y.o. female who presents for   Chief Complaint   Patient presents with    Follow-up     SCCIS     LOV 6/13/25 Juan Ramon     Patient here today for f/u on SCCIS on nasal supratip. Treated with efudex for one week due to robust reaction. Could not tolerate longer. Repeated another treatment approx 2 weeks ago - could only tolerate one week, robust reaction.    11/2024 with  E:  1. Skin, nasal supratip, shave biopsy:  - SQUAMOUS CELL CARCINOMA IN SITU.  - MARGINS ARE NEGATIVE IN THE PLANES OF SECTION EXAMINED.        Derm Hx:  SCCIS - nasal supratip, treated with efudex 11/2024 BID x 1 week, could not tolerate longer course  +BCC on right cheek (lateral lower eyelid) treated by Mohs (Dr Hooper 2013)  Has fhx of MM-sister          Review of Systems   Constitutional:  Negative for fever and chills.   HENT:  Negative for congestion and sore throat.    Respiratory:  Negative for cough and shortness of breath.    Skin:  Positive for activity-related sunscreen use and sensitivity to antibiotic ointment. Negative for itching, rash, dry skin, daily sunscreen use and sensitivity to bandage adhesive.   Hematologic/Lymphatic: Bruises/bleeds easily.       Objective:   Physical Exam   Constitutional: She appears well-developed and well-nourished.   Neurological: She is alert and oriented to person, place, and time.   Psychiatric: She has a normal mood and affect.   Skin:   Areas Examined (abnormalities noted in diagram):   Head / Face Inspection Performed       Diagram Legend     Erythematous scaling macule/papule c/w actinic keratosis       Vascular papule c/w angioma      Pigmented verrucoid papule/plaque c/w seborrheic keratosis      Yellow umbilicated papule c/w sebaceous hyperplasia      Irregularly shaped tan macule c/w lentigo     1-2 mm smooth white papules consistent with Milia      Movable subcutaneous cyst with punctum c/w epidermal inclusion cyst       Subcutaneous movable cyst c/w pilar cyst      Firm pink to brown papule c/w dermatofibroma      Pedunculated fleshy papule(s) c/w skin tag(s)      Evenly pigmented macule c/w junctional nevus     Mildly variegated pigmented, slightly irregular-bordered macule c/w mildly atypical nevus      Flesh colored to evenly pigmented papule c/w intradermal nevus       Pink pearly papule/plaque c/w basal cell carcinoma      Erythematous hyperkeratotic cursted plaque c/w SCC      Surgical scar with no sign of skin cancer recurrence      Open and closed comedones      Inflammatory papules and pustules      Verrucoid papule consistent consistent with wart     Erythematous eczematous patches and plaques     Dystrophic onycholytic nail with subungual debris c/w onychomycosis     Umbilicated papule    Erythematous-base heme-crusted tan verrucoid plaque consistent with inflamed seborrheic keratosis     Erythematous Silvery Scaling Plaque c/w Psoriasis     See annotation      Today            Assessment / Plan:        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   Good response, still healing, continue vaseline, sunscreen, avoid efudex for now  Still with some scaling which may be related to brisk response.   Will have area rechecked 8/19 w/ Dr. PITTS           No follow-ups on file.

## 2025-07-15 DIAGNOSIS — J44.9 CHRONIC OBSTRUCTIVE PULMONARY DISEASE, UNSPECIFIED COPD TYPE: ICD-10-CM

## 2025-07-15 RX ORDER — ALBUTEROL SULFATE 90 UG/1
2 INHALANT RESPIRATORY (INHALATION) EVERY 6 HOURS PRN
Qty: 54 G | Refills: 0 | Status: SHIPPED | OUTPATIENT
Start: 2025-07-15

## 2025-07-15 NOTE — TELEPHONE ENCOUNTER
Copied from CRM #[id number]. Topic: Medications - Medication Refill  >> Jul 15, 2025  1:10 PM Melyssa wrote:  Type:  RX Refill Request    Who Called: pt  Refill or New Rx:refill  RX Name and Strength:albuterol (PROVENTIL/VENTOLIN HFA) 90 mcg/actuation inhaler  How is the patient currently taking it? (ex. 1XDay):as directed  Is this a 30 day or 90 day RX:90  Preferred Pharmacy with phone number:  [facility] #[id number] - [place], [place] - [address] [facility] [address]  [address]  [facility] [address]  Phone: [phone] Fax: [phone]      Local or Mail Order:local  Ordering Provider:Nba  Would the patient rather a call back or a response via [facility]? call  Best Call Back Number:[phone]    Additional Information:

## 2025-07-15 NOTE — TELEPHONE ENCOUNTER
No care due was identified.  [facility] Embedded Care Due Messages. Reference number: [id number].   7/15/2025 2:42:03 PM CDT

## 2025-07-16 NOTE — TELEPHONE ENCOUNTER
Refill Decision Note   Madison Mercedes  is requesting a refill authorization.  Brief Assessment and Rationale for Refill:  Approve     Medication Therapy Plan:        Comments:     Note composed:11:45 PM 07/15/2025

## 2025-08-08 ENCOUNTER — TELEPHONE (OUTPATIENT)
Dept: PHARMACY | Facility: CLINIC | Age: 80
End: 2025-08-08
Payer: MEDICARE

## 2025-08-08 NOTE — TELEPHONE ENCOUNTER
[facility]Population [facility] Chart Review & Patient Outreach Details For Medication Adherence Project    Reason for Outreach Encounter: 3rd Party payor non-compliance report ([employer], [employer], [employer], etc)  2.  Patient Outreach Method: Reviewed patient chart   3.   Medication in question:    Hypertension Medications              losartan (COZAAR) 50 MG tablet TAKE 1 TABLET(50 MG) BY MOUTH EVERY MORNING                 losartan  last filled  7/24 for 90 day supply      4.  Reviewed and or Updates Made To: Patient Chart  5. Outreach Outcomes and/or actions taken: Patient filled medication and is on track to be adherent  Additional Notes:

## 2025-08-19 ENCOUNTER — OFFICE VISIT (OUTPATIENT)
Dept: DERMATOLOGY | Facility: CLINIC | Age: 80
End: 2025-08-19
Payer: MEDICARE

## 2025-08-19 VITALS — WEIGHT: 165 LBS | HEIGHT: 66 IN | BODY MASS INDEX: 26.52 KG/M2

## 2025-08-19 DIAGNOSIS — Z08 ENCOUNTER FOR FOLLOW-UP SURVEILLANCE OF SKIN CANCER: ICD-10-CM

## 2025-08-19 DIAGNOSIS — L57.8 ACTINIC SKIN DAMAGE: ICD-10-CM

## 2025-08-19 DIAGNOSIS — L56.5: ICD-10-CM

## 2025-08-19 DIAGNOSIS — L81.4 SOLAR LENTIGO: ICD-10-CM

## 2025-08-19 DIAGNOSIS — L30.4 INTERTRIGO: ICD-10-CM

## 2025-08-19 DIAGNOSIS — D18.01 CHERRY ANGIOMA: ICD-10-CM

## 2025-08-19 DIAGNOSIS — Z85.828 ENCOUNTER FOR FOLLOW-UP SURVEILLANCE OF SKIN CANCER: ICD-10-CM

## 2025-08-19 DIAGNOSIS — L82.1 SEBORRHEIC KERATOSES: ICD-10-CM

## 2025-08-19 DIAGNOSIS — L57.0 ACTINIC KERATOSES: Primary | ICD-10-CM

## 2025-08-19 PROCEDURE — 17003 DESTRUCT PREMALG LES 2-14: CPT | Mod: S$GLB,,, | Performed by: DERMATOLOGY

## 2025-08-19 PROCEDURE — 1126F AMNT PAIN NOTED NONE PRSNT: CPT | Mod: CPTII,S$GLB,, | Performed by: DERMATOLOGY

## 2025-08-19 PROCEDURE — 99213 OFFICE O/P EST LOW 20 MIN: CPT | Mod: 25,S$GLB,, | Performed by: DERMATOLOGY

## 2025-08-19 PROCEDURE — 1159F MED LIST DOCD IN RCRD: CPT | Mod: CPTII,S$GLB,, | Performed by: DERMATOLOGY

## 2025-08-19 PROCEDURE — 3288F FALL RISK ASSESSMENT DOCD: CPT | Mod: CPTII,S$GLB,, | Performed by: DERMATOLOGY

## 2025-08-19 PROCEDURE — 17000 DESTRUCT PREMALG LESION: CPT | Mod: S$GLB,,, | Performed by: DERMATOLOGY

## 2025-08-19 PROCEDURE — 1157F ADVNC CARE PLAN IN RCRD: CPT | Mod: CPTII,S$GLB,, | Performed by: DERMATOLOGY

## 2025-08-19 PROCEDURE — 1160F RVW MEDS BY RX/DR IN RCRD: CPT | Mod: CPTII,S$GLB,, | Performed by: DERMATOLOGY

## 2025-08-19 PROCEDURE — 1101F PT FALLS ASSESS-DOCD LE1/YR: CPT | Mod: CPTII,S$GLB,, | Performed by: DERMATOLOGY

## 2025-08-19 RX ORDER — KETOCONAZOLE 20 MG/G
CREAM TOPICAL
Qty: 30 G | Refills: 3 | Status: SHIPPED | OUTPATIENT
Start: 2025-08-19

## (undated) DEVICE — CORD BPLR SGL USE DISP STRL

## (undated) DEVICE — LENS VITRCTMY OPHTH 30DEG 59DE

## (undated) DEVICE — GOWN SURGICAL X-LARGE

## (undated) DEVICE — SYR DISP LL 5CC

## (undated) DEVICE — SILICON 1000 8.5 ML

## (undated) DEVICE — NDL 22GA X1 1/2 REG BEVEL

## (undated) DEVICE — SHEILD & GARTERS FOX METAL EYE

## (undated) DEVICE — PENCIL BIPOLAR 25G STR TIP

## (undated) DEVICE — COVER MAYO STAND REINFRCD 30

## (undated) DEVICE — KNIFE OPHTH MICRO UNITOME 5MM

## (undated) DEVICE — SYRINGE 30CC LL W/O NDL

## (undated) DEVICE — PROBE ILLUM FLEX CURVE LASER

## (undated) DEVICE — SEE MEDLINE ITEM 157131

## (undated) DEVICE — BLADE CLEFT PALATE BEAVER

## (undated) DEVICE — KIT GREY EYE

## (undated) DEVICE — TRAY MUSCLE LID EYE

## (undated) DEVICE — SOL GONAK

## (undated) DEVICE — NDL HYPO A BEVEL 22X1 1/2

## (undated) DEVICE — KIT MEROCEL INSTRUMENT WIPE

## (undated) DEVICE — CORD FOR BIPOLAR FORCEPS 12

## (undated) DEVICE — PACK TOTAL PLUS 25G VITRECTOMY

## (undated) DEVICE — PACK INSTRUMENT COVER DISPO

## (undated) DEVICE — DRESSING EYE OVAL LF

## (undated) DEVICE — SOL BETADINE 5%

## (undated) DEVICE — KIT PERFLUOROCARBON LIQUID

## (undated) DEVICE — SYR 1CC TB SG 27GX1/2

## (undated) DEVICE — SEE MEDLINE ITEM 146372

## (undated) DEVICE — SUT 7/0 18IN COATED VICRYL

## (undated) DEVICE — SOL BALANCED SALT 500ML

## (undated) DEVICE — BACKFLUSH 25GA SOFT-TIP DISP

## (undated) DEVICE — NEEDLE HYPODERMIC HUB LUER LOC

## (undated) DEVICE — FORCEP GRIESHABER MAXGRIP 25G

## (undated) DEVICE — PACK AUTO GAS FILL

## (undated) DEVICE — FORCEP GRASPING 25GA SMOOTH

## (undated) DEVICE — CANNULA OPTHALMIC SOF TIP 25G

## (undated) DEVICE — SOL WATER STRL IRR 1000ML

## (undated) DEVICE — SYR 10CC LUER LOCK

## (undated) DEVICE — PACK INJ VISC FLD 20G/23G SIL

## (undated) DEVICE — SOL BSS BALANCED SALT

## (undated) DEVICE — SYR 30CC LUER LOCK

## (undated) DEVICE — CLOSURE SKIN STERI STRIP 1/2X4

## (undated) DEVICE — BLADE SCALP SCLEROTOME SZ 57

## (undated) DEVICE — CONTAINER SPECIMEN STRL 4OZ

## (undated) DEVICE — SHIELD EYE METAL FOX 50/BX

## (undated) DEVICE — GLOVE BIOGEL ECLIPSE SZ 6.5

## (undated) DEVICE — SHIELD FOX W/GARTER

## (undated) DEVICE — CANNULA ANTERIOR CHAMBER 30G

## (undated) DEVICE — PROBE 25GA STRAIGHT LASER